# Patient Record
Sex: MALE | Race: WHITE | NOT HISPANIC OR LATINO | Employment: OTHER | URBAN - METROPOLITAN AREA
[De-identification: names, ages, dates, MRNs, and addresses within clinical notes are randomized per-mention and may not be internally consistent; named-entity substitution may affect disease eponyms.]

---

## 2017-01-02 ENCOUNTER — HOME CARE VISIT (OUTPATIENT)
Dept: HOME HEALTH SERVICES | Facility: HOME HEALTHCARE | Age: 57
End: 2017-01-02
Payer: MEDICAID

## 2017-01-02 VITALS — TEMPERATURE: 98.2 F | HEART RATE: 76 BPM | RESPIRATION RATE: 18 BRPM

## 2017-01-02 PROCEDURE — G0299 HHS/HOSPICE OF RN EA 15 MIN: HCPCS

## 2017-01-02 SDOH — ECONOMIC STABILITY: HOUSING INSECURITY: UNSAFE APPLIANCES: 0

## 2017-01-02 SDOH — ECONOMIC STABILITY: HOUSING INSECURITY: UNSAFE COOKING RANGE AREA: 0

## 2017-01-02 ASSESSMENT — ENCOUNTER SYMPTOMS
NAUSEA: DENIES
VOMITING: DENIES

## 2017-01-03 ENCOUNTER — HOME CARE VISIT (OUTPATIENT)
Dept: HOME HEALTH SERVICES | Facility: HOME HEALTHCARE | Age: 57
End: 2017-01-03
Payer: MEDICAID

## 2017-01-03 VITALS — TEMPERATURE: 98.5 F | DIASTOLIC BLOOD PRESSURE: 80 MMHG | SYSTOLIC BLOOD PRESSURE: 130 MMHG | HEART RATE: 70 BPM

## 2017-01-03 PROCEDURE — G0157 HHC PT ASSISTANT EA 15: HCPCS

## 2017-01-04 ENCOUNTER — HOME CARE VISIT (OUTPATIENT)
Dept: HOME HEALTH SERVICES | Facility: HOME HEALTHCARE | Age: 57
End: 2017-01-04
Payer: MEDICAID

## 2017-01-04 PROCEDURE — G0299 HHS/HOSPICE OF RN EA 15 MIN: HCPCS

## 2017-01-06 ENCOUNTER — HOME CARE VISIT (OUTPATIENT)
Dept: HOME HEALTH SERVICES | Facility: HOME HEALTHCARE | Age: 57
End: 2017-01-06
Payer: MEDICAID

## 2017-01-06 PROCEDURE — G0299 HHS/HOSPICE OF RN EA 15 MIN: HCPCS

## 2017-01-07 VITALS
DIASTOLIC BLOOD PRESSURE: 90 MMHG | RESPIRATION RATE: 18 BRPM | SYSTOLIC BLOOD PRESSURE: 160 MMHG | TEMPERATURE: 98.6 F | HEART RATE: 74 BPM

## 2017-01-07 ASSESSMENT — ENCOUNTER SYMPTOMS
VOMITING: DENIES
NAUSEA: DENIES

## 2017-01-08 ENCOUNTER — HOME CARE VISIT (OUTPATIENT)
Dept: HOME HEALTH SERVICES | Facility: HOME HEALTHCARE | Age: 57
End: 2017-01-08
Payer: MEDICAID

## 2017-01-08 VITALS
SYSTOLIC BLOOD PRESSURE: 140 MMHG | DIASTOLIC BLOOD PRESSURE: 90 MMHG | RESPIRATION RATE: 16 BRPM | HEART RATE: 89 BPM | TEMPERATURE: 98.6 F

## 2017-01-08 ASSESSMENT — ENCOUNTER SYMPTOMS
NAUSEA: DENIES
VOMITING: DENIES

## 2017-01-09 ENCOUNTER — HOME CARE VISIT (OUTPATIENT)
Dept: HOME HEALTH SERVICES | Facility: HOME HEALTHCARE | Age: 57
End: 2017-01-09
Payer: MEDICAID

## 2017-01-09 VITALS
RESPIRATION RATE: 14 BRPM | HEART RATE: 76 BPM | DIASTOLIC BLOOD PRESSURE: 82 MMHG | TEMPERATURE: 99.5 F | SYSTOLIC BLOOD PRESSURE: 130 MMHG

## 2017-01-09 PROCEDURE — G0299 HHS/HOSPICE OF RN EA 15 MIN: HCPCS

## 2017-01-09 PROCEDURE — A6214 FOAM DRG > 48 SQ IN W/BORDER: HCPCS

## 2017-01-09 PROCEDURE — A4385 OST SKN BARRIER SLD EXT WEAR: HCPCS

## 2017-01-09 PROCEDURE — A6253 ABSORPT DRG > 48 SQ IN W/O B: HCPCS

## 2017-01-09 SDOH — ECONOMIC STABILITY: HOUSING INSECURITY: UNSAFE COOKING RANGE AREA: 0

## 2017-01-09 SDOH — ECONOMIC STABILITY: HOUSING INSECURITY: UNSAFE APPLIANCES: 0

## 2017-01-09 SDOH — ECONOMIC STABILITY: HOUSING INSECURITY: HOME SAFETY: QUADRAPLEGIA

## 2017-01-09 ASSESSMENT — ENCOUNTER SYMPTOMS
NAUSEA: DENIES
VOMITING: DENIES

## 2017-01-10 ENCOUNTER — HOME CARE VISIT (OUTPATIENT)
Dept: HOME HEALTH SERVICES | Facility: HOME HEALTHCARE | Age: 57
End: 2017-01-10
Payer: MEDICAID

## 2017-01-10 ENCOUNTER — HOME CARE VISIT (OUTPATIENT)
Dept: HOME HEALTH SERVICES | Facility: HOME HEALTHCARE | Age: 57
End: 2017-01-10

## 2017-01-10 SDOH — ECONOMIC STABILITY: HOUSING INSECURITY: UNSAFE APPLIANCES: 0

## 2017-01-10 SDOH — ECONOMIC STABILITY: HOUSING INSECURITY: UNSAFE COOKING RANGE AREA: 0

## 2017-01-11 ENCOUNTER — HOME CARE VISIT (OUTPATIENT)
Dept: HOME HEALTH SERVICES | Facility: HOME HEALTHCARE | Age: 57
End: 2017-01-11
Payer: MEDICAID

## 2017-01-11 PROCEDURE — G0299 HHS/HOSPICE OF RN EA 15 MIN: HCPCS

## 2017-01-12 ENCOUNTER — HOME CARE VISIT (OUTPATIENT)
Dept: HOME HEALTH SERVICES | Facility: HOME HEALTHCARE | Age: 57
End: 2017-01-12
Payer: MEDICAID

## 2017-01-12 PROCEDURE — G0299 HHS/HOSPICE OF RN EA 15 MIN: HCPCS

## 2017-01-13 ENCOUNTER — HOME CARE VISIT (OUTPATIENT)
Dept: HOME HEALTH SERVICES | Facility: HOME HEALTHCARE | Age: 57
End: 2017-01-13
Payer: MEDICAID

## 2017-01-13 PROCEDURE — G0299 HHS/HOSPICE OF RN EA 15 MIN: HCPCS

## 2017-01-14 VITALS — HEART RATE: 95 BPM | TEMPERATURE: 98.8 F | RESPIRATION RATE: 18 BRPM

## 2017-01-14 ASSESSMENT — ENCOUNTER SYMPTOMS
VOMITING: DENIES
NAUSEA: DENIES

## 2017-01-15 VITALS — HEART RATE: 96 BPM | RESPIRATION RATE: 20 BRPM | TEMPERATURE: 98.2 F

## 2017-01-15 VITALS — TEMPERATURE: 98.6 F | RESPIRATION RATE: 18 BRPM | HEART RATE: 96 BPM

## 2017-01-15 ASSESSMENT — ENCOUNTER SYMPTOMS
DEPRESSED MOOD: 1
VOMITING: DENIES
NAUSEA: DENIES
VOMITING: DENIES
NAUSEA: DENIES

## 2017-01-16 ENCOUNTER — HOME CARE VISIT (OUTPATIENT)
Dept: HOME HEALTH SERVICES | Facility: HOME HEALTHCARE | Age: 57
End: 2017-01-16
Payer: MEDICAID

## 2017-01-16 PROCEDURE — G0299 HHS/HOSPICE OF RN EA 15 MIN: HCPCS

## 2017-01-17 VITALS — RESPIRATION RATE: 16 BRPM | TEMPERATURE: 98.6 F | HEART RATE: 96 BPM

## 2017-01-17 ASSESSMENT — ENCOUNTER SYMPTOMS
VOMITING: DENIES
NAUSEA: DENIES

## 2017-01-18 ENCOUNTER — HOME CARE VISIT (OUTPATIENT)
Dept: HOME HEALTH SERVICES | Facility: HOME HEALTHCARE | Age: 57
End: 2017-01-18
Payer: MEDICAID

## 2017-01-18 PROCEDURE — G0299 HHS/HOSPICE OF RN EA 15 MIN: HCPCS

## 2017-01-20 ENCOUNTER — HOME CARE VISIT (OUTPATIENT)
Dept: HOME HEALTH SERVICES | Facility: HOME HEALTHCARE | Age: 57
End: 2017-01-20
Payer: MEDICAID

## 2017-01-20 PROCEDURE — G0299 HHS/HOSPICE OF RN EA 15 MIN: HCPCS

## 2017-01-23 ENCOUNTER — HOME CARE VISIT (OUTPATIENT)
Dept: HOME HEALTH SERVICES | Facility: HOME HEALTHCARE | Age: 57
End: 2017-01-23
Payer: MEDICAID

## 2017-01-23 PROCEDURE — G0299 HHS/HOSPICE OF RN EA 15 MIN: HCPCS

## 2017-01-24 VITALS — TEMPERATURE: 98.6 F | RESPIRATION RATE: 16 BRPM | HEART RATE: 96 BPM

## 2017-01-24 ASSESSMENT — ENCOUNTER SYMPTOMS
NAUSEA: DENIES
VOMITING: DENIES

## 2017-01-25 ENCOUNTER — HOME CARE VISIT (OUTPATIENT)
Dept: HOME HEALTH SERVICES | Facility: HOME HEALTHCARE | Age: 57
End: 2017-01-25
Payer: MEDICAID

## 2017-01-25 VITALS
DIASTOLIC BLOOD PRESSURE: 70 MMHG | TEMPERATURE: 98.6 F | HEART RATE: 96 BPM | RESPIRATION RATE: 17 BRPM | SYSTOLIC BLOOD PRESSURE: 110 MMHG

## 2017-01-25 VITALS — RESPIRATION RATE: 20 BRPM | TEMPERATURE: 97.7 F | HEART RATE: 93 BPM

## 2017-01-25 ASSESSMENT — ENCOUNTER SYMPTOMS
VOMITING: DENIES
NAUSEA: DENIES
VOMITING: DENIES
NAUSEA: DENIES

## 2017-01-27 ENCOUNTER — HOME CARE VISIT (OUTPATIENT)
Dept: HOME HEALTH SERVICES | Facility: HOME HEALTHCARE | Age: 57
End: 2017-01-27
Payer: MEDICAID

## 2017-01-27 VITALS
HEART RATE: 100 BPM | SYSTOLIC BLOOD PRESSURE: 100 MMHG | TEMPERATURE: 98.2 F | RESPIRATION RATE: 18 BRPM | DIASTOLIC BLOOD PRESSURE: 60 MMHG

## 2017-01-27 PROCEDURE — A4385 OST SKN BARRIER SLD EXT WEAR: HCPCS

## 2017-01-27 PROCEDURE — G0299 HHS/HOSPICE OF RN EA 15 MIN: HCPCS

## 2017-01-27 SDOH — ECONOMIC STABILITY: HOUSING INSECURITY: UNSAFE APPLIANCES: 0

## 2017-01-27 SDOH — ECONOMIC STABILITY: HOUSING INSECURITY: UNSAFE COOKING RANGE AREA: 0

## 2017-01-27 ASSESSMENT — ENCOUNTER SYMPTOMS
NAUSEA: DENIES
VOMITING: DENIES
MENTAL STATUS CHANGE: 0

## 2017-01-27 ASSESSMENT — ACTIVITIES OF DAILY LIVING (ADL): TRANSPORTATION COMMENTS: QUADRIPLEGIA

## 2017-01-30 ENCOUNTER — HOME CARE VISIT (OUTPATIENT)
Dept: HOME HEALTH SERVICES | Facility: HOME HEALTHCARE | Age: 57
End: 2017-01-30
Payer: MEDICAID

## 2017-01-30 VITALS
RESPIRATION RATE: 18 BRPM | HEART RATE: 72 BPM | TEMPERATURE: 98.9 F | DIASTOLIC BLOOD PRESSURE: 80 MMHG | SYSTOLIC BLOOD PRESSURE: 120 MMHG

## 2017-01-30 PROCEDURE — A6214 FOAM DRG > 48 SQ IN W/BORDER: HCPCS

## 2017-01-30 PROCEDURE — G0299 HHS/HOSPICE OF RN EA 15 MIN: HCPCS

## 2017-01-30 SDOH — ECONOMIC STABILITY: HOUSING INSECURITY: UNSAFE COOKING RANGE AREA: 0

## 2017-01-30 SDOH — ECONOMIC STABILITY: HOUSING INSECURITY: UNSAFE APPLIANCES: 0

## 2017-01-30 ASSESSMENT — ENCOUNTER SYMPTOMS
NAUSEA: DENIES
VOMITING: DENIES

## 2017-02-02 ENCOUNTER — HOME CARE VISIT (OUTPATIENT)
Dept: HOME HEALTH SERVICES | Facility: HOME HEALTHCARE | Age: 57
End: 2017-02-02
Payer: MEDICAID

## 2017-02-02 VITALS
HEART RATE: 88 BPM | RESPIRATION RATE: 18 BRPM | SYSTOLIC BLOOD PRESSURE: 100 MMHG | DIASTOLIC BLOOD PRESSURE: 70 MMHG | TEMPERATURE: 98.5 F

## 2017-02-02 PROCEDURE — G0299 HHS/HOSPICE OF RN EA 15 MIN: HCPCS

## 2017-02-02 PROCEDURE — A6441 PAD BAND W>=3" <5"/YD: HCPCS

## 2017-02-02 PROCEDURE — A6214 FOAM DRG > 48 SQ IN W/BORDER: HCPCS

## 2017-02-02 PROCEDURE — A6216 NON-STERILE GAUZE<=16 SQ IN: HCPCS

## 2017-02-02 SDOH — ECONOMIC STABILITY: HOUSING INSECURITY: UNSAFE COOKING RANGE AREA: 0

## 2017-02-02 SDOH — ECONOMIC STABILITY: HOUSING INSECURITY: UNSAFE APPLIANCES: 0

## 2017-02-02 ASSESSMENT — ENCOUNTER SYMPTOMS
VOMITING: DENIES
NAUSEA: DENIES

## 2017-02-03 ENCOUNTER — HOME CARE VISIT (OUTPATIENT)
Dept: HOME HEALTH SERVICES | Facility: HOME HEALTHCARE | Age: 57
End: 2017-02-03
Payer: MEDICAID

## 2017-02-07 ENCOUNTER — HOME CARE VISIT (OUTPATIENT)
Dept: HOME HEALTH SERVICES | Facility: HOME HEALTHCARE | Age: 57
End: 2017-02-07
Payer: MEDICAID

## 2017-02-08 ENCOUNTER — HOME CARE VISIT (OUTPATIENT)
Dept: HOME HEALTH SERVICES | Facility: HOME HEALTHCARE | Age: 57
End: 2017-02-08
Payer: MEDICAID

## 2017-02-09 ENCOUNTER — HOME CARE VISIT (OUTPATIENT)
Dept: HOME HEALTH SERVICES | Facility: HOME HEALTHCARE | Age: 57
End: 2017-02-09
Payer: MEDICAID

## 2017-02-09 VITALS
RESPIRATION RATE: 18 BRPM | SYSTOLIC BLOOD PRESSURE: 140 MMHG | DIASTOLIC BLOOD PRESSURE: 80 MMHG | TEMPERATURE: 208.8 F | HEART RATE: 98 BPM

## 2017-02-09 PROCEDURE — A6214 FOAM DRG > 48 SQ IN W/BORDER: HCPCS

## 2017-02-09 PROCEDURE — G0299 HHS/HOSPICE OF RN EA 15 MIN: HCPCS

## 2017-02-09 SDOH — ECONOMIC STABILITY: HOUSING INSECURITY: UNSAFE APPLIANCES: 0

## 2017-02-09 SDOH — ECONOMIC STABILITY: HOUSING INSECURITY: UNSAFE COOKING RANGE AREA: 0

## 2017-02-09 ASSESSMENT — ENCOUNTER SYMPTOMS
NAUSEA: DENIES
VOMITING: DENIES

## 2017-02-14 ENCOUNTER — HOME CARE VISIT (OUTPATIENT)
Dept: HOME HEALTH SERVICES | Facility: HOME HEALTHCARE | Age: 57
End: 2017-02-14
Payer: MEDICAID

## 2017-02-14 VITALS
RESPIRATION RATE: 16 BRPM | TEMPERATURE: 98.4 F | HEART RATE: 80 BPM | SYSTOLIC BLOOD PRESSURE: 130 MMHG | DIASTOLIC BLOOD PRESSURE: 80 MMHG

## 2017-02-14 PROCEDURE — A6214 FOAM DRG > 48 SQ IN W/BORDER: HCPCS

## 2017-02-14 PROCEDURE — G0299 HHS/HOSPICE OF RN EA 15 MIN: HCPCS

## 2017-02-14 SDOH — ECONOMIC STABILITY: HOUSING INSECURITY: UNSAFE COOKING RANGE AREA: 0

## 2017-02-14 SDOH — ECONOMIC STABILITY: HOUSING INSECURITY: UNSAFE APPLIANCES: 0

## 2017-02-14 SDOH — ECONOMIC STABILITY: HOUSING INSECURITY: HOME SAFETY: QUADRIPLEGIA. LIVES WITH FAMILY. CAREGIVER IN AM SIX DAYS A WEEK.

## 2017-02-14 ASSESSMENT — ENCOUNTER SYMPTOMS
NAUSEA: DENIES
VOMITING: DENIES

## 2017-02-15 ENCOUNTER — HOME CARE VISIT (OUTPATIENT)
Dept: HOME HEALTH SERVICES | Facility: HOME HEALTHCARE | Age: 57
End: 2017-02-15
Payer: MEDICAID

## 2017-02-16 ENCOUNTER — HOME CARE VISIT (OUTPATIENT)
Dept: HOME HEALTH SERVICES | Facility: HOME HEALTHCARE | Age: 57
End: 2017-02-16
Payer: MEDICAID

## 2017-02-16 ENCOUNTER — HOME CARE VISIT (OUTPATIENT)
Dept: HOME HEALTH SERVICES | Facility: HOME HEALTHCARE | Age: 57
End: 2017-02-16

## 2017-02-16 PROCEDURE — A6441 PAD BAND W>=3" <5"/YD: HCPCS

## 2017-02-16 PROCEDURE — A4385 OST SKN BARRIER SLD EXT WEAR: HCPCS

## 2017-02-16 PROCEDURE — A6250 SKIN SEAL PROTECT MOISTURIZR: HCPCS

## 2017-02-16 PROCEDURE — G0162 HHC RN E&M PLAN SVS, 15 MIN: HCPCS

## 2017-02-16 PROCEDURE — A4554 DISPOSABLE UNDERPADS: HCPCS

## 2017-02-16 PROCEDURE — A6214 FOAM DRG > 48 SQ IN W/BORDER: HCPCS

## 2017-02-17 VITALS
DIASTOLIC BLOOD PRESSURE: 80 MMHG | TEMPERATURE: 98.1 F | SYSTOLIC BLOOD PRESSURE: 110 MMHG | RESPIRATION RATE: 16 BRPM | HEART RATE: 72 BPM

## 2017-02-17 SDOH — ECONOMIC STABILITY: HOUSING INSECURITY: HOME SAFETY: LIVES WITH BROTHER AND FAMILY.

## 2017-02-17 SDOH — ECONOMIC STABILITY: HOUSING INSECURITY: UNSAFE APPLIANCES: 0

## 2017-02-17 SDOH — ECONOMIC STABILITY: HOUSING INSECURITY: UNSAFE COOKING RANGE AREA: 0

## 2017-02-17 ASSESSMENT — ACTIVITIES OF DAILY LIVING (ADL)
HOME_HEALTH_OASIS: 01
OASIS_M1830: 06

## 2017-03-06 ENCOUNTER — RESOLUTE PROFESSIONAL BILLING HOSPITAL PROF FEE (OUTPATIENT)
Dept: HOSPITALIST | Facility: MEDICAL CENTER | Age: 57
End: 2017-03-06
Payer: MEDICAID

## 2017-03-06 ENCOUNTER — HOSPITAL ENCOUNTER (INPATIENT)
Facility: MEDICAL CENTER | Age: 57
LOS: 11 days | DRG: 871 | End: 2017-03-17
Attending: EMERGENCY MEDICINE | Admitting: INTERNAL MEDICINE
Payer: MEDICAID

## 2017-03-06 ENCOUNTER — APPOINTMENT (OUTPATIENT)
Dept: RADIOLOGY | Facility: MEDICAL CENTER | Age: 57
DRG: 871 | End: 2017-03-06
Attending: EMERGENCY MEDICINE
Payer: MEDICAID

## 2017-03-06 DIAGNOSIS — N10 ACUTE PYELONEPHRITIS: ICD-10-CM

## 2017-03-06 DIAGNOSIS — A41.9 SEPSIS, DUE TO UNSPECIFIED ORGANISM: ICD-10-CM

## 2017-03-06 PROBLEM — R07.9 CHEST PAIN: Status: ACTIVE | Noted: 2017-03-06

## 2017-03-06 PROBLEM — J18.9 HCAP (HEALTHCARE-ASSOCIATED PNEUMONIA): Status: ACTIVE | Noted: 2017-03-06

## 2017-03-06 LAB
ALBUMIN SERPL BCP-MCNC: 4.2 G/DL (ref 3.2–4.9)
ALBUMIN/GLOB SERPL: 0.8 G/DL
ALP SERPL-CCNC: 87 U/L (ref 30–99)
ALT SERPL-CCNC: 15 U/L (ref 2–50)
ANION GAP SERPL CALC-SCNC: 18 MMOL/L (ref 0–11.9)
APPEARANCE UR: ABNORMAL
APTT PPP: 32.3 SEC (ref 24.7–36)
APTT PPP: 33.2 SEC (ref 24.7–36)
AST SERPL-CCNC: 17 U/L (ref 12–45)
BACTERIA #/AREA URNS HPF: ABNORMAL /HPF
BASE EXCESS BLDV CALC-SCNC: -5 MMOL/L
BASOPHILS # BLD AUTO: 0.4 % (ref 0–1.8)
BASOPHILS # BLD: 0.06 K/UL (ref 0–0.12)
BILIRUB SERPL-MCNC: 0.5 MG/DL (ref 0.1–1.5)
BILIRUB UR QL STRIP.AUTO: ABNORMAL
BNP SERPL-MCNC: 35 PG/ML (ref 0–100)
BODY TEMPERATURE: ABNORMAL CENTIGRADE
BUN SERPL-MCNC: 31 MG/DL (ref 8–22)
CALCIUM SERPL-MCNC: 10.3 MG/DL (ref 8.5–10.5)
CHLORIDE SERPL-SCNC: 101 MMOL/L (ref 96–112)
CO2 SERPL-SCNC: 18 MMOL/L (ref 20–33)
COLOR UR: ABNORMAL
CREAT SERPL-MCNC: 0.92 MG/DL (ref 0.5–1.4)
EKG IMPRESSION: NORMAL
EKG IMPRESSION: NORMAL
EOSINOPHIL # BLD AUTO: 0 K/UL (ref 0–0.51)
EOSINOPHIL NFR BLD: 0 % (ref 0–6.9)
ERYTHROCYTE [DISTWIDTH] IN BLOOD BY AUTOMATED COUNT: 51.7 FL (ref 35.9–50)
GFR SERPL CREATININE-BSD FRML MDRD: >60 ML/MIN/1.73 M 2
GLOBULIN SER CALC-MCNC: 5 G/DL (ref 1.9–3.5)
GLUCOSE SERPL-MCNC: 136 MG/DL (ref 65–99)
GLUCOSE UR STRIP.AUTO-MCNC: NEGATIVE MG/DL
HCO3 BLDV-SCNC: 19 MMOL/L (ref 24–28)
HCT VFR BLD AUTO: 47.2 % (ref 42–52)
HGB BLD-MCNC: 14.6 G/DL (ref 14–18)
IMM GRANULOCYTES # BLD AUTO: 0.06 K/UL (ref 0–0.11)
IMM GRANULOCYTES NFR BLD AUTO: 0.4 % (ref 0–0.9)
INR PPP: 1.03 (ref 0.87–1.13)
INR PPP: 1.04 (ref 0.87–1.13)
KETONES UR STRIP.AUTO-MCNC: ABNORMAL MG/DL
LACTATE BLD-SCNC: 1.4 MMOL/L (ref 0.5–2)
LACTATE BLD-SCNC: 1.6 MMOL/L (ref 0.5–2)
LACTATE BLD-SCNC: 2.2 MMOL/L (ref 0.5–2)
LEUKOCYTE ESTERASE UR QL STRIP.AUTO: ABNORMAL
LYMPHOCYTES # BLD AUTO: 0.67 K/UL (ref 1–4.8)
LYMPHOCYTES NFR BLD: 4.2 % (ref 22–41)
MCH RBC QN AUTO: 23.2 PG (ref 27–33)
MCHC RBC AUTO-ENTMCNC: 30.9 G/DL (ref 33.7–35.3)
MCV RBC AUTO: 75.2 FL (ref 81.4–97.8)
MICRO URNS: ABNORMAL
MONOCYTES # BLD AUTO: 0.74 K/UL (ref 0–0.85)
MONOCYTES NFR BLD AUTO: 4.7 % (ref 0–13.4)
NEUTROPHILS # BLD AUTO: 14.3 K/UL (ref 1.82–7.42)
NEUTROPHILS NFR BLD: 90.3 % (ref 44–72)
NITRITE UR QL STRIP.AUTO: NEGATIVE
NRBC # BLD AUTO: 0 K/UL
NRBC BLD AUTO-RTO: 0 /100 WBC
PCO2 BLDV: 29.5 MMHG (ref 41–51)
PH BLDV: 7.42 [PH] (ref 7.31–7.45)
PH UR STRIP.AUTO: 8 [PH]
PLATELET # BLD AUTO: 552 K/UL (ref 164–446)
PMV BLD AUTO: 8.9 FL (ref 9–12.9)
PO2 BLDV: 54.1 MMHG (ref 25–40)
POTASSIUM SERPL-SCNC: 4.1 MMOL/L (ref 3.6–5.5)
PROT SERPL-MCNC: 9.2 G/DL (ref 6–8.2)
PROT UR QL STRIP: 600 MG/DL
PROTHROMBIN TIME: 13.8 SEC (ref 12–14.6)
PROTHROMBIN TIME: 13.9 SEC (ref 12–14.6)
RBC # BLD AUTO: 6.28 M/UL (ref 4.7–6.1)
RBC # URNS HPF: ABNORMAL /HPF
RBC UR QL AUTO: ABNORMAL
SAO2 % BLDV: 88.2 %
SODIUM SERPL-SCNC: 137 MMOL/L (ref 135–145)
SP GR UR STRIP.AUTO: 1.01
TRI-PHOS CRY #/AREA URNS HPF: ABNORMAL /HPF
TROPONIN I SERPL-MCNC: <0.01 NG/ML (ref 0–0.04)
WBC # BLD AUTO: 15.8 K/UL (ref 4.8–10.8)
WBC #/AREA URNS HPF: ABNORMAL /HPF

## 2017-03-06 PROCEDURE — 94760 N-INVAS EAR/PLS OXIMETRY 1: CPT

## 2017-03-06 PROCEDURE — 84484 ASSAY OF TROPONIN QUANT: CPT

## 2017-03-06 PROCEDURE — A9270 NON-COVERED ITEM OR SERVICE: HCPCS | Performed by: INTERNAL MEDICINE

## 2017-03-06 PROCEDURE — 93010 ELECTROCARDIOGRAM REPORT: CPT | Mod: 77 | Performed by: INTERNAL MEDICINE

## 2017-03-06 PROCEDURE — 700105 HCHG RX REV CODE 258: Performed by: EMERGENCY MEDICINE

## 2017-03-06 PROCEDURE — 700111 HCHG RX REV CODE 636 W/ 250 OVERRIDE (IP): Performed by: EMERGENCY MEDICINE

## 2017-03-06 PROCEDURE — 80053 COMPREHEN METABOLIC PANEL: CPT

## 2017-03-06 PROCEDURE — 83605 ASSAY OF LACTIC ACID: CPT

## 2017-03-06 PROCEDURE — 96361 HYDRATE IV INFUSION ADD-ON: CPT

## 2017-03-06 PROCEDURE — 93005 ELECTROCARDIOGRAM TRACING: CPT | Performed by: EMERGENCY MEDICINE

## 2017-03-06 PROCEDURE — 96365 THER/PROPH/DIAG IV INF INIT: CPT

## 2017-03-06 PROCEDURE — 94640 AIRWAY INHALATION TREATMENT: CPT

## 2017-03-06 PROCEDURE — 87040 BLOOD CULTURE FOR BACTERIA: CPT

## 2017-03-06 PROCEDURE — 700105 HCHG RX REV CODE 258: Performed by: INTERNAL MEDICINE

## 2017-03-06 PROCEDURE — 770020 HCHG ROOM/CARE - TELE (206)

## 2017-03-06 PROCEDURE — 700111 HCHG RX REV CODE 636 W/ 250 OVERRIDE (IP): Performed by: INTERNAL MEDICINE

## 2017-03-06 PROCEDURE — 94667 MNPJ CHEST WALL 1ST: CPT

## 2017-03-06 PROCEDURE — 87086 URINE CULTURE/COLONY COUNT: CPT

## 2017-03-06 PROCEDURE — 85025 COMPLETE CBC W/AUTO DIFF WBC: CPT

## 2017-03-06 PROCEDURE — 94668 MNPJ CHEST WALL SBSQ: CPT

## 2017-03-06 PROCEDURE — 99223 1ST HOSP IP/OBS HIGH 75: CPT | Performed by: INTERNAL MEDICINE

## 2017-03-06 PROCEDURE — 71010 DX-CHEST-PORTABLE (1 VIEW): CPT

## 2017-03-06 PROCEDURE — 85730 THROMBOPLASTIN TIME PARTIAL: CPT

## 2017-03-06 PROCEDURE — 81001 URINALYSIS AUTO W/SCOPE: CPT

## 2017-03-06 PROCEDURE — 302146: Performed by: INTERNAL MEDICINE

## 2017-03-06 PROCEDURE — 94669 MECHANICAL CHEST WALL OSCILL: CPT

## 2017-03-06 PROCEDURE — 85610 PROTHROMBIN TIME: CPT

## 2017-03-06 PROCEDURE — 36415 COLL VENOUS BLD VENIPUNCTURE: CPT

## 2017-03-06 PROCEDURE — 93010 ELECTROCARDIOGRAM REPORT: CPT | Performed by: INTERNAL MEDICINE

## 2017-03-06 PROCEDURE — 93005 ELECTROCARDIOGRAM TRACING: CPT | Performed by: INTERNAL MEDICINE

## 2017-03-06 PROCEDURE — 96375 TX/PRO/DX INJ NEW DRUG ADDON: CPT

## 2017-03-06 PROCEDURE — 96367 TX/PROPH/DG ADDL SEQ IV INF: CPT

## 2017-03-06 PROCEDURE — 99285 EMERGENCY DEPT VISIT HI MDM: CPT

## 2017-03-06 PROCEDURE — 700102 HCHG RX REV CODE 250 W/ 637 OVERRIDE(OP): Performed by: INTERNAL MEDICINE

## 2017-03-06 PROCEDURE — 700101 HCHG RX REV CODE 250: Performed by: INTERNAL MEDICINE

## 2017-03-06 PROCEDURE — 82803 BLOOD GASES ANY COMBINATION: CPT

## 2017-03-06 PROCEDURE — 700111 HCHG RX REV CODE 636 W/ 250 OVERRIDE (IP)

## 2017-03-06 PROCEDURE — 700105 HCHG RX REV CODE 258

## 2017-03-06 PROCEDURE — 83880 ASSAY OF NATRIURETIC PEPTIDE: CPT

## 2017-03-06 RX ORDER — SODIUM CHLORIDE 9 MG/ML
30 INJECTION, SOLUTION INTRAVENOUS ONCE
Status: COMPLETED | OUTPATIENT
Start: 2017-03-06 | End: 2017-03-06

## 2017-03-06 RX ORDER — LORAZEPAM 0.5 MG/1
0.5 TABLET ORAL EVERY 4 HOURS PRN
Status: DISCONTINUED | OUTPATIENT
Start: 2017-03-06 | End: 2017-03-06

## 2017-03-06 RX ORDER — LORAZEPAM 0.5 MG/1
0.5 TABLET ORAL ONCE
Status: COMPLETED | OUTPATIENT
Start: 2017-03-06 | End: 2017-03-06

## 2017-03-06 RX ORDER — BENZONATATE 100 MG/1
100 CAPSULE ORAL 3 TIMES DAILY PRN
Status: DISCONTINUED | OUTPATIENT
Start: 2017-03-06 | End: 2017-03-17 | Stop reason: HOSPADM

## 2017-03-06 RX ORDER — ONDANSETRON 2 MG/ML
4 INJECTION INTRAMUSCULAR; INTRAVENOUS EVERY 4 HOURS PRN
Status: DISCONTINUED | OUTPATIENT
Start: 2017-03-06 | End: 2017-03-17 | Stop reason: HOSPADM

## 2017-03-06 RX ORDER — AMOXICILLIN 250 MG
2 CAPSULE ORAL 2 TIMES DAILY
Status: DISCONTINUED | OUTPATIENT
Start: 2017-03-06 | End: 2017-03-17 | Stop reason: HOSPADM

## 2017-03-06 RX ORDER — SODIUM CHLORIDE 9 MG/ML
INJECTION, SOLUTION INTRAVENOUS CONTINUOUS
Status: DISPENSED | OUTPATIENT
Start: 2017-03-06 | End: 2017-03-07

## 2017-03-06 RX ORDER — ASCORBIC ACID 500 MG
500 TABLET ORAL 2 TIMES DAILY
Status: DISCONTINUED | OUTPATIENT
Start: 2017-03-06 | End: 2017-03-06

## 2017-03-06 RX ORDER — MIDODRINE HYDROCHLORIDE 5 MG/1
10 TABLET ORAL
Status: DISCONTINUED | OUTPATIENT
Start: 2017-03-06 | End: 2017-03-17 | Stop reason: HOSPADM

## 2017-03-06 RX ORDER — SODIUM CHLORIDE 9 MG/ML
500 INJECTION, SOLUTION INTRAVENOUS
Status: DISCONTINUED | OUTPATIENT
Start: 2017-03-06 | End: 2017-03-13

## 2017-03-06 RX ORDER — ONDANSETRON 4 MG/1
4 TABLET, ORALLY DISINTEGRATING ORAL EVERY 4 HOURS PRN
Status: DISCONTINUED | OUTPATIENT
Start: 2017-03-06 | End: 2017-03-17 | Stop reason: HOSPADM

## 2017-03-06 RX ORDER — TRAMADOL HYDROCHLORIDE 50 MG/1
50 TABLET ORAL EVERY 4 HOURS PRN
Status: DISCONTINUED | OUTPATIENT
Start: 2017-03-06 | End: 2017-03-17 | Stop reason: HOSPADM

## 2017-03-06 RX ORDER — PROMETHAZINE HYDROCHLORIDE 25 MG/1
12.5-25 SUPPOSITORY RECTAL EVERY 4 HOURS PRN
Status: DISCONTINUED | OUTPATIENT
Start: 2017-03-06 | End: 2017-03-17 | Stop reason: HOSPADM

## 2017-03-06 RX ORDER — BISACODYL 10 MG
10 SUPPOSITORY, RECTAL RECTAL
Status: DISCONTINUED | OUTPATIENT
Start: 2017-03-06 | End: 2017-03-17 | Stop reason: HOSPADM

## 2017-03-06 RX ORDER — PROMETHAZINE HYDROCHLORIDE 25 MG/1
12.5-25 TABLET ORAL EVERY 4 HOURS PRN
Status: DISCONTINUED | OUTPATIENT
Start: 2017-03-06 | End: 2017-03-17 | Stop reason: HOSPADM

## 2017-03-06 RX ORDER — IPRATROPIUM BROMIDE AND ALBUTEROL SULFATE 2.5; .5 MG/3ML; MG/3ML
3 SOLUTION RESPIRATORY (INHALATION)
Status: DISCONTINUED | OUTPATIENT
Start: 2017-03-06 | End: 2017-03-07

## 2017-03-06 RX ORDER — GABAPENTIN 300 MG/1
900 CAPSULE ORAL 3 TIMES DAILY
Status: DISCONTINUED | OUTPATIENT
Start: 2017-03-06 | End: 2017-03-17 | Stop reason: HOSPADM

## 2017-03-06 RX ORDER — SODIUM CHLORIDE 9 MG/ML
30 INJECTION, SOLUTION INTRAVENOUS
Status: DISCONTINUED | OUTPATIENT
Start: 2017-03-06 | End: 2017-03-13

## 2017-03-06 RX ORDER — POLYETHYLENE GLYCOL 3350 17 G/17G
1 POWDER, FOR SOLUTION ORAL
Status: DISCONTINUED | OUTPATIENT
Start: 2017-03-06 | End: 2017-03-17 | Stop reason: HOSPADM

## 2017-03-06 RX ORDER — HEPARIN SODIUM 5000 [USP'U]/ML
5000 INJECTION, SOLUTION INTRAVENOUS; SUBCUTANEOUS EVERY 8 HOURS
Status: DISCONTINUED | OUTPATIENT
Start: 2017-03-06 | End: 2017-03-17 | Stop reason: HOSPADM

## 2017-03-06 RX ORDER — LORAZEPAM 1 MG/1
0.5 TABLET ORAL EVERY 6 HOURS PRN
Status: DISCONTINUED | OUTPATIENT
Start: 2017-03-06 | End: 2017-03-17 | Stop reason: HOSPADM

## 2017-03-06 RX ADMIN — TRAMADOL HYDROCHLORIDE 50 MG: 50 TABLET, COATED ORAL at 20:21

## 2017-03-06 RX ADMIN — SODIUM CHLORIDE 1959 ML: 9 INJECTION, SOLUTION INTRAVENOUS at 10:32

## 2017-03-06 RX ADMIN — GABAPENTIN 900 MG: 300 CAPSULE ORAL at 18:19

## 2017-03-06 RX ADMIN — HEPARIN SODIUM 5000 UNITS: 5000 INJECTION, SOLUTION INTRAVENOUS; SUBCUTANEOUS at 14:53

## 2017-03-06 RX ADMIN — MEROPENEM 500 MG: 500 INJECTION, POWDER, FOR SOLUTION INTRAVENOUS at 18:20

## 2017-03-06 RX ADMIN — BENZONATATE 100 MG: 100 CAPSULE ORAL at 18:20

## 2017-03-06 RX ADMIN — STANDARDIZED SENNA CONCENTRATE AND DOCUSATE SODIUM 2 TABLET: 8.6; 5 TABLET, FILM COATED ORAL at 20:21

## 2017-03-06 RX ADMIN — LORAZEPAM 0.5 MG: 0.5 TABLET ORAL at 18:20

## 2017-03-06 RX ADMIN — ONDANSETRON 4 MG: 2 INJECTION, SOLUTION INTRAMUSCULAR; INTRAVENOUS at 12:45

## 2017-03-06 RX ADMIN — IPRATROPIUM BROMIDE AND ALBUTEROL SULFATE 3 ML: .5; 3 SOLUTION RESPIRATORY (INHALATION) at 16:41

## 2017-03-06 RX ADMIN — HEPARIN SODIUM 5000 UNITS: 5000 INJECTION, SOLUTION INTRAVENOUS; SUBCUTANEOUS at 20:22

## 2017-03-06 RX ADMIN — IPRATROPIUM BROMIDE AND ALBUTEROL SULFATE 3 ML: .5; 3 SOLUTION RESPIRATORY (INHALATION) at 19:44

## 2017-03-06 RX ADMIN — SODIUM CHLORIDE: 9 INJECTION, SOLUTION INTRAVENOUS at 13:45

## 2017-03-06 RX ADMIN — MEROPENEM 500 MG: 500 INJECTION, POWDER, FOR SOLUTION INTRAVENOUS at 11:15

## 2017-03-06 RX ADMIN — GABAPENTIN 900 MG: 300 CAPSULE ORAL at 20:21

## 2017-03-06 RX ADMIN — PROCHLORPERAZINE EDISYLATE 10 MG: 5 INJECTION INTRAMUSCULAR; INTRAVENOUS at 14:53

## 2017-03-06 RX ADMIN — LORAZEPAM 0.5 MG: 0.5 TABLET ORAL at 20:21

## 2017-03-06 RX ADMIN — VANCOMYCIN HYDROCHLORIDE 1600 MG: 10 INJECTION, POWDER, LYOPHILIZED, FOR SOLUTION INTRAVENOUS at 13:42

## 2017-03-06 ASSESSMENT — COPD QUESTIONNAIRES
COPD SCREENING SCORE: 1
DO YOU EVER COUGH UP ANY MUCUS OR PHLEGM?: NO/ONLY WITH OCCASIONAL COLDS OR INFECTIONS
HAVE YOU SMOKED AT LEAST 100 CIGARETTES IN YOUR ENTIRE LIFE: NO/DON'T KNOW
DURING THE PAST 4 WEEKS HOW MUCH DID YOU FEEL SHORT OF BREATH: NONE/LITTLE OF THE TIME

## 2017-03-06 ASSESSMENT — PAIN SCALES - GENERAL
PAINLEVEL_OUTOF10: 0

## 2017-03-06 ASSESSMENT — LIFESTYLE VARIABLES
EVER_SMOKED: NEVER
DO YOU DRINK ALCOHOL: NO

## 2017-03-06 NOTE — FLOWSHEET NOTE
03/06/17 1405   Events/Summary/Plan   Events/Summary/Plan PEP   Non-Invasive Resp Device Site Inspection Completed Intact   Interdisciplinary Plan of Care-Goals (Indications)   Obstructive Ventilatory Defect or Pulmonary Disease without Obvious Obstruction History / Diagnosis   Hyperinflation Protocol Indications Restrictive Lung Disorder / Consolidation   Education   Education Yes - Pt. / Family has been Instructed in use of Respiratory Equipment;Yes - Pt. / Family has been Instructed in use of Respiratory Medications and Adverse Reactions   PEP/CPT Group   PEP/CPT/Airway Clearance Therapy Yes   #PEP/CPT (Manual) Initial Initial   #CPT (Mechanical) Yes   PEP/CPT Method Positive Airway Pressure Device   CPT Settings Yes   Pressure 10   Date Disposable Equipment Last Changed 03/06/17   Date Disposable Equipment Next Change Due (Q 30 Days) 04/05/17   Chest Exam   Respiration 13   Pulse (!) 114   Heart Rate (Monitored) (!) 112   Oxygen   Pulse Oximetry 98 %   O2 (LPM) 0   O2 (FiO2) 21   O2 Daily Delivery Respiratory  Room Air with O2 Available

## 2017-03-06 NOTE — IP AVS SNAPSHOT
3/17/2017          Stevie Phoenix  [address] [place] [address]    Dear Stevie:    [facility] wants to ensure your discharge home is safe and you or your loved ones have had all your questions answered regarding your care after you leave the hospital.    You may receive a telephone call within two days of your discharge.  This call is to make certain you understand your discharge instructions as well as ensure we provided you with the best care possible during your stay with us.     The call will only last approximately 3-5 minutes and will be done by a nurse.    Once again, we want to ensure your discharge home is safe and that you have a clear understanding of any next steps in your care.  If you have any questions or concerns, please do not hesitate to contact us, we are here for you.  Thank you for choosing [facility] for your healthcare needs.    Sincerely,    Willie Cox    [facility]

## 2017-03-06 NOTE — PROGRESS NOTES
"Pharmacy Kinetics 56 y.o. male on vancomycin day # 1 3/6/2017    Vancomycin New Start  1600 mg loading dose ordered     Indication for Treatment:   Empiric for HCAP    Pertinent history per medical record:   Admitted on 3/6/2017 for complaints of generalized weakness and NV since yesterday.  Pt reports productive cough and chest congestion for some time prior to presenting to the ED.  He has a PMH significant for quadriplegia, urostomy, and recurrent UTIs and wound infections.  For this, he has previously received many courses of IV abx and is a patient of [facility].  Pt found to have hypotension and leukocytosis upon workup (although pt with autonomic dysfunction and thus low BP at baseline, on midodrine for this). Empiric abx have been initiated.     Imaging studies:   CXR 3/6 (ED): No acute cardiac or pulmonary abnormalities are identified.    Other antibiotics:   Merrem 500 mg iv q6h     Allergies:   Zosyn - not a true allergy, rxn NV     List concerns for renal function:   Quadriplegia/debility/low muscle mass, elevated BUN and SCr in comparison to baseline, chronic hypotension, lactic acidosis/hypermetabolic    Pertinent cultures to date:   2016 urine cx - EC, ESBL K.pneumo, Pseudomonas, E.faecalis  10/21/2016 urine cx - VRE (per [facility]ZMAN note, incidental finding, only 10-50K CFU/mL)    Many, many more wound and urine cxs positive     Recent Labs      17   1020   WBC  15.8*   NEUTSPOLYS  90.30*     Recent Labs      17   1020   BUN  31*   CREATININE  0.92   ALBUMIN  4.2     Blood pressure 66/44, pulse 107, temperature 37.5 °C (99.5 °F), resp. rate 17, height 1.905 m (6' 3\"), weight 65.318 kg (144 lb), SpO2 92 %. Temp (24hrs), Av.1 °C (98.7 °F), Min:36.6 °C (97.9 °F), Max:37.5 °C (99.5 °F)      A/P   1. Vancomycin dose change: initiate 1200 mg IV q12h ()   2. Next vancomycin level: 3/8 @ 1400 (not yet ordered)   3. Goal trough: 16-20  4. Comments: Patient with complicated " PMH presents to the ED with generalized symptoms.  He has previously been seen by [facility], would recommend consulting this group as they are familiar with this patient.  I have ordered an MRSA by PCR nares swab in order to help guide antimicrobial agent selection.  Per chart review, pt has achieved steady state trough values near goal range with this dosing regimen.  Recommend obtaining a trough level prior to the fifth total dose, once steady state has been achieved.  Would suggest trending renal indices in the interim as a surrogate of renal function.  Pharmacy will continue to monitor and adjust or de-escalate as appropriate.      Diane Rodrigez, LaylaD

## 2017-03-06 NOTE — IP AVS SNAPSHOT
" Home Care Instructions                                                                                                                  Name:Stevie Phoenix  Medical Record Number:[id number]  CSN: [id number]    YOB: 1960   Age: 56 y.o.  Sex: male  HT:1.905 m (6' 3\") WT: 73.2 kg (161 lb 6 oz)          Admit Date: 3/6/2017     Discharge Date:   Today's Date: 3/17/2017  Attending Doctor:  Shari Sanchez M.D.                  Allergies:  Zosyn            Discharge Instructions       Discharge Instructions    Discharged to home by car with relative. Discharged via wheelchair, hospital escort: Yes.  Special equipment needed: Not Applicable    Be sure to schedule a follow-up appointment with your primary care doctor or any specialists as instructed.     Discharge Plan:   Diet Plan: Discussed  Activity Level: Discussed  Confirmed Follow up Appointment: Patient to Call and Schedule Appointment  Confirmed Symptoms Management: Discussed  Medication Reconciliation Updated: Yes  Influenza Vaccine Indication: Not indicated: Previously immunized this influenza season and > 8 years of age    I understand that a diet low in cholesterol, fat, and sodium is recommended for good health. Unless I have been given specific instructions below for another diet, I accept this instruction as my diet prescription.   Other diet: Regular    Special Instructions:None    · Is patient discharged on Warfarin / Coumadin?   No     · Is patient Post Blood Transfusion?  No    Depression / Suicide Risk    As you are discharged from this [facility] facility, it is important to learn how to keep safe from harming yourself.    Recognize the warning signs:  · Abrupt changes in personality, positive or negative- including increase in energy   · Giving away possessions  · Change in eating patterns- significant weight changes-  positive or negative  · Change in sleeping patterns- unable to sleep or sleeping all the time   · Unwillingness or " inability to communicate  · Depression  · Unusual sadness, discouragement and loneliness  · Talk of wanting to die  · Neglect of personal appearance   · Rebelliousness- reckless behavior  · Withdrawal from people/activities they love  · Confusion- inability to concentrate     If you or a loved one observes any of these behaviors or has concerns about self-harm, here's what you can do:  · Talk about it- your feelings and reasons for harming yourself  · Remove any means that you might use to hurt yourself (examples: pills, rope, extension cords, firearm)  · Get professional help from the community (Mental Health, Substance Abuse, psychological counseling)  · Do not be alone:Call your Safe Contact- someone whom you trust who will be there for you.  · Call your local CRISIS HOTLINE 601-9061 or 173-369-4833  · Call your local Children's Mobile Crisis Response Team [place] (900) 066-0480 or www.snagajob.com  · Call the toll free National Suicide Prevention Hotlines   · National Suicide Prevention Lifeline 866-947-JNFV (0455)  · "[employer]," Line Network 800-SUICIDE (259-8204)    Chest Pain, Nonspecific  It is often hard to give a specific diagnosis for the cause of chest pain. There is always a chance that your pain could be related to something serious, like a heart attack or a blood clot in the lungs. You need to follow up with your caregiver for further evaluation. More lab tests or other studies such as X-rays, electrocardiography, stress testing, or cardiac imaging may be needed to find the cause of your pain.  Most of the time, nonspecific chest pain improves within 2 to 3 days with rest and mild pain medicine. For the next few days, avoid physical exertion or activities that bring on pain. Do not smoke. Avoid drinking alcohol. Call your caregiver for routine follow-up as advised.   SEEK IMMEDIATE MEDICAL CARE IF:  · You develop increased chest pain or pain that radiates to the arm, neck, jaw, back, or  abdomen.   · You develop shortness of breath, increased coughing, or you start coughing up blood.   · You have severe back or abdominal pain, nausea, or vomiting.   · You develop severe weakness, fainting, fever, or chills.   Document Released: 12/18/2006 Document Revised: 03/11/2013 Document Reviewed: 06/06/2008  ExitCare® Patient Information ©2013 [employer].    Chest Pain Observation  It is often hard to give a specific diagnosis for the cause of chest pain. Among other possibilities your symptoms might be caused by inadequate oxygen delivery to your heart (angina). Angina that is not treated or evaluated can lead to a heart attack (myocardial infarction) or death.  Blood tests, electrocardiograms, and X-rays may have been done to help determine a possible cause of your chest pain. After evaluation and observation, your health care provider has determined that it is unlikely your pain was caused by an unstable condition that requires hospitalization. However, a full evaluation of your pain may need to be completed, with additional diagnostic testing as directed. It is very important to keep your follow-up appointments. Not keeping your follow-up appointments could result in permanent heart damage, disability, or death. If there is any problem keeping your follow-up appointments, you must call your health care provider.  HOME CARE INSTRUCTIONS   Due to the slight chance that your pain could be angina, it is important to follow your health care provider's treatment plan and also maintain a healthy lifestyle:  · Maintain or work toward achieving a healthy weight.  · Stay physically active and exercise regularly.  · Decrease your salt intake.  · Eat a balanced, healthy diet. Talk to a dietitian to learn about heart-healthy foods.  · Increase your fiber intake by including whole grains, vegetables, fruits, and nuts in your diet.  · Avoid situations that cause stress, anger, or depression.  · Take medicines as  advised by your health care provider. Report any side effects to your health care provider. Do not stop medicines or adjust the dosages on your own.  · Quit smoking. Do not use nicotine patches or gum until you check with your health care provider.  · Keep your blood pressure, blood sugar, and cholesterol levels within normal limits.  · Limit alcohol intake to no more than 1 drink per day for women who are not pregnant and 2 drinks per day for men.  · Do not abuse drugs.  SEEK IMMEDIATE MEDICAL CARE IF:  You have severe chest pain or pressure which may include symptoms such as:  · You feel pain or pressure in your arms, neck, jaw, or back.  · You have severe back or abdominal pain, feel sick to your stomach (nauseous), or throw up (vomit).  · You are sweating profusely.  · You are having a fast or irregular heartbeat.  · You feel short of breath while at rest.  · You notice increasing shortness of breath during rest, sleep, or with activity.  · You have chest pain that does not get better after rest or after taking your usual medicine.  · You wake from sleep with chest pain.  · You are unable to sleep because you cannot breathe.  · You develop a frequent cough or you are coughing up blood.  · You feel dizzy, faint, or experience extreme fatigue.  · You develop severe weakness, dizziness, fainting, or chills.  Any of these symptoms may represent a serious problem that is an emergency. Do not wait to see if the symptoms will go away. Call your local emergency services (911 in the [place] Do not drive yourself to the hospital.  MAKE SURE YOU:  · Understand these instructions.  · Will watch your condition.  · Will get help right away if you are not doing well or get worse.     This information is not intended to replace advice given to you by your health care provider. Make sure you discuss any questions you have with your health care provider.     Document Released: 01/20/2012 Document Revised: 12/23/2014 Document  Reviewed: 06/19/2014  [employer] Interactive Patient Education ©2016 [employer]    Pneumonia, Adult  Pneumonia is an infection of the lungs.   CAUSES  Pneumonia may be caused by bacteria or a virus. Usually, the infection is caused by breathing in droplets from an infected person's cough or sneeze.   SYMPTOMS   Symptoms of pneumonia include:  · Cough.  · Fever.  · Chest pain.  · Rapid breathing.  · Shortness of breath.  · Shaking chills.  · Mucus production.  DIAGNOSIS   If you have the common symptoms of pneumonia, often your health care provider will confirm the diagnosis with a chest X-ray. The X-ray will show an abnormality in the lung if you have pneumonia. Other tests may be done on your blood, urine, or mucus (sputum) to find the specific cause of your pneumonia. A blood gas test or pulse oximetry test may be needed to check how well your lungs are working.  TREATMENT   Your treatment will depend on whether your pneumonia is caused by bacteria or a virus.   · Bacterial pneumonia is treated with antibiotic medicine.  · Pneumonia that is caused by the influenza virus may be treated with an antiviral medicine.  · Pneumonia that is caused by a virus other than influenza will not respond to antibiotic medicine. This type of pneumonia will have to run its course.   HOME CARE INSTRUCTIONS   · Cough suppressants may be used if you are losing too much rest from coughing at night. However, you should try to avoid taking cough suppresants. This is because coughing helps to remove mucus from your lungs.  · Sleep in a semi-upright position at night. Try sleeping in a reclining chair, or place a few pillows under your head.  · Try using a cold steam vaporizer or humidifier in your home or bedroom. This may help loosen your mucus.  · If you were prescribed an antibiotic medicine, finish all of it even if you start to feel better.  · If you were prescribed an expectorant, take it as directed by your health care provider.  This medicine loosens the mucus so you can cough it up.  · Take medicines only as directed by your health care provider.  · Do not smoke. If you are a smoker and continue to smoke, your cough may last several weeks after your pneumonia has cleared.  · Get rest when you feel tired, or as needed.  PREVENTION  A pneumococcal shot (vaccine) is available to prevent a common bacterial cause of pneumonia. This is usually suggested for:  · People over 65 years old.  · People on chemotherapy.  · People with chronic lung problems, such as bronchitis or emphysema.  · People with immune system problems.  If you are over 65 years old or have a high risk condition, you may receive the pneumococcal vaccine if you have not received it before. In some countries, a routine influenza vaccine is also recommended. This vaccine can help prevent some cases of pneumonia. You may be offered the influenza vaccine as part of your care.  If you are a smoker, it is time to quit in order to prevent pneumonia in the future. You may receive instructions on how to stop smoking. Your health care provider can provide medicines and counseling to help you quit.  SEEK MEDICAL CARE IF:  · You have a fever.  · You cannot control your cough with suppressants at night, and you keep losing sleep.  SEEK IMMEDIATE MEDICAL CARE IF:   · You have worsening shortness of breath.  · You have increased chest pain.  · Your sickness becomes worse, especially if you are an older adult or have a weakened immune system.  · You cough up blood.  · You have pain that is getting worse or is not controlled with medicines.  · Your symptoms are getting worse rather than better.     This information is not intended to replace advice given to you by your health care provider. Make sure you discuss any questions you have with your health care provider.     Document Released: 12/18/2006 Document Revised: 01/08/2016 Document Reviewed: 04/13/2016  [employer] Interactive Patient  Education ©2016 [employer]    Antibiotic Medication  Antibiotics are among the most frequently prescribed medicines. Antibiotics cure illness by assisting our body to injure or kill the bacteria that cause infection. While antibiotics are useful to treat a wide variety of infections they are useless against viruses. Antibiotics cannot cure colds, flu, or other viral infections.   There are many types of antibiotics available. Your caregiver will decide which antibiotic will be useful for an illness. Never take or give someone else's antibiotics or left over medicine.  Your caregiver may also take into account:  · Allergies.  · The cost of the medicine.  · Dosing schedules.  · Taste.  · Common side effects when choosing an antibiotic for an infection.  Ask your caregiver if you have questions about why a certain medicine was chosen.  HOME CARE INSTRUCTIONS  Read all instructions and labels on medicine bottles carefully. Some antibiotics should be taken on an empty stomach while others should be taken with food. Taking antibiotics incorrectly may reduce how well they work. Some antibiotics need to be kept in the refrigerator. Others should be kept at room temperature. Ask your caregiver or pharmacist if you do not understand how to give the medicine.  Be sure to give the amount of medicine your caregiver has prescribed. Even if you feel better and your symptoms improve, bacteria may still remain alive in the body. Taking all of the medicine will prevent:  · The infection from returning and becoming harder to treat.  · Complications from partially treated infections.  If there is any medicine left over after you have taken the medicine as your caregiver has instructed, throw the medicine away.  Be sure to tell your caregiver if you:  · Are allergic to any medicines.  · Are pregnant or intend to become pregnant while using this medicine.  · Are breastfeeding.  · Are taking any other prescription, non-prescription  medicine, or herbal remedies.  · Have any other medical conditions or problems you have not already discussed.  If you are taking birth control pills, they may not work while you are on antibiotics. To avoid unwanted pregnancy:  · Continue taking your birth control pills as usual.  · Use a second form of birth control (such as condoms) while you are taking antibiotic medicine.  · When you finish taking the antibiotic medicine, continue using the second form of birth control until you are finished with your current 1 month cycle of birth control pills.  Try not to miss any doses of medicine. If you miss a dose, take it as soon as possible. However, if it is almost time for the next dose and the dosing schedule is:  · 2 doses a day, take the missed dose and the next dose 5 to 6 hours apart.  · 3 or more doses a day, take the missed dose and the next dose 2 to 4 hours apart, then go back to the normal schedule.  · If you are unable to make up a missed dose, take the next scheduled dose on time and complete the missed dose at the end of the prescribed time for your medicine.  SIDE EFFECTS TO TAKING ANTIBIOTICS  Common side effects to antibiotic use include:  · Soft stools or diarrhea.  · Mild stomach upset.  · Sun sensitivity.  SEEK MEDICAL CARE IF:   · If you get worse or do not improve within a few days of starting the medicine.  · Vomiting develops.  · Diaper rash or rash on the genitals appears.  · Vaginal itching occurs.  · White patches appear on the tongue or in the mouth.  · Severe watery diarrhea and abdominal cramps occur.  · Signs of an allergy develop (hives, unknown itchy rash appears). STOP TAKING THE ANTIBIOTIC.  SEEK IMMEDIATE MEDICAL CARE IF:   · Urine turns dark or blood colored.  · Skin turns yellow.  · Easy bruising or bleeding occurs.  · Joint pain or muscle aches occur.  · Fever returns.  · Severe headache occurs.  · Signs of an allergy develop (trouble breathing, wheezing, swelling of the lips,  face or tongue, fainting, or blisters on the skin or in the mouth). STOP TAKING THE ANTIBIOTIC.     This information is not intended to replace advice given to you by your health care provider. Make sure you discuss any questions you have with your health care provider.     Document Released: 08/30/2005 Document Revised: 01/08/2016 Document Reviewed: 09/09/2010  [employer] Interactive Patient Education ©2016 [employer].      Follow-up Information     1. Follow up with Gail Perez N.P.. Schedule an appointment as soon as possible for a visit in 1 week.    Specialty:  Family Medicine    Why:  Follow up appointment    Contact information    [address]  [place] [address]  [phone]          2. Follow up with Marbin Arriola M.D.. Schedule an appointment as soon as possible for a visit in 2 weeks.    Specialty:  Plastic Surgery    Why:  Follow up appointment    Contact information    [address] [place] [address]  [phone]           Discharge Medication Instructions:    Below are the medications your physician expects you to take upon discharge:    Review all your home medications and newly ordered medications with your doctor and/or pharmacist. Follow medication instructions as directed by your doctor and/or pharmacist.    Please keep your medication list with you and share with your physician.               Medication List      START taking these medications        Instructions    cefdinir 300 MG Caps   Last time this was given:  300 mg on 3/17/2017  9:14 AM   Commonly known as:  OMNICEF    Take 1 Cap by mouth every 12 hours for 3 days.   Dose:  300 mg       metronidazole 500 MG Tabs   Last time this was given:  500 mg on 3/17/2017  5:12 AM   Commonly known as:  FLAGYL    Take 1 Tab by mouth every 8 hours for 3 days.   Dose:  500 mg         CONTINUE taking these medications        Instructions    ascorbic acid 500 MG Tabs   Last time this was given:  500 mg on 3/17/2017  9:14 AM   Commonly  known as:  ascorbic acid    Take 500 mg by mouth 2 Times a Day.   Dose:  500 mg       aspirin EC 81 MG Tbec   Last time this was given:  81 mg on 3/17/2017  9:14 AM   Commonly known as:  ECOTRIN    Take 81 mg by mouth every day.   Dose:  81 mg       ferrous sulfate 325 (65 FE) MG tablet    Take 1 Tab by mouth every morning with breakfast.   Dose:  325 mg       gabapentin 300 MG Caps   Last time this was given:  900 mg on 3/17/2017  5:13 AM   Commonly known as:  NEURONTIN    Take 900 mg by mouth 3 times a day.   Dose:  900 mg       midodrine 10 MG tablet   Last time this was given:  10 mg on 3/17/2017  9:15 AM   Commonly known as:  PROAMATINE    Take 10 mg by mouth 3 times a day, with meals.   Dose:  10 mg       Probiotic Caps    Take 1 Cap by mouth every morning.   Dose:  1 Cap       tramadol 50 MG Tabs   Last time this was given:  50 mg on 3/9/2017  5:35 AM   Commonly known as:  ULTRAM    Take 1 Tab by mouth every four hours as needed for Moderate Pain.   Dose:  50 mg               Instructions           Diet / Nutrition:    Follow any diet instructions given to you by your doctor or the dietician, including how much salt (sodium) you are allowed each day.    If you are overweight, talk to your doctor about a weight reduction plan.    Activity:    Remain physically active following your doctor's instructions about exercise and activity.    Rest often.     Any time you become even a little tired or short of breath, SIT DOWN and rest.    Worsening Symptoms:    Report any of the following signs and symptoms to the doctor's office immediately:    *Pain of jaw, arm, or neck  *Chest pain not relieved by medication                               *Dizziness or loss of consciousness  *Difficulty breathing even when at rest   *More tired than usual                                       *Bleeding drainage or swelling of surgical site  *Swelling of feet, ankles, legs or stomach                 *Fever (>100ºF)  *Pink or blood  tinged sputum  *Weight gain (3lbs/day or 5lbs /week)           *Shock from internal defibrillator (if applicable)  *Palpitations or irregular heartbeats                *Cool and/or numb extremities    Stroke Awareness    Common Risk Factors for Stroke include:    Age  Atrial Fibrillation  Carotid Artery Stenosis  Diabetes Mellitus  Excessive alcohol consumption  High blood pressure  Overweight   Physical inactivity  Smoking    Warning signs and symptoms of a stroke include:    *Sudden numbness or weakness of the face, arm or leg (especially on one side of the body).  *Sudden confusion, trouble speaking or understanding.  *Sudden trouble seeing in one or both eyes.  *Sudden trouble walking, dizziness, loss of balance or coordination.Sudden severe headache with no known cause.    It is very important to get treatment quickly when a stroke occurs. If you experience any of the above warning signs, call 911 immediately.                   Disclaimer         Quit Smoking / Tobacco Use:    I understand the use of any tobacco products increases my chance of suffering from future heart disease or stroke and could cause other illnesses which may shorten my life. Quitting the use of tobacco products is the single most important thing I can do to improve my health. For further information on smoking / tobacco cessation call a Toll Free Quit Line at 1-302.214.8478 (*[facility] [place] [facility]) or 1-912.191.7195 (American Lung Association) or you can access the web based program at www.lungusa.org.    [place] Tobacco Users Help Line:  (522) 325-5168       Toll Free: 1-861.287.4027  Quit Tobacco Program [facility] (791)729-9230    Crisis Hotline:    [place] Crisis Hotline:  3-869-SKJCPHS or 1-215.599.7237    [place] Crisis Hotline:    1-141.442.4959 or 657-095-6529    Discharge Survey:   Thank you for choosing [facility]. We hope we did everything we could to make your hospital stay a pleasant one. You may  be receiving a phone survey and we would appreciate your time and participation in answering the questions. Your input is very valuable to us in our efforts to improve our service to our patients and their families.        My signature on this form indicates that:    1. I have reviewed and understand the above information.  2. My questions regarding this information have been answered to my satisfaction.  3. I have formulated a plan with my discharge nurse to obtain my prescribed medications for home.                  Disclaimer         __________________________________                     __________       ________                       Patient Signature                                                 Date                    Time
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
## 2017-03-06 NOTE — IP AVS SNAPSHOT
eMarketer Access Code: [email or web address]  Expires: 4/16/2017 12:07 PM    Your email address is not on file at [facility].  Email Addresses are required for you to sign up for eMarketer, please contact [phone] to verify your personal information and to provide your email address prior to attempting to register for eMarketer.    Stevie Phoenix  [address] [place] [address]    eMarketer  A secure, online tool to manage your health information     [facility]’s eMarketer® is a secure, online tool that connects you to your personalized health information from the privacy of your home -- day or night - making it very easy for you to manage your healthcare. Once the activation process is completed, you can even access your medical information using the eMarketer sin, which is available for free in the Apple Sin store or Google Play store.     To learn more about eMarketer, visit [email or web address]    There are two levels of access available (as shown below):   My Chart Features  [facility] Primary Care Doctor [facility]  Specialists [facility]  Urgent  Care Non-[facility] Primary Care Doctor   Email your healthcare team securely and privately 24/7 X X X    Manage appointments: schedule your next appointment; view details of past/upcoming appointments X      Request prescription refills. X      View recent personal medical records, including lab and immunizations X X X X   View health record, including health history, allergies, medications X X X X   Read reports about your outpatient visits, procedures, consult and ER notes X X X X   See your discharge summary, which is a recap of your hospital and/or ER visit that includes your diagnosis, lab results, and care plan X X  X     How to register for eMarketer:  Once your e-mail address has been verified, follow the following steps to sign up for Kodk[employer].     1. Go to  [email or web address]  2. Click on the Sign Up Now box, which takes you to the New Member Sign Up page. You  will need to provide the following information:  a. Enter your [employer] Access Code exactly as it appears at the top of this page. (You will not need to use this code after you’ve completed the sign-up process. If you do not sign up before the expiration date, you must request a new code.)   b. Enter your date of birth.   c. Enter your home email address.   d. Click Submit, and follow the next screen’s instructions.  3. Create a [employer] ID. This will be your Turned On Digital login ID and cannot be changed, so think of one that is secure and easy to remember.  4. Create a [employer] password. You can change your password at any time.  5. Enter your Password Reset Question and Answer. This can be used at a later time if you forget your password.   6. Enter your e-mail address. This allows you to receive e-mail notifications when new information is available in Turn[employer].  7. Click Sign Up. You can now view your health information.    For assistance activating your Turned [employer] Digital account, call [phone]

## 2017-03-06 NOTE — PROGRESS NOTES
RN called for report, unable to reach MYRIAM Mclaughlin, left message extension #[phone] to call Kathy MIGUEL for any questions. Pt transferred to [id number]

## 2017-03-06 NOTE — IP AVS SNAPSHOT
" <p align=\"LEFT\"><IMG SRC=\"//[email or web address]\" alt=\"Image\" WIDTH=\"50%\" HEIGHT=\"200\" BORDER=\"\"></p>                   Name:Stevie Phoenix  Medical Record Number:[id number]  CSN: [id number]    YOB: 1960   Age: 56 y.o.  Sex: male  HT:1.905 m (6' 3\") WT: 73.2 kg (161 lb 6 oz)          Admit Date: 3/6/2017     Discharge Date:   Today's Date: 3/17/2017  Attending Doctor:  Shari Sanchez M.D.                  Allergies:  Zosyn          Follow-up Information     1. Follow up with Gail Perez N.P.. Schedule an appointment as soon as possible for a visit in 1 week.    Specialty:  Family Medicine    Why:  Follow up appointment    Contact information    [address]  [place] [id number] [phone]          2. Follow up with Marbin Arriola M.D.. Schedule an appointment as soon as possible for a visit in 2 weeks.    Specialty:  Plastic Surgery    Why:  Follow up appointment    Contact information    [address]  [place] [address] [phone]           Medication List      Take these Medications        Instructions    ascorbic acid 500 MG Tabs   Commonly known as:  ascorbic acid    Take 500 mg by mouth 2 Times a Day.   Dose:  500 mg       aspirin EC 81 MG Tbec   Commonly known as:  ECOTRIN    Take 81 mg by mouth every day.   Dose:  81 mg       cefdinir 300 MG Caps   Commonly known as:  OMNICEF    Take 1 Cap by mouth every 12 hours for 3 days.   Dose:  300 mg       ferrous sulfate 325 (65 FE) MG tablet    Take 1 Tab by mouth every morning with breakfast.   Dose:  325 mg       gabapentin 300 MG Caps   Commonly known as:  NEURONTIN    Take 900 mg by mouth 3 times a day.   Dose:  900 mg       metronidazole 500 MG Tabs   Commonly known as:  FLAGYL    Take 1 Tab by mouth every 8 hours for 3 days.   Dose:  500 mg       midodrine 10 MG tablet   Commonly known as:  PROAMATINE    Take 10 mg by mouth 3 times a day, with meals.   Dose:  10 mg       Probiotic Caps    Take 1 Cap by mouth every " morning.   Dose:  1 Cap       tramadol 50 MG Tabs   Commonly known as:  ULTRAM    Take 1 Tab by mouth every four hours as needed for Moderate Pain.   Dose:  50 mg

## 2017-03-06 NOTE — ED NOTES
"Med rec updated and complete  Allergies reviewed  Pt states \"No antibiotics in the last 30 days\".       "

## 2017-03-06 NOTE — ED NOTES
"Pt BIB EMS from home. Pt reports N/V and generalized weakness since yesterday. Denies blood in emesis . Chest congestion and productive cough \"for a while\". Pt received .5mg Versed and 4mg Zofran en route with some relief.  Upon arrival BP was 66/44, charge RN aware.   "

## 2017-03-07 ENCOUNTER — APPOINTMENT (OUTPATIENT)
Dept: RADIOLOGY | Facility: MEDICAL CENTER | Age: 57
DRG: 871 | End: 2017-03-07
Attending: HOSPITALIST
Payer: MEDICAID

## 2017-03-07 PROBLEM — K56.609 SBO (SMALL BOWEL OBSTRUCTION) (HCC): Status: ACTIVE | Noted: 2017-03-07

## 2017-03-07 LAB
EKG IMPRESSION: NORMAL
MRSA DNA SPEC QL NAA+PROBE: NORMAL
SIGNIFICANT IND 70042: NORMAL
SITE SITE: NORMAL
SOURCE SOURCE: NORMAL

## 2017-03-07 PROCEDURE — A9270 NON-COVERED ITEM OR SERVICE: HCPCS | Performed by: INTERNAL MEDICINE

## 2017-03-07 PROCEDURE — 700105 HCHG RX REV CODE 258: Performed by: INTERNAL MEDICINE

## 2017-03-07 PROCEDURE — 94640 AIRWAY INHALATION TREATMENT: CPT

## 2017-03-07 PROCEDURE — 700105 HCHG RX REV CODE 258: Performed by: HOSPITALIST

## 2017-03-07 PROCEDURE — 700102 HCHG RX REV CODE 250 W/ 637 OVERRIDE(OP): Performed by: INTERNAL MEDICINE

## 2017-03-07 PROCEDURE — 99291 CRITICAL CARE FIRST HOUR: CPT | Performed by: HOSPITALIST

## 2017-03-07 PROCEDURE — 87641 MR-STAPH DNA AMP PROBE: CPT

## 2017-03-07 PROCEDURE — 700111 HCHG RX REV CODE 636 W/ 250 OVERRIDE (IP)

## 2017-03-07 PROCEDURE — 700105 HCHG RX REV CODE 258

## 2017-03-07 PROCEDURE — 94760 N-INVAS EAR/PLS OXIMETRY 1: CPT

## 2017-03-07 PROCEDURE — 700111 HCHG RX REV CODE 636 W/ 250 OVERRIDE (IP): Performed by: HOSPITALIST

## 2017-03-07 PROCEDURE — 700111 HCHG RX REV CODE 636 W/ 250 OVERRIDE (IP): Performed by: INTERNAL MEDICINE

## 2017-03-07 PROCEDURE — 770020 HCHG ROOM/CARE - TELE (206)

## 2017-03-07 PROCEDURE — 700101 HCHG RX REV CODE 250: Performed by: INTERNAL MEDICINE

## 2017-03-07 PROCEDURE — 74020 DX-ABDOMEN-2 VIEWS: CPT

## 2017-03-07 PROCEDURE — E0191 PROTECTOR HEEL OR ELBOW: HCPCS | Performed by: HOSPITALIST

## 2017-03-07 RX ORDER — LORAZEPAM 2 MG/ML
0.5 INJECTION INTRAMUSCULAR EVERY 6 HOURS PRN
Status: DISCONTINUED | OUTPATIENT
Start: 2017-03-07 | End: 2017-03-17 | Stop reason: HOSPADM

## 2017-03-07 RX ORDER — SODIUM CHLORIDE 9 MG/ML
INJECTION, SOLUTION INTRAVENOUS CONTINUOUS
Status: DISCONTINUED | OUTPATIENT
Start: 2017-03-07 | End: 2017-03-11

## 2017-03-07 RX ORDER — IPRATROPIUM BROMIDE AND ALBUTEROL SULFATE 2.5; .5 MG/3ML; MG/3ML
3 SOLUTION RESPIRATORY (INHALATION)
Status: DISCONTINUED | OUTPATIENT
Start: 2017-03-07 | End: 2017-03-11

## 2017-03-07 RX ADMIN — MEROPENEM 500 MG: 500 INJECTION, POWDER, FOR SOLUTION INTRAVENOUS at 12:23

## 2017-03-07 RX ADMIN — MEROPENEM 500 MG: 500 INJECTION, POWDER, FOR SOLUTION INTRAVENOUS at 17:39

## 2017-03-07 RX ADMIN — MEROPENEM 500 MG: 500 INJECTION, POWDER, FOR SOLUTION INTRAVENOUS at 06:03

## 2017-03-07 RX ADMIN — MIDODRINE HYDROCHLORIDE 10 MG: 5 TABLET ORAL at 07:50

## 2017-03-07 RX ADMIN — ONDANSETRON 4 MG: 2 INJECTION, SOLUTION INTRAMUSCULAR; INTRAVENOUS at 01:44

## 2017-03-07 RX ADMIN — ONDANSETRON 4 MG: 2 INJECTION, SOLUTION INTRAMUSCULAR; INTRAVENOUS at 01:45

## 2017-03-07 RX ADMIN — MEROPENEM 500 MG: 500 INJECTION, POWDER, FOR SOLUTION INTRAVENOUS at 23:08

## 2017-03-07 RX ADMIN — MIDODRINE HYDROCHLORIDE 10 MG: 5 TABLET ORAL at 17:39

## 2017-03-07 RX ADMIN — LORAZEPAM 0.5 MG: 2 INJECTION INTRAMUSCULAR; INTRAVENOUS at 15:27

## 2017-03-07 RX ADMIN — SODIUM CHLORIDE: 9 INJECTION, SOLUTION INTRAVENOUS at 18:00

## 2017-03-07 RX ADMIN — MIDODRINE HYDROCHLORIDE 10 MG: 5 TABLET ORAL at 12:23

## 2017-03-07 RX ADMIN — ASPIRIN 81 MG: 81 TABLET ORAL at 07:51

## 2017-03-07 RX ADMIN — VANCOMYCIN HYDROCHLORIDE 1200 MG: 10 INJECTION, POWDER, LYOPHILIZED, FOR SOLUTION INTRAVENOUS at 01:50

## 2017-03-07 RX ADMIN — VANCOMYCIN HYDROCHLORIDE 1200 MG: 10 INJECTION, POWDER, LYOPHILIZED, FOR SOLUTION INTRAVENOUS at 15:32

## 2017-03-07 RX ADMIN — PROCHLORPERAZINE EDISYLATE 5 MG: 5 INJECTION INTRAMUSCULAR; INTRAVENOUS at 01:55

## 2017-03-07 RX ADMIN — PROCHLORPERAZINE EDISYLATE 5 MG: 5 INJECTION INTRAMUSCULAR; INTRAVENOUS at 07:51

## 2017-03-07 RX ADMIN — HEPARIN SODIUM 5000 UNITS: 5000 INJECTION, SOLUTION INTRAVENOUS; SUBCUTANEOUS at 00:00

## 2017-03-07 RX ADMIN — STANDARDIZED SENNA CONCENTRATE AND DOCUSATE SODIUM 2 TABLET: 8.6; 5 TABLET, FILM COATED ORAL at 07:55

## 2017-03-07 RX ADMIN — HEPARIN SODIUM 5000 UNITS: 5000 INJECTION, SOLUTION INTRAVENOUS; SUBCUTANEOUS at 15:32

## 2017-03-07 RX ADMIN — ONDANSETRON 4 MG: 2 INJECTION, SOLUTION INTRAMUSCULAR; INTRAVENOUS at 12:12

## 2017-03-07 RX ADMIN — LORAZEPAM 0.5 MG: 0.5 TABLET ORAL at 05:49

## 2017-03-07 RX ADMIN — LORAZEPAM 0.5 MG: 0.5 TABLET ORAL at 12:36

## 2017-03-07 RX ADMIN — IPRATROPIUM BROMIDE AND ALBUTEROL SULFATE 3 ML: .5; 3 SOLUTION RESPIRATORY (INHALATION) at 08:18

## 2017-03-07 RX ADMIN — GABAPENTIN 900 MG: 300 CAPSULE ORAL at 22:10

## 2017-03-07 RX ADMIN — BENZONATATE 100 MG: 100 CAPSULE ORAL at 07:55

## 2017-03-07 RX ADMIN — TRAMADOL HYDROCHLORIDE 50 MG: 50 TABLET, COATED ORAL at 05:53

## 2017-03-07 RX ADMIN — GABAPENTIN 900 MG: 300 CAPSULE ORAL at 15:32

## 2017-03-07 RX ADMIN — MEROPENEM 500 MG: 500 INJECTION, POWDER, FOR SOLUTION INTRAVENOUS at 01:49

## 2017-03-07 RX ADMIN — GABAPENTIN 900 MG: 300 CAPSULE ORAL at 05:48

## 2017-03-07 RX ADMIN — IPRATROPIUM BROMIDE AND ALBUTEROL SULFATE 3 ML: .5; 3 SOLUTION RESPIRATORY (INHALATION) at 04:39

## 2017-03-07 RX ADMIN — SODIUM CHLORIDE: 9 INJECTION, SOLUTION INTRAVENOUS at 05:54

## 2017-03-07 RX ADMIN — HEPARIN SODIUM 5000 UNITS: 5000 INJECTION, SOLUTION INTRAVENOUS; SUBCUTANEOUS at 22:11

## 2017-03-07 ASSESSMENT — ENCOUNTER SYMPTOMS
NAUSEA: 1
DIARRHEA: 0
CONSTIPATION: 0
NERVOUS/ANXIOUS: 1
FEVER: 0
VOMITING: 1
SHORTNESS OF BREATH: 0
CHILLS: 0
ABDOMINAL PAIN: 1
COUGH: 0
WHEEZING: 0

## 2017-03-07 ASSESSMENT — COPD QUESTIONNAIRES
DURING THE PAST 4 WEEKS HOW MUCH DID YOU FEEL SHORT OF BREATH: NONE/LITTLE OF THE TIME
HAVE YOU SMOKED AT LEAST 100 CIGARETTES IN YOUR ENTIRE LIFE: NO/DON'T KNOW
DO YOU EVER COUGH UP ANY MUCUS OR PHLEGM?: NO/ONLY WITH OCCASIONAL COLDS OR INFECTIONS
COPD SCREENING SCORE: 2

## 2017-03-07 ASSESSMENT — PAIN SCALES - GENERAL
PAINLEVEL_OUTOF10: 0

## 2017-03-07 ASSESSMENT — LIFESTYLE VARIABLES
EVER_SMOKED: NEVER
DO YOU DRINK ALCOHOL: NO
EVER_SMOKED: NEVER
ALCOHOL_USE: NO

## 2017-03-07 NOTE — CARE PLAN
Problem: Communication  Goal: The ability to communicate needs accurately and effectively will improve  Intervention: Educate patient and significant other/support system about the plan of care, procedures, treatments, medications and allow for questions  Pt updated on plan of care by writer

## 2017-03-07 NOTE — PROGRESS NOTES
Writer received report at bedside.  Pt. In bed, no apparent distress noted,, call light within reach and bed in lowest position and  locks on.   Pt. A&Ox4,VSS, denies pain; however,encouraged to call for anything.   Pt. being monitored on telemetry and currently has no S/S of cardiopulmonary distress at this time; however, monitor check called 's ativan once again.

## 2017-03-07 NOTE — FLOWSHEET NOTE
03/06/17 1946   Education   Education Yes - Pt. / Family has been Instructed in use of Respiratory Medications and Adverse Reactions   RT Assessment of Delivered Medications   Evaluation of Medication Delivery Daily Yes-- Pt /Family has been Instructed in use of Respiratory Medications and Adverse Reactions   SVN Group   #SVN Performed Yes   Given By: Mouthpiece   PEP/CPT Group   PEP/CPT/Airway Clearance Therapy Yes   #PEP/CPT (Manual) Subsequent Subsequent   PEP/CPT Method Positive Airway Pressure Device   Pressure 15   Incentive Spirometry Group   Breathing Exercises Yes   Incentive Spirometer Volume 2250 mL   Chest Exam   Respiration 18   Pulse (!) 130   Breath Sounds   RUL Breath Sounds Coarse Crackles   EDDIE Breath Sounds Coarse Crackles   Secretions   How Sputum Obtained (quad cough)   Oximetry   #Pulse Oximetry (Single Determination) Yes   Oxygen   Pulse Oximetry 95 %   O2 (LPM) 3   O2 Daily Delivery Respiratory  Silicone Nasal Cannula

## 2017-03-07 NOTE — PROGRESS NOTES
Patient arrived on floor via gurney with transport. Patient having emesis approx 300 ml tan brown fluid, medicated per MR. Assessment complete; placed on telemetry monitor; VS stable. Oriented to room, fall precautions in place.

## 2017-03-07 NOTE — WOUND TEAM
Wound team in to see patient known to service. Currently ( today ) patient is becoming nauseated and vomiting every time he is moving or moved and he reports he will not be turned over today. I ordered bilateral heel float boots and revised the ASHOK bed order to STAT. I covered bilateral heels with adhesive foam. Patient does understand the importance of dressing the ischial and sacral pressure ulcers ( which were most recently dressed wet to dry daily ). Chart notes that a Home health wound conference on 2/20 recommended plastic surgery for flap closure 2/2 slow/no epiphilial filling in occuring.

## 2017-03-07 NOTE — CARE PLAN
Problem: Infection  Goal: Will remain free from infection  Intervention: Implement standard precautions and perform hand washing before and after patient contact  Pt and family aware to wash hands when entering and leaving his room.

## 2017-03-07 NOTE — PROGRESS NOTES
Hospital Medicine Progress Note, Adult, Complex               Author: Tom Keating Date & Time created: 3/7/2017  2:28 PM     Interval History:  CC: SBO, sepsis, ischium ulcers   3/7: AXR shows likely SBO. NPO. NGT to suction. Discussed w/ pt. Also discussed ischium ulcers w/ plastics Dr. JUDSON Arriola.     Review of Systems:  Review of Systems   Constitutional: Negative for fever and chills.   Respiratory: Negative for cough, shortness of breath and wheezing.    Cardiovascular: Negative for chest pain.   Gastrointestinal: Positive for nausea, vomiting and abdominal pain. Negative for diarrhea and constipation.   Psychiatric/Behavioral: The patient is nervous/anxious.        Physical Exam:  Physical Exam   Constitutional: He appears well-developed.   Thin/frail   HENT:   Head: Normocephalic.   Cardiovascular: Normal rate.    Pulmonary/Chest: No respiratory distress. He has no wheezes.   Abdominal: He exhibits no distension. There is tenderness. There is no rebound and no guarding.   Neurological: He is alert.   Skin:   bilat ichium ulcers w/o surrounding erythema        Labs:        Invalid input(s): [id number]  Recent Labs      17   1020   TROPONINI  <0.01   BNPBTYPENAT  35     Recent Labs      17   1020   SODIUM  137   POTASSIUM  4.1   CHLORIDE  101   CO2  18*   BUN  31*   CREATININE  0.92   CALCIUM  10.3     Recent Labs      17   1020   ALTSGPT  15   ASTSGOT  17   ALKPHOSPHAT  87   TBILIRUBIN  0.5   GLUCOSE  136*     Recent Labs      17   1020  17   1900   RBC  6.28*   --    HEMOGLOBIN  14.6   --    HEMATOCRIT  47.2   --    PLATELETCT  552*   --    PROTHROMBTM  13.8  13.9   APTT  33.2  32.3   INR  1.03  1.04     Recent Labs      17   1020   WBC  15.8*   NEUTSPOLYS  90.30*   LYMPHOCYTES  4.20*   MONOCYTES  4.70   EOSINOPHILS  0.00   BASOPHILS  0.40   ASTSGOT  17   ALTSGPT  15   ALKPHOSPHAT  87   TBILIRUBIN  0.5           Hemodynamics:  Temp (24hrs), Av.9 °C (98.4 °F),  Min:36.6 °C (97.9 °F), Max:37.3 °C (99.1 °F)  Temperature: 36.6 °C (97.9 °F)  Pulse  Av.8  Min: 101  Max: 160Heart Rate (Monitored): (!) 118  Blood Pressure: (!) 93/59 mmHg     Respiratory:    Respiration: (!) 24, Pulse Oximetry: 91 %, O2 Daily Delivery Respiratory : Silicone Nasal Cannula     Given By:: Mouthpiece, PEP/CPT Method: Positive Airway Pressure Device, Work Of Breathing / Effort: Mild  RUL Breath Sounds: Diminished, RML Breath Sounds: Diminished, RLL Breath Sounds: Diminished, EDDIE Breath Sounds: Diminished, LLL Breath Sounds: Diminished  Fluids:    Intake/Output Summary (Last 24 hours) at 17 1428  Last data filed at 17 0800   Gross per 24 hour   Intake    240 ml   Output   1100 ml   Net   -860 ml     Weight: 73.3 kg (161 lb 9.6 oz)  GI/Nutrition:  Orders Placed This Encounter   Procedures   • Diet NPO     Standing Status: Standing      Number of Occurrences: 1      Standing Expiration Date:      Order Specific Question:  Restrict to:     Answer:  Sips with Medications [3]     Medical Decision Making, by Problem:  Active Hospital Problems    Diagnosis   • *Sepsis (CMS-HCC) [A41.9]  - covering for UTI vs HCAP   - vanc and meropenem  - also treating SBO   • Decubitus ulcer, stage 3 ([facility]) [L89.94]  - consulted plastics Marbin Arriola    • 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  - wound care     Patient is critically ill.   The patient continues to have:  sepsis, SBO  The vital organ system that is affected is the: circulation  If untreated there is a high chance of deterioration into: shock  And eventually death.   The critical care that I am providing today is: exam, medications, NGT  The critical that has been undertaken is medically complex.   There has been no overlap in critical care time.   Critical Care Time not including procedures: 36 mins    Labs reviewed and Medications reviewed  Medrano catheter: No Medrano      DVT Prophylaxis: Heparin

## 2017-03-07 NOTE — PROGRESS NOTES
"Assumed care of pt. Bedside report completed. AAO x 3, pt. Anxious, requesting a volunteer at bedside to \"sit and hold his hand\" for comfort. Mild work of breathing Pt. actively vomiting brown liquid, medicated (see MAR). Medrano draining dark brown fluid. Bed alarms arm on, safety precautions in place, personal belongings and call light within reach  "

## 2017-03-07 NOTE — H&P
CHIEF COMPLAINT:  Weakness, cough, vomiting.    HISTORY OF PRESENT ILLNESS:  The patient is a 56-year-old male with very   complicated medical history including ___ quadriplegia with severe sacral   decubitus ulcers, multiple urinary tract infections including ESBL,   Klebsiella, VRE enterococcus.  Patient states he comes in as he has not been   feeling well for the last several days with nausea, vomiting, generalized   weakness since yesterday.  He has also had nonbloody emesis.  He says he has   had increasing cough as well and has not been taking anything for his cough.    Denies any sick contacts.  He says his ostomy has no change in output recently   and has decreased appetite.  No recent antibiotics.  Apparently, he was taken   off wound care a week and a half ago and the plan was to see Dr. Arriola this   week to possibly get a full flap procedure for his lower extremity decubitus   ulcers.  He is no longer on a wound VAC.  He denies any other new neurological   changes.  He says he cannot feel anything from about the neck down.    REVIEW OF SYSTEMS:  All other systems reviewed are negative.    In the ED, he was given sepsis protocol, meropenem was given.    PAST MEDICAL HISTORY:  1.  Quadriplegia, ___.  2.  Recurrent urinary tract infections with history of ESBL, Klebsiella, VRE   enterococcus.  3.  Hypotension.  4.  History of autonomic dysreflexia.  5.  Neurogenic bladder.  6.  History of paroxysmal atrial fibrillation.  7.  History of Clostridium difficile infection.  8.  History of DVT.  9.  History of sacral area decubitus ulcer and bilateral heel ulcers.    PAST SURGICAL HISTORY:  1.  Gastrostomy.  2.  Cervical spine surgery.  3.  IVC filter placement.  4.  Cholecystectomy  5.  Cystoscopy, ureteroscopy and laser lithotripsy.  6.  Ileostomy.    FAMILY HISTORY:  None.    SOCIAL HISTORY:  No alcohol, tobacco, or drugs.    ALLERGIES:  ZOSYN.    MEDICATIONS PRIOR TO ADMISSION:  1.  Ascorbic acid 500 mg  b.i.d.  2.  Aspirin 81 mg every day.  3.  Ferrous sulfate 325 mg every morning with breakfast.  4.  Gabapentin 900 mg t.i.d.  5.  Midodrine 10 mg tablets t.i.d. with meals.  6.  Probiotic capsule daily.  7.  Tramadol 50 mg tablets every 4 hours as needed for moderate pain.    PHYSICAL EXAMINATION:  VITAL SIGNS:  Temperature is 37.2, heart rate 130-160, respiratory rate is 18,   oxygen saturation 93% on 3 L nasal cannula.  Blood pressure 109/74.  GENERAL:  Patient is actively vomiting in front of me, but he is A and O x4,   appears ill though.  HEENT:  Very dry mucous membranes.  Flat JVP.  Pupils equal, round and   reactive to light.  Extraocular muscles intact.  CARDIOVASCULAR:  Sinus tachycardic.  No murmurs, rubs, gallops.  Hard to   assess due to the patient's fast heart rate.  LUNGS:  He has coarse breath sounds throughout.  Mild use of accessory   muscles.  ABDOMEN:  Scaphoid, nontender.  He does have an ostomy bag in the right lower   quadrant with some normal appearing stool in it.  EXTREMITIES:  Very cachectic, paralyzed, mild ulcers on the heels.  I could   not assess his buttocks, he would not move it for me.  NEUROLOGICAL:  He has no sensation from about the upper chest distal.  MUSCULOSKELETAL:  Could not move his legs at all and sort of moves his arms,   but has no gross use of his dexterity or fingers.  SKIN:  No obvious rashes, lesions, or excoriations.  PSYCHOLOGICAL:  Appears slightly depressed.  A and O x4.    LABORATORY DATA AND IMAGING:  CBC remarkable for white blood cell count 15.8,   H and H 14.6 and 47.2, MCV is 75.2, platelet count 552.  CMP, sodium 137,   potassium 4.1, bicarbonate 18, anion gap 18.0, blood glucose 136, BUN and   creatinine of 31 and 0.92, AST and ALT 17 and 15.  Lactic acid 1.21, 1.4, 1.6.    Troponin less than 0.01.  BNP 35.  PT/INR 30.9 and 1.04, PTT 32.3.  UA, 5-10   white blood cells, moderate bilirubin, moderate occult blood, negative   nitrite, large leukocyte  esterase, moderate triple phosphate crystals.  VBG,   7.42, 30 and 54.    IMAGING:  Portable chest x-ray:  No acute cardiac or pulmonary abnormalities   are identified.    EKG personally reviewed by me, sinus tachycardia, rate of 101, left anterior   fascicular block, mild J-point elevation in V2, V3, otherwise no evidence of   acute ST segment changes.    ASSESSMENT:  1.  Sepsis.  2.  Possible healthcare-associated pneumonia versus chronic indwelling Medrano   catheter, recurrent urinary tract infections.  3.                     quadriplegia.  4.  Autonomic dysreflexia.  5.  Ileal conduit in place.  6.  History of sacral decubitus ulcers.  7.  Iron deficiency anemia.    PLAN:  Patient will be admitted to the telemetry unit.  At this time, he   appears septic.  His UA actually is  compared to priors, though we will   cover both for pneumonia and UTI as he has very coarse breath sounds.  He is   vomiting in front of me though no acute abdominal issues  on exam, therefore   we will defer CT abdomen and pelvis.  I am going to treat for possible HCAP,   also complicated UTI with history of multidrug resistant organisms given his                  quadriplegia and multiple hospitalizations.  I will do IV   vancomycin, IV meropenem, sepsis protocol, trend his lactic acid.  We will do   PEP therapy, RT protocol, DuoNeb inhalers, also speech therapy.  I will also   order wound care.  Of note, patient was supposed to get possible flap in the   near future and is off wound VAC.  I would like to have him reassessed in the   morning for sacral decubitus ulcers as he would not move for me in the ER.  I   have also ordered wound care as well, that could be another additional source   of infection.  I would recommend maybe getting ID consult in the next 1 or 2   days for help with the antibiotic choices.  We will follow up on cultures and   adjust antibiotics appropriately.    CODE STATUS:  Full code.    DIET:  NPO.    DVT,  heparin 5000 units every 8 hours.       ____________________________________     MD VIN BLANKENSHIP / ERICK    DD:  03/06/2017 20:25:24  DT:  03/06/2017 21:20:12    D#:  [id number]  Job#:  [id number]

## 2017-03-07 NOTE — THERAPY
Hold eval currently per nurs. Pt with nausea and pending abd xray with concerns for abd issues.Thanks, David

## 2017-03-07 NOTE — PROGRESS NOTES
"Pharmacy Kinetics 56 y.o. male on vancomycin day # 2  3/7/2017    Currently on Vancomycin    3/7/17 @ 1342: 1200mg iv x1 LD    Followed by: 1200mg iv q12h (02:00, 14:00)    Indication for Treatment: rule out MRSA PNA    Pertinent history per medical record:    Admitted on 3/6/2017 for NV and generalized weakness x1 day. Pt also reported productive cough and chest congestion   Pertinent PMH: quadriplegia, urostomy, complicated UTIs (MDR organisms), and wound infections (MRSA in Feb)   Pt is followed by [facility] historically     Other antibiotics:    Meropenem 500mg iv q6h (ordered through 3/8)    Allergies: Zosyn     List concerns for renal function:   BUN: Scr > 20:1   Quadriplegia   Chronic hypotension   Hypermetabolic state on admit    Pertinent cultures to date:    3/6/17 Peripheral blood cultures: 2/ NGTD   3/6/17 MRSA by PCR: in process - per micro sample should be processed this afternoon    Recent Labs      17   1020   WBC  15.8*   NEUTSPOLYS  90.30*     Recent Labs      17   1020   BUN  31*   CREATININE  0.92   ALBUMIN  4.2     Last data filed at 17 1821   Gross per 24 hour   Intake    240 ml   Output    400 ml   Net   -160 ml      Blood pressure 111/84, pulse 109, temperature 36.7 °C (98 °F), resp. rate 24, height 1.905 m (6' 3\"), weight 73.3 kg (161 lb 9.6 oz), SpO2 96 %. Temp (24hrs), Av.9 °C (98.5 °F), Min:36.7 °C (98 °F), Max:37.3 °C (99.1 °F)      A/P   1. Vancomycin dose change: not indicated  2. Next vancomycin level: 3/8/17 @ 01:30  3. Goal trough: 16-20 mcg/mL  4. Comments: Several concerns for the risk of accumulation/renal insult  a. Dose initiated based on patient's prior dosing history   b. BUN/Scr appear to be trending up. In light of this, will order a Vt before the 4th dose rather than the 5th dose as suggested by my colleague  c. Recommend rapid de-escalation of therapy if/when indicated    Marilia Tirado, PharmD, BCPS        "

## 2017-03-08 LAB
ALBUMIN SERPL BCP-MCNC: 3.5 G/DL (ref 3.2–4.9)
ALBUMIN/GLOB SERPL: 0.9 G/DL
ALP SERPL-CCNC: 70 U/L (ref 30–99)
ALT SERPL-CCNC: 8 U/L (ref 2–50)
ANION GAP SERPL CALC-SCNC: 13 MMOL/L (ref 0–11.9)
AST SERPL-CCNC: 13 U/L (ref 12–45)
BACTERIA UR CULT: NORMAL
BASOPHILS # BLD AUTO: 1.1 % (ref 0–1.8)
BASOPHILS # BLD: 0.12 K/UL (ref 0–0.12)
BILIRUB SERPL-MCNC: 0.6 MG/DL (ref 0.1–1.5)
BUN SERPL-MCNC: 42 MG/DL (ref 8–22)
CALCIUM SERPL-MCNC: 9.7 MG/DL (ref 8.5–10.5)
CHLORIDE SERPL-SCNC: 102 MMOL/L (ref 96–112)
CO2 SERPL-SCNC: 23 MMOL/L (ref 20–33)
CREAT SERPL-MCNC: 0.65 MG/DL (ref 0.5–1.4)
EOSINOPHIL # BLD AUTO: 0.02 K/UL (ref 0–0.51)
EOSINOPHIL NFR BLD: 0.2 % (ref 0–6.9)
ERYTHROCYTE [DISTWIDTH] IN BLOOD BY AUTOMATED COUNT: 50.8 FL (ref 35.9–50)
GFR SERPL CREATININE-BSD FRML MDRD: >60 ML/MIN/1.73 M 2
GLOBULIN SER CALC-MCNC: 3.9 G/DL (ref 1.9–3.5)
GLUCOSE SERPL-MCNC: 113 MG/DL (ref 65–99)
HCT VFR BLD AUTO: 39.4 % (ref 42–52)
HGB BLD-MCNC: 12.1 G/DL (ref 14–18)
IMM GRANULOCYTES # BLD AUTO: 0.04 K/UL (ref 0–0.11)
IMM GRANULOCYTES NFR BLD AUTO: 0.4 % (ref 0–0.9)
LYMPHOCYTES # BLD AUTO: 0.99 K/UL (ref 1–4.8)
LYMPHOCYTES NFR BLD: 9.3 % (ref 22–41)
MCH RBC QN AUTO: 23.2 PG (ref 27–33)
MCHC RBC AUTO-ENTMCNC: 30.7 G/DL (ref 33.7–35.3)
MCV RBC AUTO: 75.6 FL (ref 81.4–97.8)
MONOCYTES # BLD AUTO: 1.18 K/UL (ref 0–0.85)
MONOCYTES NFR BLD AUTO: 11.1 % (ref 0–13.4)
NEUTROPHILS # BLD AUTO: 8.3 K/UL (ref 1.82–7.42)
NEUTROPHILS NFR BLD: 77.9 % (ref 44–72)
NRBC # BLD AUTO: 0 K/UL
NRBC BLD AUTO-RTO: 0 /100 WBC
PLATELET # BLD AUTO: 467 K/UL (ref 164–446)
PMV BLD AUTO: 9.4 FL (ref 9–12.9)
POTASSIUM SERPL-SCNC: 3.5 MMOL/L (ref 3.6–5.5)
PROT SERPL-MCNC: 7.4 G/DL (ref 6–8.2)
RBC # BLD AUTO: 5.21 M/UL (ref 4.7–6.1)
SIGNIFICANT IND 70042: NORMAL
SITE SITE: NORMAL
SODIUM SERPL-SCNC: 138 MMOL/L (ref 135–145)
SOURCE SOURCE: NORMAL
VANCOMYCIN TROUGH SERPL-MCNC: 22.4 UG/ML (ref 10–20)
WBC # BLD AUTO: 10.7 K/UL (ref 4.8–10.8)

## 2017-03-08 PROCEDURE — 700102 HCHG RX REV CODE 250 W/ 637 OVERRIDE(OP): Performed by: HOSPITALIST

## 2017-03-08 PROCEDURE — 700105 HCHG RX REV CODE 258: Performed by: HOSPITALIST

## 2017-03-08 PROCEDURE — 700102 HCHG RX REV CODE 250 W/ 637 OVERRIDE(OP): Performed by: INTERNAL MEDICINE

## 2017-03-08 PROCEDURE — 700111 HCHG RX REV CODE 636 W/ 250 OVERRIDE (IP)

## 2017-03-08 PROCEDURE — 700105 HCHG RX REV CODE 258

## 2017-03-08 PROCEDURE — 85025 COMPLETE CBC W/AUTO DIFF WBC: CPT

## 2017-03-08 PROCEDURE — A9270 NON-COVERED ITEM OR SERVICE: HCPCS | Performed by: HOSPITALIST

## 2017-03-08 PROCEDURE — 94668 MNPJ CHEST WALL SBSQ: CPT

## 2017-03-08 PROCEDURE — 94760 N-INVAS EAR/PLS OXIMETRY 1: CPT

## 2017-03-08 PROCEDURE — 700111 HCHG RX REV CODE 636 W/ 250 OVERRIDE (IP): Performed by: HOSPITALIST

## 2017-03-08 PROCEDURE — 80202 ASSAY OF VANCOMYCIN: CPT

## 2017-03-08 PROCEDURE — 36415 COLL VENOUS BLD VENIPUNCTURE: CPT

## 2017-03-08 PROCEDURE — 80053 COMPREHEN METABOLIC PANEL: CPT

## 2017-03-08 PROCEDURE — A9270 NON-COVERED ITEM OR SERVICE: HCPCS | Performed by: INTERNAL MEDICINE

## 2017-03-08 PROCEDURE — 770020 HCHG ROOM/CARE - TELE (206)

## 2017-03-08 PROCEDURE — 700111 HCHG RX REV CODE 636 W/ 250 OVERRIDE (IP): Performed by: INTERNAL MEDICINE

## 2017-03-08 PROCEDURE — 99233 SBSQ HOSP IP/OBS HIGH 50: CPT | Performed by: HOSPITALIST

## 2017-03-08 RX ORDER — ENEMA 19; 7 G/133ML; G/133ML
1 ENEMA RECTAL PRN
Status: DISCONTINUED | OUTPATIENT
Start: 2017-03-08 | End: 2017-03-17 | Stop reason: HOSPADM

## 2017-03-08 RX ORDER — GABAPENTIN 300 MG/1
600 CAPSULE ORAL ONCE
Status: COMPLETED | OUTPATIENT
Start: 2017-03-08 | End: 2017-03-08

## 2017-03-08 RX ADMIN — GABAPENTIN 300 MG: 300 CAPSULE ORAL at 15:06

## 2017-03-08 RX ADMIN — VANCOMYCIN HYDROCHLORIDE 1200 MG: 10 INJECTION, POWDER, LYOPHILIZED, FOR SOLUTION INTRAVENOUS at 02:12

## 2017-03-08 RX ADMIN — SODIUM CHLORIDE: 9 INJECTION, SOLUTION INTRAVENOUS at 19:53

## 2017-03-08 RX ADMIN — HEPARIN SODIUM 5000 UNITS: 5000 INJECTION, SOLUTION INTRAVENOUS; SUBCUTANEOUS at 15:06

## 2017-03-08 RX ADMIN — GABAPENTIN 900 MG: 300 CAPSULE ORAL at 06:18

## 2017-03-08 RX ADMIN — MEROPENEM 500 MG: 500 INJECTION, POWDER, FOR SOLUTION INTRAVENOUS at 15:07

## 2017-03-08 RX ADMIN — HEPARIN SODIUM 5000 UNITS: 5000 INJECTION, SOLUTION INTRAVENOUS; SUBCUTANEOUS at 20:38

## 2017-03-08 RX ADMIN — HEPARIN SODIUM 5000 UNITS: 5000 INJECTION, SOLUTION INTRAVENOUS; SUBCUTANEOUS at 06:18

## 2017-03-08 RX ADMIN — LORAZEPAM 0.5 MG: 2 INJECTION INTRAMUSCULAR; INTRAVENOUS at 11:39

## 2017-03-08 RX ADMIN — MIDODRINE HYDROCHLORIDE 10 MG: 5 TABLET ORAL at 08:35

## 2017-03-08 RX ADMIN — MEROPENEM 500 MG: 500 INJECTION, POWDER, FOR SOLUTION INTRAVENOUS at 07:14

## 2017-03-08 RX ADMIN — MIDODRINE HYDROCHLORIDE 10 MG: 5 TABLET ORAL at 18:07

## 2017-03-08 RX ADMIN — MIDODRINE HYDROCHLORIDE 10 MG: 5 TABLET ORAL at 15:07

## 2017-03-08 RX ADMIN — ASPIRIN 81 MG: 81 TABLET ORAL at 08:34

## 2017-03-08 RX ADMIN — GABAPENTIN 900 MG: 300 CAPSULE ORAL at 14:00

## 2017-03-08 RX ADMIN — TRAMADOL HYDROCHLORIDE 50 MG: 50 TABLET, COATED ORAL at 02:12

## 2017-03-08 RX ADMIN — LORAZEPAM 0.5 MG: 0.5 TABLET ORAL at 02:12

## 2017-03-08 RX ADMIN — GABAPENTIN 900 MG: 300 CAPSULE ORAL at 20:42

## 2017-03-08 RX ADMIN — STANDARDIZED SENNA CONCENTRATE AND DOCUSATE SODIUM 2 TABLET: 8.6; 5 TABLET, FILM COATED ORAL at 20:38

## 2017-03-08 ASSESSMENT — PAIN SCALES - GENERAL
PAINLEVEL_OUTOF10: 0

## 2017-03-08 ASSESSMENT — ENCOUNTER SYMPTOMS
VOMITING: 0
CONSTIPATION: 1
WHEEZING: 0
ABDOMINAL PAIN: 1
CHILLS: 0
DIARRHEA: 0
COUGH: 0
SHORTNESS OF BREATH: 0
FEVER: 0
NAUSEA: 0
NERVOUS/ANXIOUS: 1

## 2017-03-08 NOTE — WOUND TEAM
"[facility]  Inpatient Services  Initial Wound and Skin Care Evaluation    Admission Date:  03/06/2017.   HPI, PMH, SH: Reviewed  Unit where seen by Wound Team: T7    WOUND CONSULT RELATED TO: Heels and decubitus ulcers.    SUBJECTIVE:  \"Home health discharged me because they said the wounds need a skin flap.\"    Self Report / Pain Level: 5/10.    OBJECTIVE: Fatigued, ill appearing, cooperative.     WOUND TYPE, LOCATION, CHARACTERISTICS (Pressure ulcers: location, stage, POA or date identified)    Location and type of wound: Bilateral ischium: Stage 4 healing pressure injuries, POA.        Periwound:     Friable, dry.   Drainage:     None.  Tissue Type and %:    100% red granulated to hypergranulated tissue.   Wound Edges:    Attached.  Odor:      None.  Exposed structure(s):  None.  S&S of Infection:   None.      Measurements:   03/08/2017.      Right  Left  Length:     4.3 cm  1.3 cm  Width:      1.3 cm  2.3 cm  Depth:    1.2 cm  1.2 cm    Location and type of wound: Right posterior heel: Unstagable pressure ulcer. POA.        Periwound:     Pink, discolored.  Drainage:     None.  Tissue Type and %:    100% black/brown eschar.  Wound Edges:    Attached.  Odor:      None.  Exposed structure(s):  None.  S&S of Infection:   None.      Measurements:   03/08/2017  Length:     4.9 cm  Width:      1.6 cm  Depth:    0 cm    Location and type of wound: Sacrum: Friable, partially denuded tissue.        Periwound:     Red, discolored.  Drainage:     Scant, sanguinous.  Tissue Type and %:    100% red/purple partially denuded tissue.  Wound Edges:    Attached.  Odor:      None.  Exposed structure(s):  None.  S&S of Infection:   None.      Measurements:   03/08/2017.  Length:     10 cm  Width:      5 cm  Depth:    0 cm      INTERVENTIONS BY WOUND TEAM: With assistance from MYRIAM Mclaughlin and MYRIAM Moncada, patient turned for assessment. Dressings that had been on ischial wounds were saturated with stool, removed. Stool " cleaned off of patient. Wounds irrigated with NS, hydrofera blue foam placed over wound beds, then covered with adhesive silicone foam. Reinforced edges with hypafix tape. Had to change them both after digital stimulation performed to induce BM. Bilateral heels assessed. Right heel has an unstagable, covered with adhesive silicone foam. Left heel is pink, looks like newly healed tissue, covered with adhesive silicone foam for protection. Heel float boots applied bilaterally. Low air loss bed already ordered. Skin care and dressing maintenance order placed.   Nursing to change dressings.       Interdisciplinary consultation: Patient, Arnoldo, RN, Coral RN.    EVALUATION: Wound bed are hypergranulated, hydrofera blue will help slow granulation, manage drainage, and provides antimicrobial treatment.     Factors affecting wound healing: Quadriplegia.  Goals: Wounds will decrease in size by 1% per week.     NURSING PLAN OF CARE ORDERS (X):    Dressing changes: See Dressing Maintenance orders: X  Skin care: See Skin Care orders: X  Rectal tube care: See Rectal Tube Care orders:   Other orders:    RSKIN: CURRENT (X) ORDERED (O)  Q shift Sebas:  X  Q shift pressure point assessments:  X  Pressure redistribution mattress   X     ASHOK    O  Bariatric ASHKO      Bariatric foam        Heel float boots   X    Heels floated on pillows      Barrier wipes    X  Barrier Cream      Barrier paste      Sacral silicone dressing  X    Silicone O2 tubing   X   Anchorfast      Trach with Optifoam split foam       Waffle cushion      Rectal tube or BMS      Antifungal tx    Turn q 2 hours   X  Up to chair     Ambulate   PT/OT     Dietician      PO     TF   TPN     PVN    NPO  X # days   Other       WOUND TEAM PLAN OF CARE (X):   NPWT change 3 x week:        Dressing changes by wound team:       Follow up as needed: X      Other (explain):    Anticipated discharge plans (X):  TBD  SNF:           Home Care:           Outpatient Wound Center:             Self Care:            Other:

## 2017-03-08 NOTE — DIETARY
Nutrition Services: Low BMI  56 year old male with admit dx of chest pain, sepsis, HCAP  Past Pertinent Med Hx: quadriplegia, recurrent UTI, hypotension, autonomic dysreflexia, neurogenic bladder, paroxysmal atrial fibrillation, clostridium difficile infection, DVT, sacral area decubitus ulcer and bilateral heel ulcers    Pt states his appetite was good PTA, however it wasn't good for the last couple of days. Pt denies wt loss and unsure of UBW.    Ht: 190.5 cm    Wt 3/7: 68.1 kg via bed scale  BMI: 18.77  Diet: NPO x 2 day  Pertinent Labs 3/6: Glucose 136, BUN 31  Pertinent Meds: neurontin, merrem, proamatine, pericolace  Fluids: NS infusion @ 125 ml/hr  Skin: Pressure Ulcer stage 4 POA ischium right  GI: Last BM PTA    PLAN/RECOMMEND -   1) Nutrition rep to see patient daily for meal and snack preferences, when diet starts  2) Start PO diet when medically feasible.   3) Weekly weights to monitor fluid and nutrition status  4) Obtain supplement order per RD when diet starts  5) Vitamin therapy to aide in wound healing: Multivitamin with minerals daily and 500 mg Vitamin C BID for 14 days    RD following

## 2017-03-08 NOTE — PROGRESS NOTES
UROLOGY CONSULT (dictated) [id number]  Requested by:  Dr Keating  Reason:  Possible UTI    Recommendations:  No evidence at this time of UTI. Medicine will handle the small bowel obstruction.   No urology intervention needed at this time. Plan of care discussed with Dr Martinez.    Thank you for the consult    HAIR Valle

## 2017-03-08 NOTE — WOUND TEAM
MYRIAM Mclaughlin will call this RN when it is time to move patient over to the low air loss bed so that assessment of patient's wounds can be done at the same time.

## 2017-03-08 NOTE — DOCUMENTATION QUERY
"DOCUMENTATION QUERY    PROVIDERS: Please select “Cosign w/ note”to reply to query.    To better represent the severity of illness of your patient, please review the following information and exercise your independent professional judgment in responding to this query.     Quadriplegia, Iron Deficiency Anemia due to inadequate dietary intake & Multiple Decubitus Ulcers are documented in the History and Physical and Progress Notes. Patient is NPO and has orders for GI/Dietary Consults in Place. Patient states \"his ostomy has no change in output recently and has had a decreased appetite. Patients BMI 18 and has an abnormal HDL reading of 22. Patient was described as being critically ill, thin & frail.      Based upon the clinical findings, risk factors, and treatment, can a diagnosis be provided to support this finding?    • Severe Protein Calorie Malnutrition   • Moderate Protein Calorie Malnutrition  • Cachexia   • Other Diagnosis (Please Specify)  • Unable to Determine            The medical record reflects the following:   Clinical Findings  BMI 18, Decreased Appetite, Quadriplegia with skin breakdown in multiple areas, Has ileostomy in place, Documented as Thin/Frail   Treatment  Dietary/ GI Consult, NPO at this time   Risk Factors  Quadriplegia, decreased appetite, History of Malnutrition    Location within medical record  H&P & Progress Notes      Thank you for your time.    Sincerely,   Coni Burnette RN  Clinical Documentation Improvement Specialist   [phone]  [email or web address]        "

## 2017-03-08 NOTE — PROGRESS NOTES
Writer received report at bedside.  Pt. In bed, no apparent distress noted, call light within reach and bed in lowest position and  locks on.   Pt. A&Ox4,claims he's tired,VSS, denies pain; however,encouraged to call for anything.   Pt. NG to wall suction maintained as ordered.

## 2017-03-08 NOTE — CARE PLAN
Problem: Knowledge Deficit  Goal: Knowledge of disease process/condition, treatment plan, diagnostic tests, and medications will improve  Intervention: Explain information regarding disease process/condition, treatment plan, diagnostic tests, and medications and document in education  Pt and family updated on plan of care.

## 2017-03-08 NOTE — PROGRESS NOTES
Bedside report completed. Pt resting in bed, A&Ox4.  Assessment complete.  Discussed POC.  Call light within reach, fall risk assessed and precautions in place.

## 2017-03-08 NOTE — DISCHARGE PLANNING
Care Transition Team Assessment    Information Source  Orientation : Oriented x 4  Information Given By: Patient  Informant's Name: Stevie  Who is responsible for making decisions for patient? : Patient    Readmission Evaluation  Is this a readmission?: No    Elopement Risk  Legal Hold: No  Ambulatory or Self Mobile in Wheelchair: No-Not an Elopement Risk    Interdisciplinary Discharge Planning  Does Admitting Nurse Feel This Could be a Complex Discharge?: No  Primary Care Physician: Gail Perez  Lives with - Patient's Self Care Capacity: Unable To Determine At This Time  Patient or legal guardian wants to designate a caregiver (see row info): No  Support Systems: Children, Family Member(s)  Housing / Facility: [address]  Do You Take your Prescribed Medications Regularly: Yes  Able to Return to Previous ADL's: No  Mobility Issues: Yes  Prior Services: Skilled Home Health Services  Patient Expects to be Discharged to:: Home  Assistance Needed: Yes  Durable Medical Equipment: Not Applicable    Discharge Preparedness  What is your plan after discharge?: Home with help  What are your discharge supports?: Child, Sibling  Prior Functional Level: Needs Assist with Activities of Daily Living  Difficulity with ADLs: Toileting, Dressing  Difficulity with IADLs: Cooking, Driving, Laundry    Functional Assesment  Prior Functional Level: Needs Assist with Activities of Daily Living    Finances  Financial Barriers to Discharge: No  Prescription Coverage: Yes    Vision / Hearing Impairment  Vision Impairment : No  Hearing Impairment : No    Values / Beliefs / Concerns  Values / Beliefs Concerns : No    Advance Directive  Advance Directive?: None  Advance Directive offered?:  (pt would like one)    Domestic Abuse  Have you ever been the victim of abuse or violence?: No  Physical Abuse or Sexual Abuse: No  Verbal Abuse or Emotional Abuse: No  Possible Abuse Reported to:: Not Applicable    Psychological Assessment  History of  Substance Abuse: None    Discharge Risks or Barriers  Discharge risks or barriers?: Complex medical needs  Patient risk factors: Complex medical needs    Anticipated Discharge Information  Anticipated discharge disposition: Home  Discharge Address: [facility] [address], [place] [address]  Discharge Contact Phone Number: [phone]

## 2017-03-09 LAB
ALBUMIN SERPL BCP-MCNC: 3.3 G/DL (ref 3.2–4.9)
ALBUMIN/GLOB SERPL: 0.9 G/DL
ALP SERPL-CCNC: 67 U/L (ref 30–99)
ALT SERPL-CCNC: 5 U/L (ref 2–50)
ANION GAP SERPL CALC-SCNC: 15 MMOL/L (ref 0–11.9)
ANISOCYTOSIS BLD QL SMEAR: ABNORMAL
AST SERPL-CCNC: 12 U/L (ref 12–45)
BASOPHILS # BLD AUTO: 1.7 % (ref 0–1.8)
BASOPHILS # BLD: 0.19 K/UL (ref 0–0.12)
BILIRUB SERPL-MCNC: 0.4 MG/DL (ref 0.1–1.5)
BUN SERPL-MCNC: 37 MG/DL (ref 8–22)
BURR CELLS BLD QL SMEAR: NORMAL
CALCIUM SERPL-MCNC: 9.4 MG/DL (ref 8.5–10.5)
CHLORIDE SERPL-SCNC: 105 MMOL/L (ref 96–112)
CO2 SERPL-SCNC: 21 MMOL/L (ref 20–33)
CREAT SERPL-MCNC: 0.44 MG/DL (ref 0.5–1.4)
EOSINOPHIL # BLD AUTO: 0.1 K/UL (ref 0–0.51)
EOSINOPHIL NFR BLD: 0.9 % (ref 0–6.9)
ERYTHROCYTE [DISTWIDTH] IN BLOOD BY AUTOMATED COUNT: 53.2 FL (ref 35.9–50)
GFR SERPL CREATININE-BSD FRML MDRD: >60 ML/MIN/1.73 M 2
GLOBULIN SER CALC-MCNC: 3.7 G/DL (ref 1.9–3.5)
GLUCOSE SERPL-MCNC: 88 MG/DL (ref 65–99)
HCT VFR BLD AUTO: 38.3 % (ref 42–52)
HGB BLD-MCNC: 11.4 G/DL (ref 14–18)
LYMPHOCYTES # BLD AUTO: 1.14 K/UL (ref 1–4.8)
LYMPHOCYTES NFR BLD: 10.4 % (ref 22–41)
MANUAL DIFF BLD: NORMAL
MCH RBC QN AUTO: 23.5 PG (ref 27–33)
MCHC RBC AUTO-ENTMCNC: 29.8 G/DL (ref 33.7–35.3)
MCV RBC AUTO: 78.8 FL (ref 81.4–97.8)
MICROCYTES BLD QL SMEAR: ABNORMAL
MONOCYTES # BLD AUTO: 0.57 K/UL (ref 0–0.85)
MONOCYTES NFR BLD AUTO: 5.2 % (ref 0–13.4)
MORPHOLOGY BLD-IMP: NORMAL
NEUTROPHILS # BLD AUTO: 9 K/UL (ref 1.82–7.42)
NEUTROPHILS NFR BLD: 81.8 % (ref 44–72)
NRBC # BLD AUTO: 0 K/UL
NRBC BLD AUTO-RTO: 0 /100 WBC
PLATELET # BLD AUTO: 437 K/UL (ref 164–446)
PLATELET BLD QL SMEAR: NORMAL
PMV BLD AUTO: 9.8 FL (ref 9–12.9)
POIKILOCYTOSIS BLD QL SMEAR: NORMAL
POTASSIUM SERPL-SCNC: 3.4 MMOL/L (ref 3.6–5.5)
PROT SERPL-MCNC: 7 G/DL (ref 6–8.2)
RBC # BLD AUTO: 4.86 M/UL (ref 4.7–6.1)
RBC BLD AUTO: PRESENT
SCHISTOCYTES BLD QL SMEAR: NORMAL
SODIUM SERPL-SCNC: 141 MMOL/L (ref 135–145)
WBC # BLD AUTO: 11 K/UL (ref 4.8–10.8)

## 2017-03-09 PROCEDURE — 700111 HCHG RX REV CODE 636 W/ 250 OVERRIDE (IP): Performed by: HOSPITALIST

## 2017-03-09 PROCEDURE — 80053 COMPREHEN METABOLIC PANEL: CPT

## 2017-03-09 PROCEDURE — G8997 SWALLOW GOAL STATUS: HCPCS | Mod: CI

## 2017-03-09 PROCEDURE — 94669 MECHANICAL CHEST WALL OSCILL: CPT

## 2017-03-09 PROCEDURE — 99233 SBSQ HOSP IP/OBS HIGH 50: CPT | Performed by: HOSPITALIST

## 2017-03-09 PROCEDURE — A6209 FOAM DRSG <=16 SQ IN W/O BDR: HCPCS | Performed by: HOSPITALIST

## 2017-03-09 PROCEDURE — 85007 BL SMEAR W/DIFF WBC COUNT: CPT

## 2017-03-09 PROCEDURE — 36415 COLL VENOUS BLD VENIPUNCTURE: CPT

## 2017-03-09 PROCEDURE — 700111 HCHG RX REV CODE 636 W/ 250 OVERRIDE (IP): Performed by: INTERNAL MEDICINE

## 2017-03-09 PROCEDURE — G8996 SWALLOW CURRENT STATUS: HCPCS | Mod: CK

## 2017-03-09 PROCEDURE — A9270 NON-COVERED ITEM OR SERVICE: HCPCS | Performed by: HOSPITALIST

## 2017-03-09 PROCEDURE — 94667 MNPJ CHEST WALL 1ST: CPT

## 2017-03-09 PROCEDURE — 700101 HCHG RX REV CODE 250: Performed by: HOSPITALIST

## 2017-03-09 PROCEDURE — 700105 HCHG RX REV CODE 258: Performed by: HOSPITALIST

## 2017-03-09 PROCEDURE — 700102 HCHG RX REV CODE 250 W/ 637 OVERRIDE(OP): Performed by: HOSPITALIST

## 2017-03-09 PROCEDURE — 85027 COMPLETE CBC AUTOMATED: CPT

## 2017-03-09 PROCEDURE — 92610 EVALUATE SWALLOWING FUNCTION: CPT

## 2017-03-09 PROCEDURE — 700102 HCHG RX REV CODE 250 W/ 637 OVERRIDE(OP): Performed by: INTERNAL MEDICINE

## 2017-03-09 PROCEDURE — A9270 NON-COVERED ITEM OR SERVICE: HCPCS | Performed by: INTERNAL MEDICINE

## 2017-03-09 PROCEDURE — 94760 N-INVAS EAR/PLS OXIMETRY 1: CPT

## 2017-03-09 PROCEDURE — 770020 HCHG ROOM/CARE - TELE (206)

## 2017-03-09 RX ORDER — ASCORBIC ACID 500 MG
500 TABLET ORAL DAILY
Status: DISCONTINUED | OUTPATIENT
Start: 2017-03-09 | End: 2017-03-17 | Stop reason: HOSPADM

## 2017-03-09 RX ORDER — POTASSIUM CHLORIDE 7.45 MG/ML
10 INJECTION INTRAVENOUS
Status: COMPLETED | OUTPATIENT
Start: 2017-03-09 | End: 2017-03-09

## 2017-03-09 RX ORDER — ENEMA 19; 7 G/133ML; G/133ML
1 ENEMA RECTAL ONCE
Status: COMPLETED | OUTPATIENT
Start: 2017-03-09 | End: 2017-03-09

## 2017-03-09 RX ADMIN — THERA TABS 1 TABLET: TAB at 12:29

## 2017-03-09 RX ADMIN — MIDODRINE HYDROCHLORIDE 10 MG: 5 TABLET ORAL at 12:29

## 2017-03-09 RX ADMIN — MIDODRINE HYDROCHLORIDE 10 MG: 5 TABLET ORAL at 08:16

## 2017-03-09 RX ADMIN — LORAZEPAM 0.5 MG: 0.5 TABLET ORAL at 05:35

## 2017-03-09 RX ADMIN — POTASSIUM CHLORIDE 10 MEQ: 7.46 INJECTION, SOLUTION INTRAVENOUS at 10:38

## 2017-03-09 RX ADMIN — HEPARIN SODIUM 5000 UNITS: 5000 INJECTION, SOLUTION INTRAVENOUS; SUBCUTANEOUS at 15:12

## 2017-03-09 RX ADMIN — POTASSIUM CHLORIDE 10 MEQ: 7.46 INJECTION, SOLUTION INTRAVENOUS at 09:17

## 2017-03-09 RX ADMIN — SODIUM CHLORIDE: 9 INJECTION, SOLUTION INTRAVENOUS at 05:53

## 2017-03-09 RX ADMIN — POTASSIUM CHLORIDE 10 MEQ: 7.46 INJECTION, SOLUTION INTRAVENOUS at 09:16

## 2017-03-09 RX ADMIN — STANDARDIZED SENNA CONCENTRATE AND DOCUSATE SODIUM 2 TABLET: 8.6; 5 TABLET, FILM COATED ORAL at 21:02

## 2017-03-09 RX ADMIN — ASPIRIN 81 MG: 81 TABLET ORAL at 08:16

## 2017-03-09 RX ADMIN — SODIUM CHLORIDE: 9 INJECTION, SOLUTION INTRAVENOUS at 15:13

## 2017-03-09 RX ADMIN — HEPARIN SODIUM 5000 UNITS: 5000 INJECTION, SOLUTION INTRAVENOUS; SUBCUTANEOUS at 05:35

## 2017-03-09 RX ADMIN — MIDODRINE HYDROCHLORIDE 10 MG: 5 TABLET ORAL at 17:52

## 2017-03-09 RX ADMIN — POTASSIUM CHLORIDE 10 MEQ: 7.46 INJECTION, SOLUTION INTRAVENOUS at 12:29

## 2017-03-09 RX ADMIN — GABAPENTIN 900 MG: 300 CAPSULE ORAL at 15:12

## 2017-03-09 RX ADMIN — HEPARIN SODIUM 5000 UNITS: 5000 INJECTION, SOLUTION INTRAVENOUS; SUBCUTANEOUS at 21:02

## 2017-03-09 RX ADMIN — PROCHLORPERAZINE EDISYLATE 10 MG: 5 INJECTION INTRAMUSCULAR; INTRAVENOUS at 23:35

## 2017-03-09 RX ADMIN — OXYCODONE HYDROCHLORIDE AND ACETAMINOPHEN 500 MG: 500 TABLET ORAL at 12:29

## 2017-03-09 RX ADMIN — GABAPENTIN 900 MG: 300 CAPSULE ORAL at 06:00

## 2017-03-09 RX ADMIN — TRAMADOL HYDROCHLORIDE 50 MG: 50 TABLET, COATED ORAL at 05:35

## 2017-03-09 RX ADMIN — SODIUM CHLORIDE: 9 INJECTION, SOLUTION INTRAVENOUS at 23:35

## 2017-03-09 RX ADMIN — ONDANSETRON 4 MG: 2 INJECTION, SOLUTION INTRAMUSCULAR; INTRAVENOUS at 20:11

## 2017-03-09 RX ADMIN — GABAPENTIN 900 MG: 300 CAPSULE ORAL at 21:02

## 2017-03-09 RX ADMIN — STANDARDIZED SENNA CONCENTRATE AND DOCUSATE SODIUM 2 TABLET: 8.6; 5 TABLET, FILM COATED ORAL at 08:16

## 2017-03-09 RX ADMIN — SODIUM PHOSPHATE 133 ML: 7; 19 ENEMA RECTAL at 15:12

## 2017-03-09 ASSESSMENT — PAIN SCALES - GENERAL
PAINLEVEL_OUTOF10: 1
PAINLEVEL_OUTOF10: 0
PAINLEVEL_OUTOF10: 4

## 2017-03-09 ASSESSMENT — ENCOUNTER SYMPTOMS
DIARRHEA: 0
FEVER: 0
NERVOUS/ANXIOUS: 1
SHORTNESS OF BREATH: 0
CHILLS: 0
NAUSEA: 0
CONSTIPATION: 1
ABDOMINAL PAIN: 0
COUGH: 0
VOMITING: 0

## 2017-03-09 NOTE — PROGRESS NOTES
Hospital Medicine Progress Note, Adult, Complex               Author: Tom Keating Date & Time created: 3/8/2017  5:33 PM     Interval History:  CC: SBO, sepsis, ischium ulcers   3/7: AXR shows likely SBO. NPO. NGT to suction. Discussed w/ pt. Also discussed ischium ulcers w/ plastics Dr. JUDSON Arriola.   3/8: Consulted niraj Martinez. THAD meropenem. DC vanc. MRSA negative. Disimpacted rectum, had medium BM. Spoke w/ daughter.     Review of Systems:  Review of Systems   Constitutional: Negative for fever and chills.   Respiratory: Negative for cough, shortness of breath and wheezing.    Cardiovascular: Negative for chest pain.   Gastrointestinal: Positive for abdominal pain and constipation. Negative for nausea, vomiting and diarrhea.   Psychiatric/Behavioral: The patient is nervous/anxious.      Physical Exam:  Physical Exam   Constitutional: He appears well-developed.   Thin/frail   HENT:   Head: Normocephalic.   Eyes: Conjunctivae are normal.   Cardiovascular: Normal rate.    Pulmonary/Chest: No respiratory distress.   Abdominal: He exhibits no distension. There is no tenderness. There is no rebound and no guarding.   Neurological: He is alert.   Skin:   bilat ichium ulcers w/o surrounding erythema      Labs:        Invalid input(s): [id number]  Recent Labs      03/06/17   1020   TROPONINI  <0.01   BNPBTYPENAT  35     Recent Labs      03/06/17   1020  03/08/17   0935   SODIUM  137  138   POTASSIUM  4.1  3.5*   CHLORIDE  101  102   CO2  18*  23   BUN  31*  42*   CREATININE  0.92  0.65   CALCIUM  10.3  9.7     Recent Labs      03/06/17   1020  03/08/17   0935   ALTSGPT  15  8   ASTSGOT  17  13   ALKPHOSPHAT  87  70   TBILIRUBIN  0.5  0.6   GLUCOSE  136*  113*     Recent Labs      03/06/17   1020  03/06/17   1900  03/08/17   0935   RBC  6.28*   --   5.21   HEMOGLOBIN  14.6   --   12.1*   HEMATOCRIT  47.2   --   39.4*   PLATELETCT  552*   --   467*   PROTHROMBTM  13.8  13.9   --    APTT  33.2  32.3   --    INR  1.03  1.04    --      Recent Labs      17   1020  17   0935   WBC  15.8*  10.7   NEUTSPOLYS  90.30*  77.90*   LYMPHOCYTES  4.20*  9.30*   MONOCYTES  4.70  11.10   EOSINOPHILS  0.00  0.20   BASOPHILS  0.40  1.10   ASTSGOT  17  13   ALTSGPT  15  8   ALKPHOSPHAT  87  70   TBILIRUBIN  0.5  0.6           Hemodynamics:  Temp (24hrs), Av.6 °C (97.9 °F), Min:36.3 °C (97.3 °F), Max:37 °C (98.6 °F)  Temperature: 36.6 °C (97.9 °F)  Pulse  Av  Min: 53  Max: 160  Blood Pressure: 141/88 mmHg     Respiratory:    Respiration: 17, Pulse Oximetry: 94 %, O2 Daily Delivery Respiratory : Silicone Nasal Cannula     PEP/CPT Method: Positive Airway Pressure Device, Work Of Breathing / Effort: Mild  RUL Breath Sounds: Diminished, RML Breath Sounds: Diminished, RLL Breath Sounds: Diminished, EDDIE Breath Sounds: Diminished, LLL Breath Sounds: Diminished  Fluids:    Intake/Output Summary (Last 24 hours) at 17 1733  Last data filed at 17 0500   Gross per 24 hour   Intake      0 ml   Output   1650 ml   Net  -1650 ml     Weight: 68.1 kg (150 lb 2.1 oz)  GI/Nutrition:  Orders Placed This Encounter   Procedures   • Diet NPO     Standing Status: Standing      Number of Occurrences: 1      Standing Expiration Date:      Order Specific Question:  Restrict to:     Answer:  Sips with Medications [3]      Comments:  small amount of fluids     Medical Decision Making, by Problem:  Active Hospital Problems    Diagnosis   • *Sepsis (CMS-HCC) [A41.9]  - covering for UTI vs HCAP   - dc vanc and meropenem  - also treating SBO   • Decubitus ulcer, stage 3 ([facility]) [L89.94]  - consulted plastics Marbin Arriola    • 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  - wound care     Labs reviewed and Medications reviewed  Medrano catheter: No Medrano      DVT Prophylaxis: Heparin

## 2017-03-09 NOTE — CARE PLAN
Problem: Bowel/Gastric:  Goal: Normal bowel function is maintained or improved  Intervention: Educate patient and significant other/support system about signs and symptoms of constipation and interventions to implement  Pt. educated on NPO status and plan of care

## 2017-03-09 NOTE — PROGRESS NOTES
Bedside report completed. Pt resting in bed, A&Ox4.  Assessment complete.  No reports of pain.  Discussed POC.  Call light within reach, fall risk assessed and precautions in place.

## 2017-03-09 NOTE — CARE PLAN
Problem: Oxygenation:  Goal: Maintain adequate oxygenation dependent on patient condition  Outcome: PROGRESSING AS EXPECTED  96-94% on 1.5 LPM NC    Problem: Hyperinflation:  Goal: Prevent or improve atelectasis  Outcome: PROGRESSING AS EXPECTED  Intervention: Instruct incentive spirometry usage  60% of pred is 1980. Best IS value today was 2200.  Intervention: Perform hyperinflation therapy as indicated by assessment  Pep QID

## 2017-03-09 NOTE — CONSULTS
DATE OF SERVICE:  03/08/2017    REQUESTING PHYSICIAN:  Tom Keating MD    CONSULTING UROLOGIST:  Tiago Martinez MD/VERENICE Ochoa    REASON FOR CONSULTATION:  Possible urinary tract infection.    HISTORY OF PRESENT ILLNESS:  This is a 56-year-old male who is well known to   our office, has a very complicated medical history including being a   quadriplegic with multiple urinary tract infections including ESBL Klebsiella,   VRE, and Enterococcus.  He presented to the emergency room complaining of   several days of nausea, vomiting, and weakness.  He states that the only   change with his ostomy is less output, but with the vomiting, he has not been   able to keep much down for fluids.  Denies any chest pain, shortness of   breath.  No fever or chills.    REVIEW OF SYSTEMS:  All pertinent positives addressed under HPI.    PAST MEDICAL HISTORY:  Quadriplegic, recurrent urinary tract infections,   hypertension, autonomic dysreflexia, neurogenic bladder, atrial fibrillation,   Clostridium difficile infection, history of DVT, history of sacral area   decubitus.    PAST SURGICAL HISTORY:  Gastrostomy, cervical spine surgery, IVC filter   placement, cholecystectomy, cystectomy, and ileostomy.    FAMILY HISTORY:  Noncontributory.    SOCIAL HISTORY:  Does not smoke, drink, or use any illicit drugs.    ALLERGIES:  ZOSYN.    MEDICATIONS:  Vitamin C, aspirin, iron, gabapentin, probiotic, tramadol,   midodrine.    PHYSICAL EXAMINATION:  VITAL SIGNS:  Temperature is 97.3, pulse 83, respirations 19, blood pressure   93/70.  GENERAL:  He is awake, alert and oriented.  In no acute distress.  HEART:  He is a little tachycardic.  LUNGS:  Breath sounds are coarse throughout.  ABDOMEN:  He does have his ostomy bag in the right lower quadrant.    LABORATORY DATA:  WBC is 10.7, hemoglobin 12.1, hematocrit 39.4.  Sodium 139,   potassium 3.5, BUN 42, creatinine 0.65.  Urine culture is negative.    IMAGING:  Chest x-ray shows  small-bowel obstruction.    ASSESSMENT AND PLAN:  At this time, does not appear to be any urinary tract   infection that would require our input.  Medicine will handle his small-bowel   obstruction.    Thank you for the consult.  Should anything change, please contact urology;   otherwise, we will see him for his normal visit with Dr. Martinez.       ____________________________________     VERENICE MATHIS / ERICK    DD:  03/08/2017 12:56:25  DT:  03/08/2017 16:45:43    D#:  [id number]  Job#:  [id number]    cc: Paula Lynn MD

## 2017-03-09 NOTE — CARE PLAN
Problem: Safety  Goal: Will remain free from injury  Intervention: Provide assistance with mobility  Pt. positioned in bed with 2 assist

## 2017-03-09 NOTE — THERAPY
"Speech Language Therapy Clinical Swallow Evaluation completed.  Functional Status: The patient was seen for clinical swallow evaluation this date. The patient was awake, alert and oriented. The patient followed oral motor directives with no gross deficits appreciated aside from weak cough and weak laryngeal elevation on palpation. The patient is currently only cleared by MD for clear liquids 2/2 possible small bowel obstruction. The patient consumed portions of his thin liquid tray with no overt s/s of aspriation. Of note, patient with wet sounding cough prior to and during PO intake however did not appear consistent with swallow function. The patient was instructed on swallow strategies given largea bore NG remains in place. Patient verbalized understanding but requested to rest. SLP following and will complete PO trials of solids when medically appropriate. Thank you.     Recommendations - Diet: Clear liquids Thin Liquid                          Strategies: Monitor during meals and Head of Bed at 90 Degrees                          Medication Administration:Whole with Liquid Wash   Plan of Care: Will benefit from Speech Therapy 3 times per week    See \"Rehab Therapy-Acute\" Patient Summary Report for complete documentation.   "

## 2017-03-10 ENCOUNTER — APPOINTMENT (OUTPATIENT)
Dept: RADIOLOGY | Facility: MEDICAL CENTER | Age: 57
DRG: 871 | End: 2017-03-10
Attending: INTERNAL MEDICINE
Payer: MEDICAID

## 2017-03-10 LAB
ALBUMIN SERPL BCP-MCNC: 3 G/DL (ref 3.2–4.9)
ALBUMIN/GLOB SERPL: 0.9 G/DL
ALP SERPL-CCNC: 72 U/L (ref 30–99)
ALT SERPL-CCNC: 7 U/L (ref 2–50)
ANION GAP SERPL CALC-SCNC: 8 MMOL/L (ref 0–11.9)
AST SERPL-CCNC: 13 U/L (ref 12–45)
BASOPHILS # BLD AUTO: 0.3 % (ref 0–1.8)
BASOPHILS # BLD: 0.04 K/UL (ref 0–0.12)
BILIRUB SERPL-MCNC: 0.3 MG/DL (ref 0.1–1.5)
BUN SERPL-MCNC: 20 MG/DL (ref 8–22)
CALCIUM SERPL-MCNC: 8.8 MG/DL (ref 8.5–10.5)
CHLORIDE SERPL-SCNC: 110 MMOL/L (ref 96–112)
CO2 SERPL-SCNC: 22 MMOL/L (ref 20–33)
CREAT SERPL-MCNC: 0.41 MG/DL (ref 0.5–1.4)
EOSINOPHIL # BLD AUTO: 0.26 K/UL (ref 0–0.51)
EOSINOPHIL NFR BLD: 2.1 % (ref 0–6.9)
ERYTHROCYTE [DISTWIDTH] IN BLOOD BY AUTOMATED COUNT: 52.5 FL (ref 35.9–50)
GFR SERPL CREATININE-BSD FRML MDRD: >60 ML/MIN/1.73 M 2
GLOBULIN SER CALC-MCNC: 3.3 G/DL (ref 1.9–3.5)
GLUCOSE SERPL-MCNC: 103 MG/DL (ref 65–99)
HCT VFR BLD AUTO: 34.6 % (ref 42–52)
HGB BLD-MCNC: 9.9 G/DL (ref 14–18)
IMM GRANULOCYTES # BLD AUTO: 0.1 K/UL (ref 0–0.11)
IMM GRANULOCYTES NFR BLD AUTO: 0.8 % (ref 0–0.9)
LYMPHOCYTES # BLD AUTO: 0.88 K/UL (ref 1–4.8)
LYMPHOCYTES NFR BLD: 7 % (ref 22–41)
MCH RBC QN AUTO: 22.4 PG (ref 27–33)
MCHC RBC AUTO-ENTMCNC: 28.6 G/DL (ref 33.7–35.3)
MCV RBC AUTO: 78.3 FL (ref 81.4–97.8)
MONOCYTES # BLD AUTO: 1.27 K/UL (ref 0–0.85)
MONOCYTES NFR BLD AUTO: 10.1 % (ref 0–13.4)
NEUTROPHILS # BLD AUTO: 10 K/UL (ref 1.82–7.42)
NEUTROPHILS NFR BLD: 79.7 % (ref 44–72)
NRBC # BLD AUTO: 0 K/UL
NRBC BLD AUTO-RTO: 0 /100 WBC
PLATELET # BLD AUTO: 467 K/UL (ref 164–446)
PMV BLD AUTO: 10.1 FL (ref 9–12.9)
POTASSIUM SERPL-SCNC: 3.5 MMOL/L (ref 3.6–5.5)
PROT SERPL-MCNC: 6.3 G/DL (ref 6–8.2)
RBC # BLD AUTO: 4.42 M/UL (ref 4.7–6.1)
SODIUM SERPL-SCNC: 140 MMOL/L (ref 135–145)
WBC # BLD AUTO: 12.6 K/UL (ref 4.8–10.8)

## 2017-03-10 PROCEDURE — 94640 AIRWAY INHALATION TREATMENT: CPT

## 2017-03-10 PROCEDURE — A9270 NON-COVERED ITEM OR SERVICE: HCPCS | Performed by: HOSPITALIST

## 2017-03-10 PROCEDURE — 94669 MECHANICAL CHEST WALL OSCILL: CPT

## 2017-03-10 PROCEDURE — 99233 SBSQ HOSP IP/OBS HIGH 50: CPT | Performed by: INTERNAL MEDICINE

## 2017-03-10 PROCEDURE — 94667 MNPJ CHEST WALL 1ST: CPT

## 2017-03-10 PROCEDURE — 700111 HCHG RX REV CODE 636 W/ 250 OVERRIDE (IP): Performed by: INTERNAL MEDICINE

## 2017-03-10 PROCEDURE — 700102 HCHG RX REV CODE 250 W/ 637 OVERRIDE(OP): Performed by: INTERNAL MEDICINE

## 2017-03-10 PROCEDURE — 80053 COMPREHEN METABOLIC PANEL: CPT

## 2017-03-10 PROCEDURE — 31720 CLEARANCE OF AIRWAYS: CPT

## 2017-03-10 PROCEDURE — 302111 WAFER OST 2.25IN N IMG RD 2 PC (BARRIER): Performed by: INTERNAL MEDICINE

## 2017-03-10 PROCEDURE — 700105 HCHG RX REV CODE 258: Performed by: INTERNAL MEDICINE

## 2017-03-10 PROCEDURE — 85025 COMPLETE CBC W/AUTO DIFF WBC: CPT

## 2017-03-10 PROCEDURE — A6209 FOAM DRSG <=16 SQ IN W/O BDR: HCPCS | Performed by: INTERNAL MEDICINE

## 2017-03-10 PROCEDURE — 770020 HCHG ROOM/CARE - TELE (206)

## 2017-03-10 PROCEDURE — 36415 COLL VENOUS BLD VENIPUNCTURE: CPT

## 2017-03-10 PROCEDURE — 700105 HCHG RX REV CODE 258: Performed by: HOSPITALIST

## 2017-03-10 PROCEDURE — 700102 HCHG RX REV CODE 250 W/ 637 OVERRIDE(OP): Performed by: HOSPITALIST

## 2017-03-10 PROCEDURE — A9270 NON-COVERED ITEM OR SERVICE: HCPCS | Performed by: INTERNAL MEDICINE

## 2017-03-10 PROCEDURE — 74000 DX-ABDOMEN-1 VIEW: CPT

## 2017-03-10 PROCEDURE — 302104 KIT,UROSTOMY 2-PIECE 2 1/4": Performed by: INTERNAL MEDICINE

## 2017-03-10 PROCEDURE — 94760 N-INVAS EAR/PLS OXIMETRY 1: CPT

## 2017-03-10 RX ORDER — POTASSIUM CHLORIDE 20 MEQ/1
40 TABLET, EXTENDED RELEASE ORAL ONCE
Status: COMPLETED | OUTPATIENT
Start: 2017-03-10 | End: 2017-03-10

## 2017-03-10 RX ADMIN — MIDODRINE HYDROCHLORIDE 10 MG: 5 TABLET ORAL at 11:58

## 2017-03-10 RX ADMIN — HEPARIN SODIUM 5000 UNITS: 5000 INJECTION, SOLUTION INTRAVENOUS; SUBCUTANEOUS at 11:58

## 2017-03-10 RX ADMIN — GABAPENTIN 900 MG: 300 CAPSULE ORAL at 05:29

## 2017-03-10 RX ADMIN — OXYCODONE HYDROCHLORIDE AND ACETAMINOPHEN 500 MG: 500 TABLET ORAL at 07:58

## 2017-03-10 RX ADMIN — GABAPENTIN 900 MG: 300 CAPSULE ORAL at 20:21

## 2017-03-10 RX ADMIN — POTASSIUM CHLORIDE 40 MEQ: 1500 TABLET, EXTENDED RELEASE ORAL at 09:00

## 2017-03-10 RX ADMIN — GABAPENTIN 900 MG: 300 CAPSULE ORAL at 11:58

## 2017-03-10 RX ADMIN — SODIUM CHLORIDE: 9 INJECTION, SOLUTION INTRAVENOUS at 17:11

## 2017-03-10 RX ADMIN — ASPIRIN 81 MG: 81 TABLET ORAL at 07:58

## 2017-03-10 RX ADMIN — STANDARDIZED SENNA CONCENTRATE AND DOCUSATE SODIUM 2 TABLET: 8.6; 5 TABLET, FILM COATED ORAL at 07:58

## 2017-03-10 RX ADMIN — MIDODRINE HYDROCHLORIDE 10 MG: 5 TABLET ORAL at 07:58

## 2017-03-10 RX ADMIN — HEPARIN SODIUM 5000 UNITS: 5000 INJECTION, SOLUTION INTRAVENOUS; SUBCUTANEOUS at 20:22

## 2017-03-10 RX ADMIN — THERA TABS 1 TABLET: TAB at 07:58

## 2017-03-10 RX ADMIN — MIDODRINE HYDROCHLORIDE 10 MG: 5 TABLET ORAL at 17:07

## 2017-03-10 RX ADMIN — SODIUM CHLORIDE: 9 INJECTION, SOLUTION INTRAVENOUS at 07:57

## 2017-03-10 RX ADMIN — HEPARIN SODIUM 5000 UNITS: 5000 INJECTION, SOLUTION INTRAVENOUS; SUBCUTANEOUS at 05:29

## 2017-03-10 ASSESSMENT — PATIENT HEALTH QUESTIONNAIRE - PHQ9
SUM OF ALL RESPONSES TO PHQ QUESTIONS 1-9: 0
SUM OF ALL RESPONSES TO PHQ9 QUESTIONS 1 AND 2: 0
2. FEELING DOWN, DEPRESSED, IRRITABLE, OR HOPELESS: NOT AT ALL
1. LITTLE INTEREST OR PLEASURE IN DOING THINGS: NOT AT ALL

## 2017-03-10 ASSESSMENT — ENCOUNTER SYMPTOMS
VOMITING: 0
PALPITATIONS: 0
ABDOMINAL PAIN: 0
FALLS: 0
COUGH: 0
DEPRESSION: 1
FEVER: 0
MYALGIAS: 1
CHILLS: 0
WEAKNESS: 1
NAUSEA: 0
DIARRHEA: 1
SHORTNESS OF BREATH: 0

## 2017-03-10 ASSESSMENT — PAIN SCALES - GENERAL
PAINLEVEL_OUTOF10: 0

## 2017-03-10 ASSESSMENT — LIFESTYLE VARIABLES: DO YOU DRINK ALCOHOL: NO

## 2017-03-10 NOTE — PROGRESS NOTES
Patient very agitated and irritable. Did have one small emesis brown in color. Medicated with zofran. Request all medications to be given at this time so we can leave him alone. Complains that we are bugging him causing him to be more sick. Refuses to turn. Will medicate patient and try again at later time.

## 2017-03-10 NOTE — PROGRESS NOTES
PLASTICS    Patient known to me with bilateral ischial pressure ulcers, recently admitted with bowel obstruction.  Currently still has NG tube in place.  Patient unwilling to let me assess wounds, but I reviewed the recent photos - wound appears clean and granulating.  Will address further wound culture once bowel obstruction resolves and patient has a chance to improve nutritional status.

## 2017-03-10 NOTE — PROGRESS NOTES
Report received, pt care assumed, tele box on. VSS, pt assessment complete. Pt aaox 4, no signs of distress noted at this time. POC discussed with pt and verbalizes no questions. Pt c/o of 4/10 pain in arms, PRN pain med declined administration. Pt denies any additional needs at this time. Bed in lowest position, bed alarm on, pt educated on fall risk and verbalized understanding, call light within reach, will continue to monitor.

## 2017-03-10 NOTE — PROGRESS NOTES
Hospital Medicine Progress Note, Adult, Complex               Author: Tom Keating Date & Time created: 3/9/2017  6:32 PM     Interval History:  CC: SBO, sepsis, ischium ulcers   3/7: AXR shows likely SBO. NPO. NGT to suction. Discussed w/ pt. Also discussed ischium ulcers w/ plastics Dr. JUDSON Arriola.   3/8: Consulted niraj Martinez. THAD meropenem. DC vanc. MRSA negative. Disimpacted rectum, had medium BM. Spoke w/ daughter.   3/9: Added liquid MVI and vitamin C. Clamped NGT and trialing clears. Disimpacted again. HypoK: repleting. Updated daughter Giselle Phoenix (number below)     Review of Systems:  Review of Systems   Constitutional: Negative for fever and chills.   Respiratory: Negative for cough and shortness of breath.    Cardiovascular: Negative for chest pain.   Gastrointestinal: Positive for constipation. Negative for nausea, vomiting, abdominal pain and diarrhea.   Psychiatric/Behavioral: The patient is nervous/anxious.      Physical Exam:  Physical Exam   Constitutional: He appears well-developed.   Thin/frail   HENT:   Head: Normocephalic.   Eyes: Conjunctivae are normal.   Cardiovascular: Normal rate.    Pulmonary/Chest: No respiratory distress. He has no wheezes.   Abdominal: He exhibits no distension. There is no tenderness. There is no rebound.   Neurological: He is alert.   Skin:   bilat ichium ulcers w/o surrounding erythema      Labs:        Invalid input(s): [id number]      Recent Labs      03/08/17   0935  03/09/17   0231   SODIUM  138  141   POTASSIUM  3.5*  3.4*   CHLORIDE  102  105   CO2  23  21   BUN  42*  37*   CREATININE  0.65  0.44*   CALCIUM  9.7  9.4     Recent Labs      03/08/17   0935  03/09/17   0231   ALTSGPT  8  5   ASTSGOT  13  12   ALKPHOSPHAT  70  67   TBILIRUBIN  0.6  0.4   GLUCOSE  113*  88     Recent Labs      03/06/17   1900  03/08/17   0935  03/09/17   0231   RBC   --   5.21  4.86   HEMOGLOBIN   --   12.1*  11.4*   HEMATOCRIT   --   39.4*  38.3*   PLATELETCT   --   467*  437    PROTHROMBTM  13.9   --    --    APTT  32.3   --    --    INR  1.04   --    --      Recent Labs      17   0935  17   0231   WBC  10.7  11.0*   NEUTSPOLYS  77.90*  81.80*   LYMPHOCYTES  9.30*  10.40*   MONOCYTES  11.10  5.20   EOSINOPHILS  0.20  0.90   BASOPHILS  1.10  1.70   ASTSGOT  13  12   ALTSGPT  8  5   ALKPHOSPHAT  70  67   TBILIRUBIN  0.6  0.4           Hemodynamics:  Temp (24hrs), Av.5 °C (97.7 °F), Min:36.1 °C (97 °F), Max:37.5 °C (99.5 °F)  Temperature: 37.5 °C (99.5 °F)  Pulse  Av.3  Min: 11  Max: 160  Blood Pressure: 108/72 mmHg     Respiratory:    Respiration: 18, Pulse Oximetry: 97 %, O2 Daily Delivery Respiratory : Silicone Nasal Cannula     PEP/CPT Method: Flutter Valve, Given By:: Mouthseal, Work Of Breathing / Effort: Mild  RUL Breath Sounds: Diminished, RML Breath Sounds: Diminished, RLL Breath Sounds: Diminished, EDDIE Breath Sounds: Diminished, LLL Breath Sounds: Diminished  Fluids:    Intake/Output Summary (Last 24 hours) at 17 1832  Last data filed at 17 0700   Gross per 24 hour   Intake      0 ml   Output    800 ml   Net   -800 ml     Weight: 70.3 kg (154 lb 15.7 oz)  GI/Nutrition:  Orders Placed This Encounter   Procedures   • DIET ORDER     Standing Status: Standing      Number of Occurrences: 1      Standing Expiration Date:      Order Specific Question:  Diet:     Answer:  Clear Liquids - No Red Foods [12]     Medical Decision Making, by Problem:  Active Hospital Problems    Diagnosis   • *Sepsis (CMS-[facility]) [A41.9]  - covering for UTI vs HCAP   - dc vanc and meropenem  - also treating SBO   • Decubitus ulcer, stage 3 ([facility]) [L89.94]  - consulted plastics Marbin Arriola    • 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]  - chronic   • Anxiety [F41.9]  - ativan PRN   • SBO (small bowel obstruction) (CMS-[facility]) [K56.69]  - NGT now clamped  - no hx of abd surg other than ileal conduit w/ uro Michelle   - disimpacted   - trialing clear liquids   • Chest pain [R07.9]   • HCAP  (healthcare-associated pneumonia) [J18.9]  - covering empirically    • Iron deficiency anemia secondary to inadequate dietary iron intake [D50.8]   • Chronic ulcer of left heel (CMS-HCC) [L97.429]  - wound care   • Chronic ulcer of right heel (CMS-HCC) [L97.419]  - wound care   • Decubitus ulcer of left ischium, stage 3 (CMS-HCC) [L89.323].  - wound care     Dispo: Daughter Giselle Phoenix, cell [phone]    Labs reviewed and Medications reviewed  Medrano catheter: No Medrano      DVT Prophylaxis: Heparin

## 2017-03-10 NOTE — PROGRESS NOTES
Informed patient of importance of turning and wound care which he agrees to do. Patient found to have a small bm which had saturated the dressing to his coccyx. Dressings changed. Medicated for nausea.

## 2017-03-10 NOTE — PROGRESS NOTES
Pt A&Ox4, awake in bed. IV Site intact, IVF infusing without difficulty. SR/A Flutter on monitor. Denies pain or SOB. Will continue to monitor.

## 2017-03-10 NOTE — CARE PLAN
Problem: Skin Integrity  Goal: Risk for impaired skin integrity will decrease  Outcome: PROGRESSING AS EXPECTED

## 2017-03-10 NOTE — CARE PLAN
Problem: Oxygenation:  Goal: Maintain adequate oxygenation dependent on patient condition  Outcome: PROGRESSING AS EXPECTED  Intervention: Manage oxygen therapy by monitoring pulse oximetry and/or ABG values  Pt 97% on 3.5LPM NC       Problem: Bronchopulmonary Hygiene:  Goal: Increase mobilization of retained secretions  Outcome: PROGRESSING AS EXPECTED  Intervention: Perform bronchopulmonary therapy as indicated by assessment  IPV Q4 with cough assist

## 2017-03-10 NOTE — DIETARY
Nutrition Services: Update  Pt has been on CLD x 1 day (NPO/CLD x4 days). Pt states he hasn't eaten anything yet and is going to try breakfast in a bit. Pt willing to try boost breeze for additional kcal and protein. Per rounds MD okay with supplements. Added TID with meals. Per chart pt PO 0% for breakfast. Wt on 3/9 - 72.9 kg via bed scale, wt fluctuation noted.     Pertinent Labs: K 3.5, Glucose 103, Creatinine 0.41  Pertinent Meds: ascorbic acid, neurontin, proamatine, MVI, pericolace  Fluids: NS infusion @ 125 ml/hr  Skin: Per wound team note on 3/8 - Bilateral ischium: Stage 4 healing pressure injuries, POA, Right posterior heel: Unstagable pressure ulcer. POA, and Sacrum: Friable, partially denuded tissue.  GI: Last BM 3/9    PLAN/RECOMMEND -   1) Nutrition rep to see patient daily for meal and snack preferences, when diet advances past clears.   2) Advance diet past clears when medically feasible. If unable to advance consider starting nutrition support  3) Weekly weights to monitor fluid and nutrition status  4) Please document PO intake for each meal as percentage of meals consumed    RD following

## 2017-03-10 NOTE — PROGRESS NOTES
Hospital Medicine Progress Note, Adult, Complex               Author: Allyssa Becerril  Date & Time created: 3/10/2017  1:13 PM     CC: Constipation, Vomiting    Interval History:  56M hx of paraplegia, c/b autonomic dysreflexia, severe chronic sacral decubitus and heel ulcers, neurogenic bowel (s/p ostomy) and neurogenic bladder (recurrent UTIs w MDROs), paroxysmal afib and hx of DVT s/p IVC filter (not on anticaog), who was admitted for N/V and weakness found to have small bowel obstruction    3/7: SBO. NPO. NGT to suction.  Discussed ischium ulcers w/ plastics Dr. JUDSON Arriola, will see when SBO resolves  3/8: Consulted uro Michelle. THAD meropenem. DC vanc. MRSA negative. Disimpacted rectum, had medium BM.  3/9: Clamped NGT and trial of clears. Disimpacted again. HypoK: repleting. Updated daughter Giselle   3/10: Frequent liquid stools, vomiting resolved.  Repeat KUB ordered.  Requiring 2-3L    Review of Systems:  Review of Systems   Constitutional: Positive for malaise/fatigue. Negative for fever and chills.   Respiratory: Negative for cough and shortness of breath.    Cardiovascular: Negative for chest pain and palpitations.   Gastrointestinal: Positive for diarrhea (Loose stool now). Negative for nausea, vomiting and abdominal pain.   Genitourinary: Negative for dysuria and urgency.   Musculoskeletal: Positive for myalgias. Negative for falls.   Neurological: Positive for weakness.   Psychiatric/Behavioral: Positive for depression.     Physical Exam:  Physical Exam   Constitutional: He appears well-developed.   Thin/frail   HENT:   Head: Normocephalic.   Cortrak in place, NC in place   Eyes: Conjunctivae are normal.   Neck: No tracheal deviation present. No thyromegaly present.   Cardiovascular: Normal rate.    Pulmonary/Chest: No respiratory distress. He has no wheezes.   Abdominal: He exhibits no distension. There is no tenderness. There is no rebound.   Musculoskeletal: He exhibits no edema or tenderness.    Neurological: He is alert. A cranial nerve deficit is present. He exhibits abnormal muscle tone. Coordination abnormal.   Skin:   bilat ichium ulcers w/o surrounding erythema      Labs:        Invalid input(s): [id number]      Recent Labs      03/08/17   0935  03/09/17   0231  03/10/17   032   SODIUM  138  141  140   POTASSIUM  3.5*  3.4*  3.5*   CHLORIDE  102  105  110   CO2  23  21  22   BUN  42*  37*  20   CREATININE  0.65  0.44*  0.41*   CALCIUM  9.7  9.4  8.8     Recent Labs      03/08/17   0935  03/09/17   0231  03/10/17   032   ALTSGPT  8  5  7   ASTSGOT  13  12  13   ALKPHOSPHAT  70  67  72   TBILIRUBIN  0.6  0.4  0.3   GLUCOSE  113*  88  103*     Recent Labs      03/08/17   0935  03/09/17   0231  03/10/17   0329   RBC  5.21  4.86  4.42*   HEMOGLOBIN  12.1*  11.4*  9.9*   HEMATOCRIT  39.4*  38.3*  34.6*   PLATELETCT  467*  437  467*     Recent Labs      03/08/17   0935  03/09/17   0231  03/10/17   032   WBC  10.7  11.0*  12.6*   NEUTSPOLYS  77.90*  81.80*  79.70*   LYMPHOCYTES  9.30*  10.40*  7.00*   MONOCYTES  11.10  5.20  10.10   EOSINOPHILS  0.20  0.90  2.10   BASOPHILS  1.10  1.70  0.30   ASTSGOT  13  12  13   ALTSGPT  8  5  7   ALKPHOSPHAT  70  67  72   TBILIRUBIN  0.6  0.4  0.3           Hemodynamics:  Temp (24hrs), Av.2 °C (98.9 °F), Min:36.8 °C (98.2 °F), Max:37.6 °C (99.6 °F)  Temperature: 37.6 °C (99.6 °F)  Pulse  Av.4  Min: 11  Max: 160  Blood Pressure: (!) 94/59 mmHg     Respiratory:    Respiration: 18, Pulse Oximetry: 96 %, O2 Daily Delivery Respiratory : Silicone Nasal Cannula     Given By:: Mouthpiece, Given By:: Mouthseal, Work Of Breathing / Effort: Mild  RUL Breath Sounds: Diminished, RML Breath Sounds: Diminished, RLL Breath Sounds: Diminished, EDDIE Breath Sounds: Diminished, LLL Breath Sounds: Diminished  Fluids:    Intake/Output Summary (Last 24 hours) at 03/10/17 1313  Last data filed at 03/10/17 0900   Gross per 24 hour   Intake   2960 ml   Output   1350 ml   Net    1610 ml     Weight: 72.9 kg (160 lb 11.5 oz)  GI/Nutrition:  Orders Placed This Encounter   Procedures   • DIET ORDER     Standing Status: Standing      Number of Occurrences: 1      Standing Expiration Date:      Order Specific Question:  Diet:     Answer:  Clear Liquids - No Red Foods [12]     Medical Decision Making, by Problem:  Active Hospital Problems    Diagnosis   • *Sepsis (CMS-HCC) [A41.9] vs SIRS.  Was initially started on abx to cover for UTI vs HCAP.  Urology consulted and discontinued abx, as no e/o UTI. Still with WBC  - dc vanc and meropenem  - suspect related to SIRS rather than sepsis  - No infectious source identified     • Decubitus ulcer, stage 3 ([facility]) [L89.94]  - consulted plastics Marbin Arriola, he will see pt when SBO resolves and nutrition improves  - Re-consult in AM     • 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]  - Midodrine 10 TID  - Gabapentin 900 TID     • Anxiety [F41.9]  - ativan PRN     • SBO (small bowel obstruction) (CMS-HCC) [K56.69]- partially due to severe impaction, required digital disimpaction and now with liquid stool  - Decrease IVF  - Trial of clears  - NGT now clamped, DC in AM if tolerating clears  - Repeat KUB today  - hx of abd surg other than ileal conduit w/ uro Michelle      • Iron deficiency anemia secondary to inadequate dietary iron intake [D50.8]- Hgb is stable  -CBC in AM  -Slight drop overnight     • Chronic ulcer of left heel (CMS-[facility]) [L97.429]  - wound care     • Chronic ulcer of right heel (CMS-[facility]) [L97.419]  - wound care     • Hypotension - BP low end of tolerable     • Hypokalemia - replace PO   -Repeat in AM   • Decubitus ulcer of left ischium, stage 3 (CMS-[facility]) [L89.323].  - wound care  - Dr. Arriola to reconsult when SBO improves     Dispo: Daughter Giselle Phoenix, cell [phone]    F/U KUB, re-consult Plastics, WBC in AM, Wean O2 (2-3L)    Labs reviewed and Medications reviewed  Medrano catheter: No Medrano      DVT Prophylaxis: Heparin

## 2017-03-11 ENCOUNTER — APPOINTMENT (OUTPATIENT)
Dept: RADIOLOGY | Facility: MEDICAL CENTER | Age: 57
DRG: 871 | End: 2017-03-11
Attending: INTERNAL MEDICINE
Payer: MEDICAID

## 2017-03-11 ENCOUNTER — APPOINTMENT (OUTPATIENT)
Dept: RADIOLOGY | Facility: MEDICAL CENTER | Age: 57
DRG: 871 | End: 2017-03-11
Attending: HOSPITALIST
Payer: MEDICAID

## 2017-03-11 LAB
ANION GAP SERPL CALC-SCNC: 10 MMOL/L (ref 0–11.9)
BACTERIA BLD CULT: NORMAL
BACTERIA BLD CULT: NORMAL
BASOPHILS # BLD AUTO: 0.5 % (ref 0–1.8)
BASOPHILS # BLD: 0.06 K/UL (ref 0–0.12)
BUN SERPL-MCNC: 10 MG/DL (ref 8–22)
CALCIUM SERPL-MCNC: 8.9 MG/DL (ref 8.5–10.5)
CHLORIDE SERPL-SCNC: 110 MMOL/L (ref 96–112)
CO2 SERPL-SCNC: 20 MMOL/L (ref 20–33)
CREAT SERPL-MCNC: 0.3 MG/DL (ref 0.5–1.4)
EOSINOPHIL # BLD AUTO: 0.23 K/UL (ref 0–0.51)
EOSINOPHIL NFR BLD: 1.8 % (ref 0–6.9)
ERYTHROCYTE [DISTWIDTH] IN BLOOD BY AUTOMATED COUNT: 51.4 FL (ref 35.9–50)
GFR SERPL CREATININE-BSD FRML MDRD: >60 ML/MIN/1.73 M 2
GLUCOSE SERPL-MCNC: 106 MG/DL (ref 65–99)
HCT VFR BLD AUTO: 31.6 % (ref 42–52)
HGB BLD-MCNC: 9.7 G/DL (ref 14–18)
IMM GRANULOCYTES # BLD AUTO: 0.12 K/UL (ref 0–0.11)
IMM GRANULOCYTES NFR BLD AUTO: 0.9 % (ref 0–0.9)
LYMPHOCYTES # BLD AUTO: 1.65 K/UL (ref 1–4.8)
LYMPHOCYTES NFR BLD: 12.8 % (ref 22–41)
MAGNESIUM SERPL-MCNC: 1.6 MG/DL (ref 1.5–2.5)
MCH RBC QN AUTO: 23.6 PG (ref 27–33)
MCHC RBC AUTO-ENTMCNC: 30.7 G/DL (ref 33.7–35.3)
MCV RBC AUTO: 76.9 FL (ref 81.4–97.8)
MONOCYTES # BLD AUTO: 1.25 K/UL (ref 0–0.85)
MONOCYTES NFR BLD AUTO: 9.7 % (ref 0–13.4)
NEUTROPHILS # BLD AUTO: 9.55 K/UL (ref 1.82–7.42)
NEUTROPHILS NFR BLD: 74.3 % (ref 44–72)
NRBC # BLD AUTO: 0 K/UL
NRBC BLD AUTO-RTO: 0 /100 WBC
PLATELET # BLD AUTO: 459 K/UL (ref 164–446)
PMV BLD AUTO: 9.7 FL (ref 9–12.9)
POTASSIUM SERPL-SCNC: 2.9 MMOL/L (ref 3.6–5.5)
RBC # BLD AUTO: 4.11 M/UL (ref 4.7–6.1)
SIGNIFICANT IND 70042: NORMAL
SIGNIFICANT IND 70042: NORMAL
SITE SITE: NORMAL
SITE SITE: NORMAL
SODIUM SERPL-SCNC: 140 MMOL/L (ref 135–145)
SOURCE SOURCE: NORMAL
SOURCE SOURCE: NORMAL
WBC # BLD AUTO: 12.9 K/UL (ref 4.8–10.8)

## 2017-03-11 PROCEDURE — 700102 HCHG RX REV CODE 250 W/ 637 OVERRIDE(OP): Performed by: INTERNAL MEDICINE

## 2017-03-11 PROCEDURE — 700101 HCHG RX REV CODE 250: Performed by: HOSPITALIST

## 2017-03-11 PROCEDURE — A9270 NON-COVERED ITEM OR SERVICE: HCPCS | Performed by: INTERNAL MEDICINE

## 2017-03-11 PROCEDURE — 36415 COLL VENOUS BLD VENIPUNCTURE: CPT

## 2017-03-11 PROCEDURE — 94640 AIRWAY INHALATION TREATMENT: CPT

## 2017-03-11 PROCEDURE — 80048 BASIC METABOLIC PNL TOTAL CA: CPT

## 2017-03-11 PROCEDURE — 700102 HCHG RX REV CODE 250 W/ 637 OVERRIDE(OP): Performed by: HOSPITALIST

## 2017-03-11 PROCEDURE — A9270 NON-COVERED ITEM OR SERVICE: HCPCS | Performed by: HOSPITALIST

## 2017-03-11 PROCEDURE — 700111 HCHG RX REV CODE 636 W/ 250 OVERRIDE (IP): Performed by: INTERNAL MEDICINE

## 2017-03-11 PROCEDURE — E0191 PROTECTOR HEEL OR ELBOW: HCPCS | Performed by: INTERNAL MEDICINE

## 2017-03-11 PROCEDURE — 700101 HCHG RX REV CODE 250: Performed by: INTERNAL MEDICINE

## 2017-03-11 PROCEDURE — 83735 ASSAY OF MAGNESIUM: CPT

## 2017-03-11 PROCEDURE — 85025 COMPLETE CBC W/AUTO DIFF WBC: CPT

## 2017-03-11 PROCEDURE — 71010 DX-CHEST-LIMITED (1 VIEW): CPT

## 2017-03-11 PROCEDURE — 74000 DX-ABDOMEN-1 VIEW: CPT

## 2017-03-11 PROCEDURE — 99232 SBSQ HOSP IP/OBS MODERATE 35: CPT | Performed by: INTERNAL MEDICINE

## 2017-03-11 PROCEDURE — 94760 N-INVAS EAR/PLS OXIMETRY 1: CPT

## 2017-03-11 PROCEDURE — 302043 TAPE, HYPAFIX: Performed by: INTERNAL MEDICINE

## 2017-03-11 PROCEDURE — 770020 HCHG ROOM/CARE - TELE (206)

## 2017-03-11 RX ORDER — SODIUM CHLORIDE 9 MG/ML
1000 INJECTION, SOLUTION INTRAVENOUS ONCE
Status: ACTIVE | OUTPATIENT
Start: 2017-03-11 | End: 2017-03-12

## 2017-03-11 RX ORDER — POTASSIUM CHLORIDE 1.5 G/1.58G
40 POWDER, FOR SOLUTION ORAL 2 TIMES DAILY
Status: DISCONTINUED | OUTPATIENT
Start: 2017-03-11 | End: 2017-03-13

## 2017-03-11 RX ORDER — IPRATROPIUM BROMIDE AND ALBUTEROL SULFATE 2.5; .5 MG/3ML; MG/3ML
3 SOLUTION RESPIRATORY (INHALATION)
Status: DISCONTINUED | OUTPATIENT
Start: 2017-03-11 | End: 2017-03-17 | Stop reason: HOSPADM

## 2017-03-11 RX ORDER — SODIUM CHLORIDE AND POTASSIUM CHLORIDE 300; 900 MG/100ML; MG/100ML
INJECTION, SOLUTION INTRAVENOUS CONTINUOUS
Status: DISCONTINUED | OUTPATIENT
Start: 2017-03-11 | End: 2017-03-17 | Stop reason: HOSPADM

## 2017-03-11 RX ORDER — LEVOFLOXACIN 5 MG/ML
750 INJECTION, SOLUTION INTRAVENOUS EVERY 24 HOURS
Status: DISCONTINUED | OUTPATIENT
Start: 2017-03-11 | End: 2017-03-13

## 2017-03-11 RX ADMIN — MIDODRINE HYDROCHLORIDE 10 MG: 5 TABLET ORAL at 17:01

## 2017-03-11 RX ADMIN — MIDODRINE HYDROCHLORIDE 10 MG: 5 TABLET ORAL at 11:56

## 2017-03-11 RX ADMIN — HEPARIN SODIUM 5000 UNITS: 5000 INJECTION, SOLUTION INTRAVENOUS; SUBCUTANEOUS at 22:42

## 2017-03-11 RX ADMIN — STANDARDIZED SENNA CONCENTRATE AND DOCUSATE SODIUM 2 TABLET: 8.6; 5 TABLET, FILM COATED ORAL at 08:24

## 2017-03-11 RX ADMIN — POTASSIUM CHLORIDE AND SODIUM CHLORIDE: 900; 300 INJECTION, SOLUTION INTRAVENOUS at 08:25

## 2017-03-11 RX ADMIN — MIDODRINE HYDROCHLORIDE 10 MG: 5 TABLET ORAL at 08:29

## 2017-03-11 RX ADMIN — OXYCODONE HYDROCHLORIDE AND ACETAMINOPHEN 500 MG: 500 TABLET ORAL at 08:25

## 2017-03-11 RX ADMIN — GABAPENTIN 900 MG: 300 CAPSULE ORAL at 14:38

## 2017-03-11 RX ADMIN — ASPIRIN 81 MG: 81 TABLET ORAL at 08:25

## 2017-03-11 RX ADMIN — HEPARIN SODIUM 5000 UNITS: 5000 INJECTION, SOLUTION INTRAVENOUS; SUBCUTANEOUS at 06:16

## 2017-03-11 RX ADMIN — POTASSIUM CHLORIDE 40 MEQ: 1.5 POWDER, FOR SOLUTION ORAL at 08:25

## 2017-03-11 RX ADMIN — THERA TABS 1 TABLET: TAB at 08:25

## 2017-03-11 RX ADMIN — GABAPENTIN 900 MG: 300 CAPSULE ORAL at 06:17

## 2017-03-11 RX ADMIN — LEVOFLOXACIN 750 MG: 750 INJECTION, SOLUTION INTRAVENOUS at 15:05

## 2017-03-11 RX ADMIN — HEPARIN SODIUM 5000 UNITS: 5000 INJECTION, SOLUTION INTRAVENOUS; SUBCUTANEOUS at 14:38

## 2017-03-11 RX ADMIN — IPRATROPIUM BROMIDE AND ALBUTEROL SULFATE 3 ML: .5; 3 SOLUTION RESPIRATORY (INHALATION) at 15:31

## 2017-03-11 ASSESSMENT — ENCOUNTER SYMPTOMS
SHORTNESS OF BREATH: 0
FEVER: 0
VOMITING: 0
FALLS: 0
NAUSEA: 0
WEAKNESS: 1
ABDOMINAL PAIN: 0
PALPITATIONS: 0
CONSTIPATION: 0
COUGH: 1
CHILLS: 0

## 2017-03-11 ASSESSMENT — PATIENT HEALTH QUESTIONNAIRE - PHQ9
2. FEELING DOWN, DEPRESSED, IRRITABLE, OR HOPELESS: NOT AT ALL
SUM OF ALL RESPONSES TO PHQ QUESTIONS 1-9: 0
1. LITTLE INTEREST OR PLEASURE IN DOING THINGS: NOT AT ALL
SUM OF ALL RESPONSES TO PHQ9 QUESTIONS 1 AND 2: 0

## 2017-03-11 ASSESSMENT — PAIN SCALES - GENERAL
PAINLEVEL_OUTOF10: 0
PAINLEVEL_OUTOF10: 0

## 2017-03-11 ASSESSMENT — LIFESTYLE VARIABLES: EVER_SMOKED: NEVER

## 2017-03-11 NOTE — PROGRESS NOTES
"Hospital Medicine Progress Note, Adult, Complex               Author: Allyssa Becerril  Date & Time created: 3/11/2017  11:07 AM     CC: Constipation, Vomiting    Interval History:  56M hx of paraplegia, c/b autonomic dysreflexia, severe chronic sacral decubitus and heel ulcers, neurogenic bowel (s/p ostomy) and neurogenic bladder (recurrent UTIs w MDROs), paroxysmal afib and hx of DVT s/p IVC filter (not on anticaog), who was admitted for N/V and weakness found to have small bowel obstruction    3/7: SBO. NPO. NGT to suction.  Discussed ischium ulcers w/ plastics Dr. JUDSON Arriola, will see when SBO resolves  3/8: Consulted uro Michelle. THAD meropenem. DC vanc. MRSA negative. Disimpacted rectum, had medium BM.  3/9: Clamped NGT and trial of clears. Disimpacted again. HypoK: repleting. Updated daughter Giselle   3/10: Frequent liquid stools, vomiting resolved.  Repeat KUB showing improved SBO.  Requiring 2-3L  3/11: Cortrak removed, tolerating clears.  Advanced to full liquid, still having BMs    Review of Systems:  Review of Systems   Constitutional: Positive for malaise/fatigue. Negative for fever and chills.        \"The first day I feel worth a damn\"   Respiratory: Positive for cough. Negative for shortness of breath.    Cardiovascular: Negative for chest pain and palpitations.   Gastrointestinal: Negative for nausea, vomiting, abdominal pain and constipation.   Genitourinary: Negative for dysuria and urgency.   Musculoskeletal: Negative for falls.   Neurological: Positive for weakness.     Physical Exam:  Physical Exam   Constitutional: He appears well-developed.   Thin/frail   HENT:   Head: Normocephalic.   Eyes: Conjunctivae are normal.   Neck: No tracheal deviation present. No thyromegaly present.   Cardiovascular: Normal rate.    Pulmonary/Chest: No respiratory distress. He has no wheezes.   Bronchial breath sounds, weak cough   Abdominal: He exhibits no distension. There is no tenderness. There is no rebound. "   Musculoskeletal: He exhibits no edema or tenderness.   Neurological: He is alert. He exhibits abnormal muscle tone. Coordination abnormal.   Skin:   bilat ichium ulcers w/o surrounding erythema      Labs:        Invalid input(s): [id number]      Recent Labs      03/09/17   0231  03/10/17   0329  03/11/17   0302  03/11/17   0917   SODIUM  141  140  140   --    POTASSIUM  3.4*  3.5*  2.9*   --    CHLORIDE  105  110  110   --    CO2  21  22  20   --    BUN  37*  20  10   --    CREATININE  0.44*  0.41*  0.30*   --    MAGNESIUM   --    --    --   1.6   CALCIUM  9.4  8.8  8.9   --      Recent Labs      03/09/17   0231  03/10/17   0329  03/11/17   0302   ALTSGPT  5  7   --    ASTSGOT  12  13   --    ALKPHOSPHAT  67  72   --    TBILIRUBIN  0.4  0.3   --    GLUCOSE  88  103*  106*     Recent Labs      03/09/17   0231  03/10/17   0329  03/11/17   0302   RBC  4.86  4.42*  4.11*   HEMOGLOBIN  11.4*  9.9*  9.7*   HEMATOCRIT  38.3*  34.6*  31.6*   PLATELETCT  437  467*  459*     Recent Labs      03/09/17   0231  03/10/17   0329  03/11/17   030   WBC  11.0*  12.6*  12.9*   NEUTSPOLYS  81.80*  79.70*  74.30*   LYMPHOCYTES  10.40*  7.00*  12.80*   MONOCYTES  5.20  10.10  9.70   EOSINOPHILS  0.90  2.10  1.80   BASOPHILS  1.70  0.30  0.50   ASTSGOT  12  13   --    ALTSGPT  5  7   --    ALKPHOSPHAT  67  72   --    TBILIRUBIN  0.4  0.3   --            Hemodynamics:  Temp (24hrs), Av.5 °C (99.5 °F), Min:37.1 °C (98.7 °F), Max:38.6 °C (101.4 °F)  Temperature: 37.4 °C (99.3 °F)  Pulse  Av  Min: 11  Max: 160  Blood Pressure: (!) 97/66 mmHg     Respiratory:    Respiration: 18, Pulse Oximetry: 95 %, O2 Daily Delivery Respiratory : Room Air with O2 Available     PEP/CPT Method: Cough Assist, Given By:: Mouthseal, Work Of Breathing / Effort: Mild  RUL Breath Sounds: Diminished, RML Breath Sounds: Diminished, RLL Breath Sounds: Diminished, EDDIE Breath Sounds: Diminished, LLL Breath Sounds: Diminished  Fluids:    Intake/Output  Summary (Last 24 hours) at 03/11/17 1107  Last data filed at 03/11/17 0400   Gross per 24 hour   Intake      0 ml   Output   1080 ml   Net  -1080 ml     Weight: 71.8 kg (158 lb 4.6 oz)  GI/Nutrition:  Orders Placed This Encounter   Procedures   • DIET ORDER     Standing Status: Standing      Number of Occurrences: 1      Standing Expiration Date:      Order Specific Question:  Diet:     Answer:  Full Liquid [11]     Medical Decision Making, by Problem:  Active Hospital Problems    Diagnosis   • *Sepsis (CMS-[facility]) [A41.9] vs SIRS.  Was initially started on abx to cover for UTI vs HCAP.  Urology consulted and discontinued abx, as no e/o UTI. Still with WBC  - dc vanc and meropenem  - suspect related to SIRS rather than sepsis  - No infectious source identified  - Repeat CXR for weak cough on exam today     • Decubitus ulcer, stage 3 ([facility]) [L89.94]  - consulted plastics Marbin Arriola, he will see pt when SBO resolves and nutrition improves  - Re-consult on Monday     • 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]- BP varies  - Midodrine 10 TID  - Gabapentin 900 TID     • Anxiety [F41.9]  - ativan PRN     • SBO (small bowel obstruction) (CMS-[facility]) [K56.69]- partially due to severe impaction, required digital disimpaction and now with liquid stool.  NGT removed, tolerating clears  - Decrease IVF  - Trial of full liquid diet  - hx of abd surg other than ileal conduit w/ uro Michelle   - Repeat KUB on 3/10, showed improvement     • Iron deficiency anemia secondary to inadequate dietary iron intake [D50.8]- Hgb is stable  -CBC in AM  -Slight drop overnight     • Chronic ulcer of left heel (CMS-[facility]) [L97.429]  - wound care     • Chronic ulcer of right heel (CMS-[facility]) [L97.419]  - wound care     • Hypotension - BP low end of tolerable     • Hypokalemia - replace PO and in IVF  -Repeat in AM  -Check mag     • Decubitus ulcer of left ischium, stage 3 (CMS-[facility]) [L89.323].  - wound care  - Dr. Arriola to reconsult when SBO improves     Dispo:  Daughter Giselle Phoenix, cell [phone]    F/U CXR, re-consult Plastics, WBC in AM, Wean O2 (2-3L)- consider abx.  Encourage IS    Labs reviewed and Medications reviewed  Medrano catheter: No Medrano      DVT Prophylaxis: Heparin

## 2017-03-11 NOTE — PROGRESS NOTES
Pt A&Ox4, awake in bed. IV site intact, IVF infusing without difficulty. Cortrack in place, clamped. Denies pain or SOB at this time. Will continue to monitor.

## 2017-03-11 NOTE — CARE PLAN
Problem: Safety  Goal: Will remain free from injury  Intervention: Provide assistance with mobility  Turn & reposition q2hrs

## 2017-03-11 NOTE — CARE PLAN
Problem: Respiratory:  Goal: Respiratory status will improve  Intervention: Educate and encourage incentive spirometry usage  Encourage IS hrly

## 2017-03-11 NOTE — CARE PLAN
Problem: Safety  Goal: Will remain free from injury  Intervention: Provide assistance with mobility  Turn & reposition pt q2hrs

## 2017-03-11 NOTE — CARE PLAN
Problem: Nutritional:  Goal: Achieve adequate nutritional intake  Patient will start PO diet and will consume >50% of meals   Outcome: PROGRESSING AS EXPECTED  Diet advanced to full liquids.   Boost Plus supplements in place TID.    RD following for adequate intake and additional needs.

## 2017-03-11 NOTE — FLOWSHEET NOTE
03/11/17 1535   Events/Summary/Plan   Events/Summary/Plan IPV refused, PRN SVN given   Non-Invasive Resp Device Site Inspection Completed Intact   Interdisciplinary Plan of Care-Goals (Indications)   Obstructive Ventilatory Defect or Pulmonary Disease without Obvious Obstruction History / Diagnosis   Interdisciplinary Plan of Care-Outcomes    Bronchodilator Outcome Patient at Stable Baseline   Education   Education Yes - Pt. / Family has been Instructed in use of Respiratory Equipment;Yes - Pt. / Family has been Instructed in use of Respiratory Medications and Adverse Reactions   Therapy Not Performed   Type of Therapy Not Performed IPV   Reason Therapy Not Performed Refused   RT Assessment of Delivered Medications   Evaluation of Medication Delivery Daily Yes-- Pt /Family has been Instructed in use of Respiratory Medications and Adverse Reactions   SVN Group   #SVN Performed Yes   Given By: Mask   Date SVN Last Changed 03/06/17   Date SVN Next Change Due (Q 7 Days) 03/13/17   Incentive Spirometry Group   Incentive Spirometry Instruction Yes   Breathing Exercises Yes   Incentive Spirometer Volume 2000 mL   Incentive Spirometer Date Last Changed 03/06/17   Incentive Spirometer Next Change Date (Q 30 Days) 04/05/17   Respiratory WDL   Respiratory (WDL) X   Chest Exam   Work Of Breathing / Effort Mild   Respiration 18   Pulse 97   Breath Sounds   Pre/Post Intervention Pre Intervention Assessment   RUL Breath Sounds Diminished   RML Breath Sounds Diminished   RLL Breath Sounds Diminished   EDDIE Breath Sounds Diminished   LLL Breath Sounds Diminished   Secretions   Cough Weak;Productive;Quad   How Sputum Obtained Spontaneous;Cough on Request   Sputum Amount Small;Moderate   Sputum Color Grey   Sputum Consistency Thin;Frothy   Suction Frequency Suctioned Once or Twice Per Encounter   Oximetry   #Pulse Oximetry (Single Determination) Yes   Oxygen   Pulse Oximetry 93 %   O2 (LPM) 0   O2 (FiO2) 21   O2 Daily Delivery  Respiratory  Room Air with O2 Available

## 2017-03-11 NOTE — FLOWSHEET NOTE
03/11/17 1000   Events/Summary/Plan   Events/Summary/Plan IPV refused, pt states he feels he does not need the IPV tx anymore and is able to have a productive cough without assistance.  At [facility] pt was able to cough up a good amount of thin frothy grey sectrions    Therapy Not Performed   Type of Therapy Not Performed IPV   Reason Therapy Not Performed Refused   Chest Exam   Work Of Breathing / Effort Mild   Respiration 18   Pulse 91   Breath Sounds   Pre/Post Intervention Pre Intervention Assessment   RUL Breath Sounds Diminished   RML Breath Sounds Diminished   RLL Breath Sounds Diminished   EDDIE Breath Sounds Diminished   LLL Breath Sounds Diminished   Secretions   Cough Productive   How Sputum Obtained Spontaneous;Cough on Request   Sputum Amount Small;Moderate   Sputum Color Grey   Sputum Consistency Thin;Frothy   Oximetry   #Pulse Oximetry (Single Determination) Yes   Oxygen   Pulse Oximetry 95 %   O2 (LPM) 0   O2 (FiO2) 21   O2 Daily Delivery Respiratory  Room Air with O2 Available

## 2017-03-11 NOTE — CARE PLAN
Problem: Knowledge Deficit  Goal: Knowledge of disease process/condition, treatment plan, diagnostic tests, and medications will improve  Intervention: Assess knowledge level of disease process/condition, treatment plan, diagnostic tests, and medications  Assess understanding of abd xray results & treatment plan that follows

## 2017-03-12 ENCOUNTER — APPOINTMENT (OUTPATIENT)
Dept: RADIOLOGY | Facility: MEDICAL CENTER | Age: 57
DRG: 871 | End: 2017-03-12
Attending: INTERNAL MEDICINE
Payer: MEDICAID

## 2017-03-12 LAB
ANION GAP SERPL CALC-SCNC: 9 MMOL/L (ref 0–11.9)
ANISOCYTOSIS BLD QL SMEAR: ABNORMAL
BASOPHILS # BLD AUTO: 1.8 % (ref 0–1.8)
BASOPHILS # BLD: 0.24 K/UL (ref 0–0.12)
BUN SERPL-MCNC: 10 MG/DL (ref 8–22)
CALCIUM SERPL-MCNC: 9 MG/DL (ref 8.5–10.5)
CHLORIDE SERPL-SCNC: 108 MMOL/L (ref 96–112)
CO2 SERPL-SCNC: 22 MMOL/L (ref 20–33)
CREAT SERPL-MCNC: 0.33 MG/DL (ref 0.5–1.4)
EOSINOPHIL # BLD AUTO: 0 K/UL (ref 0–0.51)
EOSINOPHIL NFR BLD: 0 % (ref 0–6.9)
ERYTHROCYTE [DISTWIDTH] IN BLOOD BY AUTOMATED COUNT: 50.4 FL (ref 35.9–50)
GFR SERPL CREATININE-BSD FRML MDRD: >60 ML/MIN/1.73 M 2
GLUCOSE SERPL-MCNC: 99 MG/DL (ref 65–99)
HCT VFR BLD AUTO: 32.1 % (ref 42–52)
HGB BLD-MCNC: 9.7 G/DL (ref 14–18)
HYPOCHROMIA BLD QL SMEAR: ABNORMAL
LACTATE BLD-SCNC: 0.9 MMOL/L (ref 0.5–2)
LYMPHOCYTES # BLD AUTO: 1.38 K/UL (ref 1–4.8)
LYMPHOCYTES NFR BLD: 10.4 % (ref 22–41)
MANUAL DIFF BLD: NORMAL
MCH RBC QN AUTO: 23.1 PG (ref 27–33)
MCHC RBC AUTO-ENTMCNC: 30.2 G/DL (ref 33.7–35.3)
MCV RBC AUTO: 76.4 FL (ref 81.4–97.8)
MICROCYTES BLD QL SMEAR: ABNORMAL
MONOCYTES # BLD AUTO: 1.04 K/UL (ref 0–0.85)
MONOCYTES NFR BLD AUTO: 7.8 % (ref 0–13.4)
MORPHOLOGY BLD-IMP: NORMAL
MYELOCYTES NFR BLD MANUAL: 0.9 %
NEUTROPHILS # BLD AUTO: 10.52 K/UL (ref 1.82–7.42)
NEUTROPHILS NFR BLD: 76.5 % (ref 44–72)
NEUTS BAND NFR BLD MANUAL: 2.6 % (ref 0–10)
NRBC # BLD AUTO: 0 K/UL
NRBC BLD AUTO-RTO: 0 /100 WBC
PLATELET # BLD AUTO: 463 K/UL (ref 164–446)
PLATELET BLD QL SMEAR: NORMAL
PMV BLD AUTO: 10.2 FL (ref 9–12.9)
POTASSIUM SERPL-SCNC: 3.3 MMOL/L (ref 3.6–5.5)
RBC # BLD AUTO: 4.2 M/UL (ref 4.7–6.1)
RBC BLD AUTO: PRESENT
SODIUM SERPL-SCNC: 139 MMOL/L (ref 135–145)
WBC # BLD AUTO: 13.3 K/UL (ref 4.8–10.8)

## 2017-03-12 PROCEDURE — 94669 MECHANICAL CHEST WALL OSCILL: CPT

## 2017-03-12 PROCEDURE — 83605 ASSAY OF LACTIC ACID: CPT

## 2017-03-12 PROCEDURE — 80048 BASIC METABOLIC PNL TOTAL CA: CPT

## 2017-03-12 PROCEDURE — 85027 COMPLETE CBC AUTOMATED: CPT

## 2017-03-12 PROCEDURE — 74000 DX-ABDOMEN-1 VIEW: CPT

## 2017-03-12 PROCEDURE — 700101 HCHG RX REV CODE 250: Performed by: INTERNAL MEDICINE

## 2017-03-12 PROCEDURE — 770020 HCHG ROOM/CARE - TELE (206)

## 2017-03-12 PROCEDURE — 700102 HCHG RX REV CODE 250 W/ 637 OVERRIDE(OP): Performed by: INTERNAL MEDICINE

## 2017-03-12 PROCEDURE — A9270 NON-COVERED ITEM OR SERVICE: HCPCS | Performed by: INTERNAL MEDICINE

## 2017-03-12 PROCEDURE — 99233 SBSQ HOSP IP/OBS HIGH 50: CPT | Performed by: INTERNAL MEDICINE

## 2017-03-12 PROCEDURE — 85007 BL SMEAR W/DIFF WBC COUNT: CPT

## 2017-03-12 PROCEDURE — 36415 COLL VENOUS BLD VENIPUNCTURE: CPT

## 2017-03-12 PROCEDURE — 700111 HCHG RX REV CODE 636 W/ 250 OVERRIDE (IP): Performed by: INTERNAL MEDICINE

## 2017-03-12 PROCEDURE — 94668 MNPJ CHEST WALL SBSQ: CPT

## 2017-03-12 PROCEDURE — 94760 N-INVAS EAR/PLS OXIMETRY 1: CPT

## 2017-03-12 PROCEDURE — A6209 FOAM DRSG <=16 SQ IN W/O BDR: HCPCS | Performed by: INTERNAL MEDICINE

## 2017-03-12 RX ADMIN — MIDODRINE HYDROCHLORIDE 10 MG: 5 TABLET ORAL at 13:16

## 2017-03-12 RX ADMIN — METRONIDAZOLE 500 MG: 500 INJECTION, SOLUTION INTRAVENOUS at 13:16

## 2017-03-12 RX ADMIN — MIDODRINE HYDROCHLORIDE 10 MG: 5 TABLET ORAL at 09:54

## 2017-03-12 RX ADMIN — POTASSIUM CHLORIDE AND SODIUM CHLORIDE: 900; 300 INJECTION, SOLUTION INTRAVENOUS at 08:19

## 2017-03-12 RX ADMIN — METRONIDAZOLE 500 MG: 500 INJECTION, SOLUTION INTRAVENOUS at 09:55

## 2017-03-12 RX ADMIN — METRONIDAZOLE 500 MG: 500 INJECTION, SOLUTION INTRAVENOUS at 21:27

## 2017-03-12 RX ADMIN — HEPARIN SODIUM 5000 UNITS: 5000 INJECTION, SOLUTION INTRAVENOUS; SUBCUTANEOUS at 13:16

## 2017-03-12 RX ADMIN — HEPARIN SODIUM 5000 UNITS: 5000 INJECTION, SOLUTION INTRAVENOUS; SUBCUTANEOUS at 21:27

## 2017-03-12 RX ADMIN — LEVOFLOXACIN 750 MG: 750 INJECTION, SOLUTION INTRAVENOUS at 08:19

## 2017-03-12 RX ADMIN — GABAPENTIN 900 MG: 300 CAPSULE ORAL at 21:27

## 2017-03-12 RX ADMIN — MIDODRINE HYDROCHLORIDE 10 MG: 5 TABLET ORAL at 17:48

## 2017-03-12 RX ADMIN — GABAPENTIN 900 MG: 300 CAPSULE ORAL at 13:16

## 2017-03-12 RX ADMIN — HEPARIN SODIUM 5000 UNITS: 5000 INJECTION, SOLUTION INTRAVENOUS; SUBCUTANEOUS at 06:16

## 2017-03-12 ASSESSMENT — ENCOUNTER SYMPTOMS
SHORTNESS OF BREATH: 0
NAUSEA: 1
COUGH: 1
WEAKNESS: 1
ABDOMINAL PAIN: 0
FALLS: 0
VOMITING: 1
PALPITATIONS: 0
CONSTIPATION: 0
FEVER: 0
CHILLS: 0

## 2017-03-12 ASSESSMENT — COPD QUESTIONNAIRES
HAVE YOU SMOKED AT LEAST 100 CIGARETTES IN YOUR ENTIRE LIFE: NO/DON'T KNOW
COPD SCREENING SCORE: 1
DURING THE PAST 4 WEEKS HOW MUCH DID YOU FEEL SHORT OF BREATH: NONE/LITTLE OF THE TIME
DO YOU EVER COUGH UP ANY MUCUS OR PHLEGM?: NO/ONLY WITH OCCASIONAL COLDS OR INFECTIONS

## 2017-03-12 ASSESSMENT — PAIN SCALES - GENERAL
PAINLEVEL_OUTOF10: 0
PAINLEVEL_OUTOF10: 0

## 2017-03-12 ASSESSMENT — LIFESTYLE VARIABLES: EVER_SMOKED: NEVER

## 2017-03-12 NOTE — PROGRESS NOTES
Received report and assumed care of patient. Patient is alert and oriented x4. Patient is in apparent distress and with abdomen discomfort and has a hardened abdomen with faint bowel sounds to LUQ but non in all other quadrants. Patient has an abdominal xray pending for SBO. Patient turned to relieve discomfort and has liquid almost like bile that is coming out. Patient has some relief from it. Patients wounds changed but continue to get soiled from BM. Patient was updated on the plan of care for the day. Call light within reach, bed in low position, 2 side rails up. All fall precautions in place. Will continue to monitor.

## 2017-03-12 NOTE — PROGRESS NOTES
Patient repositioned and changed, not having loose watery bile stool he was having before just slightly now with NGtube in place. A total of 300 out at this time with low suction. Patient's BP high 90's has stabilized here, decided not to give bolus but will leave in place in case of any hypotension. Sacrum dressings changed again per order.

## 2017-03-12 NOTE — CARE PLAN
"Problem: Bowel/Gastric:  Goal: Normal bowel function is maintained or improved  Intervention: Educate patient and significant other/support system about diet, fluid intake, medications and activity to promote bowel function  NG tube replaced last night, patient NPO at this time, patient reports \"feeling better.\"          "

## 2017-03-12 NOTE — CARE PLAN
Problem: Infection  Goal: Will remain free from infection  Intervention: Assess signs and symptoms of infection  Lactic acid at 0.9 this morning.

## 2017-03-12 NOTE — PROGRESS NOTES
Report received from MYRIAM Jha at bedside.  Patient sleeping in bed with eyes closed, respirations even and unlabored. Call light within reach.

## 2017-03-12 NOTE — DISCHARGE PLANNING
Medical Social Work    This  received a message from pt's  Hanny Welch [phone].  Per Hanny pt is on the Win Waiver and also states that pt's Medicaid application is due soon.  Per Hanny she would like social work to call her with an update.  Hanny states that pt has a sister who is involved in his care and Hanny is hoping that pt's sister has turned in his Medicaid application, as it is due soon.  This  attempted to reach Hanny however she is not in the office on Sundays.  This  to leave report for tomorrows unit SW.

## 2017-03-12 NOTE — PROGRESS NOTES
Patient continues to feel discomfort in abdomen but feels slightly less distended than at change of shift. Pressures continues to be 80's. Dr. Chu notified orders to replace NGTube to intermittent low suction and give 1L bolus. Will continue to monitor.

## 2017-03-12 NOTE — PROGRESS NOTES
"Hospital Medicine Progress Note, Adult, Complex               Author: Allyssa Becerril  Date & Time created: 3/12/2017  1:16 PM     CC: Constipation, Vomiting    Interval History:  56M hx of paraplegia, c/b autonomic dysreflexia, severe chronic sacral decubitus and heel ulcers, neurogenic bowel (s/p ostomy) and neurogenic bladder (recurrent UTIs w MDROs), paroxysmal afib and hx of DVT s/p IVC filter (not on anticaog), who was admitted for N/V and weakness found to have small bowel obstruction    3/7: SBO. NPO. NGT to suction.  Discussed ischium ulcers w/ plastics Dr. JUDSON Arriola, will see when SBO resolves  3/8: Consulted uro Michelle. THAD meropenem. DC vanc. MRSA negative. Disimpacted rectum, had medium BM.  3/9: Clamped NGT and trial of clears. Disimpacted again. HypoK: repleting. Updated daughter Giselle   3/10: Frequent liquid stools, vomiting resolved.  Repeat KUB showing improved SBO.  Requiring 2-3L  3/11: Cortrak removed, tolerating clears.  Advanced to full liquid, still having BMs  3/12: N/V returned, NGT replaced.  No abdominal pain, last BM was last night    Review of Systems:  Review of Systems   Constitutional: Positive for malaise/fatigue. Negative for fever and chills.        \"The first day I feel worth a damn\"   Respiratory: Positive for cough. Negative for shortness of breath.    Cardiovascular: Negative for chest pain and palpitations.   Gastrointestinal: Positive for nausea and vomiting. Negative for abdominal pain and constipation.   Genitourinary: Negative for dysuria and urgency.   Musculoskeletal: Negative for falls.   Neurological: Positive for weakness.     Physical Exam:  Physical Exam   Constitutional: He appears well-developed. He appears listless.   Thin/frail   HENT:   Head: Normocephalic.   Eyes: Conjunctivae are normal.   Neck: No tracheal deviation present. No thyromegaly present.   Cardiovascular: Normal rate.    Pulmonary/Chest: No respiratory distress. He has no wheezes.   Bronchial " breath sounds, weak cough   Abdominal: He exhibits no distension. There is no tenderness. There is no rebound.   NGT back in place, bilious output   Musculoskeletal: He exhibits no edema or tenderness.   Neurological: He appears listless. He exhibits abnormal muscle tone. Coordination abnormal.   Skin:   bilat ichium ulcers w/o surrounding erythema      Labs:        Invalid input(s): [id number]      Recent Labs      03/10/17   0329  03/11/17   0302  03/11/17   0917  03/12/17   0407   SODIUM  140  140   --   139   POTASSIUM  3.5*  2.9*   --   3.3*   CHLORIDE  110  110   --   108   CO2  22  20   --   22   BUN  20  10   --   10   CREATININE  0.41*  0.30*   --   0.33*   MAGNESIUM   --    --   1.6   --    CALCIUM  8.8  8.9   --   9.0     Recent Labs      03/10/17   0329  03/11/17   0302  03/12/17   0407   ALTSGPT  7   --    --    ASTSGOT  13   --    --    ALKPHOSPHAT  72   --    --    TBILIRUBIN  0.3   --    --    GLUCOSE  103*  106*  99     Recent Labs      03/10/17   0329  03/11/17   0302  03/12/17   040   RBC  4.42*  4.11*  4.20*   HEMOGLOBIN  9.9*  9.7*  9.7*   HEMATOCRIT  34.6*  31.6*  32.1*   PLATELETCT  467*  459*  463*     Recent Labs      03/10/17   0329  03/11/17   0302  03/12/17   040   WBC  12.6*  12.9*  13.3*   NEUTSPOLYS  79.70*  74.30*  76.50*   LYMPHOCYTES  7.00*  12.80*  10.40*   MONOCYTES  10.10  9.70  7.80   EOSINOPHILS  2.10  1.80  0.00   BASOPHILS  0.30  0.50  1.80   ASTSGOT  13   --    --    ALTSGPT  7   --    --    ALKPHOSPHAT  72   --    --    TBILIRUBIN  0.3   --    --            Hemodynamics:  Temp (24hrs), Av.8 °C (98.3 °F), Min:36.3 °C (97.4 °F), Max:37.5 °C (99.5 °F)  Temperature: 36.5 °C (97.7 °F)  Pulse  Av.4  Min: 11  Max: 160  Blood Pressure: 148/95 mmHg     Respiratory:    Respiration: 16, Pulse Oximetry: 96 %, O2 Daily Delivery Respiratory : Silicone Nasal Cannula     Given By:: Mask, Work Of Breathing / Effort: Mild  RUL Breath Sounds: Clear, RML Breath Sounds: Clear,  RLL Breath Sounds: Diminished, EDDIE Breath Sounds: Clear, LLL Breath Sounds: Diminished  Fluids:    Intake/Output Summary (Last 24 hours) at 03/12/17 1316  Last data filed at 03/12/17 0800   Gross per 24 hour   Intake    600 ml   Output   1250 ml   Net   -650 ml     Weight: 71.6 kg (157 lb 13.6 oz)  GI/Nutrition:  Orders Placed This Encounter   Procedures   • DIET NPO     Standing Status: Standing      Number of Occurrences: 1      Standing Expiration Date:      Order Specific Question:  Restrict to:     Answer:  Sips with Medications [3]      Comments:  ice chips ok      Medical Decision Making, by Problem:  Active Hospital Problems    Diagnosis   • *Pneumonia - on presentation had sepsis ([facility]) [A41.9] vs SIRS, which resolved and he later developed PNA, likely from poor cough reflex/NGT in place/vomiting..  Was initially started on abx to cover for UTI vs HCAP.  Urology consulted and discontinued abx, as no e/o UTI. Still with WBC  - Levaquin (b-lactam allergy)  - Repeat lactate: (result, normal)  - Repeat CXR 3/11 showed PNA     • Decubitus ulcer, stage 3 ([facility]) [L89.94]  - consulted plastics Marbin Arriola, he will see pt when SBO resolves and nutrition improves  - Re-consult later in week     • 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]- BP varies  - Midodrine 10 TID  - Gabapentin 900 TID     • Anxiety [F41.9]  - ativan PRN     • SBO (small bowel obstruction) (CMS-[facility]) [K56.69]- partially due to severe impaction, required digital disimpaction and now with liquid stool.  NGT removed, and emesis returned w full liquids  - Decrease IVF  - NPO w sips  - hx of abd surg other than ileal conduit w/ uro Michelle   - Repeat KUB on 3/10, showed improvement  - Repeat KUB today     • Iron deficiency anemia secondary to inadequate dietary iron intake [D50.8]- Hgb is stable  -CBC in AM  -Slight drop overnight     • Chronic ulcer of left heel (CMS-[facility]) [L97.429]  - wound care     • Chronic ulcer of right heel (CMS-[facility]) [L97.419]  - wound  care     • Hypotension - BP low end of tolerable     • Hypokalemia - replace PO and in IVF  -Repeat in AM  -Check mag     • Decubitus ulcer of left ischium, stage 3 ([facility]) [L89.323].  - wound care  - Dr. Arriola to reconsult when SBO improves     Dispo: Daughter Giselle Phoenix, cell [phone]    F/U KUB, re-consult Plastics, Wean O2 (2-3L). Encourage IS    Labs reviewed and Medications reviewed  Medrano catheter: No Medrano      DVT Prophylaxis: Heparin

## 2017-03-13 LAB
ANION GAP SERPL CALC-SCNC: 10 MMOL/L (ref 0–11.9)
ANISOCYTOSIS BLD QL SMEAR: ABNORMAL
BASOPHILS # BLD AUTO: 0 % (ref 0–1.8)
BASOPHILS # BLD: 0 K/UL (ref 0–0.12)
BUN SERPL-MCNC: 9 MG/DL (ref 8–22)
CALCIUM SERPL-MCNC: 8.5 MG/DL (ref 8.5–10.5)
CHLORIDE SERPL-SCNC: 108 MMOL/L (ref 96–112)
CO2 SERPL-SCNC: 24 MMOL/L (ref 20–33)
CREAT SERPL-MCNC: 0.29 MG/DL (ref 0.5–1.4)
EOSINOPHIL # BLD AUTO: 0.5 K/UL (ref 0–0.51)
EOSINOPHIL NFR BLD: 4.4 % (ref 0–6.9)
ERYTHROCYTE [DISTWIDTH] IN BLOOD BY AUTOMATED COUNT: 51.5 FL (ref 35.9–50)
GFR SERPL CREATININE-BSD FRML MDRD: >60 ML/MIN/1.73 M 2
GLUCOSE SERPL-MCNC: 92 MG/DL (ref 65–99)
HCT VFR BLD AUTO: 32.9 % (ref 42–52)
HGB BLD-MCNC: 10 G/DL (ref 14–18)
LYMPHOCYTES # BLD AUTO: 1.97 K/UL (ref 1–4.8)
LYMPHOCYTES NFR BLD: 17.4 % (ref 22–41)
MANUAL DIFF BLD: NORMAL
MCH RBC QN AUTO: 23.5 PG (ref 27–33)
MCHC RBC AUTO-ENTMCNC: 30.4 G/DL (ref 33.7–35.3)
MCV RBC AUTO: 77.2 FL (ref 81.4–97.8)
MICROCYTES BLD QL SMEAR: ABNORMAL
MONOCYTES # BLD AUTO: 0.49 K/UL (ref 0–0.85)
MONOCYTES NFR BLD AUTO: 4.3 % (ref 0–13.4)
MORPHOLOGY BLD-IMP: NORMAL
NEUTROPHILS # BLD AUTO: 8.35 K/UL (ref 1.82–7.42)
NEUTROPHILS NFR BLD: 73.9 % (ref 44–72)
NRBC # BLD AUTO: 0 K/UL
NRBC BLD AUTO-RTO: 0 /100 WBC
PLATELET # BLD AUTO: 465 K/UL (ref 164–446)
PLATELET BLD QL SMEAR: NORMAL
PMV BLD AUTO: 9.4 FL (ref 9–12.9)
POTASSIUM SERPL-SCNC: 3.3 MMOL/L (ref 3.6–5.5)
RBC # BLD AUTO: 4.26 M/UL (ref 4.7–6.1)
RBC BLD AUTO: PRESENT
SODIUM SERPL-SCNC: 142 MMOL/L (ref 135–145)
WBC # BLD AUTO: 11.3 K/UL (ref 4.8–10.8)

## 2017-03-13 PROCEDURE — 80048 BASIC METABOLIC PNL TOTAL CA: CPT

## 2017-03-13 PROCEDURE — A9270 NON-COVERED ITEM OR SERVICE: HCPCS | Performed by: HOSPITALIST

## 2017-03-13 PROCEDURE — 99233 SBSQ HOSP IP/OBS HIGH 50: CPT | Performed by: INTERNAL MEDICINE

## 2017-03-13 PROCEDURE — 700102 HCHG RX REV CODE 250 W/ 637 OVERRIDE(OP): Performed by: INTERNAL MEDICINE

## 2017-03-13 PROCEDURE — A9270 NON-COVERED ITEM OR SERVICE: HCPCS | Performed by: INTERNAL MEDICINE

## 2017-03-13 PROCEDURE — 700111 HCHG RX REV CODE 636 W/ 250 OVERRIDE (IP): Performed by: INTERNAL MEDICINE

## 2017-03-13 PROCEDURE — 700102 HCHG RX REV CODE 250 W/ 637 OVERRIDE(OP): Performed by: HOSPITALIST

## 2017-03-13 PROCEDURE — 700101 HCHG RX REV CODE 250: Performed by: INTERNAL MEDICINE

## 2017-03-13 PROCEDURE — 94760 N-INVAS EAR/PLS OXIMETRY 1: CPT

## 2017-03-13 PROCEDURE — 85027 COMPLETE CBC AUTOMATED: CPT

## 2017-03-13 PROCEDURE — 36415 COLL VENOUS BLD VENIPUNCTURE: CPT

## 2017-03-13 PROCEDURE — 770020 HCHG ROOM/CARE - TELE (206)

## 2017-03-13 PROCEDURE — 85007 BL SMEAR W/DIFF WBC COUNT: CPT

## 2017-03-13 RX ORDER — GUAIFENESIN 600 MG/1
600 TABLET, EXTENDED RELEASE ORAL EVERY 12 HOURS
Status: DISCONTINUED | OUTPATIENT
Start: 2017-03-13 | End: 2017-03-17 | Stop reason: HOSPADM

## 2017-03-13 RX ORDER — METRONIDAZOLE 500 MG/1
500 TABLET ORAL EVERY 8 HOURS
Status: DISCONTINUED | OUTPATIENT
Start: 2017-03-13 | End: 2017-03-16

## 2017-03-13 RX ADMIN — GABAPENTIN 900 MG: 300 CAPSULE ORAL at 14:41

## 2017-03-13 RX ADMIN — MIDODRINE HYDROCHLORIDE 10 MG: 5 TABLET ORAL at 17:46

## 2017-03-13 RX ADMIN — OXYCODONE HYDROCHLORIDE AND ACETAMINOPHEN 500 MG: 500 TABLET ORAL at 08:54

## 2017-03-13 RX ADMIN — HEPARIN SODIUM 5000 UNITS: 5000 INJECTION, SOLUTION INTRAVENOUS; SUBCUTANEOUS at 05:56

## 2017-03-13 RX ADMIN — POTASSIUM CHLORIDE AND SODIUM CHLORIDE: 900; 300 INJECTION, SOLUTION INTRAVENOUS at 19:30

## 2017-03-13 RX ADMIN — THERA TABS 1 TABLET: TAB at 08:54

## 2017-03-13 RX ADMIN — METRONIDAZOLE 500 MG: 500 TABLET ORAL at 21:21

## 2017-03-13 RX ADMIN — METRONIDAZOLE 500 MG: 500 INJECTION, SOLUTION INTRAVENOUS at 05:56

## 2017-03-13 RX ADMIN — MIDODRINE HYDROCHLORIDE 10 MG: 5 TABLET ORAL at 11:49

## 2017-03-13 RX ADMIN — ASPIRIN 81 MG: 81 TABLET ORAL at 08:54

## 2017-03-13 RX ADMIN — HEPARIN SODIUM 5000 UNITS: 5000 INJECTION, SOLUTION INTRAVENOUS; SUBCUTANEOUS at 21:20

## 2017-03-13 RX ADMIN — MIDODRINE HYDROCHLORIDE 10 MG: 5 TABLET ORAL at 08:54

## 2017-03-13 RX ADMIN — GUAIFENESIN 600 MG: 600 TABLET, EXTENDED RELEASE ORAL at 21:21

## 2017-03-13 RX ADMIN — LEVOFLOXACIN 750 MG: 750 INJECTION, SOLUTION INTRAVENOUS at 08:54

## 2017-03-13 RX ADMIN — GABAPENTIN 900 MG: 300 CAPSULE ORAL at 05:56

## 2017-03-13 RX ADMIN — BENZONATATE 100 MG: 100 CAPSULE ORAL at 17:46

## 2017-03-13 RX ADMIN — METRONIDAZOLE 500 MG: 500 INJECTION, SOLUTION INTRAVENOUS at 14:42

## 2017-03-13 RX ADMIN — HEPARIN SODIUM 5000 UNITS: 5000 INJECTION, SOLUTION INTRAVENOUS; SUBCUTANEOUS at 14:41

## 2017-03-13 RX ADMIN — GABAPENTIN 900 MG: 300 CAPSULE ORAL at 21:21

## 2017-03-13 RX ADMIN — STANDARDIZED SENNA CONCENTRATE AND DOCUSATE SODIUM 2 TABLET: 8.6; 5 TABLET, FILM COATED ORAL at 08:54

## 2017-03-13 ASSESSMENT — ENCOUNTER SYMPTOMS
WEAKNESS: 1
VOMITING: 1
FEVER: 0
CHILLS: 0
PALPITATIONS: 0
NAUSEA: 1
ABDOMINAL PAIN: 0
CONSTIPATION: 0
SHORTNESS OF BREATH: 0
COUGH: 1
FALLS: 0

## 2017-03-13 ASSESSMENT — PAIN SCALES - GENERAL
PAINLEVEL_OUTOF10: 0
PAINLEVEL_OUTOF10: 0

## 2017-03-13 NOTE — PROGRESS NOTES
NGT clamped at this time per Dr. Becerril.  Will start clear liquid diet at 1330 if patient tolerates clamped NGT.

## 2017-03-13 NOTE — PROGRESS NOTES
Hospital Medicine Progress Note, Adult, Complex               Author: Allyssa Becerril  Date & Time created: 3/13/2017  3:04 PM     CC: Constipation, Vomiting    Interval History:  56M hx of paraplegia, c/b autonomic dysreflexia, severe chronic sacral decubitus and heel ulcers, neurogenic bowel (s/p ostomy) and neurogenic bladder (recurrent UTIs w MDROs), paroxysmal afib and hx of DVT s/p IVC filter (not on anticaog), who was admitted for N/V and weakness found to have small bowel obstruction    3/7: SBO. NPO. NGT to suction.  Discussed ischium ulcers w/ plastics Dr. JUDSON Arriola, will see when SBO resolves  3/8: Consulted uro Michelle. DC meropenem. DC vanc. MRSA negative. Disimpacted rectum, had medium BM.  3/9: Clamped NGT and trial of clears. Disimpacted again. HypoK: repleting. Updated daughter Giselle   3/10: Frequent liquid stools, vomiting resolved.  Repeat KUB showing improved SBO.  Requiring 2-3L  3/11: Cortrak removed, tolerating clears.  Advanced to full liquid, still having BMs  3/12: N/V returned, NGT replaced.  No abdominal pain, last BM was last night  3/13: N/V improved ~150 cc from NGT, clamped.  Will advance if tolerates    Review of Systems:  Review of Systems   Constitutional: Positive for malaise/fatigue. Negative for fever and chills.   Respiratory: Positive for cough. Negative for shortness of breath.    Cardiovascular: Negative for chest pain and palpitations.   Gastrointestinal: Positive for nausea and vomiting. Negative for abdominal pain and constipation.   Genitourinary: Negative for dysuria and urgency.   Musculoskeletal: Negative for falls.   Neurological: Positive for weakness.     Physical Exam:  Physical Exam   Constitutional: He appears well-developed. He appears listless.   Thin/frail   HENT:   Head: Normocephalic.   Eyes: Conjunctivae are normal.   Neck: No tracheal deviation present. No thyromegaly present.   Cardiovascular: Normal rate.    Pulmonary/Chest: No respiratory distress. He  has no wheezes.   Bronchial breath sounds, weak cough   Abdominal: He exhibits no distension. There is no tenderness. There is no rebound.   NGT back in place, bilious output   Musculoskeletal: He exhibits no edema or tenderness.   Neurological: He appears listless. He exhibits abnormal muscle tone. Coordination abnormal.   Skin:   bilat ichium ulcers w/o surrounding erythema      Labs:        Invalid input(s): [id number]      Recent Labs      17   0917  17   0313   SODIUM  140   --   139  142   POTASSIUM  2.9*   --   3.3*  3.3*   CHLORIDE  110   --   108  108   CO2  20   --   22  24   BUN  10   --   10  9   CREATININE  0.30*   --   0.33*  0.29*   MAGNESIUM   --   1.6   --    --    CALCIUM  8.9   --   9.0  8.5     Recent Labs      17   GLUCOSE  106*  99  92     Recent Labs      17   RBC  4.11*  4.20*  4.26*   HEMOGLOBIN  9.7*  9.7*  10.0*   HEMATOCRIT  31.6*  32.1*  32.9*   PLATELETCT  459*  463*  465*     Recent Labs      17   WBC  12.9*  13.3*  11.3*   NEUTSPOLYS  74.30*  76.50*  73.90*   LYMPHOCYTES  12.80*  10.40*  17.40*   MONOCYTES  9.70  7.80  4.30   EOSINOPHILS  1.80  0.00  4.40   BASOPHILS  0.50  1.80  0.00           Hemodynamics:  Temp (24hrs), Av.6 °C (97.9 °F), Min:36.3 °C (97.4 °F), Max:36.9 °C (98.4 °F)  Temperature: 36.6 °C (97.8 °F)  Pulse  Av.5  Min: 11  Max: 160  Blood Pressure: 134/92 mmHg     Respiratory:    Respiration: 18, Pulse Oximetry: 95 %, O2 Daily Delivery Respiratory : Silicone Nasal Cannula     PEP/CPT Method: Cough Assist, Work Of Breathing / Effort: Mild  RUL Breath Sounds: Diminished, RML Breath Sounds: Diminished, RLL Breath Sounds: Diminished, EDDIE Breath Sounds: Diminished, LLL Breath Sounds: Diminished  Fluids:    Intake/Output Summary (Last 24 hours) at 17 1504  Last data filed  at 03/13/17 1433   Gross per 24 hour   Intake    808 ml   Output   2050 ml   Net  -1242 ml     Weight: 69.6 kg (153 lb 7 oz)  GI/Nutrition:  Orders Placed This Encounter   Procedures   • DIET ORDER     Standing Status: Standing      Number of Occurrences: 1      Standing Expiration Date:      Order Specific Question:  Diet:     Answer:  Clear Liquid [10]     Medical Decision Making, by Problem:  Active Hospital Problems    Diagnosis   • *Pneumonia - on presentation had sepsis (CMS-[facility]) [A41.9] vs SIRS, which resolved and he later developed PNA, likely from poor cough reflex/NGT in place/vomiting..  Was initially started on abx to cover for UTI vs HCAP.  Urology consulted and discontinued abx, as no e/o UTI. Still with WBC  - Change to Ceftriaxone/Flagyl (long QT)  - Lactate ok  - Repeat CXR 3/11 showed PNA  - Add mucinex  - Add quad cough assist     • Decubitus ulcer, stage 3 ([facility]) [L89.94]  - consulted plastics Marbin Arriola, he will see pt when SBO resolves and nutrition improves  - Re-consult later in week     • 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]- BP varies  - Midodrine 10 TID  - Gabapentin 900 TID     • Anxiety [F41.9]  - ativan PRN     • SBO (small bowel obstruction) (CMS-HCC) [K56.69]- partially due to severe impaction, required digital disimpaction and now with liquid stool.  NGT removed, and emesis returned w full liquids  - Decrease IVF  - NPO w sips  - hx of abd surg other than ileal conduit w/ uro Michelle   - Serial KUBs have been improving  - Clamp NGT  - Trial of clears if tolerated     • Iron deficiency anemia secondary to inadequate dietary iron intake [D50.8]- Hgb is stable  -CBC in AM  -Slight drop overnight     • Chronic ulcer of left heel (CMS-[facility]) [L97.429]  - wound care     • Chronic ulcer of right heel (CMS-[facility]) [L97.419]  - wound care     • Hypotension - BP low end of tolerable     • Hypokalemia - replace in IVF  -Repeat in AM  -Check mag     • Decubitus ulcer of left ischium, stage 3 (CMS-[facility])  [L97.323].  - wound care  - Dr. Arriola to reconsult when SBO improves     Dispo: Daughter Giselle [place], cell [phone]    Re-consult Plastics when tolerating diet, Wean O2 (2-3L). Quad cough    Labs reviewed and Medications reviewed  Medrano catheter: No Medrano      DVT Prophylaxis: Heparin

## 2017-03-13 NOTE — CARE PLAN
Problem: Bowel/Gastric:  Goal: Normal bowel function is maintained or improved  Intervention: Educate patient and significant other/support system about diet, fluid intake, medications and activity to promote bowel function  NGT clamped for 2 hours prior to starting trial of clear liquids.  Started with ice chips and slowly introducing clears.  Patient tolerating well so far this shift.

## 2017-03-13 NOTE — CARE PLAN
Problem: Safety  Goal: Will remain free from injury  Intervention: Provide assistance with mobility  Patient instructed to use call light when needing assistance

## 2017-03-13 NOTE — PROGRESS NOTES
Delores ARNDT made aware of potassium level from today and yesterday. Also made her aware of continuous  IVF with K+, and of pt refusing potassium PO supplements. No new orders rec'd.

## 2017-03-14 ENCOUNTER — APPOINTMENT (OUTPATIENT)
Dept: RADIOLOGY | Facility: MEDICAL CENTER | Age: 57
DRG: 871 | End: 2017-03-14
Attending: HOSPITALIST
Payer: MEDICAID

## 2017-03-14 PROBLEM — J18.9 HCAP (HEALTHCARE-ASSOCIATED PNEUMONIA): Status: ACTIVE | Noted: 2017-03-14

## 2017-03-14 LAB
ANION GAP SERPL CALC-SCNC: 8 MMOL/L (ref 0–11.9)
ANISOCYTOSIS BLD QL SMEAR: ABNORMAL
BASOPHILS # BLD AUTO: 0.9 % (ref 0–1.8)
BASOPHILS # BLD: 0.08 K/UL (ref 0–0.12)
BUN SERPL-MCNC: 7 MG/DL (ref 8–22)
CALCIUM SERPL-MCNC: 8.5 MG/DL (ref 8.5–10.5)
CHLORIDE SERPL-SCNC: 109 MMOL/L (ref 96–112)
CO2 SERPL-SCNC: 25 MMOL/L (ref 20–33)
CREAT SERPL-MCNC: 0.28 MG/DL (ref 0.5–1.4)
EOSINOPHIL # BLD AUTO: 0.4 K/UL (ref 0–0.51)
EOSINOPHIL NFR BLD: 4.3 % (ref 0–6.9)
ERYTHROCYTE [DISTWIDTH] IN BLOOD BY AUTOMATED COUNT: 52 FL (ref 35.9–50)
GFR SERPL CREATININE-BSD FRML MDRD: >60 ML/MIN/1.73 M 2
GLUCOSE SERPL-MCNC: 94 MG/DL (ref 65–99)
HCT VFR BLD AUTO: 32.4 % (ref 42–52)
HGB BLD-MCNC: 10 G/DL (ref 14–18)
LYMPHOCYTES # BLD AUTO: 2.02 K/UL (ref 1–4.8)
LYMPHOCYTES NFR BLD: 21.7 % (ref 22–41)
MANUAL DIFF BLD: NORMAL
MCH RBC QN AUTO: 23.7 PG (ref 27–33)
MCHC RBC AUTO-ENTMCNC: 30.9 G/DL (ref 33.7–35.3)
MCV RBC AUTO: 76.8 FL (ref 81.4–97.8)
MICROCYTES BLD QL SMEAR: ABNORMAL
MONOCYTES # BLD AUTO: 0.81 K/UL (ref 0–0.85)
MONOCYTES NFR BLD AUTO: 8.7 % (ref 0–13.4)
MORPHOLOGY BLD-IMP: NORMAL
NEUTROPHILS # BLD AUTO: 5.99 K/UL (ref 1.82–7.42)
NEUTROPHILS NFR BLD: 64.4 % (ref 44–72)
NRBC # BLD AUTO: 0 K/UL
NRBC BLD AUTO-RTO: 0 /100 WBC
PLATELET # BLD AUTO: 540 K/UL (ref 164–446)
PLATELET BLD QL SMEAR: NORMAL
PMV BLD AUTO: 9.4 FL (ref 9–12.9)
POTASSIUM SERPL-SCNC: 3.2 MMOL/L (ref 3.6–5.5)
RBC # BLD AUTO: 4.22 M/UL (ref 4.7–6.1)
RBC BLD AUTO: PRESENT
SODIUM SERPL-SCNC: 142 MMOL/L (ref 135–145)
WBC # BLD AUTO: 9.3 K/UL (ref 4.8–10.8)

## 2017-03-14 PROCEDURE — 700111 HCHG RX REV CODE 636 W/ 250 OVERRIDE (IP): Performed by: INTERNAL MEDICINE

## 2017-03-14 PROCEDURE — 700101 HCHG RX REV CODE 250: Performed by: INTERNAL MEDICINE

## 2017-03-14 PROCEDURE — 700102 HCHG RX REV CODE 250 W/ 637 OVERRIDE(OP): Performed by: HOSPITALIST

## 2017-03-14 PROCEDURE — A9270 NON-COVERED ITEM OR SERVICE: HCPCS | Performed by: HOSPITALIST

## 2017-03-14 PROCEDURE — G8988 SELF CARE GOAL STATUS: HCPCS | Mod: CI

## 2017-03-14 PROCEDURE — 97165 OT EVAL LOW COMPLEX 30 MIN: CPT

## 2017-03-14 PROCEDURE — 94668 MNPJ CHEST WALL SBSQ: CPT

## 2017-03-14 PROCEDURE — 700102 HCHG RX REV CODE 250 W/ 637 OVERRIDE(OP): Performed by: INTERNAL MEDICINE

## 2017-03-14 PROCEDURE — 302146: Performed by: HOSPITALIST

## 2017-03-14 PROCEDURE — 770020 HCHG ROOM/CARE - TELE (206)

## 2017-03-14 PROCEDURE — A9270 NON-COVERED ITEM OR SERVICE: HCPCS | Performed by: INTERNAL MEDICINE

## 2017-03-14 PROCEDURE — 770006 HCHG ROOM/CARE - MED/SURG/GYN SEMI*

## 2017-03-14 PROCEDURE — 36415 COLL VENOUS BLD VENIPUNCTURE: CPT

## 2017-03-14 PROCEDURE — 85027 COMPLETE CBC AUTOMATED: CPT

## 2017-03-14 PROCEDURE — 700111 HCHG RX REV CODE 636 W/ 250 OVERRIDE (IP): Performed by: HOSPITALIST

## 2017-03-14 PROCEDURE — G8987 SELF CARE CURRENT STATUS: HCPCS | Mod: CI

## 2017-03-14 PROCEDURE — 85007 BL SMEAR W/DIFF WBC COUNT: CPT

## 2017-03-14 PROCEDURE — 700105 HCHG RX REV CODE 258: Performed by: INTERNAL MEDICINE

## 2017-03-14 PROCEDURE — 71010 DX-CHEST-PORTABLE (1 VIEW): CPT

## 2017-03-14 PROCEDURE — 94760 N-INVAS EAR/PLS OXIMETRY 1: CPT

## 2017-03-14 PROCEDURE — 80048 BASIC METABOLIC PNL TOTAL CA: CPT

## 2017-03-14 PROCEDURE — 99233 SBSQ HOSP IP/OBS HIGH 50: CPT | Performed by: HOSPITALIST

## 2017-03-14 PROCEDURE — G8989 SELF CARE D/C STATUS: HCPCS | Mod: CI

## 2017-03-14 RX ORDER — POTASSIUM CHLORIDE 7.45 MG/ML
10 INJECTION INTRAVENOUS
Status: DISPENSED | OUTPATIENT
Start: 2017-03-14 | End: 2017-03-14

## 2017-03-14 RX ADMIN — MIDODRINE HYDROCHLORIDE 10 MG: 5 TABLET ORAL at 13:50

## 2017-03-14 RX ADMIN — THERA TABS 1 TABLET: TAB at 08:48

## 2017-03-14 RX ADMIN — MIDODRINE HYDROCHLORIDE 10 MG: 5 TABLET ORAL at 08:51

## 2017-03-14 RX ADMIN — HEPARIN SODIUM 5000 UNITS: 5000 INJECTION, SOLUTION INTRAVENOUS; SUBCUTANEOUS at 06:23

## 2017-03-14 RX ADMIN — GUAIFENESIN 600 MG: 600 TABLET, EXTENDED RELEASE ORAL at 08:48

## 2017-03-14 RX ADMIN — OXYCODONE HYDROCHLORIDE AND ACETAMINOPHEN 500 MG: 500 TABLET ORAL at 08:47

## 2017-03-14 RX ADMIN — MIDODRINE HYDROCHLORIDE 10 MG: 5 TABLET ORAL at 17:32

## 2017-03-14 RX ADMIN — GABAPENTIN 900 MG: 300 CAPSULE ORAL at 13:50

## 2017-03-14 RX ADMIN — METRONIDAZOLE 500 MG: 500 TABLET ORAL at 06:23

## 2017-03-14 RX ADMIN — CEFTRIAXONE SODIUM 1 G: 1 INJECTION, POWDER, FOR SOLUTION INTRAMUSCULAR; INTRAVENOUS at 08:50

## 2017-03-14 RX ADMIN — POTASSIUM CHLORIDE 10 MEQ: 7.46 INJECTION, SOLUTION INTRAVENOUS at 19:17

## 2017-03-14 RX ADMIN — POTASSIUM CHLORIDE AND SODIUM CHLORIDE: 900; 300 INJECTION, SOLUTION INTRAVENOUS at 17:32

## 2017-03-14 RX ADMIN — METRONIDAZOLE 500 MG: 500 TABLET ORAL at 20:32

## 2017-03-14 RX ADMIN — METRONIDAZOLE 500 MG: 500 TABLET ORAL at 13:50

## 2017-03-14 RX ADMIN — HEPARIN SODIUM 5000 UNITS: 5000 INJECTION, SOLUTION INTRAVENOUS; SUBCUTANEOUS at 13:50

## 2017-03-14 RX ADMIN — POTASSIUM CHLORIDE 10 MEQ: 7.46 INJECTION, SOLUTION INTRAVENOUS at 20:36

## 2017-03-14 RX ADMIN — GABAPENTIN 900 MG: 300 CAPSULE ORAL at 20:32

## 2017-03-14 RX ADMIN — POTASSIUM CHLORIDE 10 MEQ: 7.46 INJECTION, SOLUTION INTRAVENOUS at 17:32

## 2017-03-14 RX ADMIN — GABAPENTIN 900 MG: 300 CAPSULE ORAL at 06:23

## 2017-03-14 RX ADMIN — ASPIRIN 81 MG: 81 TABLET ORAL at 08:47

## 2017-03-14 RX ADMIN — HEPARIN SODIUM 5000 UNITS: 5000 INJECTION, SOLUTION INTRAVENOUS; SUBCUTANEOUS at 20:32

## 2017-03-14 RX ADMIN — GUAIFENESIN 600 MG: 600 TABLET, EXTENDED RELEASE ORAL at 20:32

## 2017-03-14 RX ADMIN — MAGNESIUM SULFATE IN DEXTROSE 1 G: 10 INJECTION, SOLUTION INTRAVENOUS at 16:00

## 2017-03-14 ASSESSMENT — ENCOUNTER SYMPTOMS
VOMITING: 0
MYALGIAS: 0
WHEEZING: 0
FOCAL WEAKNESS: 0
COUGH: 1
CHILLS: 0
TINGLING: 0
DIZZINESS: 0
DIARRHEA: 0
SHORTNESS OF BREATH: 1
DEPRESSION: 0
ABDOMINAL PAIN: 0
SORE THROAT: 0
PALPITATIONS: 0
WEAKNESS: 1
HEADACHES: 0
NAUSEA: 0
FEVER: 0
PHOTOPHOBIA: 0

## 2017-03-14 ASSESSMENT — PATIENT HEALTH QUESTIONNAIRE - PHQ9
1. LITTLE INTEREST OR PLEASURE IN DOING THINGS: NOT AT ALL
SUM OF ALL RESPONSES TO PHQ9 QUESTIONS 1 AND 2: 0
2. FEELING DOWN, DEPRESSED, IRRITABLE, OR HOPELESS: NOT AT ALL
SUM OF ALL RESPONSES TO PHQ QUESTIONS 1-9: 0

## 2017-03-14 ASSESSMENT — PAIN SCALES - GENERAL
PAINLEVEL_OUTOF10: 0

## 2017-03-14 ASSESSMENT — ACTIVITIES OF DAILY LIVING (ADL): TOILETING: INDEPENDENT

## 2017-03-14 NOTE — THERAPY
"Occupational Therapy Evaluation completed.   Functional Status:  At functional baseline  Plan of Care: Patient with no further skilled OT needs in the acute care setting at this time  Discharge Recommendations:  Equipment: No Equipment Needed and Will Continue to Assess for Equipment Needs.    Patient seen for OT eval 2/2 requesting an eating utensil modification. Issued appropriate items, trialed positioning, and patient verbalize approval for new eating utensil. PLOF, patient reports Mod I and wc level. Upon eval, per nurse, and per patient, patient demos baseline functional level and cognition intact. Patient has no further skilled OT needs at this time. Eval only.  DC to home when medically stable.     See \"Rehab Therapy-Acute\" Patient Summary Report for complete documentation.    "

## 2017-03-14 NOTE — PROGRESS NOTES
Hospital Medicine Progress Note, Adult, Complex               Author: Shari Sanchez Date & Time created: 3/14/2017  8:13 AM     CC: 56M with multiple medical issues including paraplegia with autonomic dysreflexia, severe chronic sacral decubitus, neurogenic bowel (s/p ostomy) and neurogenic bladder (recurrent UTIs w MDRs), paroxysmal afib and hx of DVT s/p IVC filter (not on anticaog), who presented with constipation, nausea and vomiting and admitted for small bowel obstruction and hospitalization complicated by development of PNA.    Interval History:  Patient reports flatus and bowel movement this morning, NGT has been clamped since yesterday and patient tolerating clears.  Reports worsening SOB.    Review of Systems:  Review of Systems   Constitutional: Positive for malaise/fatigue. Negative for fever and chills.   HENT: Negative for congestion and sore throat.    Eyes: Negative for photophobia.   Respiratory: Positive for cough and shortness of breath. Negative for wheezing.    Cardiovascular: Negative for chest pain and palpitations.   Gastrointestinal: Negative for nausea, vomiting, abdominal pain and diarrhea.   Genitourinary: Negative for dysuria.   Musculoskeletal: Negative for myalgias.   Skin: Negative.    Neurological: Positive for weakness. Negative for dizziness, tingling, focal weakness and headaches.   Psychiatric/Behavioral: Negative for depression and suicidal ideas.     Physical Exam:  Physical Exam   Constitutional: He is oriented to person, place, and time. No distress.   HENT:   Head: Normocephalic and atraumatic.   Right Ear: External ear normal.   Left Ear: External ear normal.   Eyes: EOM are normal. Right eye exhibits no discharge. Left eye exhibits no discharge.   Neck: Neck supple. No JVD present.   Cardiovascular: Normal rate, regular rhythm and normal heart sounds.    Pulmonary/Chest: Effort normal. No respiratory distress. He has rales. He exhibits no tenderness.   Diminished at bases    Abdominal: Soft. Bowel sounds are normal. He exhibits no distension. There is no tenderness.   Musculoskeletal: He exhibits no edema.   Neurological: He is alert and oriented to person, place, and time. No cranial nerve deficit. He exhibits abnormal muscle tone. Coordination abnormal.   Skin: Skin is dry. He is not diaphoretic. No erythema.   Psychiatric: His mood appears anxious. He exhibits a depressed mood.   Nursing note and vitals reviewed.    Labs:        Invalid input(s): [id number]      Recent Labs      17   SODIUM   --   139  142  142   POTASSIUM   --   3.3*  3.3*  3.2*   CHLORIDE   --   108  108  109   CO2   --   22  24  25   BUN   --   10  9  7*   CREATININE   --   0.33*  0.29*  0.28*   MAGNESIUM  1.6   --    --    --    CALCIUM   --   9.0  8.5  8.5     Recent Labs      17   GLUCOSE  99  92  94     Recent Labs      17   RBC  4.20*  4.26*  4.22*   HEMOGLOBIN  9.7*  10.0*  10.0*   HEMATOCRIT  32.1*  32.9*  32.4*   PLATELETCT  463*  465*  540*     Recent Labs      17   WBC  13.3*  11.3*  9.3   NEUTSPOLYS  76.50*  73.90*  64.40   LYMPHOCYTES  10.40*  17.40*  21.70*   MONOCYTES  7.80  4.30  8.70   EOSINOPHILS  0.00  4.40  4.30   BASOPHILS  1.80  0.00  0.90           Hemodynamics:  Temp (24hrs), Av.6 °C (97.8 °F), Min:36.4 °C (97.5 °F), Max:36.8 °C (98.2 °F)  Temperature: 36.6 °C (97.8 °F)  Pulse  Av.8  Min: 11  Max: 160  Blood Pressure: 126/83 mmHg     Respiratory:    Respiration: 16, Pulse Oximetry: 97 %, O2 Daily Delivery Respiratory : Silicone Nasal Cannula     Work Of Breathing / Effort: Mild  RUL Breath Sounds: Diminished, RML Breath Sounds: Diminished, RLL Breath Sounds: Diminished, EDDIE Breath Sounds: Diminished, LLL Breath Sounds: Diminished  Fluids:    Intake/Output Summary (Last 24  hours) at 03/14/17 0813  Last data filed at 03/14/17 0600   Gross per 24 hour   Intake    556 ml   Output   1925 ml   Net  -1369 ml     Weight: 72.9 kg (160 lb 11.5 oz)  GI/Nutrition:  Orders Placed This Encounter   Procedures   • DIET ORDER     Standing Status: Standing      Number of Occurrences: 1      Standing Expiration Date:      Order Specific Question:  Diet:     Answer:  Clear Liquid [10]     Medical Decision Making, by Problem:  Active Hospital Problems    Diagnosis   • SBO (small bowel obstruction) (CMS-HCC) [K56.69]  - Partially due to severe impaction s/p required digital disimpaction  - +Flatus and BM, good bowel sounds  - Advance diet to full liquids for lunch and check for residuals, if none, will DC NGT     • Pneumonia  - Not present at admission, likely from poor cough reflex/NGT in place/vomiting  - Repeat CXR this AM, patient reports worsening SOB  - Continue Ceftriaxone/Flagyl to cover for aspiration  - RT protocol to assist with aggressive pulmonary toilet in a quad     • Decubitus ulcer, stage 3 ([facility]) [L89.94] and bilateral heel ulcers  - Plastics Marbin Arriola previously consulted, he will see pt when SBO resolves and nutrition improves  - Re-consult later in week  - Wound care     • 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] with Hypotension  - Midodrine 10 TID  - Gabapentin 900 TID     • Anxiety [F41.9]  - Ativan PRN     • Hypokalemia   - Persists, mildly worse  - Add K rider, Mag 1.6 but will give 1g IV mag  - Monitor     More than 35 minutes was spent in pt exam, interview, and more than 60 % of this in discussion of plan, diagnoses, prognosis and disposition with patient and/or family, nurse and case management coordinator.      Labs reviewed and Medications reviewed  Medrano catheter: No Medrano      DVT Prophylaxis: Heparin

## 2017-03-14 NOTE — PROGRESS NOTES
Report received at bedside, assumed care of patient. Patient A&O x 4, no signs of distress noted. All LDAs assessed. Discussed POC with patient and all questions answered at this time. Denies pain at this time. Bed alarm on. Bed in lowest position, upper side rails up. Non-skid socks in place. Call light within reach. Personal possessions in reach. Will continue to monitor pt status.

## 2017-03-14 NOTE — DIETARY
Nutrition Services: Following for PO adequacy and stage 4 pressure ulcer. Appropriate vitamin therapy in place. Supplement order in place; patient receiving Boost TID. The patient has been NPO or on clears since admit due to SBO; upgraded to full liquid diet today. Recommendation: Advance diet as tolerated per MD. RD will continue to monitor for PO adequacy.

## 2017-03-14 NOTE — CARE PLAN
Problem: Nutritional:  Goal: Achieve adequate nutritional intake  Patient will start PO diet and will consume >50% of meals   Outcome: PROGRESSING AS EXPECTED

## 2017-03-15 LAB
ANION GAP SERPL CALC-SCNC: 8 MMOL/L (ref 0–11.9)
ANISOCYTOSIS BLD QL SMEAR: ABNORMAL
BASOPHILS # BLD AUTO: 0 % (ref 0–1.8)
BASOPHILS # BLD: 0 K/UL (ref 0–0.12)
BUN SERPL-MCNC: 5 MG/DL (ref 8–22)
CALCIUM SERPL-MCNC: 8.1 MG/DL (ref 8.5–10.5)
CHLORIDE SERPL-SCNC: 110 MMOL/L (ref 96–112)
CO2 SERPL-SCNC: 25 MMOL/L (ref 20–33)
CREAT SERPL-MCNC: 0.27 MG/DL (ref 0.5–1.4)
EOSINOPHIL # BLD AUTO: 0.74 K/UL (ref 0–0.51)
EOSINOPHIL NFR BLD: 9.6 % (ref 0–6.9)
ERYTHROCYTE [DISTWIDTH] IN BLOOD BY AUTOMATED COUNT: 53.8 FL (ref 35.9–50)
GFR SERPL CREATININE-BSD FRML MDRD: >60 ML/MIN/1.73 M 2
GLUCOSE SERPL-MCNC: 87 MG/DL (ref 65–99)
HCT VFR BLD AUTO: 32.6 % (ref 42–52)
HGB BLD-MCNC: 9.7 G/DL (ref 14–18)
LYMPHOCYTES # BLD AUTO: 3.41 K/UL (ref 1–4.8)
LYMPHOCYTES NFR BLD: 44.3 % (ref 22–41)
MANUAL DIFF BLD: NORMAL
MCH RBC QN AUTO: 23.4 PG (ref 27–33)
MCHC RBC AUTO-ENTMCNC: 29.8 G/DL (ref 33.7–35.3)
MCV RBC AUTO: 78.6 FL (ref 81.4–97.8)
MICROCYTES BLD QL SMEAR: ABNORMAL
MONOCYTES # BLD AUTO: 0.07 K/UL (ref 0–0.85)
MONOCYTES NFR BLD AUTO: 0.9 % (ref 0–13.4)
MORPHOLOGY BLD-IMP: NORMAL
NEUTROPHILS # BLD AUTO: 3.48 K/UL (ref 1.82–7.42)
NEUTROPHILS NFR BLD: 45.2 % (ref 44–72)
NRBC # BLD AUTO: 0 K/UL
NRBC BLD AUTO-RTO: 0 /100 WBC
PLATELET # BLD AUTO: 525 K/UL (ref 164–446)
PLATELET BLD QL SMEAR: NORMAL
PMV BLD AUTO: 9.3 FL (ref 9–12.9)
POTASSIUM SERPL-SCNC: 3.7 MMOL/L (ref 3.6–5.5)
RBC # BLD AUTO: 4.15 M/UL (ref 4.7–6.1)
RBC BLD AUTO: PRESENT
SODIUM SERPL-SCNC: 143 MMOL/L (ref 135–145)
WBC # BLD AUTO: 7.7 K/UL (ref 4.8–10.8)

## 2017-03-15 PROCEDURE — 99233 SBSQ HOSP IP/OBS HIGH 50: CPT | Performed by: HOSPITALIST

## 2017-03-15 PROCEDURE — 94669 MECHANICAL CHEST WALL OSCILL: CPT

## 2017-03-15 PROCEDURE — 700101 HCHG RX REV CODE 250: Performed by: INTERNAL MEDICINE

## 2017-03-15 PROCEDURE — 700105 HCHG RX REV CODE 258: Performed by: INTERNAL MEDICINE

## 2017-03-15 PROCEDURE — 31720 CLEARANCE OF AIRWAYS: CPT

## 2017-03-15 PROCEDURE — 700102 HCHG RX REV CODE 250 W/ 637 OVERRIDE(OP): Performed by: INTERNAL MEDICINE

## 2017-03-15 PROCEDURE — 700102 HCHG RX REV CODE 250 W/ 637 OVERRIDE(OP): Performed by: HOSPITALIST

## 2017-03-15 PROCEDURE — A9270 NON-COVERED ITEM OR SERVICE: HCPCS | Performed by: HOSPITALIST

## 2017-03-15 PROCEDURE — 770006 HCHG ROOM/CARE - MED/SURG/GYN SEMI*

## 2017-03-15 PROCEDURE — 94668 MNPJ CHEST WALL SBSQ: CPT

## 2017-03-15 PROCEDURE — 700111 HCHG RX REV CODE 636 W/ 250 OVERRIDE (IP): Performed by: INTERNAL MEDICINE

## 2017-03-15 PROCEDURE — 80048 BASIC METABOLIC PNL TOTAL CA: CPT

## 2017-03-15 PROCEDURE — A9270 NON-COVERED ITEM OR SERVICE: HCPCS | Performed by: INTERNAL MEDICINE

## 2017-03-15 PROCEDURE — 94760 N-INVAS EAR/PLS OXIMETRY 1: CPT

## 2017-03-15 PROCEDURE — 85007 BL SMEAR W/DIFF WBC COUNT: CPT

## 2017-03-15 PROCEDURE — 36415 COLL VENOUS BLD VENIPUNCTURE: CPT

## 2017-03-15 PROCEDURE — 85027 COMPLETE CBC AUTOMATED: CPT

## 2017-03-15 RX ADMIN — MIDODRINE HYDROCHLORIDE 10 MG: 5 TABLET ORAL at 17:40

## 2017-03-15 RX ADMIN — OXYCODONE HYDROCHLORIDE AND ACETAMINOPHEN 500 MG: 500 TABLET ORAL at 08:22

## 2017-03-15 RX ADMIN — METRONIDAZOLE 500 MG: 500 TABLET ORAL at 06:30

## 2017-03-15 RX ADMIN — MIDODRINE HYDROCHLORIDE 10 MG: 5 TABLET ORAL at 14:20

## 2017-03-15 RX ADMIN — METRONIDAZOLE 500 MG: 500 TABLET ORAL at 14:20

## 2017-03-15 RX ADMIN — CEFTRIAXONE SODIUM 1 G: 1 INJECTION, POWDER, FOR SOLUTION INTRAMUSCULAR; INTRAVENOUS at 08:23

## 2017-03-15 RX ADMIN — HEPARIN SODIUM 5000 UNITS: 5000 INJECTION, SOLUTION INTRAVENOUS; SUBCUTANEOUS at 06:30

## 2017-03-15 RX ADMIN — GABAPENTIN 900 MG: 300 CAPSULE ORAL at 22:07

## 2017-03-15 RX ADMIN — POTASSIUM CHLORIDE AND SODIUM CHLORIDE: 900; 300 INJECTION, SOLUTION INTRAVENOUS at 22:07

## 2017-03-15 RX ADMIN — HEPARIN SODIUM 5000 UNITS: 5000 INJECTION, SOLUTION INTRAVENOUS; SUBCUTANEOUS at 22:06

## 2017-03-15 RX ADMIN — GUAIFENESIN 600 MG: 600 TABLET, EXTENDED RELEASE ORAL at 22:07

## 2017-03-15 RX ADMIN — THERA TABS 1 TABLET: TAB at 08:23

## 2017-03-15 RX ADMIN — GABAPENTIN 900 MG: 300 CAPSULE ORAL at 14:20

## 2017-03-15 RX ADMIN — GUAIFENESIN 600 MG: 600 TABLET, EXTENDED RELEASE ORAL at 09:00

## 2017-03-15 RX ADMIN — METRONIDAZOLE 500 MG: 500 TABLET ORAL at 22:07

## 2017-03-15 RX ADMIN — ASPIRIN 81 MG: 81 TABLET ORAL at 08:22

## 2017-03-15 RX ADMIN — MIDODRINE HYDROCHLORIDE 10 MG: 5 TABLET ORAL at 08:22

## 2017-03-15 RX ADMIN — HEPARIN SODIUM 5000 UNITS: 5000 INJECTION, SOLUTION INTRAVENOUS; SUBCUTANEOUS at 14:21

## 2017-03-15 RX ADMIN — GABAPENTIN 900 MG: 300 CAPSULE ORAL at 06:30

## 2017-03-15 ASSESSMENT — LIFESTYLE VARIABLES: DO YOU DRINK ALCOHOL: NO

## 2017-03-15 ASSESSMENT — PAIN SCALES - GENERAL
PAINLEVEL_OUTOF10: 0

## 2017-03-15 ASSESSMENT — ENCOUNTER SYMPTOMS
WHEEZING: 0
TINGLING: 0
COUGH: 1
SORE THROAT: 0
DIZZINESS: 0
CHILLS: 0
FOCAL WEAKNESS: 0
VOMITING: 0
DEPRESSION: 0
PHOTOPHOBIA: 0
FEVER: 0
WEAKNESS: 1
HEADACHES: 0
DIARRHEA: 0
ABDOMINAL PAIN: 0
PALPITATIONS: 0
NAUSEA: 0
SHORTNESS OF BREATH: 1
MYALGIAS: 0

## 2017-03-15 NOTE — PROGRESS NOTES
Skin team  Concerned with blanching redness behind left ear, patient has glasses and silicone oxygen tubing. Foam pads applied to both ears. Primary RN notified.

## 2017-03-15 NOTE — CARE PLAN
Problem: Communication  Goal: The ability to communicate needs accurately and effectively will improve  Outcome: PROGRESSING AS EXPECTED  Patient verbalized understanding of need to communicate needs/concerns to nurse/staff.

## 2017-03-15 NOTE — CARE PLAN
Problem: Safety  Goal: Will remain free from injury  Outcome: PROGRESSING AS EXPECTED  Patient verbalized understanding of important in using call-light to call for assistance with mobility, bed at lowest position at all times, HOB greater than 30 degrees when eating, turning q 2 hours.

## 2017-03-15 NOTE — PROGRESS NOTES
Moved to low air loss mattress. ngt removed, after checking residual. 15cc residual after eating lunch. repositioned for comfort and dressings changed.

## 2017-03-15 NOTE — CARE PLAN
Problem: Bowel/Gastric:  Goal: Normal bowel function is maintained or improved  Intervention: Collaborate with Interdisciplinary Team for optimal positioning for bowel evacuation  Will collaborate with interdisciplinary team if bowel movement slows or shows s/s of another bowel obstruction.       Problem: Skin Integrity  Goal: Risk for impaired skin integrity will decrease  Intervention: Implement precautions to protect skin integrity in collaboration with the interdisciplinary team  Follow wound orders for dressing changes and monitor pt skin for additional breakdown due to incontinence of stool.

## 2017-03-15 NOTE — PROGRESS NOTES
Hospital Medicine Progress Note, Adult, Complex               Author: Shari Sanchez Date & Time created: 3/15/2017  8:15 AM     CC: 56M with multiple medical issues including paraplegia with autonomic dysreflexia, severe chronic sacral decubitus, neurogenic bowel (s/p ostomy) and neurogenic bladder (recurrent UTIs w MDRs), paroxysmal afib and hx of DVT s/p IVC filter (not on anticaog), who presented with constipation, nausea and vomiting and admitted for small bowel obstruction and hospitalization complicated by development of PNA.    Interval History:  3/14 - Patient reports flatus and bowel movement this morning, NGT has been clamped since yesterday and patient tolerating clears.  Reports worsening SOB.  3/15 - NGT successfully DCd, tolerating diet    Review of Systems:  Review of Systems   Constitutional: Positive for malaise/fatigue. Negative for fever and chills.   HENT: Negative for congestion and sore throat.    Eyes: Negative for photophobia.   Respiratory: Positive for cough and shortness of breath. Negative for wheezing.    Cardiovascular: Negative for chest pain and palpitations.   Gastrointestinal: Negative for nausea, vomiting, abdominal pain and diarrhea.   Genitourinary: Negative for dysuria.   Musculoskeletal: Negative for myalgias.   Skin: Negative.    Neurological: Positive for weakness. Negative for dizziness, tingling, focal weakness and headaches.   Psychiatric/Behavioral: Negative for depression and suicidal ideas.     Physical Exam:  Physical Exam   Constitutional: He is oriented to person, place, and time. No distress.   HENT:   Head: Normocephalic and atraumatic.   Right Ear: External ear normal.   Left Ear: External ear normal.   Eyes: EOM are normal. Right eye exhibits no discharge. Left eye exhibits no discharge.   Neck: Neck supple. No JVD present.   Cardiovascular: Normal rate, regular rhythm and normal heart sounds.    Pulmonary/Chest: Effort normal. No respiratory distress. He has  rales. He exhibits no tenderness.   Diminished at bases   Abdominal: Soft. Bowel sounds are normal. He exhibits no distension. There is no tenderness.   Musculoskeletal: He exhibits no edema.   Neurological: He is alert and oriented to person, place, and time. No cranial nerve deficit. He exhibits abnormal muscle tone. Coordination abnormal.   Skin: Skin is dry. He is not diaphoretic. No erythema.   Psychiatric: His mood appears anxious. He exhibits a depressed mood.   Nursing note and vitals reviewed.    Labs:        Invalid input(s): [id number]      Recent Labs      03/13/17   0313  03/14/17   0227  03/15/17   0351   SODIUM  142  142  143   POTASSIUM  3.3*  3.2*  3.7   CHLORIDE  108  109  110   CO2  24  25  25   BUN  9  7*  5*   CREATININE  0.29*  0.28*  0.27*   CALCIUM  8.5  8.5  8.1*     Recent Labs      03/13/17   0313  03/14/17   0227  03/15/17   0351   GLUCOSE  92  94  87     Recent Labs      03/13/17   0313  03/14/17   0227  03/15/17   0351   RBC  4.26*  4.22*  4.15*   HEMOGLOBIN  10.0*  10.0*  9.7*   HEMATOCRIT  32.9*  32.4*  32.6*   PLATELETCT  465*  540*  525*     Recent Labs      03/13/17   0313  03/14/17   0227  03/15/17   0351   WBC  11.3*  9.3  7.7   NEUTSPOLYS  73.90*  64.40  45.20   LYMPHOCYTES  17.40*  21.70*  44.30*   MONOCYTES  4.30  8.70  0.90   EOSINOPHILS  4.40  4.30  9.60*   BASOPHILS  0.00  0.90  0.00           Hemodynamics:  Temp (24hrs), Av.6 °C (97.8 °F), Min:36.3 °C (97.4 °F), Max:36.8 °C (98.3 °F)  Temperature: 36.8 °C (98.3 °F)  Pulse  Av.6  Min: 11  Max: 160  Blood Pressure: 125/84 mmHg     Respiratory:    Respiration: 16, Pulse Oximetry: 99 %, O2 Daily Delivery Respiratory : Silicone Nasal Cannula     PEP/CPT Method: Positive Airway Pressure Device, Work Of Breathing / Effort: Mild  RUL Breath Sounds: Diminished, RML Breath Sounds: Diminished, RLL Breath Sounds: Diminished, EDDIE Breath Sounds: Diminished, LLL Breath Sounds: Diminished  Fluids:    Intake/Output Summary  (Last 24 hours) at 03/15/17 0815  Last data filed at 03/15/17 0600   Gross per 24 hour   Intake   1440 ml   Output   1600 ml   Net   -160 ml     Weight: 72.8 kg (160 lb 7.9 oz)  GI/Nutrition:  Orders Placed This Encounter   Procedures   • DIET ORDER     Standing Status: Standing      Number of Occurrences: 1      Standing Expiration Date:      Order Specific Question:  Diet:     Answer:  Regular [1]     Medical Decision Making, by Problem:  Active Hospital Problems    Diagnosis   • SBO (small bowel obstruction) (CMS-HCC) [K56.69]  - Partially due to severe impaction s/p required digital disimpaction  - +Flatus and BM, good bowel sounds  - Advance diet to full liquids for lunch and check for residuals, if none, will DC NGT     • Pneumonia  - Not present at admission, likely from poor cough reflex/NGT in place/vomiting  - Repeat CXR 3/14 shows worsening patchy bilateral lower lung opacity suggesting multifocal pneumonia however patient remains afebrile with VSS and no leukocytosis  - Continue Ceftriaxone/Flagyl to cover for aspiration  - RT protocol to assist with aggressive pulmonary toilet in a quad     • Decubitus ulcer, stage 3 ([facility]) [L89.94] and bilateral heel ulcers  - Plastics Marbin Arriola previously consulted, he will see pt when SBO resolves and nutrition improves  - NGT DCd yesterday and diet advanced to regular today  - Wound care     • 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] with Hypotension  - Midodrine 10 TID  - Gabapentin 900 TID     • Anxiety [F41.9]  - Ativan PRN     • Hypokalemia   - Corrected     More than 35 minutes was spent in pt exam, interview, and more than 60 % of this in discussion of plan, diagnoses, prognosis and disposition with patient and/or family, nurse and case management coordinator.      Labs reviewed and Medications reviewed  Medrano catheter: No Medrano      DVT Prophylaxis: Heparin

## 2017-03-15 NOTE — CARE PLAN
Problem: Knowledge Deficit  Goal: Knowledge of disease process/condition, treatment plan, diagnostic tests, and medications will improve  Outcome: PROGRESSING AS EXPECTED  Plan of care discussed and questions answered.

## 2017-03-16 LAB
ANION GAP SERPL CALC-SCNC: 6 MMOL/L (ref 0–11.9)
ANISOCYTOSIS BLD QL SMEAR: ABNORMAL
BASOPHILS # BLD AUTO: 0 % (ref 0–1.8)
BASOPHILS # BLD: 0 K/UL (ref 0–0.12)
BUN SERPL-MCNC: 6 MG/DL (ref 8–22)
CALCIUM SERPL-MCNC: 8.3 MG/DL (ref 8.5–10.5)
CHLORIDE SERPL-SCNC: 108 MMOL/L (ref 96–112)
CO2 SERPL-SCNC: 24 MMOL/L (ref 20–33)
CREAT SERPL-MCNC: 0.36 MG/DL (ref 0.5–1.4)
EOSINOPHIL # BLD AUTO: 0.32 K/UL (ref 0–0.51)
EOSINOPHIL NFR BLD: 3.5 % (ref 0–6.9)
ERYTHROCYTE [DISTWIDTH] IN BLOOD BY AUTOMATED COUNT: 52.8 FL (ref 35.9–50)
GFR SERPL CREATININE-BSD FRML MDRD: >60 ML/MIN/1.73 M 2
GLUCOSE SERPL-MCNC: 89 MG/DL (ref 65–99)
HCT VFR BLD AUTO: 34.7 % (ref 42–52)
HGB BLD-MCNC: 10.6 G/DL (ref 14–18)
LYMPHOCYTES # BLD AUTO: 3.37 K/UL (ref 1–4.8)
LYMPHOCYTES NFR BLD: 37.4 % (ref 22–41)
MANUAL DIFF BLD: NORMAL
MCH RBC QN AUTO: 23.7 PG (ref 27–33)
MCHC RBC AUTO-ENTMCNC: 30.5 G/DL (ref 33.7–35.3)
MCV RBC AUTO: 77.5 FL (ref 81.4–97.8)
MICROCYTES BLD QL SMEAR: ABNORMAL
MONOCYTES # BLD AUTO: 0.4 K/UL (ref 0–0.85)
MONOCYTES NFR BLD AUTO: 4.4 % (ref 0–13.4)
MORPHOLOGY BLD-IMP: NORMAL
MYELOCYTES NFR BLD MANUAL: 1.7 %
NEUTROPHILS # BLD AUTO: 4.77 K/UL (ref 1.82–7.42)
NEUTROPHILS NFR BLD: 53 % (ref 44–72)
NRBC # BLD AUTO: 0 K/UL
NRBC BLD AUTO-RTO: 0 /100 WBC
PLATELET # BLD AUTO: 472 K/UL (ref 164–446)
PLATELET BLD QL SMEAR: NORMAL
PMV BLD AUTO: 9.9 FL (ref 9–12.9)
POTASSIUM SERPL-SCNC: 3.9 MMOL/L (ref 3.6–5.5)
RBC # BLD AUTO: 4.48 M/UL (ref 4.7–6.1)
RBC BLD AUTO: PRESENT
SODIUM SERPL-SCNC: 138 MMOL/L (ref 135–145)
WBC # BLD AUTO: 9 K/UL (ref 4.8–10.8)

## 2017-03-16 PROCEDURE — 36415 COLL VENOUS BLD VENIPUNCTURE: CPT

## 2017-03-16 PROCEDURE — 770006 HCHG ROOM/CARE - MED/SURG/GYN SEMI*

## 2017-03-16 PROCEDURE — 80048 BASIC METABOLIC PNL TOTAL CA: CPT

## 2017-03-16 PROCEDURE — 700111 HCHG RX REV CODE 636 W/ 250 OVERRIDE (IP): Performed by: NURSE PRACTITIONER

## 2017-03-16 PROCEDURE — 700102 HCHG RX REV CODE 250 W/ 637 OVERRIDE(OP): Performed by: INTERNAL MEDICINE

## 2017-03-16 PROCEDURE — 85007 BL SMEAR W/DIFF WBC COUNT: CPT

## 2017-03-16 PROCEDURE — 85027 COMPLETE CBC AUTOMATED: CPT

## 2017-03-16 PROCEDURE — 700102 HCHG RX REV CODE 250 W/ 637 OVERRIDE(OP): Performed by: HOSPITALIST

## 2017-03-16 PROCEDURE — 700111 HCHG RX REV CODE 636 W/ 250 OVERRIDE (IP): Performed by: INTERNAL MEDICINE

## 2017-03-16 PROCEDURE — 99233 SBSQ HOSP IP/OBS HIGH 50: CPT | Performed by: HOSPITALIST

## 2017-03-16 PROCEDURE — A9270 NON-COVERED ITEM OR SERVICE: HCPCS | Performed by: INTERNAL MEDICINE

## 2017-03-16 PROCEDURE — A9270 NON-COVERED ITEM OR SERVICE: HCPCS | Performed by: HOSPITALIST

## 2017-03-16 PROCEDURE — A6209 FOAM DRSG <=16 SQ IN W/O BDR: HCPCS | Performed by: HOSPITALIST

## 2017-03-16 PROCEDURE — 700101 HCHG RX REV CODE 250: Performed by: INTERNAL MEDICINE

## 2017-03-16 PROCEDURE — 700105 HCHG RX REV CODE 258: Performed by: INTERNAL MEDICINE

## 2017-03-16 RX ORDER — SODIUM CHLORIDE 9 MG/ML
500 INJECTION, SOLUTION INTRAVENOUS ONCE
Status: COMPLETED | OUTPATIENT
Start: 2017-03-16 | End: 2017-03-16

## 2017-03-16 RX ORDER — CEFDINIR 300 MG/1
300 CAPSULE ORAL EVERY 12 HOURS
Status: DISCONTINUED | OUTPATIENT
Start: 2017-03-16 | End: 2017-03-17 | Stop reason: HOSPADM

## 2017-03-16 RX ORDER — METRONIDAZOLE 500 MG/1
500 TABLET ORAL EVERY 8 HOURS
Status: DISCONTINUED | OUTPATIENT
Start: 2017-03-16 | End: 2017-03-17 | Stop reason: HOSPADM

## 2017-03-16 RX ADMIN — OXYCODONE HYDROCHLORIDE AND ACETAMINOPHEN 500 MG: 500 TABLET ORAL at 08:43

## 2017-03-16 RX ADMIN — HEPARIN SODIUM 5000 UNITS: 5000 INJECTION, SOLUTION INTRAVENOUS; SUBCUTANEOUS at 13:55

## 2017-03-16 RX ADMIN — METRONIDAZOLE 500 MG: 500 TABLET ORAL at 13:55

## 2017-03-16 RX ADMIN — GABAPENTIN 900 MG: 300 CAPSULE ORAL at 13:55

## 2017-03-16 RX ADMIN — MIDODRINE HYDROCHLORIDE 10 MG: 5 TABLET ORAL at 08:43

## 2017-03-16 RX ADMIN — CEFDINIR 300 MG: 300 CAPSULE ORAL at 21:11

## 2017-03-16 RX ADMIN — GUAIFENESIN 600 MG: 600 TABLET, EXTENDED RELEASE ORAL at 21:10

## 2017-03-16 RX ADMIN — METRONIDAZOLE 500 MG: 500 TABLET ORAL at 21:11

## 2017-03-16 RX ADMIN — CEFTRIAXONE SODIUM 1 G: 1 INJECTION, POWDER, FOR SOLUTION INTRAMUSCULAR; INTRAVENOUS at 09:00

## 2017-03-16 RX ADMIN — CEFDINIR 300 MG: 300 CAPSULE ORAL at 15:04

## 2017-03-16 RX ADMIN — METRONIDAZOLE 500 MG: 500 TABLET ORAL at 06:13

## 2017-03-16 RX ADMIN — THERA TABS 1 TABLET: TAB at 08:43

## 2017-03-16 RX ADMIN — GABAPENTIN 900 MG: 300 CAPSULE ORAL at 06:13

## 2017-03-16 RX ADMIN — HEPARIN SODIUM 5000 UNITS: 5000 INJECTION, SOLUTION INTRAVENOUS; SUBCUTANEOUS at 06:13

## 2017-03-16 RX ADMIN — HEPARIN SODIUM 5000 UNITS: 5000 INJECTION, SOLUTION INTRAVENOUS; SUBCUTANEOUS at 21:11

## 2017-03-16 RX ADMIN — POTASSIUM CHLORIDE AND SODIUM CHLORIDE: 900; 300 INJECTION, SOLUTION INTRAVENOUS at 22:06

## 2017-03-16 RX ADMIN — MIDODRINE HYDROCHLORIDE 10 MG: 5 TABLET ORAL at 17:30

## 2017-03-16 RX ADMIN — GABAPENTIN 900 MG: 300 CAPSULE ORAL at 21:10

## 2017-03-16 RX ADMIN — ASPIRIN 81 MG: 81 TABLET ORAL at 08:43

## 2017-03-16 RX ADMIN — GUAIFENESIN 600 MG: 600 TABLET, EXTENDED RELEASE ORAL at 08:43

## 2017-03-16 RX ADMIN — SODIUM CHLORIDE 500 ML: 9 INJECTION, SOLUTION INTRAVENOUS at 04:13

## 2017-03-16 RX ADMIN — MIDODRINE HYDROCHLORIDE 10 MG: 5 TABLET ORAL at 11:59

## 2017-03-16 RX ADMIN — SODIUM CHLORIDE 500 ML: 9 INJECTION, SOLUTION INTRAVENOUS at 06:27

## 2017-03-16 ASSESSMENT — ENCOUNTER SYMPTOMS
WEAKNESS: 1
ABDOMINAL PAIN: 0
SORE THROAT: 0
VOMITING: 0
MYALGIAS: 0
DIARRHEA: 0
PALPITATIONS: 0
FOCAL WEAKNESS: 0
NAUSEA: 0
PHOTOPHOBIA: 0
CHILLS: 0
FEVER: 0
HEADACHES: 0
TINGLING: 0
COUGH: 1
DEPRESSION: 0
DIZZINESS: 0
SHORTNESS OF BREATH: 1
WHEEZING: 0

## 2017-03-16 ASSESSMENT — PAIN SCALES - GENERAL
PAINLEVEL_OUTOF10: 0

## 2017-03-16 NOTE — PROGRESS NOTES
Recheck patient BP during med pass, BP 69/60 on vitals machine, checked manually per hospitalist request, 76/60, ordered another 500 mL bolus of NS.  Will recheck.

## 2017-03-16 NOTE — PROGRESS NOTES
Patient resting in bed. Pain assessed and patient is not experiencing any pain. Patient is not in distress. Will continue to conduct hourly rounds. Bed alarm is on. Call light is within reach. Safety precautions were discussed and Patient calls for assistance.

## 2017-03-16 NOTE — CARE PLAN
Problem: Nutritional:  Goal: Achieve adequate nutritional intake  Patient will start PO diet and will consume >50% of meals   Outcome: PROGRESSING AS EXPECTED  Diet advanced to regular, po intake varies but is mostly >50%     Please record percentage of all meals and supplements consumed to help monitor po adequacy

## 2017-03-16 NOTE — PROGRESS NOTES
Handoff report received. Assume patient care. Patient sleeping in bed. Patient not in distress, AAOX 4. Bed alarm is on. Safety precautions in place. Call light and personal belongings within reach. Educated to call for assistance if needed.

## 2017-03-16 NOTE — PROGRESS NOTES
Patient is sleeping at this time.  No signs of distress.  Safety precautions in place, will continue to monitor with hourly rounding.

## 2017-03-16 NOTE — PROGRESS NOTES
Bedside report completed.  Assumed pt care. Patient in bed, resting, in no apparent distress.  Safety precautions in place. Call light & personal belongings within reach.  Patient is on room air, IV is running NS with 40 mEq of KCl at 50 mL/hour.  Plan of care discussed.  No reports of pain at this time.  Will continue to monitor with hourly rounding in place.

## 2017-03-16 NOTE — CARE PLAN
Problem: Communication  Goal: The ability to communicate needs accurately and effectively will improve  Outcome: PROGRESSING AS EXPECTED  Intervention: [place] patient and significant other/support system to call light to alert staff of needs  Patient educated and understands the use of the call button for any needs to be met.      Problem: Venous Thromboembolism (VTW)/Deep Vein Thrombosis (DVT) Prevention:  Goal: Patient will participate in Venous Thrombosis (VTE)/Deep Vein Thrombosis (DVT)Prevention Measures  Outcome: PROGRESSING AS EXPECTED  Patient is currently on heparin for DVT prophylaxis.

## 2017-03-16 NOTE — PROGRESS NOTES
Hospital Medicine Progress Note, Adult, Complex               Author: Shari Sanchez Date & Time created: 3/16/2017  8:21 AM     CC: 56M with multiple medical issues including paraplegia with autonomic dysreflexia, severe chronic sacral decubitus, neurogenic bowel (s/p ostomy) and neurogenic bladder (recurrent UTIs w MDRs), paroxysmal afib and hx of DVT s/p IVC filter (not on anticaog), who presented with constipation, nausea and vomiting and admitted for small bowel obstruction and hospitalization complicated by development of PNA.    Interval History:  3/14 - Patient reports flatus and bowel movement this morning, NGT has been clamped since yesterday and patient tolerating clears.  Reports worsening SOB.  3/15 - NGT successfully DCd, tolerating diet  3/16 - Continues to tolerate diet and is having BMs, clinically appears better, reports better breathing    Review of Systems:  Review of Systems   Constitutional: Positive for malaise/fatigue. Negative for fever and chills.   HENT: Negative for congestion and sore throat.    Eyes: Negative for photophobia.   Respiratory: Positive for cough and shortness of breath. Negative for wheezing.    Cardiovascular: Negative for chest pain and palpitations.   Gastrointestinal: Negative for nausea, vomiting, abdominal pain and diarrhea.   Genitourinary: Negative for dysuria.   Musculoskeletal: Negative for myalgias.   Skin: Negative.    Neurological: Positive for weakness. Negative for dizziness, tingling, focal weakness and headaches.   Psychiatric/Behavioral: Negative for depression and suicidal ideas.     Physical Exam:  Physical Exam   Constitutional: He is oriented to person, place, and time. No distress.   HENT:   Head: Normocephalic and atraumatic.   Right Ear: External ear normal.   Left Ear: External ear normal.   Eyes: EOM are normal. Right eye exhibits no discharge. Left eye exhibits no discharge.   Neck: Neck supple. No JVD present.   Cardiovascular: Normal rate,  regular rhythm and normal heart sounds.    Pulmonary/Chest: Effort normal. No respiratory distress. He has rales. He exhibits no tenderness.   Diminished at bases   Abdominal: Soft. Bowel sounds are normal. He exhibits no distension. There is no tenderness.   Musculoskeletal: He exhibits no edema.   Neurological: He is alert and oriented to person, place, and time. No cranial nerve deficit. He exhibits abnormal muscle tone. Coordination abnormal.   Skin: Skin is dry. He is not diaphoretic. No erythema.   Psychiatric: His mood appears anxious. He exhibits a depressed mood.   Nursing note and vitals reviewed.    Labs:        Invalid input(s): [id number]      Recent Labs      03/14/17   0227  03/15/17   0351  03/16/17   0315   SODIUM  142  143  138   POTASSIUM  3.2*  3.7  3.9   CHLORIDE  109  110  108   CO2  25  25  24   BUN  7*  5*  6*   CREATININE  0.28*  0.27*  0.36*   CALCIUM  8.5  8.1*  8.3*     Recent Labs      03/14/17   0227  03/15/17   0351  03/16/17   0315   GLUCOSE  94  87  89     Recent Labs      03/14/17   0227  03/15/17   0351  03/16/17   0315   RBC  4.22*  4.15*  4.48*   HEMOGLOBIN  10.0*  9.7*  10.6*   HEMATOCRIT  32.4*  32.6*  34.7*   PLATELETCT  540*  525*  472*     Recent Labs      03/14/17   0227  03/15/17   0351  03/16/17   0315   WBC  9.3  7.7  9.0   NEUTSPOLYS  64.40  45.20  53.00   LYMPHOCYTES  21.70*  44.30*  37.40   MONOCYTES  8.70  0.90  4.40   EOSINOPHILS  4.30  9.60*  3.50   BASOPHILS  0.90  0.00  0.00           Hemodynamics:  Temp (24hrs), Av.7 °C (98 °F), Min:36.4 °C (97.6 °F), Max:36.8 °C (98.2 °F)  Temperature: 36.4 °C (97.6 °F)  Pulse  Av.6  Min: 11  Max: 160  Blood Pressure: (!) 76/60 mmHg     Respiratory:    Respiration: 18, Pulse Oximetry: 95 %, O2 Daily Delivery Respiratory : Silicone Nasal Cannula     PEP/CPT Method: Positive Airway Pressure Device, Work Of Breathing / Effort: Mild  RUL Breath Sounds: Diminished, RML Breath Sounds: Diminished, RLL Breath Sounds:  Diminished, EDDIE Breath Sounds: Diminished, LLL Breath Sounds: Diminished  Fluids:    Intake/Output Summary (Last 24 hours) at 03/16/17 0821  Last data filed at 03/16/17 0600   Gross per 24 hour   Intake   1340 ml   Output   2800 ml   Net  -1460 ml     Weight: 75.3 kg (166 lb 0.1 oz)  GI/Nutrition:  Orders Placed This Encounter   Procedures   • DIET ORDER     Standing Status: Standing      Number of Occurrences: 1      Standing Expiration Date:      Order Specific Question:  Diet:     Answer:  Regular [1]      Comments:  only CHOCOLATE boost, no other flavors please!      Medical Decision Making, by Problem:  Active Hospital Problems    Diagnosis   • SBO (small bowel obstruction) (CMS-HCC) [K56.69]  - Resolved  - Partially due to severe impaction s/p digital disimpaction  - +Flatus and BM, good bowel sounds  - NGT DCd  - Tolerating diet and having BMs     • Pneumonia  - Improving  - CORRECTION TO THIS AND ALL PREVIOUS NOTES: present at admission, likely from poor cough reflex/NGT in place/vomiting  - Repeat CXR 3/14 shows worsening patchy bilateral lower lung opacity suggesting multifocal pneumonia however patient remains afebrile with VSS and no leukocytosis  - Continue Ceftriaxone/Flagyl to cover for aspiration but will switch to PO  - RT protocol to assist with aggressive pulmonary toilet in a quad     • Decubitus ulcer, stage 3 (HCC) [L89.94] and bilateral heel ulcers  - Discussed with Dr. Arriola of plastic surgery, would like a few more weeks of oral diet to improve nutritional status and he will arrange for outpatient FU, no surgery planned at this time     • 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] with Hypotension  - Midodrine 10 TID  - Gabapentin 900 TID     • Anxiety [F41.9]  - Ativan PRN     • Hypokalemia   - Corrected     More than 35 minutes was spent in pt exam, interview, and more than 60 % of this in discussion of plan, diagnoses, prognosis and disposition with patient and/or family, nurse and case management  coordinator.      Labs reviewed and Medications reviewed  Medrano catheter: No Medrano      DVT Prophylaxis: Heparin

## 2017-03-16 NOTE — PROGRESS NOTES
Patient blood pressure was 75/54, paged hospitalist.  Per hospitalist, start 500mL bolus of NS.  Will recheck patient after bolus.

## 2017-03-16 NOTE — CARE PLAN
Problem: Communication  Goal: The ability to communicate needs accurately and effectively will improve  Intervention: [place] patient and significant other/support system to call light to alert staff of needs  Patient educated on the importance of communicating his feeding needs and personal needs.       Problem: Safety  Goal: Will remain free from falls  Intervention: Implement fall precautions  Patient educated on safety precautions, the utilization of the call light, and bed alarm. Patient verbalized understanding.

## 2017-03-16 NOTE — DISCHARGE PLANNING
"The patient will discharge home tomorrow, 3/17.  He is concerned because his electric wheelchair at home has batteries that can no longer be charged and he needs new ones.  He was about to have them changed but had to be admitted to the hospital.  He would like the new batteries to be replaced before he arrives home.  I called [facility] ([phone]) to see if they can go to his home today to replace the batteries.  I spoke with Lindsay and she said that they can only replace the batteries once the patient is actually home from the hospital.  I asked if they can plan to replace them tomorrow.  Lindsay said that she will \"put the patient on Zig[facility]'s schedule\" for Friday but he won't go to the patient's house until the patient calls him from home after he gets home.  I told the patient this.  He said that the problem is that he lives with his brother and his brother works until about 5:00 PM and can't  the patient until after that, at which [facility].  I offered to send him home by ambulance but he said that even if he is home and calls [facility] to come, he won't be able to answer the door because of the non-working batteries in his electric wheelchair.  He also said there are dogs, which he apparently can't deal with by himself.  So the patient said he will talk to his brother when he stops by the hospital after work today.  Maybe his brother could either take the day off work or leave for long enough to take the patient home and wait for the new batteries to arrive.  The patient requested that the  who has him on Friday would go to his room first thing in the morning so that he can communicate the plan early.  I told him that I will leave a message for the .  "

## 2017-03-17 VITALS
OXYGEN SATURATION: 94 % | WEIGHT: 161.38 LBS | BODY MASS INDEX: 20.07 KG/M2 | HEART RATE: 76 BPM | SYSTOLIC BLOOD PRESSURE: 119 MMHG | TEMPERATURE: 98.3 F | DIASTOLIC BLOOD PRESSURE: 80 MMHG | HEIGHT: 75 IN | RESPIRATION RATE: 16 BRPM

## 2017-03-17 LAB
ANION GAP SERPL CALC-SCNC: 6 MMOL/L (ref 0–11.9)
BASOPHILS # BLD AUTO: 0.5 % (ref 0–1.8)
BASOPHILS # BLD: 0.04 K/UL (ref 0–0.12)
BUN SERPL-MCNC: 8 MG/DL (ref 8–22)
CALCIUM SERPL-MCNC: 8.6 MG/DL (ref 8.5–10.5)
CHLORIDE SERPL-SCNC: 108 MMOL/L (ref 96–112)
CO2 SERPL-SCNC: 24 MMOL/L (ref 20–33)
CREAT SERPL-MCNC: 0.31 MG/DL (ref 0.5–1.4)
EOSINOPHIL # BLD AUTO: 0.46 K/UL (ref 0–0.51)
EOSINOPHIL NFR BLD: 5.5 % (ref 0–6.9)
ERYTHROCYTE [DISTWIDTH] IN BLOOD BY AUTOMATED COUNT: 51.9 FL (ref 35.9–50)
GFR SERPL CREATININE-BSD FRML MDRD: >60 ML/MIN/1.73 M 2
GLUCOSE SERPL-MCNC: 98 MG/DL (ref 65–99)
HCT VFR BLD AUTO: 34.1 % (ref 42–52)
HGB BLD-MCNC: 10.2 G/DL (ref 14–18)
IMM GRANULOCYTES # BLD AUTO: 0.19 K/UL (ref 0–0.11)
IMM GRANULOCYTES NFR BLD AUTO: 2.3 % (ref 0–0.9)
LYMPHOCYTES # BLD AUTO: 2.97 K/UL (ref 1–4.8)
LYMPHOCYTES NFR BLD: 35.8 % (ref 22–41)
MCH RBC QN AUTO: 23.1 PG (ref 27–33)
MCHC RBC AUTO-ENTMCNC: 29.9 G/DL (ref 33.7–35.3)
MCV RBC AUTO: 77.3 FL (ref 81.4–97.8)
MONOCYTES # BLD AUTO: 0.62 K/UL (ref 0–0.85)
MONOCYTES NFR BLD AUTO: 7.5 % (ref 0–13.4)
NEUTROPHILS # BLD AUTO: 4.02 K/UL (ref 1.82–7.42)
NEUTROPHILS NFR BLD: 48.4 % (ref 44–72)
NRBC # BLD AUTO: 0 K/UL
NRBC BLD AUTO-RTO: 0 /100 WBC
PLATELET # BLD AUTO: 582 K/UL (ref 164–446)
PMV BLD AUTO: 9.1 FL (ref 9–12.9)
POTASSIUM SERPL-SCNC: 4 MMOL/L (ref 3.6–5.5)
RBC # BLD AUTO: 4.41 M/UL (ref 4.7–6.1)
SODIUM SERPL-SCNC: 138 MMOL/L (ref 135–145)
WBC # BLD AUTO: 8.3 K/UL (ref 4.8–10.8)

## 2017-03-17 PROCEDURE — A9270 NON-COVERED ITEM OR SERVICE: HCPCS | Performed by: INTERNAL MEDICINE

## 2017-03-17 PROCEDURE — 700102 HCHG RX REV CODE 250 W/ 637 OVERRIDE(OP): Performed by: HOSPITALIST

## 2017-03-17 PROCEDURE — G8997 SWALLOW GOAL STATUS: HCPCS | Mod: CH

## 2017-03-17 PROCEDURE — 700102 HCHG RX REV CODE 250 W/ 637 OVERRIDE(OP): Performed by: INTERNAL MEDICINE

## 2017-03-17 PROCEDURE — 85025 COMPLETE CBC W/AUTO DIFF WBC: CPT

## 2017-03-17 PROCEDURE — A9270 NON-COVERED ITEM OR SERVICE: HCPCS | Performed by: HOSPITALIST

## 2017-03-17 PROCEDURE — G8998 SWALLOW D/C STATUS: HCPCS | Mod: CH

## 2017-03-17 PROCEDURE — 700111 HCHG RX REV CODE 636 W/ 250 OVERRIDE (IP): Performed by: INTERNAL MEDICINE

## 2017-03-17 PROCEDURE — 36415 COLL VENOUS BLD VENIPUNCTURE: CPT

## 2017-03-17 PROCEDURE — 80048 BASIC METABOLIC PNL TOTAL CA: CPT

## 2017-03-17 PROCEDURE — 99239 HOSP IP/OBS DSCHRG MGMT >30: CPT | Performed by: HOSPITALIST

## 2017-03-17 PROCEDURE — 92526 ORAL FUNCTION THERAPY: CPT

## 2017-03-17 RX ORDER — CEFDINIR 300 MG/1
300 CAPSULE ORAL EVERY 12 HOURS
Qty: 6 CAP | Refills: 0 | Status: SHIPPED | OUTPATIENT
Start: 2017-03-17 | End: 2017-03-20

## 2017-03-17 RX ORDER — METRONIDAZOLE 500 MG/1
500 TABLET ORAL EVERY 8 HOURS
Qty: 9 TAB | Refills: 0 | Status: SHIPPED | OUTPATIENT
Start: 2017-03-17 | End: 2017-03-20

## 2017-03-17 RX ORDER — METRONIDAZOLE 500 MG/1
500 TABLET ORAL EVERY 8 HOURS
Qty: 9 TAB | Refills: 0 | Status: SHIPPED | OUTPATIENT
Start: 2017-03-17 | End: 2017-03-17

## 2017-03-17 RX ORDER — CEFDINIR 300 MG/1
300 CAPSULE ORAL EVERY 12 HOURS
Qty: 6 CAP | Refills: 0 | Status: SHIPPED | OUTPATIENT
Start: 2017-03-17 | End: 2017-03-17

## 2017-03-17 RX ADMIN — ASPIRIN 81 MG: 81 TABLET ORAL at 09:14

## 2017-03-17 RX ADMIN — OXYCODONE HYDROCHLORIDE AND ACETAMINOPHEN 500 MG: 500 TABLET ORAL at 09:14

## 2017-03-17 RX ADMIN — MIDODRINE HYDROCHLORIDE 10 MG: 5 TABLET ORAL at 09:15

## 2017-03-17 RX ADMIN — METRONIDAZOLE 500 MG: 500 TABLET ORAL at 05:12

## 2017-03-17 RX ADMIN — GABAPENTIN 900 MG: 300 CAPSULE ORAL at 05:13

## 2017-03-17 RX ADMIN — METRONIDAZOLE 500 MG: 500 TABLET ORAL at 14:43

## 2017-03-17 RX ADMIN — THERA TABS 1 TABLET: TAB at 09:15

## 2017-03-17 RX ADMIN — HEPARIN SODIUM 5000 UNITS: 5000 INJECTION, SOLUTION INTRAVENOUS; SUBCUTANEOUS at 05:14

## 2017-03-17 RX ADMIN — GUAIFENESIN 600 MG: 600 TABLET, EXTENDED RELEASE ORAL at 09:15

## 2017-03-17 RX ADMIN — CEFDINIR 300 MG: 300 CAPSULE ORAL at 09:14

## 2017-03-17 RX ADMIN — GABAPENTIN 900 MG: 300 CAPSULE ORAL at 14:43

## 2017-03-17 ASSESSMENT — LIFESTYLE VARIABLES: EVER_SMOKED: NEVER

## 2017-03-17 NOTE — DISCHARGE PLANNING
Medical Social Work     MD d/c order has been placed. Met w/ pt at bedside and he will call his brother to come pick him up. Provided pt with number to [employer] [phone]) to call when he gets home. Arleen to deliver battery to electric wheelchair once pt is home. Nothing further.

## 2017-03-17 NOTE — CARE PLAN
Problem: Safety  Goal: Will remain free from injury  Outcome: PROGRESSING AS EXPECTED  Fall precautions in place. Bed wheels locked, bed is in the lowest position. Call light in reach. Bed alarm on and in place. Non-skid socks provided. Patient provides successful verbalization of fall and safety precautions and demonstration of use of call bell. Upper side rails are up. Hourly rounding in progress.         Problem: Knowledge Deficit  Goal: Knowledge of disease process/condition, treatment plan, diagnostic tests, and medications will improve  Outcome: PROGRESSING AS EXPECTED  POC discussed with the patient. All questions and concerns addressed and answered. Patient is involved in POC and verbalizes the understanding of the disease process, medications, tests and treatments. Questions on POC encouraged throughout care.

## 2017-03-17 NOTE — PROGRESS NOTES
Patient up to floor around 0030. Patient AxO x4, VSS, denies pain at this time, and appears calm and comfortable. Heel boots in place. Call light in reach, all needs met at this time.

## 2017-03-17 NOTE — DISCHARGE INSTRUCTIONS
Discharge Instructions    Discharged to home by car with relative. Discharged via wheelchair, hospital escort: Yes.  Special equipment needed: Not Applicable    Be sure to schedule a follow-up appointment with your primary care doctor or any specialists as instructed.     Discharge Plan:   Diet Plan: Discussed  Activity Level: Discussed  Confirmed Follow up Appointment: Patient to Call and Schedule Appointment  Confirmed Symptoms Management: Discussed  Medication Reconciliation Updated: Yes  Influenza Vaccine Indication: Not indicated: Previously immunized this influenza season and > 8 years of age    I understand that a diet low in cholesterol, fat, and sodium is recommended for good health. Unless I have been given specific instructions below for another diet, I accept this instruction as my diet prescription.   Other diet: Regular    Special Instructions:None    · Is patient discharged on Warfarin / Coumadin?   No     · Is patient Post Blood Transfusion?  No    Depression / Suicide Risk    As you are discharged from this [facility] facility, it is important to learn how to keep safe from harming yourself.    Recognize the warning signs:  · Abrupt changes in personality, positive or negative- including increase in energy   · Giving away possessions  · Change in eating patterns- significant weight changes-  positive or negative  · Change in sleeping patterns- unable to sleep or sleeping all the time   · Unwillingness or inability to communicate  · Depression  · Unusual sadness, discouragement and loneliness  · Talk of wanting to die  · Neglect of personal appearance   · Rebelliousness- reckless behavior  · Withdrawal from people/activities they love  · Confusion- inability to concentrate     If you or a loved one observes any of these behaviors or has concerns about self-harm, here's what you can do:  · Talk about it- your feelings and reasons for harming yourself  · Remove any means that you might use to hurt  yourself (examples: pills, rope, extension cords, firearm)  · Get professional help from the community (Mental Health, Substance Abuse, psychological counseling)  · Do not be alone:Call your Safe Contact- someone whom you trust who will be there for you.  · Call your local CRISIS HOTLINE 716-5027 or 686-281-2692  · Call your local Children's Mobile Crisis Response Team [place] (383) 251-0139 or www.Bridgefy  · Call the toll free National Suicide Prevention Hotlines   · National Suicide Prevention Lifeline 103-880-WKLQ (9472)  · [employer] Hope Line Network 800-SUICIDE (836-6008)    Chest Pain, Nonspecific  It is often hard to give a specific diagnosis for the cause of chest pain. There is always a chance that your pain could be related to something serious, like a heart attack or a blood clot in the lungs. You need to follow up with your caregiver for further evaluation. More lab tests or other studies such as X-rays, electrocardiography, stress testing, or cardiac imaging may be needed to find the cause of your pain.  Most of the time, nonspecific chest pain improves within 2 to 3 days with rest and mild pain medicine. For the next few days, avoid physical exertion or activities that bring on pain. Do not smoke. Avoid drinking alcohol. Call your caregiver for routine follow-up as advised.   SEEK IMMEDIATE MEDICAL CARE IF:  · You develop increased chest pain or pain that radiates to the arm, neck, jaw, back, or abdomen.   · You develop shortness of breath, increased coughing, or you start coughing up blood.   · You have severe back or abdominal pain, nausea, or vomiting.   · You develop severe weakness, fainting, fever, or chills.   Document Released: 12/18/2006 Document Revised: 03/11/2013 Document Reviewed: 06/06/2008  ExitCare® Patient Information ©2013 [employer].    Chest Pain Observation  It is often hard to give a specific diagnosis for the cause of chest pain. Among other possibilities your  symptoms might be caused by inadequate oxygen delivery to your heart (angina). Angina that is not treated or evaluated can lead to a heart attack (myocardial infarction) or death.  Blood tests, electrocardiograms, and X-rays may have been done to help determine a possible cause of your chest pain. After evaluation and observation, your health care provider has determined that it is unlikely your pain was caused by an unstable condition that requires hospitalization. However, a full evaluation of your pain may need to be completed, with additional diagnostic testing as directed. It is very important to keep your follow-up appointments. Not keeping your follow-up appointments could result in permanent heart damage, disability, or death. If there is any problem keeping your follow-up appointments, you must call your health care provider.  HOME CARE INSTRUCTIONS   Due to the slight chance that your pain could be angina, it is important to follow your health care provider's treatment plan and also maintain a healthy lifestyle:  · Maintain or work toward achieving a healthy weight.  · Stay physically active and exercise regularly.  · Decrease your salt intake.  · Eat a balanced, healthy diet. Talk to a dietitian to learn about heart-healthy foods.  · Increase your fiber intake by including whole grains, vegetables, fruits, and nuts in your diet.  · Avoid situations that cause stress, anger, or depression.  · Take medicines as advised by your health care provider. Report any side effects to your health care provider. Do not stop medicines or adjust the dosages on your own.  · Quit smoking. Do not use nicotine patches or gum until you check with your health care provider.  · Keep your blood pressure, blood sugar, and cholesterol levels within normal limits.  · Limit alcohol intake to no more than 1 drink per day for women who are not pregnant and 2 drinks per day for men.  · Do not abuse drugs.  SEEK IMMEDIATE MEDICAL CARE  IF:  You have severe chest pain or pressure which may include symptoms such as:  · You feel pain or pressure in your arms, neck, jaw, or back.  · You have severe back or abdominal pain, feel sick to your stomach (nauseous), or throw up (vomit).  · You are sweating profusely.  · You are having a fast or irregular heartbeat.  · You feel short of breath while at rest.  · You notice increasing shortness of breath during rest, sleep, or with activity.  · You have chest pain that does not get better after rest or after taking your usual medicine.  · You wake from sleep with chest pain.  · You are unable to sleep because you cannot breathe.  · You develop a frequent cough or you are coughing up blood.  · You feel dizzy, faint, or experience extreme fatigue.  · You develop severe weakness, dizziness, fainting, or chills.  Any of these symptoms may represent a serious problem that is an emergency. Do not wait to see if the symptoms will go away. Call your local emergency services (911 in the [place] Do not drive yourself to the hospital.  MAKE SURE YOU:  · Understand these instructions.  · Will watch your condition.  · Will get help right away if you are not doing well or get worse.     This information is not intended to replace advice given to you by your health care provider. Make sure you discuss any questions you have with your health care provider.     Document Released: 01/20/2012 Document Revised: 12/23/2014 Document Reviewed: 06/19/2014  [employer] Interactive Patient Education ©2016 [employer]    Pneumonia, Adult  Pneumonia is an infection of the lungs.   CAUSES  Pneumonia may be caused by bacteria or a virus. Usually, the infection is caused by breathing in droplets from an infected person's cough or sneeze.   SYMPTOMS   Symptoms of pneumonia include:  · Cough.  · Fever.  · Chest pain.  · Rapid breathing.  · Shortness of breath.  · Shaking chills.  · Mucus production.  DIAGNOSIS   If you have the common symptoms  of pneumonia, often your health care provider will confirm the diagnosis with a chest X-ray. The X-ray will show an abnormality in the lung if you have pneumonia. Other tests may be done on your blood, urine, or mucus (sputum) to find the specific cause of your pneumonia. A blood gas test or pulse oximetry test may be needed to check how well your lungs are working.  TREATMENT   Your treatment will depend on whether your pneumonia is caused by bacteria or a virus.   · Bacterial pneumonia is treated with antibiotic medicine.  · Pneumonia that is caused by the influenza virus may be treated with an antiviral medicine.  · Pneumonia that is caused by a virus other than influenza will not respond to antibiotic medicine. This type of pneumonia will have to run its course.   HOME CARE INSTRUCTIONS   · Cough suppressants may be used if you are losing too much rest from coughing at night. However, you should try to avoid taking cough suppresants. This is because coughing helps to remove mucus from your lungs.  · Sleep in a semi-upright position at night. Try sleeping in a reclining chair, or place a few pillows under your head.  · Try using a cold steam vaporizer or humidifier in your home or bedroom. This may help loosen your mucus.  · If you were prescribed an antibiotic medicine, finish all of it even if you start to feel better.  · If you were prescribed an expectorant, take it as directed by your health care provider. This medicine loosens the mucus so you can cough it up.  · Take medicines only as directed by your health care provider.  · Do not smoke. If you are a smoker and continue to smoke, your cough may last several weeks after your pneumonia has cleared.  · Get rest when you feel tired, or as needed.  PREVENTION  A pneumococcal shot (vaccine) is available to prevent a common bacterial cause of pneumonia. This is usually suggested for:  · People over 65 years old.  · People on chemotherapy.  · People with chronic  lung problems, such as bronchitis or emphysema.  · People with immune system problems.  If you are over 65 years old or have a high risk condition, you may receive the pneumococcal vaccine if you have not received it before. In some countries, a routine influenza vaccine is also recommended. This vaccine can help prevent some cases of pneumonia. You may be offered the influenza vaccine as part of your care.  If you are a smoker, it is time to quit in order to prevent pneumonia in the future. You may receive instructions on how to stop smoking. Your health care provider can provide medicines and counseling to help you quit.  SEEK MEDICAL CARE IF:  · You have a fever.  · You cannot control your cough with suppressants at night, and you keep losing sleep.  SEEK IMMEDIATE MEDICAL CARE IF:   · You have worsening shortness of breath.  · You have increased chest pain.  · Your sickness becomes worse, especially if you are an older adult or have a weakened immune system.  · You cough up blood.  · You have pain that is getting worse or is not controlled with medicines.  · Your symptoms are getting worse rather than better.     This information is not intended to replace advice given to you by your health care provider. Make sure you discuss any questions you have with your health care provider.     Document Released: 12/18/2006 Document Revised: 01/08/2016 Document Reviewed: 04/13/2016  [employer] Interactive Patient Education ©2016 [employer]    Antibiotic Medication  Antibiotics are among the most frequently prescribed medicines. Antibiotics cure illness by assisting our body to injure or kill the bacteria that cause infection. While antibiotics are useful to treat a wide variety of infections they are useless against viruses. Antibiotics cannot cure colds, flu, or other viral infections.   There are many types of antibiotics available. Your caregiver will decide which antibiotic will be useful for an illness. Never take  or give someone else's antibiotics or left over medicine.  Your caregiver may also take into account:  · Allergies.  · The cost of the medicine.  · Dosing schedules.  · Taste.  · Common side effects when choosing an antibiotic for an infection.  Ask your caregiver if you have questions about why a certain medicine was chosen.  HOME CARE INSTRUCTIONS  Read all instructions and labels on medicine bottles carefully. Some antibiotics should be taken on an empty stomach while others should be taken with food. Taking antibiotics incorrectly may reduce how well they work. Some antibiotics need to be kept in the refrigerator. Others should be kept at room temperature. Ask your caregiver or pharmacist if you do not understand how to give the medicine.  Be sure to give the amount of medicine your caregiver has prescribed. Even if you feel better and your symptoms improve, bacteria may still remain alive in the body. Taking all of the medicine will prevent:  · The infection from returning and becoming harder to treat.  · Complications from partially treated infections.  If there is any medicine left over after you have taken the medicine as your caregiver has instructed, throw the medicine away.  Be sure to tell your caregiver if you:  · Are allergic to any medicines.  · Are pregnant or intend to become pregnant while using this medicine.  · Are breastfeeding.  · Are taking any other prescription, non-prescription medicine, or herbal remedies.  · Have any other medical conditions or problems you have not already discussed.  If you are taking birth control pills, they may not work while you are on antibiotics. To avoid unwanted pregnancy:  · Continue taking your birth control pills as usual.  · Use a second form of birth control (such as condoms) while you are taking antibiotic medicine.  · When you finish taking the antibiotic medicine, continue using the second form of birth control until you are finished with your current 1  month cycle of birth control pills.  Try not to miss any doses of medicine. If you miss a dose, take it as soon as possible. However, if it is almost time for the next dose and the dosing schedule is:  · 2 doses a day, take the missed dose and the next dose 5 to 6 hours apart.  · 3 or more doses a day, take the missed dose and the next dose 2 to 4 hours apart, then go back to the normal schedule.  · If you are unable to make up a missed dose, take the next scheduled dose on time and complete the missed dose at the end of the prescribed time for your medicine.  SIDE EFFECTS TO TAKING ANTIBIOTICS  Common side effects to antibiotic use include:  · Soft stools or diarrhea.  · Mild stomach upset.  · Sun sensitivity.  SEEK MEDICAL CARE IF:   · If you get worse or do not improve within a few days of starting the medicine.  · Vomiting develops.  · Diaper rash or rash on the genitals appears.  · Vaginal itching occurs.  · White patches appear on the tongue or in the mouth.  · Severe watery diarrhea and abdominal cramps occur.  · Signs of an allergy develop (hives, unknown itchy rash appears). STOP TAKING THE ANTIBIOTIC.  SEEK IMMEDIATE MEDICAL CARE IF:   · Urine turns dark or blood colored.  · Skin turns yellow.  · Easy bruising or bleeding occurs.  · Joint pain or muscle aches occur.  · Fever returns.  · Severe headache occurs.  · Signs of an allergy develop (trouble breathing, wheezing, swelling of the lips, face or tongue, fainting, or blisters on the skin or in the mouth). STOP TAKING THE ANTIBIOTIC.     This information is not intended to replace advice given to you by your health care provider. Make sure you discuss any questions you have with your health care provider.     Document Released: 08/30/2005 Document Revised: 01/08/2016 Document Reviewed: 09/09/2010  [employer] Interactive Patient Education ©2016 [employer]

## 2017-03-17 NOTE — THERAPY
"Speech Language Therapy dysphagia treatment completed.   Functional Status:  Tolerating regular diet.   Recommendations: Recommend continue regular diet. No further acute dysphagia intervention indicated at this time. Please reconsult with any further needs.      See \"Rehab Therapy-Acute\" Patient Summary Report for complete documentation.     "

## 2017-03-17 NOTE — DISCHARGE PLANNING
Medical Social Work     Received report from T7  regarding discharge plan for this pt today. Met w/ pt at bedside and he stated his brother is off today to assist in transporting him home with his wheelchair. Once MD places DC order, will call [employer] (Ph: [phone]) to let them know when pt and his brother will be home to meet them and provide pt with new batteries for his electric wheelchair. Pt plans to call his brother once the MD lets pt know that he is medically stable to return home. Will continue to assist with further d/c plan needs.

## 2017-03-17 NOTE — PROGRESS NOTES
Bedside report received from day RN. Assumed pt care. Pt a&ox4. POC discussed. Pt denies any needs at this time. Bed alarm on. Bed locked and in lowest position. Call light within reach. Will continue to assess and provide care.

## 2017-03-18 NOTE — PROGRESS NOTES
Received pt awake in bed. Alert and oriented x 3. VSS.   Lungs clear bilaterally. No SOB or cough.   Abdomen soft and distended. BS present x 4. Pt passing flatus and denies nausea.  Ileoconduit intact draining yellow, mucousy urine.  Pt denies any pain.  Heel boots maintained. Pt on low air pressure mattress.   IV infusing well.  Morning medication administered as per MAR. No voiced concerns at this time. Call bell in reach.

## 2017-03-18 NOTE — PROGRESS NOTES
Discharge papers signed and given to pt. Prescription given to pt.   Teachings done in regards to diet, activity, pneumonia information and antibiotic therapy. Pt aware of follow up appointments.   No voiced concerns or questions. Pt awaiting ride from friend.

## 2017-03-18 NOTE — DISCHARGE SUMMARY
CHIEF COMPLAINT:  Weakness, cough and vomiting.    DISCHARGE DIAGNOSES:  1.  Pneumonia, suspect aspiration, improved.  2.  Small bowel obstruction, resolved.  3.  Decubitus ulcer, chronic, will need outpatient followup with plastic   surgery in 2 weeks for skin flap surgery.  4.  Hypokalemia, corrected.  5.  Chronic autonomic dysreflexia with hypotension.  6.  Chronic quadriplegia.  7.  Chronic anxiety.    CONSULTANT:  Dr. Tiago Martinez of urology.    STUDIES:  1.  Blood culture negative since 02/06/2017.  2.  Urine culture negative since 03/06/2017.  3.  Blood culture negative since 03/06/2017.    HOSPITAL COURSE:  For complete history and physical, please see notes dictated   by Dr. Lazaro Andrade.  In short, this is a 56-year-old gentleman who carries   a very complicated medical history including chronic quadriplegia with severe   sacral decubitus ulcers, who has been on an outpatient treatment with wound   care and past history of urinary tract infections, positive for ESBL   Klebsiella and VRE, who comes in with complaints of several days of weakness,   cough and vomiting.  Upon assessment in the ER, the patient had clinical   evidence of pneumonia and sepsis.  He was started on empiric antibiotic   therapy to cover both the lungs as well as a urinary tract given his history   of recurrent UTIs.  He was seen by Dr. Tiago Martinez of urology and was deemed   not to have urinary tract infection.  We focused on treating him for pneumonia   which given his history of dysphagia and quadriplegia, did likely have an   element of aspiration.  Antibiotics were changed to cover for aspiration   pneumonia and by the date of discharge, the patient was clinically much   improved and feeling better.  He will be sent home with a prescription for   additional antibiotic therapy to complete a full course.    During the course of this hospitalization, the patient also developed a   small-bowel obstruction.  This was partially due  to severe impaction and the   patient underwent digital disimpaction.  He also needed a NG tube was placed   for decompression.  We treated him conservatively with bowel rest and his SBO   did resolve.  By the date of discharge, he had been having bowel movements and   tolerating a regular diet.    In terms of the patient's chronic issues including his quadriplegia leading to   severe sacral decubitus ulcers, I discussed this patient with Dr. Arriola   during this hospitalization.  He has recommended that the patient be   discharged home for the time being to continue increasing his nutritional   status and he will make arrangements for the patient to be followed up on an   outpatient basis in several weeks to arrange for skin flap procedure to close   the wound.  Otherwise, although the patient's other chronic medical issues   have remained stable and he was treated with his home medications.  The   patient is currently stable for discharge home.    DISPOSITION:  Home.    DIET:  Regular.    ACTIVITIES:  As tolerated.    MEDICATIONS:  1.  Omnicef 300 mg p.o. q. 12 hours x3 more days.  2.  Flagyl 500 mg p.o. q. 8 hours x3 more days.  3.  Ascorbic acid.  4.  Aspirin 81 mg p.o. daily.  5.  Iron sulfate.  6.  Gabapentin 900 mg p.o. t.i.d.  7.  Midodrine 10 mg p.o. t.i.d.  8.  Probiotics.  9.  Tramadol 50 mg p.o. q. 4 hours p.r.n. severe pain.    FOLLOWUP:  The patient is scheduled to follow up with Dr. Jung Oliveira on   April 21st, he is instructed to move up appointment if needed.  He is also   instructed to call and make an appointment with Dr. Arriola of plastic surgery   to discuss future plans for skin flap procedure.    These discharge instructions were discussed with the patient at bedside, he   has expressed understanding and agreed to comply with all discharge   instruction.    Thirty six minutes were spent in the discharge planning and management of this   patient, including more than 50% of the time spent  face-to-face in   counseling.       ____________________________________     MD HIEN ABURTO / ERICK    DD:  03/17/2017 13:15:39  DT:  03/17/2017 19:32:47    D#:  [id number]  Job#:  [id number]

## 2017-04-11 DIAGNOSIS — R82.90 ABNORMAL URINE ODOR: ICD-10-CM

## 2017-04-12 ENCOUNTER — HOSPITAL ENCOUNTER (OUTPATIENT)
Dept: LAB | Facility: MEDICAL CENTER | Age: 57
End: 2017-04-12
Attending: SURGERY
Payer: MEDICAID

## 2017-04-12 DIAGNOSIS — R82.90 ABNORMAL URINE ODOR: ICD-10-CM

## 2017-04-12 LAB
ALBUMIN SERPL BCP-MCNC: 3.7 G/DL (ref 3.2–4.9)
ALBUMIN/GLOB SERPL: 0.8 G/DL
ALP SERPL-CCNC: 70 U/L (ref 30–99)
ALT SERPL-CCNC: 10 U/L (ref 2–50)
ANION GAP SERPL CALC-SCNC: 9 MMOL/L (ref 0–11.9)
ANISOCYTOSIS BLD QL SMEAR: ABNORMAL
APPEARANCE UR: ABNORMAL
AST SERPL-CCNC: 15 U/L (ref 12–45)
BACTERIA #/AREA URNS HPF: ABNORMAL /HPF
BASOPHILS # BLD AUTO: 1 % (ref 0–1.8)
BASOPHILS # BLD: 0.13 K/UL (ref 0–0.12)
BILIRUB SERPL-MCNC: 0.2 MG/DL (ref 0.1–1.5)
BILIRUB UR QL STRIP.AUTO: NEGATIVE
BUN SERPL-MCNC: 13 MG/DL (ref 8–22)
CALCIUM SERPL-MCNC: 9.8 MG/DL (ref 8.5–10.5)
CHLORIDE SERPL-SCNC: 102 MMOL/L (ref 96–112)
CO2 SERPL-SCNC: 27 MMOL/L (ref 20–33)
COLOR UR: YELLOW
COMMENT 1642: NORMAL
CREAT SERPL-MCNC: 0.43 MG/DL (ref 0.5–1.4)
CULTURE IF INDICATED INDCX: YES UA CULTURE
EOSINOPHIL # BLD AUTO: 0.59 K/UL (ref 0–0.51)
EOSINOPHIL NFR BLD: 4.7 % (ref 0–6.9)
ERYTHROCYTE [DISTWIDTH] IN BLOOD BY AUTOMATED COUNT: 50.2 FL (ref 35.9–50)
GFR SERPL CREATININE-BSD FRML MDRD: >60 ML/MIN/1.73 M 2
GLOBULIN SER CALC-MCNC: 4.5 G/DL (ref 1.9–3.5)
GLUCOSE SERPL-MCNC: 87 MG/DL (ref 65–99)
GLUCOSE UR STRIP.AUTO-MCNC: NEGATIVE MG/DL
HCT VFR BLD AUTO: 42.7 % (ref 42–52)
HGB BLD-MCNC: 12.7 G/DL (ref 14–18)
HYALINE CASTS #/AREA URNS LPF: ABNORMAL /LPF
IMM GRANULOCYTES # BLD AUTO: 0.09 K/UL (ref 0–0.11)
IMM GRANULOCYTES NFR BLD AUTO: 0.7 % (ref 0–0.9)
KETONES UR STRIP.AUTO-MCNC: NEGATIVE MG/DL
LEUKOCYTE ESTERASE UR QL STRIP.AUTO: ABNORMAL
LYMPHOCYTES # BLD AUTO: 2.15 K/UL (ref 1–4.8)
LYMPHOCYTES NFR BLD: 17.3 % (ref 22–41)
MCH RBC QN AUTO: 24.1 PG (ref 27–33)
MCHC RBC AUTO-ENTMCNC: 29.7 G/DL (ref 33.7–35.3)
MCV RBC AUTO: 81.2 FL (ref 81.4–97.8)
MICRO URNS: ABNORMAL
MONOCYTES # BLD AUTO: 1.04 K/UL (ref 0–0.85)
MONOCYTES NFR BLD AUTO: 8.4 % (ref 0–13.4)
MORPHOLOGY BLD-IMP: NORMAL
MUCOUS THREADS #/AREA URNS HPF: ABNORMAL /HPF
NEUTROPHILS # BLD AUTO: 8.45 K/UL (ref 1.82–7.42)
NEUTROPHILS NFR BLD: 67.9 % (ref 44–72)
NITRITE UR QL STRIP.AUTO: NEGATIVE
NRBC # BLD AUTO: 0 K/UL
NRBC BLD AUTO-RTO: 0 /100 WBC
PH UR STRIP.AUTO: 6.5 [PH]
PLATELET # BLD AUTO: 832 K/UL (ref 164–446)
PLATELET BLD QL SMEAR: NORMAL
PMV BLD AUTO: 9.3 FL (ref 9–12.9)
POTASSIUM SERPL-SCNC: 4.4 MMOL/L (ref 3.6–5.5)
PREALB SERPL-MCNC: 9 MG/DL (ref 18–38)
PROT SERPL-MCNC: 8.2 G/DL (ref 6–8.2)
PROT UR QL STRIP: 100 MG/DL
RBC # BLD AUTO: 5.26 M/UL (ref 4.7–6.1)
RBC BLD AUTO: PRESENT
RBC UR QL AUTO: ABNORMAL
SODIUM SERPL-SCNC: 138 MMOL/L (ref 135–145)
SP GR UR STRIP.AUTO: 1.02
WBC # BLD AUTO: 12.5 K/UL (ref 4.8–10.8)
WBC #/AREA URNS HPF: ABNORMAL /HPF

## 2017-04-12 PROCEDURE — 87086 URINE CULTURE/COLONY COUNT: CPT

## 2017-04-12 PROCEDURE — 87077 CULTURE AEROBIC IDENTIFY: CPT | Mod: 91

## 2017-04-12 PROCEDURE — 81001 URINALYSIS AUTO W/SCOPE: CPT

## 2017-04-12 PROCEDURE — 87186 SC STD MICRODIL/AGAR DIL: CPT

## 2017-04-12 PROCEDURE — 80053 COMPREHEN METABOLIC PANEL: CPT

## 2017-04-12 PROCEDURE — 85025 COMPLETE CBC W/AUTO DIFF WBC: CPT

## 2017-04-12 PROCEDURE — 36415 COLL VENOUS BLD VENIPUNCTURE: CPT

## 2017-04-12 PROCEDURE — 84134 ASSAY OF PREALBUMIN: CPT

## 2017-04-15 LAB
BACTERIA UR CULT: NORMAL
SIGNIFICANT IND 70042: NORMAL
SOURCE SOURCE: NORMAL

## 2017-04-24 ENCOUNTER — HOSPITAL ENCOUNTER (OUTPATIENT)
Facility: MEDICAL CENTER | Age: 57
End: 2017-04-24
Attending: NURSE PRACTITIONER
Payer: MEDICAID

## 2017-04-24 LAB
AMORPH CRY #/AREA URNS HPF: PRESENT /HPF
APPEARANCE UR: ABNORMAL
BACTERIA #/AREA URNS HPF: ABNORMAL /HPF
BILIRUB UR QL STRIP.AUTO: NEGATIVE
COLOR UR: YELLOW
CULTURE IF INDICATED INDCX: YES UA CULTURE
GLUCOSE UR STRIP.AUTO-MCNC: NEGATIVE MG/DL
KETONES UR STRIP.AUTO-MCNC: NEGATIVE MG/DL
LEUKOCYTE ESTERASE UR QL STRIP.AUTO: ABNORMAL
MICRO URNS: ABNORMAL
MUCOUS THREADS #/AREA URNS HPF: ABNORMAL /HPF
NITRITE UR QL STRIP.AUTO: NEGATIVE
PH UR STRIP.AUTO: 6.5 [PH]
PROT UR QL STRIP: 100 MG/DL
RBC # URNS HPF: ABNORMAL /HPF
RBC UR QL AUTO: ABNORMAL
SP GR UR STRIP.AUTO: 1.02
WBC #/AREA URNS HPF: ABNORMAL /HPF

## 2017-04-24 PROCEDURE — 81001 URINALYSIS AUTO W/SCOPE: CPT

## 2017-04-24 PROCEDURE — 87086 URINE CULTURE/COLONY COUNT: CPT

## 2017-04-25 LAB
BACTERIA UR CULT: NORMAL
SIGNIFICANT IND 70042: NORMAL
SOURCE SOURCE: NORMAL

## 2017-07-11 ENCOUNTER — HOSPITAL ENCOUNTER (INPATIENT)
Facility: MEDICAL CENTER | Age: 57
LOS: 7 days | DRG: 539 | End: 2017-07-18
Attending: EMERGENCY MEDICINE | Admitting: INTERNAL MEDICINE
Payer: MEDICAID

## 2017-07-11 ENCOUNTER — RESOLUTE PROFESSIONAL BILLING HOSPITAL PROF FEE (OUTPATIENT)
Dept: HOSPITALIST | Facility: MEDICAL CENTER | Age: 57
End: 2017-07-11
Payer: MEDICAID

## 2017-07-11 DIAGNOSIS — F41.9 ANXIETY: ICD-10-CM

## 2017-07-11 DIAGNOSIS — Z93.6 S/P ILEAL CONDUIT (HCC): ICD-10-CM

## 2017-07-11 DIAGNOSIS — G82.20 PARAPLEGIA (HCC): ICD-10-CM

## 2017-07-11 DIAGNOSIS — L89.323 DECUBITUS ULCER OF LEFT ISCHIUM, STAGE 3 (HCC): ICD-10-CM

## 2017-07-11 DIAGNOSIS — M46.28 OSTEOMYELITIS OF SACROILIAC REGION (HCC): ICD-10-CM

## 2017-07-11 DIAGNOSIS — L89.319 DECUBITUS ULCER OF RIGHT ISCHIAL AREA, UNSPECIFIED PRESSURE ULCER STAGE: ICD-10-CM

## 2017-07-11 DIAGNOSIS — L89.304 DECUBITUS ULCER OF BUTTOCK, STAGE 4, UNSPECIFIED LATERALITY: ICD-10-CM

## 2017-07-11 DIAGNOSIS — G90.4 AUTONOMIC DYSREFLEXIA: ICD-10-CM

## 2017-07-11 LAB
ALBUMIN SERPL BCP-MCNC: 3.7 G/DL (ref 3.2–4.9)
ALBUMIN/GLOB SERPL: 0.9 G/DL
ALP SERPL-CCNC: 92 U/L (ref 30–99)
ALT SERPL-CCNC: 12 U/L (ref 2–50)
ANION GAP SERPL CALC-SCNC: 12 MMOL/L (ref 0–11.9)
APPEARANCE UR: ABNORMAL
AST SERPL-CCNC: 14 U/L (ref 12–45)
BACTERIA #/AREA URNS HPF: ABNORMAL /HPF
BASOPHILS # BLD AUTO: 0.8 % (ref 0–1.8)
BASOPHILS # BLD: 0.1 K/UL (ref 0–0.12)
BILIRUB SERPL-MCNC: 0.3 MG/DL (ref 0.1–1.5)
BILIRUB UR QL STRIP.AUTO: NEGATIVE
BUN SERPL-MCNC: 18 MG/DL (ref 8–22)
CALCIUM SERPL-MCNC: 9.7 MG/DL (ref 8.5–10.5)
CHLORIDE SERPL-SCNC: 105 MMOL/L (ref 96–112)
CO2 SERPL-SCNC: 21 MMOL/L (ref 20–33)
COLOR UR: YELLOW
CREAT SERPL-MCNC: 0.43 MG/DL (ref 0.5–1.4)
CRP SERPL HS-MCNC: 6.55 MG/DL (ref 0–0.75)
CULTURE IF INDICATED INDCX: YES UA CULTURE
EOSINOPHIL # BLD AUTO: 0.69 K/UL (ref 0–0.51)
EOSINOPHIL NFR BLD: 5.9 % (ref 0–6.9)
EPI CELLS #/AREA URNS HPF: ABNORMAL /HPF
ERYTHROCYTE [DISTWIDTH] IN BLOOD BY AUTOMATED COUNT: 47.9 FL (ref 35.9–50)
ERYTHROCYTE [SEDIMENTATION RATE] IN BLOOD BY WESTERGREN METHOD: 91 MM/HOUR (ref 0–20)
GFR SERPL CREATININE-BSD FRML MDRD: >60 ML/MIN/1.73 M 2
GLOBULIN SER CALC-MCNC: 4.2 G/DL (ref 1.9–3.5)
GLUCOSE SERPL-MCNC: 117 MG/DL (ref 65–99)
GLUCOSE UR STRIP.AUTO-MCNC: NEGATIVE MG/DL
GRAM STN SPEC: NORMAL
HCT VFR BLD AUTO: 38.9 % (ref 42–52)
HGB BLD-MCNC: 11.7 G/DL (ref 14–18)
HYALINE CASTS #/AREA URNS LPF: ABNORMAL /LPF
IMM GRANULOCYTES # BLD AUTO: 0.1 K/UL (ref 0–0.11)
IMM GRANULOCYTES NFR BLD AUTO: 0.8 % (ref 0–0.9)
KETONES UR STRIP.AUTO-MCNC: NEGATIVE MG/DL
LEUKOCYTE ESTERASE UR QL STRIP.AUTO: ABNORMAL
LYMPHOCYTES # BLD AUTO: 1.8 K/UL (ref 1–4.8)
LYMPHOCYTES NFR BLD: 15.3 % (ref 22–41)
MCH RBC QN AUTO: 22.9 PG (ref 27–33)
MCHC RBC AUTO-ENTMCNC: 30.1 G/DL (ref 33.7–35.3)
MCV RBC AUTO: 76 FL (ref 81.4–97.8)
MICRO URNS: ABNORMAL
MONOCYTES # BLD AUTO: 0.82 K/UL (ref 0–0.85)
MONOCYTES NFR BLD AUTO: 7 % (ref 0–13.4)
NEUTROPHILS # BLD AUTO: 8.26 K/UL (ref 1.82–7.42)
NEUTROPHILS NFR BLD: 70.2 % (ref 44–72)
NITRITE UR QL STRIP.AUTO: POSITIVE
NRBC # BLD AUTO: 0 K/UL
NRBC BLD AUTO-RTO: 0 /100 WBC
PH UR STRIP.AUTO: 8 [PH]
PLATELET # BLD AUTO: 781 K/UL (ref 164–446)
PMV BLD AUTO: 8.7 FL (ref 9–12.9)
POTASSIUM SERPL-SCNC: 4 MMOL/L (ref 3.6–5.5)
PROT SERPL-MCNC: 7.9 G/DL (ref 6–8.2)
PROT UR QL STRIP: NEGATIVE MG/DL
RBC # BLD AUTO: 5.12 M/UL (ref 4.7–6.1)
RBC # URNS HPF: ABNORMAL /HPF
RBC UR QL AUTO: ABNORMAL
RENAL EPI CELLS #/AREA URNS HPF: ABNORMAL /HPF
SIGNIFICANT IND 70042: NORMAL
SITE SITE: NORMAL
SODIUM SERPL-SCNC: 138 MMOL/L (ref 135–145)
SOURCE SOURCE: NORMAL
SP GR UR STRIP.AUTO: 1.01
TRI-PHOS CRY #/AREA URNS HPF: ABNORMAL /HPF
TSH SERPL DL<=0.005 MIU/L-ACNC: 2.33 UIU/ML (ref 0.3–3.7)
UROBILINOGEN UR STRIP.AUTO-MCNC: 0.2 MG/DL
WBC # BLD AUTO: 11.8 K/UL (ref 4.8–10.8)
WBC #/AREA URNS HPF: ABNORMAL /HPF

## 2017-07-11 PROCEDURE — 96365 THER/PROPH/DIAG IV INF INIT: CPT

## 2017-07-11 PROCEDURE — 770021 HCHG ROOM/CARE - ISO PRIVATE

## 2017-07-11 PROCEDURE — 87077 CULTURE AEROBIC IDENTIFY: CPT | Mod: 91

## 2017-07-11 PROCEDURE — 99223 1ST HOSP IP/OBS HIGH 75: CPT | Performed by: INTERNAL MEDICINE

## 2017-07-11 PROCEDURE — 81001 URINALYSIS AUTO W/SCOPE: CPT

## 2017-07-11 PROCEDURE — 85652 RBC SED RATE AUTOMATED: CPT

## 2017-07-11 PROCEDURE — 700105 HCHG RX REV CODE 258

## 2017-07-11 PROCEDURE — 96367 TX/PROPH/DG ADDL SEQ IV INF: CPT

## 2017-07-11 PROCEDURE — 86140 C-REACTIVE PROTEIN: CPT

## 2017-07-11 PROCEDURE — 302146: Performed by: INTERNAL MEDICINE

## 2017-07-11 PROCEDURE — 84443 ASSAY THYROID STIM HORMONE: CPT

## 2017-07-11 PROCEDURE — 96366 THER/PROPH/DIAG IV INF ADDON: CPT

## 2017-07-11 PROCEDURE — 700111 HCHG RX REV CODE 636 W/ 250 OVERRIDE (IP): Performed by: INTERNAL MEDICINE

## 2017-07-11 PROCEDURE — 85025 COMPLETE CBC W/AUTO DIFF WBC: CPT

## 2017-07-11 PROCEDURE — 36415 COLL VENOUS BLD VENIPUNCTURE: CPT

## 2017-07-11 PROCEDURE — 87186 SC STD MICRODIL/AGAR DIL: CPT

## 2017-07-11 PROCEDURE — A9270 NON-COVERED ITEM OR SERVICE: HCPCS | Performed by: INTERNAL MEDICINE

## 2017-07-11 PROCEDURE — 80053 COMPREHEN METABOLIC PANEL: CPT

## 2017-07-11 PROCEDURE — 700101 HCHG RX REV CODE 250: Performed by: INTERNAL MEDICINE

## 2017-07-11 PROCEDURE — 700102 HCHG RX REV CODE 250 W/ 637 OVERRIDE(OP): Performed by: INTERNAL MEDICINE

## 2017-07-11 PROCEDURE — 87040 BLOOD CULTURE FOR BACTERIA: CPT | Mod: 91

## 2017-07-11 PROCEDURE — 87070 CULTURE OTHR SPECIMN AEROBIC: CPT

## 2017-07-11 PROCEDURE — 700105 HCHG RX REV CODE 258: Performed by: EMERGENCY MEDICINE

## 2017-07-11 PROCEDURE — 87205 SMEAR GRAM STAIN: CPT

## 2017-07-11 PROCEDURE — 99285 EMERGENCY DEPT VISIT HI MDM: CPT

## 2017-07-11 PROCEDURE — 87086 URINE CULTURE/COLONY COUNT: CPT

## 2017-07-11 PROCEDURE — 700111 HCHG RX REV CODE 636 W/ 250 OVERRIDE (IP)

## 2017-07-11 PROCEDURE — 700111 HCHG RX REV CODE 636 W/ 250 OVERRIDE (IP): Performed by: EMERGENCY MEDICINE

## 2017-07-11 RX ORDER — GABAPENTIN 300 MG/1
900 CAPSULE ORAL 3 TIMES DAILY
Status: DISCONTINUED | OUTPATIENT
Start: 2017-07-11 | End: 2017-07-18 | Stop reason: HOSPADM

## 2017-07-11 RX ORDER — POLYETHYLENE GLYCOL 3350 17 G/17G
1 POWDER, FOR SOLUTION ORAL
Status: DISCONTINUED | OUTPATIENT
Start: 2017-07-11 | End: 2017-07-18 | Stop reason: HOSPADM

## 2017-07-11 RX ORDER — TRAMADOL HYDROCHLORIDE 50 MG/1
50 TABLET ORAL EVERY 4 HOURS PRN
Status: DISCONTINUED | OUTPATIENT
Start: 2017-07-11 | End: 2017-07-18 | Stop reason: HOSPADM

## 2017-07-11 RX ORDER — SODIUM CHLORIDE AND POTASSIUM CHLORIDE 150; 900 MG/100ML; MG/100ML
INJECTION, SOLUTION INTRAVENOUS CONTINUOUS
Status: DISCONTINUED | OUTPATIENT
Start: 2017-07-11 | End: 2017-07-15

## 2017-07-11 RX ORDER — FERROUS SULFATE 325(65) MG
325 TABLET ORAL
Status: DISCONTINUED | OUTPATIENT
Start: 2017-07-12 | End: 2017-07-18 | Stop reason: HOSPADM

## 2017-07-11 RX ORDER — ONDANSETRON 4 MG/1
4 TABLET, ORALLY DISINTEGRATING ORAL EVERY 4 HOURS PRN
Status: DISCONTINUED | OUTPATIENT
Start: 2017-07-11 | End: 2017-07-18 | Stop reason: HOSPADM

## 2017-07-11 RX ORDER — AMOXICILLIN 250 MG
2 CAPSULE ORAL 2 TIMES DAILY
Status: DISCONTINUED | OUTPATIENT
Start: 2017-07-11 | End: 2017-07-18 | Stop reason: HOSPADM

## 2017-07-11 RX ORDER — LABETALOL HYDROCHLORIDE 5 MG/ML
10 INJECTION, SOLUTION INTRAVENOUS EVERY 4 HOURS PRN
Status: DISCONTINUED | OUTPATIENT
Start: 2017-07-11 | End: 2017-07-18 | Stop reason: HOSPADM

## 2017-07-11 RX ORDER — BISACODYL 10 MG
10 SUPPOSITORY, RECTAL RECTAL
Status: DISCONTINUED | OUTPATIENT
Start: 2017-07-11 | End: 2017-07-18 | Stop reason: HOSPADM

## 2017-07-11 RX ORDER — MIDODRINE HYDROCHLORIDE 5 MG/1
10 TABLET ORAL
Status: DISCONTINUED | OUTPATIENT
Start: 2017-07-11 | End: 2017-07-18 | Stop reason: HOSPADM

## 2017-07-11 RX ORDER — ONDANSETRON 2 MG/ML
4 INJECTION INTRAMUSCULAR; INTRAVENOUS EVERY 4 HOURS PRN
Status: DISCONTINUED | OUTPATIENT
Start: 2017-07-11 | End: 2017-07-18 | Stop reason: HOSPADM

## 2017-07-11 RX ORDER — PROMETHAZINE HYDROCHLORIDE 25 MG/1
12.5-25 SUPPOSITORY RECTAL EVERY 4 HOURS PRN
Status: DISCONTINUED | OUTPATIENT
Start: 2017-07-11 | End: 2017-07-18 | Stop reason: HOSPADM

## 2017-07-11 RX ORDER — ASCORBIC ACID 500 MG
500 TABLET ORAL 2 TIMES DAILY
Status: DISCONTINUED | OUTPATIENT
Start: 2017-07-11 | End: 2017-07-18 | Stop reason: HOSPADM

## 2017-07-11 RX ORDER — PROMETHAZINE HYDROCHLORIDE 25 MG/1
12.5-25 TABLET ORAL EVERY 4 HOURS PRN
Status: DISCONTINUED | OUTPATIENT
Start: 2017-07-11 | End: 2017-07-18 | Stop reason: HOSPADM

## 2017-07-11 RX ADMIN — ENOXAPARIN SODIUM 40 MG: 100 INJECTION SUBCUTANEOUS at 18:17

## 2017-07-11 RX ADMIN — OXYCODONE HYDROCHLORIDE AND ACETAMINOPHEN 500 MG: 500 TABLET ORAL at 20:16

## 2017-07-11 RX ADMIN — MEROPENEM 500 MG: 500 INJECTION, POWDER, FOR SOLUTION INTRAVENOUS at 13:12

## 2017-07-11 RX ADMIN — POTASSIUM CHLORIDE AND SODIUM CHLORIDE: 900; 150 INJECTION, SOLUTION INTRAVENOUS at 18:50

## 2017-07-11 RX ADMIN — STANDARDIZED SENNA CONCENTRATE AND DOCUSATE SODIUM 2 TABLET: 8.6; 5 TABLET, FILM COATED ORAL at 20:16

## 2017-07-11 RX ADMIN — MIDODRINE HYDROCHLORIDE 10 MG: 5 TABLET ORAL at 18:17

## 2017-07-11 RX ADMIN — GABAPENTIN 900 MG: 300 CAPSULE ORAL at 20:15

## 2017-07-11 RX ADMIN — VANCOMYCIN HYDROCHLORIDE 1800 MG: 100 INJECTION, POWDER, LYOPHILIZED, FOR SOLUTION INTRAVENOUS at 13:55

## 2017-07-11 ASSESSMENT — ENCOUNTER SYMPTOMS
NECK PAIN: 0
DIZZINESS: 0
PSYCHIATRIC NEGATIVE: 1
GASTROINTESTINAL NEGATIVE: 1
EYES NEGATIVE: 1
MYALGIAS: 1
COUGH: 0
DEPRESSION: 0
FEVER: 0
RESPIRATORY NEGATIVE: 1
HEADACHES: 0
WEAKNESS: 1
CARDIOVASCULAR NEGATIVE: 1
BLURRED VISION: 0
HEARTBURN: 0
BRUISES/BLEEDS EASILY: 0

## 2017-07-11 ASSESSMENT — PAIN SCALES - GENERAL: PAINLEVEL_OUTOF10: 0

## 2017-07-11 ASSESSMENT — LIFESTYLE VARIABLES
ALCOHOL_USE: NO
EVER_SMOKED: NEVER

## 2017-07-11 NOTE — IP AVS SNAPSHOT
7/18/2017    Stevie Phoenix  [address] [place] [address]    Dear Stevie:    [facility] wants to ensure your discharge home is safe and you or your loved ones have had all of your questions answered regarding your care after you leave the hospital.    Below is a list of resources and contact information should you have any questions regarding your hospital stay, follow-up instructions, or active medical symptoms.    Questions or Concerns Regarding… Contact   Medical Questions Related to Your Discharge  (7 days a week, 8am-5pm) Contact a Nurse Care Coordinator   [phone]   Medical Questions Not Related to Your Discharge  (24 hours a day / 7 days a week)  Contact the Nurse Health Line   [phone]    Medications or Discharge Instructions Refer to your discharge packet   or contact your [facility] Primary Care Provider   [phone]   Follow-up Appointment(s) Schedule your appointment via [employer]   or contact Scheduling [phone]   Billing Review your statement via [employer]  or contact Billing [phone]   Medical Records Review your records via [employer]   or contact Medical Records [phone]     You may receive a telephone call within two days of discharge. This call is to make certain you understand your discharge instructions and have the opportunity to have any questions answered. You can also easily access your medical information, test results and upcoming appointments via the [employer] free online health management tool. You can learn more and sign up at [email or web address]. For assistance setting up your [employer] account, please call [phone].    Once again, we want to ensure your discharge home is safe and that you have a clear understanding of any next steps in your care. If you have any questions or concerns, please do not hesitate to contact us, we are here for you. Thank you for choosing [facility] for your healthcare needs.    Sincerely,    Your [facility] Healthcare Team
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
## 2017-07-11 NOTE — IP AVS SNAPSHOT
Home Care Instructions                                                                                                                  Name:Stevie Phoenix  Medical Record Number:[id number]  CSN: [id number]    YOB: 1960   Age: 56 y.o.  Sex: male  HT:1.829 m (6') WT: 72.576 kg (160 lb)          Admit Date: 7/11/2017     Discharge Date:   Today's Date: 7/18/2017  Attending Doctor:  Zari Hebert M.D.                  Allergies:  Zosyn            Discharge Instructions       Discharge Instructions    Discharged to home by car with relative. Discharged via wheelchair, hospital escort: Yes.  Special equipment needed: Not Applicable    Be sure to schedule a follow-up appointment with your primary care doctor or any specialists as instructed.     Discharge Plan:   Diet Plan: Discussed (Resume normal diet)  Activity Level: Discussed  Confirmed Follow up Appointment: Patient to Call and Schedule Appointment (Follow up with PCP)  Confirmed Symptoms Management: Discussed  Medication Reconciliation Updated: Yes (Augmentin 800mg every 8 hrs)  Influenza Vaccine Indication: Not indicated: Previously immunized this influenza season and > 8 years of age    I understand that a diet low in cholesterol, fat, and sodium is recommended for good health. Unless I have been given specific instructions below for another diet, I accept this instruction as my diet prescription.   Other diet: Regular    Special Instructions: Please take antibiotics as ordered    · Is patient discharged on Warfarin / Coumadin?   No     · Is patient Post Blood Transfusion?  No    Depression / Suicide Risk    As you are discharged from this [facility] facility, it is important to learn how to keep safe from harming yourself.    Recognize the warning signs:  · Abrupt changes in personality, positive or negative- including increase in energy   · Giving away possessions  · Change in eating patterns- significant weight changes-  positive or  negative  · Change in sleeping patterns- unable to sleep or sleeping all the time   · Unwillingness or inability to communicate  · Depression  · Unusual sadness, discouragement and loneliness  · Talk of wanting to die  · Neglect of personal appearance   · Rebelliousness- reckless behavior  · Withdrawal from people/activities they love  · Confusion- inability to concentrate     If you or a loved one observes any of these behaviors or has concerns about self-harm, here's what you can do:  · Talk about it- your feelings and reasons for harming yourself  · Remove any means that you might use to hurt yourself (examples: pills, rope, extension cords, firearm)  · Get professional help from the community (Mental Health, Substance Abuse, psychological counseling)  · Do not be alone:Call your Safe Contact- someone whom you trust who will be there for you.  · Call your local CRISIS HOTLINE 414-2066 or 379-357-3482  · Call your local Children's Mobile Crisis Response Team [place] (382) 367-0155 or www.Urgent Career  · Call the toll free National Suicide Prevention Hotlines   · National Suicide Prevention Lifeline 244-694-QNBY (8515)  · National Hope Line Network 800-SUICIDE (791-2576)        Follow-up Information     1. Follow up with Gail Perez N.P.. Schedule an appointment as soon as possible for a visit in 1 week.    Specialty:  Family Medicine    Why:  Post hospitalization check    Contact information    [address] [place] [address]  [phone]           Discharge Medication Instructions:    Below are the medications your physician expects you to take upon discharge:    Review all your home medications and newly ordered medications with your doctor and/or pharmacist. Follow medication instructions as directed by your doctor and/or pharmacist.    Please keep your medication list with you and share with your physician.               Medication List      ASK your doctor about these medications         Instructions    Morning Afternoon Evening Bedtime    ascorbic acid 500 MG Tabs   Last time this was given:  500 mg on 7/18/2017  8:20 AM   Commonly known as:  ascorbic acid   Next Dose Due:  7/18 pm        Take 500 mg by mouth 2 Times a Day.   Dose:  500 mg                        aspirin EC 81 MG Tbec   Last time this was given:  81 mg on 7/18/2017  8:20 AM   Commonly known as:  ECOTRIN   Next Dose Due:  7/19 am        Take 81 mg by mouth every day.   Dose:  81 mg                        ferrous sulfate 325 (65 FE) MG tablet   Last time this was given:  325 mg on 7/18/2017  8:21 AM   Next Dose Due:  7/19 am        Take 1 Tab by mouth every morning with breakfast.   Dose:  325 mg                        gabapentin 300 MG Caps   Last time this was given:  900 mg on 7/18/2017  1:03 PM   Commonly known as:  NEURONTIN   Next Dose Due:  7/18 pm        Take 900 mg by mouth 3 times a day.   Dose:  900 mg                        midodrine 10 MG tablet   Last time this was given:  10 mg on 7/18/2017  1:03 PM   Commonly known as:  PROAMATINE   Next Dose Due:  7/18 pm        Take 10 mg by mouth 3 times a day, with meals.   Dose:  10 mg                        Probiotic Caps   Next Dose Due:  7/19 am        Take 1 Cap by mouth every morning.   Dose:  1 Cap                        tramadol 50 MG Tabs   Last time this was given:  50 mg on 7/14/2017  8:22 PM   Commonly known as:  ULTRAM   Next Dose Due:  Any time as needed (pain)        Take 1 Tab by mouth every four hours as needed for Moderate Pain.   Dose:  50 mg                                Orders for after discharge     [facility] Bed    Complete by:  As directed    The patient has a medical condition which requires positioning of the body in ways not feasible with an ordinary bed. OR   The patient requires positioning of the body in ways not feasible with an ordinary bed in order to alleviate pain. OR   The patient requires the head of the bed to be elevated more than 30 degrees most  of the time due to congestive heart failure, chronic pulmonary disease, problems with aspiration. OR   The patient requires traction equipment which can only be attached to a hospital bed, and the patient requires frequent changes in body position and/or has an immediate need for a change in body position       DME Other    Complete by:  As directed        REFERRAL TO HOME HEALTH    Complete by:  As directed    Home health will create and establish a plan of care       REFERRAL TO LONG TERM ACUTE CARE    Complete by:  As directed        REFERRAL TO SKILLED NURSING FACILITY    Complete by:  As directed              Instructions           Diet / Nutrition:    Follow any diet instructions given to you by your doctor or the dietician, including how much salt (sodium) you are allowed each day.    If you are overweight, talk to your doctor about a weight reduction plan.    Activity:    Remain physically active following your doctor's instructions about exercise and activity.    Rest often.     Any time you become even a little tired or short of breath, SIT DOWN and rest.    Worsening Symptoms:    Report any of the following signs and symptoms to the doctor's office immediately:    *Pain of jaw, arm, or neck  *Chest pain not relieved by medication                               *Dizziness or loss of consciousness  *Difficulty breathing even when at rest   *More tired than usual                                       *Bleeding drainage or swelling of surgical site  *Swelling of feet, ankles, legs or stomach                 *Fever (>100ºF)  *Pink or blood tinged sputum  *Weight gain (3lbs/day or 5lbs /week)           *Shock from internal defibrillator (if applicable)  *Palpitations or irregular heartbeats                *Cool and/or numb extremities    Stroke Awareness    Common Risk Factors for Stroke include:    Age  Atrial Fibrillation  Carotid Artery Stenosis  Diabetes Mellitus  Excessive alcohol consumption  High blood  pressure  Overweight   Physical inactivity  Smoking    Warning signs and symptoms of a stroke include:    *Sudden numbness or weakness of the face, arm or leg (especially on one side of the body).  *Sudden confusion, trouble speaking or understanding.  *Sudden trouble seeing in one or both eyes.  *Sudden trouble walking, dizziness, loss of balance or coordination.Sudden severe headache with no known cause.    It is very important to get treatment quickly when a stroke occurs. If you experience any of the above warning signs, call 911 immediately.                   Disclaimer         Quit Smoking / Tobacco Use:    I understand the use of any tobacco products increases my chance of suffering from future heart disease or stroke and could cause other illnesses which may shorten my life. Quitting the use of tobacco products is the single most important thing I can do to improve my health. For further information on smoking / tobacco cessation call a Toll Free Quit Line at 1-329.152.3789 (*National [employer] [place]) or 1-274.352.5129 (American Lung Association) or you can access the web based program at www.lungIsogenica.org.    [place] Tobacco Users Help Line:  (310) 569-1819       Toll Free: 1-219.855.7774  Quit Tobacco Program [facility] (570)846-3991    Crisis Hotline:    [place] Crisis Hotline:  3-957-YONXWCQ or 1-876.225.8693    [place] Crisis Hotline:    1-795.987.3506 or 647-033-5797    Discharge Survey:   Thank you for choosing [facility]. We hope we did everything we could to make your hospital stay a pleasant one. You may be receiving a phone survey and we would appreciate your time and participation in answering the questions. Your input is very valuable to us in our efforts to improve our service to our patients and their families.        My signature on this form indicates that:    1. I have reviewed and understand the above information.  2. My questions regarding this information have  been answered to my satisfaction.  3. I have formulated a plan with my discharge nurse to obtain my prescribed medications for home.                  Disclaimer         __________________________________                     __________       ________                       Patient Signature                                                 Date                    Time

## 2017-07-11 NOTE — ED NOTES
Chief Complaint   Patient presents with   • Wound Check     bib AMB from home. pt has multiple wounds to sacrum. foul smelling drainage to all wounds. full thickness - to the bone

## 2017-07-11 NOTE — IP AVS SNAPSHOT
Kuona Access Code: [email or web address]  Expires: 8/17/2017  3:03 PM    Your email address is not on file at [email or web address].  Email Addresses are required for you to sign up for Kuona, please contact [phone] to verify your personal information and to provide your email address prior to attempting to register for Kuona.    Stevie Phoenix  [address] [place] [address]    Kuona  A secure, online tool to manage your health information     [facility]’s Kuona® is a secure, online tool that connects you to your personalized health information from the privacy of your home -- day or night - making it very easy for you to manage your healthcare. Once the activation process is completed, you can even access your medical information using the Kuona sin, which is available for free in the Apple Sin store or Google Play store.     To learn more about Kuona, visit [email or web address]    There are two levels of access available (as shown below):   My Chart Features  [facility] Primary Care Doctor [facility]  Specialists [facility]  Urgent  Care Non-[facility] Primary Care Doctor   Email your healthcare team securely and privately 24/7 X X X    Manage appointments: schedule your next appointment; view details of past/upcoming appointments X      Request prescription refills. X      View recent personal medical records, including lab and immunizations X X X X   View health record, including health history, allergies, medications X X X X   Read reports about your outpatient visits, procedures, consult and ER notes X X X X   See your discharge summary, which is a recap of your hospital and/or ER visit that includes your diagnosis, lab results, and care plan X X  X     How to register for JumpStartt:  Once your e-mail address has been verified, follow the following steps to sign up for Kuona.     1. Go to  [email or web address]  2. Click on the Sign Up Now box, which takes you to the New Member Sign Up page. You  will need to provide the following information:  a. Enter your [employer] Access Code exactly as it appears at the top of this page. (You will not need to use this code after you’ve completed the sign-up process. If you do not sign up before the expiration date, you must request a new code.)   b. Enter your date of birth.   c. Enter your home email address.   d. Click Submit, and follow the next screen’s instructions.  3. Create a Zeust ID. This will be your [employer] login ID and cannot be changed, so think of one that is secure and easy to remember.  4. Create a [employer] password. You can change your password at any time.  5. Enter your Password Reset Question and Answer. This can be used at a later time if you forget your password.   6. Enter your e-mail address. This allows you to receive e-mail notifications when new information is available in [employer].  7. Click Sign Up. You can now view your health information.    For assistance activating your [employer] account, call [phone]

## 2017-07-11 NOTE — IP AVS SNAPSHOT
" <p align=\"LEFT\"><IMG SRC=\"//[email or web address]\" alt=\"Image\" WIDTH=\"50%\" HEIGHT=\"200\" BORDER=\"\"></p>                   Name:Stevie Phoenix  Medical Record Number:[id number]  CSN: [id number]    YOB: 1960   Age: 56 y.o.  Sex: male  HT:1.829 m (6') WT: 72.576 kg (160 lb)          Admit Date: 7/11/2017     Discharge Date:   Today's Date: 7/18/2017  Attending Doctor:  Zari Hebert M.D.                  Allergies:  Zosyn          Follow-up Information     1. Follow up with Gail Perez N.P.. Schedule an appointment as soon as possible for a visit in 1 week.    Specialty:  Family Medicine    Why:  Post hospitalization check    Contact information    [facility] [facility] [address] [phone]           Medication List      Ask your Physician about these medications        Instructions    ascorbic acid 500 MG Tabs   Commonly known as:  ascorbic acid    Take 500 mg by mouth 2 Times a Day.   Dose:  500 mg       aspirin EC 81 MG Tbec   Commonly known as:  ECOTRIN    Take 81 mg by mouth every day.   Dose:  81 mg       ferrous sulfate 325 (65 FE) MG tablet    Take 1 Tab by mouth every morning with breakfast.   Dose:  325 mg       gabapentin 300 MG Caps   Commonly known as:  NEURONTIN    Take 900 mg by mouth 3 times a day.   Dose:  900 mg       midodrine 10 MG tablet   Commonly known as:  PROAMATINE    Take 10 mg by mouth 3 times a day, with meals.   Dose:  10 mg       Probiotic Caps    Take 1 Cap by mouth every morning.   Dose:  1 Cap       tramadol 50 MG Tabs   Commonly known as:  ULTRAM    Take 1 Tab by mouth every four hours as needed for Moderate Pain.   Dose:  50 mg         "

## 2017-07-12 ENCOUNTER — APPOINTMENT (OUTPATIENT)
Dept: RADIOLOGY | Facility: MEDICAL CENTER | Age: 57
DRG: 539 | End: 2017-07-12
Attending: INTERNAL MEDICINE
Payer: MEDICAID

## 2017-07-12 LAB
ANION GAP SERPL CALC-SCNC: 10 MMOL/L (ref 0–11.9)
BUN SERPL-MCNC: 19 MG/DL (ref 8–22)
CALCIUM SERPL-MCNC: 9.5 MG/DL (ref 8.5–10.5)
CHLORIDE SERPL-SCNC: 109 MMOL/L (ref 96–112)
CO2 SERPL-SCNC: 22 MMOL/L (ref 20–33)
CREAT SERPL-MCNC: 0.34 MG/DL (ref 0.5–1.4)
ERYTHROCYTE [DISTWIDTH] IN BLOOD BY AUTOMATED COUNT: 49.9 FL (ref 35.9–50)
GFR SERPL CREATININE-BSD FRML MDRD: >60 ML/MIN/1.73 M 2
GLUCOSE SERPL-MCNC: 86 MG/DL (ref 65–99)
HCT VFR BLD AUTO: 36.9 % (ref 42–52)
HGB BLD-MCNC: 10.8 G/DL (ref 14–18)
MCH RBC QN AUTO: 22.8 PG (ref 27–33)
MCHC RBC AUTO-ENTMCNC: 29.3 G/DL (ref 33.7–35.3)
MCV RBC AUTO: 78 FL (ref 81.4–97.8)
PLATELET # BLD AUTO: 612 K/UL (ref 164–446)
PMV BLD AUTO: 9.8 FL (ref 9–12.9)
POTASSIUM SERPL-SCNC: 4.3 MMOL/L (ref 3.6–5.5)
RBC # BLD AUTO: 4.73 M/UL (ref 4.7–6.1)
SODIUM SERPL-SCNC: 141 MMOL/L (ref 135–145)
WBC # BLD AUTO: 10.9 K/UL (ref 4.8–10.8)

## 2017-07-12 PROCEDURE — 72197 MRI PELVIS W/O & W/DYE: CPT

## 2017-07-12 PROCEDURE — 700111 HCHG RX REV CODE 636 W/ 250 OVERRIDE (IP): Performed by: INTERNAL MEDICINE

## 2017-07-12 PROCEDURE — A9270 NON-COVERED ITEM OR SERVICE: HCPCS | Performed by: INTERNAL MEDICINE

## 2017-07-12 PROCEDURE — 36415 COLL VENOUS BLD VENIPUNCTURE: CPT

## 2017-07-12 PROCEDURE — 99233 SBSQ HOSP IP/OBS HIGH 50: CPT | Performed by: INTERNAL MEDICINE

## 2017-07-12 PROCEDURE — 700102 HCHG RX REV CODE 250 W/ 637 OVERRIDE(OP): Performed by: INTERNAL MEDICINE

## 2017-07-12 PROCEDURE — 302111 WAFER OST 2.25IN N IMG RD 2 PC (BARRIER): Performed by: INTERNAL MEDICINE

## 2017-07-12 PROCEDURE — 700105 HCHG RX REV CODE 258: Performed by: INTERNAL MEDICINE

## 2017-07-12 PROCEDURE — 94760 N-INVAS EAR/PLS OXIMETRY 1: CPT

## 2017-07-12 PROCEDURE — 700117 HCHG RX CONTRAST REV CODE 255: Performed by: INTERNAL MEDICINE

## 2017-07-12 PROCEDURE — 302103 DOWN DRAIN ADAPTER: Performed by: INTERNAL MEDICINE

## 2017-07-12 PROCEDURE — 700101 HCHG RX REV CODE 250: Performed by: INTERNAL MEDICINE

## 2017-07-12 PROCEDURE — 770021 HCHG ROOM/CARE - ISO PRIVATE

## 2017-07-12 PROCEDURE — A9579 GAD-BASE MR CONTRAST NOS,1ML: HCPCS | Performed by: INTERNAL MEDICINE

## 2017-07-12 PROCEDURE — 85027 COMPLETE CBC AUTOMATED: CPT

## 2017-07-12 PROCEDURE — 80048 BASIC METABOLIC PNL TOTAL CA: CPT

## 2017-07-12 RX ADMIN — POTASSIUM CHLORIDE AND SODIUM CHLORIDE: 900; 150 INJECTION, SOLUTION INTRAVENOUS at 08:16

## 2017-07-12 RX ADMIN — AMPICILLIN SODIUM AND SULBACTAM SODIUM 3 G: 2; 1 INJECTION, POWDER, FOR SOLUTION INTRAMUSCULAR; INTRAVENOUS at 16:50

## 2017-07-12 RX ADMIN — GABAPENTIN 900 MG: 300 CAPSULE ORAL at 20:14

## 2017-07-12 RX ADMIN — Medication 325 MG: at 08:06

## 2017-07-12 RX ADMIN — OXYCODONE HYDROCHLORIDE AND ACETAMINOPHEN 500 MG: 500 TABLET ORAL at 20:14

## 2017-07-12 RX ADMIN — AMPICILLIN SODIUM AND SULBACTAM SODIUM 3 G: 2; 1 INJECTION, POWDER, FOR SOLUTION INTRAMUSCULAR; INTRAVENOUS at 15:12

## 2017-07-12 RX ADMIN — GABAPENTIN 900 MG: 300 CAPSULE ORAL at 12:10

## 2017-07-12 RX ADMIN — GADODIAMIDE 20 ML: 287 INJECTION INTRAVENOUS at 09:11

## 2017-07-12 RX ADMIN — ASPIRIN 81 MG: 81 TABLET ORAL at 08:07

## 2017-07-12 RX ADMIN — OXYCODONE HYDROCHLORIDE AND ACETAMINOPHEN 500 MG: 500 TABLET ORAL at 08:07

## 2017-07-12 RX ADMIN — GABAPENTIN 900 MG: 300 CAPSULE ORAL at 05:35

## 2017-07-12 RX ADMIN — MIDODRINE HYDROCHLORIDE 10 MG: 5 TABLET ORAL at 12:10

## 2017-07-12 RX ADMIN — MIDODRINE HYDROCHLORIDE 10 MG: 5 TABLET ORAL at 08:07

## 2017-07-12 RX ADMIN — ENOXAPARIN SODIUM 40 MG: 100 INJECTION SUBCUTANEOUS at 16:51

## 2017-07-12 RX ADMIN — MIDODRINE HYDROCHLORIDE 10 MG: 5 TABLET ORAL at 16:51

## 2017-07-12 ASSESSMENT — ENCOUNTER SYMPTOMS
DIARRHEA: 0
DIZZINESS: 0
WHEEZING: 0
VOMITING: 0
TREMORS: 0
CONSTIPATION: 0
PALPITATIONS: 0
HEADACHES: 0
FALLS: 0
FEVER: 0
COUGH: 0
ABDOMINAL PAIN: 0
SHORTNESS OF BREATH: 0
NAUSEA: 0
SEIZURES: 0
DEPRESSION: 0
BACK PAIN: 1
HEARTBURN: 0
PND: 0
SPUTUM PRODUCTION: 0
BLURRED VISION: 0
WEIGHT LOSS: 0
WEAKNESS: 1
NECK PAIN: 0
SPEECH CHANGE: 0
CHILLS: 0
EYE PAIN: 0

## 2017-07-12 ASSESSMENT — LIFESTYLE VARIABLES
EVER_SMOKED: NEVER
SUBSTANCE_ABUSE: 0

## 2017-07-12 ASSESSMENT — PAIN SCALES - GENERAL: PAINLEVEL_OUTOF10: 0
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
## 2017-07-12 NOTE — ED NOTES
Assist RN: Transport here to take Pt upstairs. Called up to [address] floor to let them know Pt coming up.

## 2017-07-12 NOTE — DIETARY
Nutrition note:  Pt with wounds to coccyx, bilat ischium and left heel.  Wound RN just saw pt.  BMI 21.7.  Pt is on a regular diet.    Pt states he usually eats 100% of his meals.  Agrees to add snacks to help ensure adequate protein intake for healing.  Labs and meds noted.  Pt is on vitamin C (500 mg BID) and ferrous sulfate (325 mg), but no MVi.    Set up snacks TID. May want to order a daily MVi with minerals to ensure adequate intake of trace nutrients. RD will monitor per dept policy.

## 2017-07-12 NOTE — PROGRESS NOTES
Patient back from MRI- alert and oriented, vss, denies pain. Patient now on low air loss mattress. Q2 turns. Wound care consulted and dressing changes to be ordered via wound care. IV fluids running. Patient with urostomy. stg 1 pressure ulcer noted to R heel- moon boots in place and LDA added. Await MRI results and plastics consults. Will monitor.
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
## 2017-07-12 NOTE — PROGRESS NOTES
PT care assumed.  Aaox4, flat affect and fatigued.  R and L ischial pressures ulcers, L heel pressure ulcer and coccyx ulcer noted.  New dressing in pace, cdi.  Pt refusing turns, refusing transfer to [facility] at this time, states needs to be left alone as he is very tired.  Denies pain, swallows pills without incident.  Call light within reach and q2 rounds in place.

## 2017-07-12 NOTE — PROGRESS NOTES
Patient arrived from ED into [place] [address].  2 RN skin check performed with Castillo MIGUEL.     Patient noted to have R and L ischial pressure ulcers, L heel pressure ulcer, and coccyx ulcer. Patient has RLQ urostomy, stoma pink and patent.  Pictures uploaded to Epic.  All dressings changed (wet-dry per wound RN) and urostomy collection device changed.   Low air loss bed ordered per protocol.     Patient oriented to unit.

## 2017-07-12 NOTE — CARE PLAN
Problem: Venous Thromboembolism (VTW)/Deep Vein Thrombosis (DVT) Prevention:  Goal: Patient will participate in Venous Thrombosis (VTE)/Deep Vein Thrombosis (DVT)Prevention Measures  Outcome: PROGRESSING AS EXPECTED  Patient with lovenox ordered.

## 2017-07-12 NOTE — ASSESSMENT & PLAN NOTE
- Wound culture grew strep G   - cont patient on Unasyn , doing well  - Plastic surgeon recom surgery but not at this moment  -  home vs snf vs [facility] for iv ertapenem and the wound care  - PT preferS to go home, pending set up for home iv abx and need PICC  AS PER I.D    Patient is requesting Dr. Arriola for 2nd opinion we'll contact infectious disease specialist Dr. Arriola.

## 2017-07-12 NOTE — DISCHARGE PLANNING
Care Transition Team Assessment    IHD met with pt at bedside. Pt states that he currently receives home care nursing provided by provided by [facility]. He states that he also receives meal assistance provided by [employer]. Pt is currently requesting a referral for a lateral rotation bed at home. He states that he feels as though he is not getting moved enough at home which is why he believes he has so many hospital visits.     Information Source  Orientation : Oriented x 4  Information Given By: Patient  Informant's Name: Stevie  Who is responsible for making decisions for patient? : Patient         Elopement Risk  Legal Hold: No  Ambulatory or Self Mobile in Wheelchair: No-Not an Elopement Risk    Interdisciplinary Discharge Planning  Does Admitting Nurse Feel This Could be a Complex Discharge?: Yes  Primary Care Physician: Dr. Gail ePrez  Lives with - Patient's Self Care Capacity: Attendant / Paid Care Giver  Patient or legal guardian wants to designate a caregiver (see row info): Yes  Caregiver name: Marily  Caregiver relationship to patient: Paid Caregiver- [facility]  Support Systems: Children  Housing / Facility: [address] [place] [address] House  Do You Take your Prescribed Medications Regularly: Yes  Able to Return to Previous ADL's: Other (paraplegic, bedbound)  Mobility Issues: Yes  Prior Services: Continuous (24 Hour) Care Giving Per Service  Assistance Needed: Yes  Durable Medical Equipment: Other - Specify (needs hospital bed)    Discharge Preparedness  What is your plan after discharge?: Home with help  What are your discharge supports?: Sibling, [facility] parent (home health nurse )  Prior Functional Level: Needs Assist with Activities of Daily Living, Wheelchair Dependent, Needs Assist with Medication Management  Difficulity with ADLs: Walking, Toileting, Dressing, Bathing  Difficulty with ADLs Comment: pt has home health nurse and lives with siblings for assistance  Difficulity with IADLs:  Driving    Functional Assesment  Prior Functional Level: Needs Assist with Activities of Daily Living, Wheelchair Dependent, Needs Assist with Medication Management    Finances  Financial Barriers to Discharge: No  Prescription Coverage: Yes    Vision / Hearing Impairment  Vision Impairment : Yes  Right Eye Vision: Impaired, Wears Glasses  Left Eye Vision: Impaired, Wears Glasses  Hearing Impairment : No    Values / Beliefs / Concerns  Values / Beliefs Concerns : No         Domestic Abuse  Have you ever been the victim of abuse or violence?: No  Physical Abuse or Sexual Abuse: No  Verbal Abuse or Emotional Abuse: No    Psychological Assessment  History of Substance Abuse: None  History of Psychiatric Problems: No  Non-compliant with Treatment: No  Newly Diagnosed Illness: No    Discharge Risks or Barriers  Discharge risks or barriers?: No  Patient risk factors: Multiple ED visits    Anticipated Discharge Information  Anticipated discharge disposition: Home  Discharge Address: [facility] [address]   Discharge Contact Phone Number: [phone]
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
## 2017-07-12 NOTE — ASSESSMENT & PLAN NOTE
Patient needed to be admitted to the hospital and consider long-term outpatient treatment in nursing care facility

## 2017-07-13 LAB
ANION GAP SERPL CALC-SCNC: 8 MMOL/L (ref 0–11.9)
ANISOCYTOSIS BLD QL SMEAR: ABNORMAL
BACTERIA UR CULT: NORMAL
BACTERIA WND AEROBE CULT: ABNORMAL
BACTERIA WND AEROBE CULT: ABNORMAL
BASOPHILS # BLD AUTO: 0.9 % (ref 0–1.8)
BASOPHILS # BLD: 0.08 K/UL (ref 0–0.12)
BUN SERPL-MCNC: 16 MG/DL (ref 8–22)
CALCIUM SERPL-MCNC: 9.1 MG/DL (ref 8.5–10.5)
CHLORIDE SERPL-SCNC: 108 MMOL/L (ref 96–112)
CO2 SERPL-SCNC: 22 MMOL/L (ref 20–33)
CREAT SERPL-MCNC: 0.4 MG/DL (ref 0.5–1.4)
EOSINOPHIL # BLD AUTO: 0.32 K/UL (ref 0–0.51)
EOSINOPHIL NFR BLD: 3.5 % (ref 0–6.9)
ERYTHROCYTE [DISTWIDTH] IN BLOOD BY AUTOMATED COUNT: 49.4 FL (ref 35.9–50)
GFR SERPL CREATININE-BSD FRML MDRD: >60 ML/MIN/1.73 M 2
GLUCOSE SERPL-MCNC: 86 MG/DL (ref 65–99)
GRAM STN SPEC: ABNORMAL
HCT VFR BLD AUTO: 34.9 % (ref 42–52)
HGB BLD-MCNC: 10.4 G/DL (ref 14–18)
LYMPHOCYTES # BLD AUTO: 3.04 K/UL (ref 1–4.8)
LYMPHOCYTES NFR BLD: 33 % (ref 22–41)
MANUAL DIFF BLD: NORMAL
MCH RBC QN AUTO: 22.9 PG (ref 27–33)
MCHC RBC AUTO-ENTMCNC: 29.8 G/DL (ref 33.7–35.3)
MCV RBC AUTO: 76.9 FL (ref 81.4–97.8)
MICROCYTES BLD QL SMEAR: ABNORMAL
MONOCYTES # BLD AUTO: 0.56 K/UL (ref 0–0.85)
MONOCYTES NFR BLD AUTO: 6.1 % (ref 0–13.4)
MORPHOLOGY BLD-IMP: NORMAL
NEUTROPHILS # BLD AUTO: 5.2 K/UL (ref 1.82–7.42)
NEUTROPHILS NFR BLD: 56.5 % (ref 44–72)
NRBC # BLD AUTO: 0 K/UL
NRBC BLD AUTO-RTO: 0 /100 WBC
PLATELET # BLD AUTO: 619 K/UL (ref 164–446)
PLATELET BLD QL SMEAR: NORMAL
PMV BLD AUTO: 9.2 FL (ref 9–12.9)
POTASSIUM SERPL-SCNC: 4.3 MMOL/L (ref 3.6–5.5)
RBC # BLD AUTO: 4.54 M/UL (ref 4.7–6.1)
RBC BLD AUTO: PRESENT
SIGNIFICANT IND 70042: ABNORMAL
SIGNIFICANT IND 70042: NORMAL
SITE SITE: ABNORMAL
SITE SITE: NORMAL
SODIUM SERPL-SCNC: 138 MMOL/L (ref 135–145)
SOURCE SOURCE: ABNORMAL
SOURCE SOURCE: NORMAL
WBC # BLD AUTO: 9.2 K/UL (ref 4.8–10.8)

## 2017-07-13 PROCEDURE — 700102 HCHG RX REV CODE 250 W/ 637 OVERRIDE(OP): Performed by: INTERNAL MEDICINE

## 2017-07-13 PROCEDURE — 36415 COLL VENOUS BLD VENIPUNCTURE: CPT

## 2017-07-13 PROCEDURE — 99232 SBSQ HOSP IP/OBS MODERATE 35: CPT | Performed by: INTERNAL MEDICINE

## 2017-07-13 PROCEDURE — A9270 NON-COVERED ITEM OR SERVICE: HCPCS | Performed by: INTERNAL MEDICINE

## 2017-07-13 PROCEDURE — 700105 HCHG RX REV CODE 258: Performed by: INTERNAL MEDICINE

## 2017-07-13 PROCEDURE — 85007 BL SMEAR W/DIFF WBC COUNT: CPT

## 2017-07-13 PROCEDURE — 85027 COMPLETE CBC AUTOMATED: CPT

## 2017-07-13 PROCEDURE — 770021 HCHG ROOM/CARE - ISO PRIVATE

## 2017-07-13 PROCEDURE — 80048 BASIC METABOLIC PNL TOTAL CA: CPT

## 2017-07-13 PROCEDURE — 700101 HCHG RX REV CODE 250: Performed by: INTERNAL MEDICINE

## 2017-07-13 PROCEDURE — 700111 HCHG RX REV CODE 636 W/ 250 OVERRIDE (IP): Performed by: INTERNAL MEDICINE

## 2017-07-13 RX ADMIN — GABAPENTIN 900 MG: 300 CAPSULE ORAL at 21:22

## 2017-07-13 RX ADMIN — STANDARDIZED SENNA CONCENTRATE AND DOCUSATE SODIUM 2 TABLET: 8.6; 5 TABLET, FILM COATED ORAL at 09:44

## 2017-07-13 RX ADMIN — POTASSIUM CHLORIDE AND SODIUM CHLORIDE: 900; 150 INJECTION, SOLUTION INTRAVENOUS at 00:10

## 2017-07-13 RX ADMIN — OXYCODONE HYDROCHLORIDE AND ACETAMINOPHEN 500 MG: 500 TABLET ORAL at 21:22

## 2017-07-13 RX ADMIN — MIDODRINE HYDROCHLORIDE 10 MG: 5 TABLET ORAL at 12:04

## 2017-07-13 RX ADMIN — Medication 325 MG: at 07:36

## 2017-07-13 RX ADMIN — AMPICILLIN SODIUM AND SULBACTAM SODIUM 3 G: 2; 1 INJECTION, POWDER, FOR SOLUTION INTRAMUSCULAR; INTRAVENOUS at 07:36

## 2017-07-13 RX ADMIN — POTASSIUM CHLORIDE AND SODIUM CHLORIDE: 900; 150 INJECTION, SOLUTION INTRAVENOUS at 09:47

## 2017-07-13 RX ADMIN — ASPIRIN 81 MG: 81 TABLET ORAL at 09:44

## 2017-07-13 RX ADMIN — OXYCODONE HYDROCHLORIDE AND ACETAMINOPHEN 500 MG: 500 TABLET ORAL at 09:44

## 2017-07-13 RX ADMIN — MIDODRINE HYDROCHLORIDE 10 MG: 5 TABLET ORAL at 07:36

## 2017-07-13 RX ADMIN — STANDARDIZED SENNA CONCENTRATE AND DOCUSATE SODIUM 2 TABLET: 8.6; 5 TABLET, FILM COATED ORAL at 21:22

## 2017-07-13 RX ADMIN — ENOXAPARIN SODIUM 40 MG: 100 INJECTION SUBCUTANEOUS at 18:07

## 2017-07-13 RX ADMIN — GABAPENTIN 900 MG: 300 CAPSULE ORAL at 05:12

## 2017-07-13 RX ADMIN — AMPICILLIN SODIUM AND SULBACTAM SODIUM 3 G: 2; 1 INJECTION, POWDER, FOR SOLUTION INTRAMUSCULAR; INTRAVENOUS at 18:07

## 2017-07-13 RX ADMIN — MIDODRINE HYDROCHLORIDE 10 MG: 5 TABLET ORAL at 18:06

## 2017-07-13 RX ADMIN — AMPICILLIN SODIUM AND SULBACTAM SODIUM 3 G: 2; 1 INJECTION, POWDER, FOR SOLUTION INTRAMUSCULAR; INTRAVENOUS at 00:10

## 2017-07-13 RX ADMIN — POTASSIUM CHLORIDE AND SODIUM CHLORIDE: 900; 150 INJECTION, SOLUTION INTRAVENOUS at 21:36

## 2017-07-13 RX ADMIN — AMPICILLIN SODIUM AND SULBACTAM SODIUM 3 G: 2; 1 INJECTION, POWDER, FOR SOLUTION INTRAMUSCULAR; INTRAVENOUS at 12:04

## 2017-07-13 RX ADMIN — GABAPENTIN 900 MG: 300 CAPSULE ORAL at 12:04

## 2017-07-13 ASSESSMENT — LIFESTYLE VARIABLES: SUBSTANCE_ABUSE: 0

## 2017-07-13 ASSESSMENT — ENCOUNTER SYMPTOMS
HEADACHES: 0
FEVER: 0
SPEECH CHANGE: 0
CHILLS: 0
DIZZINESS: 0
PND: 0
WEIGHT LOSS: 0
TREMORS: 0
HEARTBURN: 0
EYE PAIN: 0
COUGH: 0
PALPITATIONS: 0
WEAKNESS: 1
NECK PAIN: 0
SPUTUM PRODUCTION: 0
DIARRHEA: 0
RESPIRATORY NEGATIVE: 1
NAUSEA: 0
SEIZURES: 0
BLURRED VISION: 0
WHEEZING: 0
CONSTIPATION: 0
BACK PAIN: 1
DEPRESSION: 0
CARDIOVASCULAR NEGATIVE: 1
GASTROINTESTINAL NEGATIVE: 1
HEMOPTYSIS: 0
FALLS: 0

## 2017-07-13 ASSESSMENT — PAIN SCALES - GENERAL: PAINLEVEL_OUTOF10: 0

## 2017-07-13 NOTE — PROGRESS NOTES
Patient resting quietly in bed, no S/S of distress, AA&Ox4. Denies SOB, chest pain, dizziness. Patient has no complaints of pain at this time. Patient has float boots on. Urostomy bag in place with no complications at this time. Bed alarm on, call light within reach,  pt calls appropriately and does not get out of bed. Bed in lowest position, bed locked, RN and CNA numbers provided, no further needs at this time. Hourly rounding in place.
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
## 2017-07-13 NOTE — PROGRESS NOTES
Patient alert and oriented, vss, denies pain. Patient now on low air loss mattress. Q2 turns. IV fluids running. Patient with urostomy. stg 1 pressure ulcer noted to R heel- moon boots in place. Bed in lowest position. Call light within reach. Will monitor.

## 2017-07-13 NOTE — FACE TO FACE
Face to Face Note  -  Durable Medical Equipment    Zari Hebert M.D. - NPI: [id number]  I certify that this patient is under my care and that they had a durable medical equipment(DME)face to face encounter by myself that meets the physician DME face-to-face encounter requirements with this patient on:    Date of encounter:   Patient:                    MRN:                       YOB: 2015  Stevie Phoenix  [id number]  1960     The encounter with the patient was in whole, or in part, for the following medical condition, which is the primary reason for durable medical equipment:  Wound Care    I certify that, based on my findings, the following durable medical equipment is medically necessary:  Specialty Overlay Mattress and Other DME Equipment - air loss matress.    HOME O2 Saturation Measurements:(Values must be present for Home Oxygen orders)         ,     ,         My Clinical findings support the need for the above equipment due to:  Bedbound/non-weight bearing, Decubitus ulcers    Supporting Symptoms: none
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
## 2017-07-13 NOTE — WOUND TEAM
"[facility]  Inpatient Services  Initial Wound and Skin Care Evaluation    Admission Date:  7/11/17  HPI, PMH, SH: Reviewed  Unit where seen by Wound Team:  [id number]    WOUND CONSULT RELATED TO:  Evaluation of multiple wounds and ostomy    SUBJECTIVE:  \"I think we were just putting gauze on them until they broke open\"    Self Report / Pain Level:  Denies due to paralyzed    OBJECTIVE:  Patient well known to wound team from previous admissions.  He has the below wounds that are chronic; right heel is scarred and intact at this time.  He has a purple area on tip of left great toe that requires no care      WOUND TYPE, LOCATION, CHARACTERISTICS (Pressure ulcers: location, stage, POA or date identified)    Wound Type/Location:  Stage 3 pressure ulcer right sacrum POA    Periwound: red, two linear skin tears medially due to tape     Drainage:  Moderate tan, serous     Tissue Type and %:   Red/brown 80/20%   Wound Edges:  Open and rolled    Odor:  mild     Exposed structure(s):  Bone palpated   S&S of Infection:  drainage    Measurements:  (taken 7/12/17)    Length:  4.5cm   Width:  6.5cm   Depth:  3cm   Tracts/undermining: mild undermining proximally ~1-2 cm     Wound Type/Location: stage 3 pressure ulcer right ischium POA  Periwound: intact     Drainage:  Moderate tan serous     Tissue Type and %:   Red 100%   Wound Edges:  Attached and rolled    Odor:  none     Exposed structure(s): bone palpated    S&S of Infection:  none    Measurements: (taken 7/12/17)   Length: 5cm   Width:  4.5cm   Depth: <0.5cm   Tracts/undermining: none      Wound Type/Location:  Stage 3 pressure ulcer left ischium POA    Periwound: intact     Drainage:  Moderate tan serous     Tissue Type and %:   Red 100%   Wound Edges:  Attached and rolled    Odor:  none     Exposed structure(s):  Bone palpated   S&S of Infection:  none    Measurements:  (taken 7/12/17)    Length:  5.5cm   Width:  5.5cm   Depth:  <0.5cm   Tracts/undermining:  " none      Wound Type/Location:  Gagandeep 2 pressure ulcer posterior left heel POA    Periwound:  Flaky, peeling, scarred     Drainage:  Minimal bloody     Tissue Type and %: red 100%     Wound Edges:  attached    Odor:  none      Exposed structure(s):  none   S&S of Infection:  none   Measurements:  (taken 7/12/17)    Length: 2.5cm   Width:  1cm   Depth: <0.5cm   Tracts/undermining: none      INTERVENTIONS BY WOUND TEAM:  Turned patient to side and removed old dressings.  Cleansed and irrigated wounds with wound cleanser.  Measurements taken.  Filled wounds with silver hydrofiber and secured with adhesive foams (bilateral ischial) and mepilex sacral wound.  Note that patient starting to expel soft brown stool.  Using gloved finger provided digital stimulation and remove stool from rectal vault.  Cleansed christiano rectal skin.  See below for ostomy care.  Discussed with case management need for county  so that patient can assess resources.  Discussed with staff RN need for daily digital stimulation to maintain bowel regime.     Interdisciplinary consultation: case management, staff RN, patient, CNA    EVALUATION:    Ischial wounds clean appearing; some soft eschar sacral wound.  Silver hydrofiber to promote antimicrobial environment and foam to manage drainage.    Factors affecting wound healing:  Pressure, history of non compliance, infection  Goals: wound to decrease by 1% weekly and drainage to decrease with IVAB; Dr. Marbin Arriola to consult with patient as patient reports flap procedure to occur this fall.    NURSING PLAN OF CARE ORDERS (X):    Dressing changes: See Dressing Maintenance orders:  X  Skin care: See Skin Care orders: X  Rectal tube care: See Rectal Tube Care orders:    Other orders: X bowel care    RSKIN: CURRENT (X) ORDERED (O)  Q shift Sebas:  X  Q shift pressure point assessments:  X  Atmosair         ASHOK   X    Bariatric ASHOK       Bariatric foam         Heel float boots    X    Heels  "floated on pillows       Barrier wipes       Barrier Cream       Barrier paste    Sacral silicone dressing  X    Padded O2 tubing       Anchorfast       Trach with Optifoam split foam        Waffle cushion       Rectal tube or BMS       Antifungal tx    Turn q 2 hours  X  Up to chair      Ambulate    PT/OT      Dietician      PO   X  TF   TPN     PVN    NPO   # days    Other       WOUND TEAM PLAN OF CARE (X):    NPWT change 3 x week:         Dressing changes by wound team:       Follow up as needed:  X      Other (explain):    Anticipated discharge plans (X):  SNF:           Home Care:           Outpatient Wound Center:            Self Care:            Other:    TBD          [facility]   Inpatient Services   Established Ostomy Management/ troubleshooting  HPI: Reviewed  PMH: Reviewed   SH: Reviewed   Reason for Ostomy nurse consult:  Assist with established urostomy    Subjective:  \"I have it changed a couple of times a week\"    Objective:   Ostomy type:  urostomy  Stoma location:  RLQ  Stoma assessment:    Appearance:  pink   Size: 1\"   Protrusion:  Well budded   Output: moderate jumana with mucus shreds   MC jxn: intact   Peristomal skin: intact    Ostomy Appliance (type and size):  2 1/4\" two piece attached to down drain     Interventions and Education:  Removed old appliance and cleansed peristomal skin with water, pat dry.  Cut barrier to fit and applied to peristomal skin.  Snapped on pouch and attached to down drain.  Ostomy orders written for staff and supplies ordered.      Evaluation: established urostomy that staff will manage    Plan: assist PRN request    Anticipated discharge needs:  none    "

## 2017-07-13 NOTE — CARE PLAN
Problem: Communication  Goal: The ability to communicate needs accurately and effectively will improve  Outcome: PROGRESSING AS EXPECTED  Patient effectively states his needs to staff.    Problem: Venous Thromboembolism (VTW)/Deep Vein Thrombosis (DVT) Prevention:  Goal: Patient will participate in Venous Thrombosis (VTE)/Deep Vein Thrombosis (DVT)Prevention Measures  Outcome: PROGRESSING AS EXPECTED  Pt given Lovenox for DVT/VTE prophylaxis.
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
## 2017-07-13 NOTE — CARE PLAN
Problem: Safety  Goal: Will remain free from injury  Outcome: PROGRESSING AS EXPECTED  Bed in lowest position. Call light within reach.

## 2017-07-14 ENCOUNTER — APPOINTMENT (OUTPATIENT)
Dept: RADIOLOGY | Facility: MEDICAL CENTER | Age: 57
DRG: 539 | End: 2017-07-14
Attending: INTERNAL MEDICINE
Payer: MEDICAID

## 2017-07-14 LAB
ANION GAP SERPL CALC-SCNC: 11 MMOL/L (ref 0–11.9)
BASOPHILS # BLD AUTO: 1.2 % (ref 0–1.8)
BASOPHILS # BLD: 0.1 K/UL (ref 0–0.12)
BUN SERPL-MCNC: 16 MG/DL (ref 8–22)
CALCIUM SERPL-MCNC: 9.1 MG/DL (ref 8.5–10.5)
CHLORIDE SERPL-SCNC: 109 MMOL/L (ref 96–112)
CO2 SERPL-SCNC: 20 MMOL/L (ref 20–33)
CREAT SERPL-MCNC: 0.39 MG/DL (ref 0.5–1.4)
EOSINOPHIL # BLD AUTO: 0.55 K/UL (ref 0–0.51)
EOSINOPHIL NFR BLD: 6.5 % (ref 0–6.9)
ERYTHROCYTE [DISTWIDTH] IN BLOOD BY AUTOMATED COUNT: 50.4 FL (ref 35.9–50)
GFR SERPL CREATININE-BSD FRML MDRD: >60 ML/MIN/1.73 M 2
GLUCOSE SERPL-MCNC: 85 MG/DL (ref 65–99)
HCT VFR BLD AUTO: 35.3 % (ref 42–52)
HGB BLD-MCNC: 10.3 G/DL (ref 14–18)
IMM GRANULOCYTES # BLD AUTO: 0.06 K/UL (ref 0–0.11)
IMM GRANULOCYTES NFR BLD AUTO: 0.7 % (ref 0–0.9)
LYMPHOCYTES # BLD AUTO: 2.12 K/UL (ref 1–4.8)
LYMPHOCYTES NFR BLD: 25 % (ref 22–41)
MCH RBC QN AUTO: 22.9 PG (ref 27–33)
MCHC RBC AUTO-ENTMCNC: 29.2 G/DL (ref 33.7–35.3)
MCV RBC AUTO: 78.6 FL (ref 81.4–97.8)
MONOCYTES # BLD AUTO: 0.71 K/UL (ref 0–0.85)
MONOCYTES NFR BLD AUTO: 8.4 % (ref 0–13.4)
NEUTROPHILS # BLD AUTO: 4.94 K/UL (ref 1.82–7.42)
NEUTROPHILS NFR BLD: 58.2 % (ref 44–72)
NRBC # BLD AUTO: 0 K/UL
NRBC BLD AUTO-RTO: 0 /100 WBC
PLATELET # BLD AUTO: 537 K/UL (ref 164–446)
PMV BLD AUTO: 9.7 FL (ref 9–12.9)
POTASSIUM SERPL-SCNC: 4.3 MMOL/L (ref 3.6–5.5)
RBC # BLD AUTO: 4.49 M/UL (ref 4.7–6.1)
SODIUM SERPL-SCNC: 140 MMOL/L (ref 135–145)
WBC # BLD AUTO: 8.5 K/UL (ref 4.8–10.8)

## 2017-07-14 PROCEDURE — A9270 NON-COVERED ITEM OR SERVICE: HCPCS | Performed by: INTERNAL MEDICINE

## 2017-07-14 PROCEDURE — 700105 HCHG RX REV CODE 258: Performed by: INTERNAL MEDICINE

## 2017-07-14 PROCEDURE — 99232 SBSQ HOSP IP/OBS MODERATE 35: CPT | Performed by: INTERNAL MEDICINE

## 2017-07-14 PROCEDURE — 770021 HCHG ROOM/CARE - ISO PRIVATE

## 2017-07-14 PROCEDURE — 36415 COLL VENOUS BLD VENIPUNCTURE: CPT

## 2017-07-14 PROCEDURE — 85025 COMPLETE CBC W/AUTO DIFF WBC: CPT

## 2017-07-14 PROCEDURE — 700102 HCHG RX REV CODE 250 W/ 637 OVERRIDE(OP): Performed by: INTERNAL MEDICINE

## 2017-07-14 PROCEDURE — 700111 HCHG RX REV CODE 636 W/ 250 OVERRIDE (IP): Performed by: INTERNAL MEDICINE

## 2017-07-14 PROCEDURE — 80048 BASIC METABOLIC PNL TOTAL CA: CPT

## 2017-07-14 RX ADMIN — OXYCODONE HYDROCHLORIDE AND ACETAMINOPHEN 500 MG: 500 TABLET ORAL at 08:58

## 2017-07-14 RX ADMIN — Medication 325 MG: at 08:58

## 2017-07-14 RX ADMIN — ENOXAPARIN SODIUM 40 MG: 100 INJECTION SUBCUTANEOUS at 16:33

## 2017-07-14 RX ADMIN — OXYCODONE HYDROCHLORIDE AND ACETAMINOPHEN 500 MG: 500 TABLET ORAL at 20:22

## 2017-07-14 RX ADMIN — AMPICILLIN SODIUM AND SULBACTAM SODIUM 3 G: 2; 1 INJECTION, POWDER, FOR SOLUTION INTRAMUSCULAR; INTRAVENOUS at 00:00

## 2017-07-14 RX ADMIN — TRAMADOL HYDROCHLORIDE 50 MG: 50 TABLET, COATED ORAL at 20:22

## 2017-07-14 RX ADMIN — AMPICILLIN SODIUM AND SULBACTAM SODIUM 3 G: 2; 1 INJECTION, POWDER, FOR SOLUTION INTRAMUSCULAR; INTRAVENOUS at 11:38

## 2017-07-14 RX ADMIN — MIDODRINE HYDROCHLORIDE 10 MG: 5 TABLET ORAL at 16:33

## 2017-07-14 RX ADMIN — AMPICILLIN SODIUM AND SULBACTAM SODIUM 3 G: 2; 1 INJECTION, POWDER, FOR SOLUTION INTRAMUSCULAR; INTRAVENOUS at 05:23

## 2017-07-14 RX ADMIN — STANDARDIZED SENNA CONCENTRATE AND DOCUSATE SODIUM 2 TABLET: 8.6; 5 TABLET, FILM COATED ORAL at 20:22

## 2017-07-14 RX ADMIN — GABAPENTIN 900 MG: 300 CAPSULE ORAL at 20:22

## 2017-07-14 RX ADMIN — AMPICILLIN SODIUM AND SULBACTAM SODIUM 3 G: 2; 1 INJECTION, POWDER, FOR SOLUTION INTRAMUSCULAR; INTRAVENOUS at 16:33

## 2017-07-14 RX ADMIN — MIDODRINE HYDROCHLORIDE 10 MG: 5 TABLET ORAL at 08:58

## 2017-07-14 RX ADMIN — MIDODRINE HYDROCHLORIDE 10 MG: 5 TABLET ORAL at 11:39

## 2017-07-14 RX ADMIN — GABAPENTIN 900 MG: 300 CAPSULE ORAL at 05:23

## 2017-07-14 RX ADMIN — ASPIRIN 81 MG: 81 TABLET ORAL at 08:58

## 2017-07-14 RX ADMIN — GABAPENTIN 900 MG: 300 CAPSULE ORAL at 11:38

## 2017-07-14 ASSESSMENT — ENCOUNTER SYMPTOMS
BACK PAIN: 1
CONSTIPATION: 0
HEARTBURN: 0
CARDIOVASCULAR NEGATIVE: 1
DIZZINESS: 0
NAUSEA: 0
HEMOPTYSIS: 0
BLURRED VISION: 0
WEAKNESS: 1
DIARRHEA: 0
EYE PAIN: 0
NECK PAIN: 0
WEIGHT LOSS: 0
TREMORS: 0
DEPRESSION: 0
RESPIRATORY NEGATIVE: 1
SPUTUM PRODUCTION: 0
WHEEZING: 0
FEVER: 0
FALLS: 0
SPEECH CHANGE: 0
PND: 0
GASTROINTESTINAL NEGATIVE: 1
PHOTOPHOBIA: 0
PALPITATIONS: 0
SEIZURES: 0
HEADACHES: 0
CHILLS: 0
COUGH: 0

## 2017-07-14 ASSESSMENT — PAIN SCALES - GENERAL: PAINLEVEL_OUTOF10: 0

## 2017-07-14 ASSESSMENT — LIFESTYLE VARIABLES: SUBSTANCE_ABUSE: 0

## 2017-07-14 NOTE — PROGRESS NOTES
PLASTICS    Patient known to me, previous bedside debridement of sacral ulcer.  He had ischial ulcers at the time that were stable and we discussed possible flap closure, which was tentatively planned for this spring, but the patient develop a small bowel obstruction with nutritional insufficiency from not eating for a week.  He has since recovered, and we discussed flap surgery again, but he wanted to postpone it to the fall because of some personal issues.  He states his wounds were doing well, with his sacral wound closing down significantly until his home care giver was unable to continue with wound care.  He came to the hospital with concerns of his sacral wound appearance.  He initially did have an elevated WBC with has come down.  Wound culture showed mixed enteric geeta as well as Group B beta strep.  Urine culture also showed mixed enteric geeta, despite being collected from his ileal conduit bag.  MRI shows osteomyelitis of bilateral ischia, which was also shown on his MRI from 2015.  Ischial wounds are granulating, pink, with mild amount of discharge.  Sacral wound has some yellow slough, with an area of thin eschar at the superior aspect.  Compared to photos of his sacral wound on admission, it has significantly improved.  At this point, I have recommended to him continued local wound care with absorptive dressings, and possible bedside debridement of the thin eschar if it does not lift with local wound care.  We have discussed flap surgery again for the ischial wounds.  As we discussed earlier, he will need postoperative pressure offloading, for which he is in the process of obtaining an appropriate bed for home.  A better option would be admission to a skilled nursing or rehab facility in the immediate postop period.  I will be out of town this weekend and then again at the middle of next week until the end of July, so would be unable to do his surgery until the beginning of August.  He has again  requested waiting until October to do his flap surgery.  I will see him back on Monday if he is still admitted, otherwise will work with his primary care physician to arrange appropriate postop care for eventual flap surgery.

## 2017-07-14 NOTE — DISCHARGE PLANNING
Medical Social Work  PC from [facility] , concerned about patient's 6 weeks of IV abx.  Asked about him going to an LTAC with his insurance or going home with H/H.  I stated he has gone home in past with [facility] and [facility] providing the medications.  I suggested that a LTAC order be placed and we can see if he can be accepted.  If not then we could look at home health.    Talked to DR. Hebert, he will discuss with patient.
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
## 2017-07-14 NOTE — CARE PLAN
Problem: Safety  Goal: Will remain free from falls  Bed in low and locked position, hourly rounding in place, call light within reach and used appropriately.     Problem: Skin Integrity  Goal: Risk for impaired skin integrity will decrease  Intervention: Assess risk factors for impaired skin integrity and/or pressure ulcers  Heel float boots in place, q2 turns, low air loss mattress, appropriate dressings on wounds.
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
## 2017-07-15 ENCOUNTER — APPOINTMENT (OUTPATIENT)
Dept: RADIOLOGY | Facility: MEDICAL CENTER | Age: 57
DRG: 539 | End: 2017-07-15
Attending: INTERNAL MEDICINE
Payer: MEDICAID

## 2017-07-15 LAB
ANION GAP SERPL CALC-SCNC: 6 MMOL/L (ref 0–11.9)
BASOPHILS # BLD AUTO: 1 % (ref 0–1.8)
BASOPHILS # BLD: 0.08 K/UL (ref 0–0.12)
BUN SERPL-MCNC: 17 MG/DL (ref 8–22)
CALCIUM SERPL-MCNC: 9 MG/DL (ref 8.5–10.5)
CHLORIDE SERPL-SCNC: 111 MMOL/L (ref 96–112)
CO2 SERPL-SCNC: 22 MMOL/L (ref 20–33)
CREAT SERPL-MCNC: 0.44 MG/DL (ref 0.5–1.4)
EOSINOPHIL # BLD AUTO: 0.59 K/UL (ref 0–0.51)
EOSINOPHIL NFR BLD: 7.4 % (ref 0–6.9)
ERYTHROCYTE [DISTWIDTH] IN BLOOD BY AUTOMATED COUNT: 49 FL (ref 35.9–50)
GFR SERPL CREATININE-BSD FRML MDRD: >60 ML/MIN/1.73 M 2
GLUCOSE SERPL-MCNC: 92 MG/DL (ref 65–99)
HCT VFR BLD AUTO: 34.8 % (ref 42–52)
HGB BLD-MCNC: 10.3 G/DL (ref 14–18)
IMM GRANULOCYTES # BLD AUTO: 0.1 K/UL (ref 0–0.11)
IMM GRANULOCYTES NFR BLD AUTO: 1.3 % (ref 0–0.9)
LYMPHOCYTES # BLD AUTO: 2.52 K/UL (ref 1–4.8)
LYMPHOCYTES NFR BLD: 31.6 % (ref 22–41)
MCH RBC QN AUTO: 22.7 PG (ref 27–33)
MCHC RBC AUTO-ENTMCNC: 29.6 G/DL (ref 33.7–35.3)
MCV RBC AUTO: 76.8 FL (ref 81.4–97.8)
MONOCYTES # BLD AUTO: 0.7 K/UL (ref 0–0.85)
MONOCYTES NFR BLD AUTO: 8.8 % (ref 0–13.4)
MORPHOLOGY BLD-IMP: NORMAL
NEUTROPHILS # BLD AUTO: 3.99 K/UL (ref 1.82–7.42)
NEUTROPHILS NFR BLD: 49.9 % (ref 44–72)
NRBC # BLD AUTO: 0 K/UL
NRBC BLD AUTO-RTO: 0 /100 WBC
PLATELET # BLD AUTO: 570 K/UL (ref 164–446)
PMV BLD AUTO: 8.6 FL (ref 9–12.9)
POTASSIUM SERPL-SCNC: 4.3 MMOL/L (ref 3.6–5.5)
RBC # BLD AUTO: 4.53 M/UL (ref 4.7–6.1)
SODIUM SERPL-SCNC: 139 MMOL/L (ref 135–145)
WBC # BLD AUTO: 8 K/UL (ref 4.8–10.8)

## 2017-07-15 PROCEDURE — C1751 CATH, INF, PER/CENT/MIDLINE: HCPCS

## 2017-07-15 PROCEDURE — 36569 INSJ PICC 5 YR+ W/O IMAGING: CPT

## 2017-07-15 PROCEDURE — 99232 SBSQ HOSP IP/OBS MODERATE 35: CPT | Performed by: INTERNAL MEDICINE

## 2017-07-15 PROCEDURE — 80048 BASIC METABOLIC PNL TOTAL CA: CPT

## 2017-07-15 PROCEDURE — 85025 COMPLETE CBC W/AUTO DIFF WBC: CPT

## 2017-07-15 PROCEDURE — 700111 HCHG RX REV CODE 636 W/ 250 OVERRIDE (IP): Performed by: INTERNAL MEDICINE

## 2017-07-15 PROCEDURE — 36415 COLL VENOUS BLD VENIPUNCTURE: CPT

## 2017-07-15 PROCEDURE — 700101 HCHG RX REV CODE 250: Performed by: INTERNAL MEDICINE

## 2017-07-15 PROCEDURE — 700102 HCHG RX REV CODE 250 W/ 637 OVERRIDE(OP): Performed by: INTERNAL MEDICINE

## 2017-07-15 PROCEDURE — 770021 HCHG ROOM/CARE - ISO PRIVATE

## 2017-07-15 PROCEDURE — A9270 NON-COVERED ITEM OR SERVICE: HCPCS | Performed by: INTERNAL MEDICINE

## 2017-07-15 PROCEDURE — 700105 HCHG RX REV CODE 258: Performed by: INTERNAL MEDICINE

## 2017-07-15 RX ADMIN — GABAPENTIN 900 MG: 300 CAPSULE ORAL at 19:49

## 2017-07-15 RX ADMIN — POTASSIUM CHLORIDE AND SODIUM CHLORIDE: 900; 150 INJECTION, SOLUTION INTRAVENOUS at 03:55

## 2017-07-15 RX ADMIN — MIDODRINE HYDROCHLORIDE 10 MG: 5 TABLET ORAL at 09:51

## 2017-07-15 RX ADMIN — ENOXAPARIN SODIUM 40 MG: 100 INJECTION SUBCUTANEOUS at 17:37

## 2017-07-15 RX ADMIN — MIDODRINE HYDROCHLORIDE 10 MG: 5 TABLET ORAL at 13:20

## 2017-07-15 RX ADMIN — Medication 325 MG: at 09:52

## 2017-07-15 RX ADMIN — OXYCODONE HYDROCHLORIDE AND ACETAMINOPHEN 500 MG: 500 TABLET ORAL at 19:49

## 2017-07-15 RX ADMIN — AMPICILLIN SODIUM AND SULBACTAM SODIUM 3 G: 2; 1 INJECTION, POWDER, FOR SOLUTION INTRAMUSCULAR; INTRAVENOUS at 17:37

## 2017-07-15 RX ADMIN — AMPICILLIN SODIUM AND SULBACTAM SODIUM 3 G: 2; 1 INJECTION, POWDER, FOR SOLUTION INTRAMUSCULAR; INTRAVENOUS at 13:08

## 2017-07-15 RX ADMIN — AMPICILLIN SODIUM AND SULBACTAM SODIUM 3 G: 2; 1 INJECTION, POWDER, FOR SOLUTION INTRAMUSCULAR; INTRAVENOUS at 00:09

## 2017-07-15 RX ADMIN — AMPICILLIN SODIUM AND SULBACTAM SODIUM 3 G: 2; 1 INJECTION, POWDER, FOR SOLUTION INTRAMUSCULAR; INTRAVENOUS at 06:17

## 2017-07-15 RX ADMIN — OXYCODONE HYDROCHLORIDE AND ACETAMINOPHEN 500 MG: 500 TABLET ORAL at 09:52

## 2017-07-15 RX ADMIN — ASPIRIN 81 MG: 81 TABLET ORAL at 09:51

## 2017-07-15 RX ADMIN — GABAPENTIN 900 MG: 300 CAPSULE ORAL at 13:20

## 2017-07-15 RX ADMIN — MIDODRINE HYDROCHLORIDE 10 MG: 5 TABLET ORAL at 17:37

## 2017-07-15 RX ADMIN — GABAPENTIN 900 MG: 300 CAPSULE ORAL at 06:17

## 2017-07-15 ASSESSMENT — ENCOUNTER SYMPTOMS
DIARRHEA: 0
PND: 0
COUGH: 0
EYE PAIN: 0
SHORTNESS OF BREATH: 0
CHILLS: 0
NAUSEA: 0
CARDIOVASCULAR NEGATIVE: 1
WEAKNESS: 1
BLURRED VISION: 0
CONSTIPATION: 0
VOMITING: 0
FEVER: 0
FALLS: 0
SPUTUM PRODUCTION: 0
SEIZURES: 0
DEPRESSION: 0
BACK PAIN: 1
PHOTOPHOBIA: 0
TREMORS: 0
HEADACHES: 0
WHEEZING: 0
NECK PAIN: 0
PALPITATIONS: 0
SPEECH CHANGE: 0
HEARTBURN: 0
RESPIRATORY NEGATIVE: 1
DIZZINESS: 0
GASTROINTESTINAL NEGATIVE: 1
NERVOUS/ANXIOUS: 1

## 2017-07-15 ASSESSMENT — LIFESTYLE VARIABLES: SUBSTANCE_ABUSE: 0

## 2017-07-15 ASSESSMENT — PAIN SCALES - GENERAL: PAINLEVEL_OUTOF10: 0

## 2017-07-15 NOTE — CARE PLAN
Problem: Safety  Goal: Will remain free from falls  Bed in low and locked position, non-slip socks on patient, hourly rounding in place.     Problem: Skin Integrity  Goal: Risk for impaired skin integrity will decrease  Low air loss mattress in use, linens changed when wet, heel float boots in place, dressings in place.

## 2017-07-15 NOTE — FACE TO FACE
Face to Face Supporting Documentation - Home Health    The encounter with this patient was in whole or in part the primary reason for home health admission.    Date of encounter:   Patient:                    MRN:                       YOB: 2015  Stevie Phoenix  [id number]  1960     Home health to see patient for:  Skilled Nursing care for assessment, interventions & education and Wound Care    Skilled need for:  Medication Management IV antibiotics and wound care    Skilled nursing interventions to include:  Venous access care    Homebound status evidenced by:  Need the aid of supportive devices such as crutches, canes, wheelchairs or walkers. Leaving home requires a considerable and taxing effort. There is a normal inability to leave the home.    Community Physician to provide follow up care: Gail Perez N.P.     Optional Interventions? No      I certify the face to face encounter for this home health care referral meets the CMS requirements and the encounter/clinical assessment with the patient was, in whole, or in part, for the medical condition(s) listed above, which is the primary reason for home health care. Based on my clinical findings: the service(s) are medically necessary, support the need for home health care, and the homebound criteria are met.  I certify that this patient has had a face to face encounter by myself.  Zari Hebert M.D. - NPI: [id number]

## 2017-07-15 NOTE — CARE PLAN
Problem: Bowel/Gastric:  Goal: Normal bowel function is maintained or improved  Outcome: PROGRESSING AS EXPECTED  Pt was disimpacted this am, after digital stim.=nothing. Stool is soft
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
## 2017-07-15 NOTE — PROGRESS NOTES
"Pt is resting in bed. C/o chronic \"pain always-where i can feel\". Refused pain meds. Disimpacted him and gave his meds.  "

## 2017-07-16 LAB
BACTERIA BLD CULT: NORMAL
BACTERIA BLD CULT: NORMAL
SIGNIFICANT IND 70042: NORMAL
SIGNIFICANT IND 70042: NORMAL
SITE SITE: NORMAL
SITE SITE: NORMAL
SOURCE SOURCE: NORMAL
SOURCE SOURCE: NORMAL

## 2017-07-16 PROCEDURE — 700111 HCHG RX REV CODE 636 W/ 250 OVERRIDE (IP): Performed by: INTERNAL MEDICINE

## 2017-07-16 PROCEDURE — 99232 SBSQ HOSP IP/OBS MODERATE 35: CPT | Performed by: INTERNAL MEDICINE

## 2017-07-16 PROCEDURE — 770021 HCHG ROOM/CARE - ISO PRIVATE

## 2017-07-16 PROCEDURE — 700102 HCHG RX REV CODE 250 W/ 637 OVERRIDE(OP): Performed by: INTERNAL MEDICINE

## 2017-07-16 PROCEDURE — 700105 HCHG RX REV CODE 258: Performed by: INTERNAL MEDICINE

## 2017-07-16 PROCEDURE — A9270 NON-COVERED ITEM OR SERVICE: HCPCS | Performed by: INTERNAL MEDICINE

## 2017-07-16 RX ADMIN — AMPICILLIN SODIUM AND SULBACTAM SODIUM 3 G: 2; 1 INJECTION, POWDER, FOR SOLUTION INTRAMUSCULAR; INTRAVENOUS at 05:30

## 2017-07-16 RX ADMIN — AMPICILLIN SODIUM AND SULBACTAM SODIUM 3 G: 2; 1 INJECTION, POWDER, FOR SOLUTION INTRAMUSCULAR; INTRAVENOUS at 23:50

## 2017-07-16 RX ADMIN — AMPICILLIN SODIUM AND SULBACTAM SODIUM 3 G: 2; 1 INJECTION, POWDER, FOR SOLUTION INTRAMUSCULAR; INTRAVENOUS at 17:37

## 2017-07-16 RX ADMIN — MIDODRINE HYDROCHLORIDE 10 MG: 5 TABLET ORAL at 07:51

## 2017-07-16 RX ADMIN — AMPICILLIN SODIUM AND SULBACTAM SODIUM 3 G: 2; 1 INJECTION, POWDER, FOR SOLUTION INTRAMUSCULAR; INTRAVENOUS at 00:53

## 2017-07-16 RX ADMIN — ENOXAPARIN SODIUM 40 MG: 100 INJECTION SUBCUTANEOUS at 17:39

## 2017-07-16 RX ADMIN — GABAPENTIN 900 MG: 300 CAPSULE ORAL at 20:41

## 2017-07-16 RX ADMIN — OXYCODONE HYDROCHLORIDE AND ACETAMINOPHEN 500 MG: 500 TABLET ORAL at 20:41

## 2017-07-16 RX ADMIN — MIDODRINE HYDROCHLORIDE 10 MG: 5 TABLET ORAL at 17:40

## 2017-07-16 RX ADMIN — GABAPENTIN 900 MG: 300 CAPSULE ORAL at 12:31

## 2017-07-16 RX ADMIN — GABAPENTIN 900 MG: 300 CAPSULE ORAL at 05:30

## 2017-07-16 RX ADMIN — OXYCODONE HYDROCHLORIDE AND ACETAMINOPHEN 500 MG: 500 TABLET ORAL at 07:52

## 2017-07-16 RX ADMIN — AMPICILLIN SODIUM AND SULBACTAM SODIUM 3 G: 2; 1 INJECTION, POWDER, FOR SOLUTION INTRAMUSCULAR; INTRAVENOUS at 12:42

## 2017-07-16 RX ADMIN — STANDARDIZED SENNA CONCENTRATE AND DOCUSATE SODIUM 2 TABLET: 8.6; 5 TABLET, FILM COATED ORAL at 07:52

## 2017-07-16 RX ADMIN — ASPIRIN 81 MG: 81 TABLET ORAL at 07:51

## 2017-07-16 RX ADMIN — Medication 325 MG: at 07:52

## 2017-07-16 RX ADMIN — MIDODRINE HYDROCHLORIDE 10 MG: 5 TABLET ORAL at 12:31

## 2017-07-16 ASSESSMENT — ENCOUNTER SYMPTOMS
FALLS: 0
FEVER: 0
CHILLS: 0
CARDIOVASCULAR NEGATIVE: 1
WEAKNESS: 1
NAUSEA: 0
BACK PAIN: 1
SEIZURES: 0
SHORTNESS OF BREATH: 0
PALPITATIONS: 0
VOMITING: 0
NECK PAIN: 0
TREMORS: 0
DIZZINESS: 0
BLURRED VISION: 0
PND: 0
RESPIRATORY NEGATIVE: 1
CONSTIPATION: 0
COUGH: 0
GASTROINTESTINAL NEGATIVE: 1
SPEECH CHANGE: 0
HEADACHES: 0
NERVOUS/ANXIOUS: 1
DEPRESSION: 0
DIARRHEA: 0
DOUBLE VISION: 0
PHOTOPHOBIA: 0
HEARTBURN: 0
WHEEZING: 0
MYALGIAS: 0
CLAUDICATION: 0

## 2017-07-16 ASSESSMENT — LIFESTYLE VARIABLES: SUBSTANCE_ABUSE: 0

## 2017-07-16 ASSESSMENT — PAIN SCALES - GENERAL
PAINLEVEL_OUTOF10: 0
PAINLEVEL_OUTOF10: 3

## 2017-07-16 NOTE — PROGRESS NOTES
Assumed patient care at 1900, bedside report completed. Patient denying pain or needs at this time. Patient alert, oriented, and resting in bed in no apparent distress. Fall precautions in place, call light within reach.

## 2017-07-16 NOTE — CARE PLAN
EXAM NOTE    HISTORY:  Jaxon Wilson is a 56 year old male who presents for routine followup on hypertension, hyperlipidemia, prediabetes, history of gout, erectile dysfunction. Overall he has been feeling okay. No chest pain or dyspnea. Energy level okay.    Having some issues with eczema this time he year.    I have reviewed the past medical, family and social history sections including the medications and allergies listed in the above medical record as well as the nursing notes.    REVIEW OF SYSTEMS:  He is eating okay.  He is sleeping okay.  Constitutional:  Patient denies fever, chills, tiredness or malaise.  Eyes:  Denies change in visual acuity, pain, burning or itching.  HENT:  Denies sinus problems, ear infections, nasal congestion or sore throat.  Integument:  Rash:  Eczema.  Skin lesions:  None.  Easy bruising:  None.  Immunologic:  Denies hives, seasonal allergies.  Respiratory:  No cough.  No  shortness of breath.  Cardiovascular:  No chest pain.  No  edema.  Gastrointestinal:  No abdominal pain.  No nausea, vomiting, bloody stools or diarrhea.  Bowel movements daily.  Genitourinary:  Denies urine retention, painful urination, urinary frequency, blood in urine.  Nocturia 0-1.  No incontinence.  Musculoskeletal:  Occasional chronic back pain.  No  neck pain.  Joint pain:  None.  Neurologic:  No headache.  No focal weakness or sensory changes.  No anesthesias or paresthesias.  Endocrine:  Denies polyuria, polydipsia or temperature intolerance.  Lymphatic:  Denies swollen glands, weight loss.  Psychiatric:  Good mood and affect.    PHYSICAL EXAM:  Vital Signs:    Visit Vitals   • /74   • Pulse 64   • Temp 97.9 °F (36.6 °C) (Oral)   • Resp 16   • Ht 5' 10.5\" (1.791 m)   • Wt 86.4 kg   • SpO2 99%   • BMI 26.93 kg/m2     Last 4 Weights:    Wt Readings from Last 4 Encounters:   03/14/17 86.4 kg   12/13/16 89.4 kg   09/12/16 86 kg   06/02/16 87.7 kg     Constitutional:  He presents in good  Problem: Skin Integrity  Goal: Risk for impaired skin integrity will decrease  Intervention: Implement precautions to protect skin integrity in collaboration with the interdisciplinary team    07/15/17 2100 07/16/17 0671   OTHER   Skin Preventative Measures --  Pillows in Use for Support / Positioning   Bed Types --  Pressure Redistribution Mattress (Atmosair)   Friction Interventions --  Draw Sheet / Pad Used for Repositioning   Protocols --  Appropriate Wound Protocols in Place   Moisturizers --  Containment Devices   Containment Devices (Urostomy) --    Activity  --  Bed   Patient Turns / Repositioning --  Supine   Assistance / Tolerance for Turning/Repositioning --  Assistance of Two or More   Patient is Receiving Nutrition --  Oral Intake Adequate   Nutrition Consult Ordered --  No, Consult has Already been Placed   Vitamin Therapy in Use --  No                spirits today.  Integument:  Warm.  Dry.  No erythema.  No rash.  Skin lesions:  A few benign nevi. Patch of eczema noted right lower leg.  HENT:  Normocephalic.  Atraumatic.  Bilateral external ears normal.  Oropharynx moist.  No oral exudates.  Nose normal.  Eyes:  PERRL (Pupils equal, round, reactive to light), EOMI (extraocular movements intact).  Conjunctivae normal.  No discharge.  Neck:  Supple.  No thyromegaly.  No lymphadenopathy.  No carotid bruits.  Cardiovascular:  Normal heart rate.  Regular rhythm.  No  murmur.  No rubs.  No gallops.  No heaves, lifts or thrills.  Respiratory:  Normal breath sounds.  No respiratory distress.  No wheezing.  No chest tenderness.  Gastrointestinal:  Bowel sounds normal.  Soft.  No tenderness.  No masses.  No pulsatile masses.   No obvious hepatosplenomegaly.  Neurologic:  Alert and oriented x3.  Normal motor function.  Normal sensory function.  No focal deficits noted.  Musculoskeletal:  No obvious back or CVA (costovertebral angle) tenderness.  No peripheral edema.  He is using all extremities well.  Psychiatric:  Good mood and affect.    LAB:  Lab Services on 03/09/2017   Component Date Value Ref Range Status   • Fasting Status 03/09/2017 11  hrs Final   • Sodium 03/09/2017 140  135 - 145 mmol/L Final   • Potassium 03/09/2017 4.1  3.4 - 5.1 mmol/L Final   • Chloride 03/09/2017 101  98 - 107 mmol/L Final   • Carbon Dioxide 03/09/2017 28  21 - 32 mmol/L Final   • Anion Gap 03/09/2017 15  10 - 20 mmol/L Final   • Glucose 03/09/2017 98  65 - 99 mg/dL Final   • BUN 03/09/2017 9  6 - 20 mg/dL Final   • Creatinine 03/09/2017 0.71  0.67 - 1.17 mg/dL Final   • GFR Estimate,  03/09/2017 >90   Final   • GFR Estimate, Non  03/09/2017 >90   Final   • BUN/Creatinine Ratio 03/09/2017 13  7 - 25 Final   • CALCIUM 03/09/2017 8.7  8.4 - 10.2 mg/dL Final   • FASTING STATUS 03/09/2017 11  hrs Final   • CHOLESTEROL 03/09/2017 147  <200 mg/dL Final   •  CALCULATED LDL 03/09/2017 67  <130 mg/dL Final   • HDL 03/09/2017 61  >59 mg/dL Final   • TRIGLYCERIDE 03/09/2017 93  <150 mg/dL Final   • CALCULATED NON HDL 03/09/2017 86  mg/dL Final   • CHOL/HDL 03/09/2017 2.4  <4.5 Final   • CPK 03/09/2017 151  39 - 308 Units/L Final   • ALT/SGPT 03/09/2017 29  <79 Units/L Final        RADIOLOGY:  none    ASSESSMENT:  #1 hypertension under good control.    #2 hyperlipidemia doing very nicely currently.    #3 prediabetes with good numbers this time.    #4 eczema with a little bit of a flare currently.    PLAN:  I reviewed the lab data with him and copy given.    I reviewed and renewed his medication.    He will continue to work on diet and exercise.    Return in 3 months for follow-up. Fasting lab prior.  To see Elvira Cevallos.
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
## 2017-07-16 NOTE — PROGRESS NOTES
PICC Insertion Final 3CG    Consents confirmed, vessel patency confirmed with ultrasound. Risks and benefits of procedure explained to patient/family and education regarding central line associated bloodstream infections provided. Questions answered.     PICC placed in RUE per MD order with ultrasound guidance. 4 Fr, single lumen PICC placed in basilic vein after one attempt(s). 3 cc's of 1% lidocaine injected intradermally, 21 gauge microintroducer needle and modified Seldinger technique used. 40 cm catheter inserted with good blood return. Secured at 0cm marker. Each lumen flushed without resistance with 10 mL 0.9% normal saline. PICC line secured with Biopatch and Tegaderm.    PICC placement is confirmed by nurse using 3CG technology. PICC line is appropriate for use at this time. Pt tolerated procedure well.  Patient condition relayed to unit RN or ordering physician via this post procedure note in the EMR.     [employer] Power PICC ref # [id number], Lot # [id number]

## 2017-07-16 NOTE — CARE PLAN
Problem: Infection  Goal: Will remain free from infection  Intervention: Assess signs and symptoms of infection  No s/s of infection noted at this time.

## 2017-07-17 LAB
ALBUMIN SERPL BCP-MCNC: 3.4 G/DL (ref 3.2–4.9)
ALBUMIN/GLOB SERPL: 0.9 G/DL
ALP SERPL-CCNC: 69 U/L (ref 30–99)
ALT SERPL-CCNC: 25 U/L (ref 2–50)
ANION GAP SERPL CALC-SCNC: 8 MMOL/L (ref 0–11.9)
AST SERPL-CCNC: 24 U/L (ref 12–45)
BASOPHILS # BLD AUTO: 1.2 % (ref 0–1.8)
BASOPHILS # BLD: 0.11 K/UL (ref 0–0.12)
BILIRUB SERPL-MCNC: 0.2 MG/DL (ref 0.1–1.5)
BUN SERPL-MCNC: 22 MG/DL (ref 8–22)
CALCIUM SERPL-MCNC: 9.2 MG/DL (ref 8.5–10.5)
CHLORIDE SERPL-SCNC: 107 MMOL/L (ref 96–112)
CO2 SERPL-SCNC: 24 MMOL/L (ref 20–33)
CREAT SERPL-MCNC: 0.48 MG/DL (ref 0.5–1.4)
CRP SERPL HS-MCNC: 0.73 MG/DL (ref 0–0.75)
EOSINOPHIL # BLD AUTO: 0.79 K/UL (ref 0–0.51)
EOSINOPHIL NFR BLD: 8.7 % (ref 0–6.9)
ERYTHROCYTE [DISTWIDTH] IN BLOOD BY AUTOMATED COUNT: 50.7 FL (ref 35.9–50)
ERYTHROCYTE [SEDIMENTATION RATE] IN BLOOD BY WESTERGREN METHOD: 45 MM/HOUR (ref 0–20)
GFR SERPL CREATININE-BSD FRML MDRD: >60 ML/MIN/1.73 M 2
GLOBULIN SER CALC-MCNC: 3.6 G/DL (ref 1.9–3.5)
GLUCOSE SERPL-MCNC: 81 MG/DL (ref 65–99)
HCT VFR BLD AUTO: 37.5 % (ref 42–52)
HGB BLD-MCNC: 11 G/DL (ref 14–18)
IMM GRANULOCYTES # BLD AUTO: 0.19 K/UL (ref 0–0.11)
IMM GRANULOCYTES NFR BLD AUTO: 2.1 % (ref 0–0.9)
LYMPHOCYTES # BLD AUTO: 3.03 K/UL (ref 1–4.8)
LYMPHOCYTES NFR BLD: 33.3 % (ref 22–41)
MCH RBC QN AUTO: 22.8 PG (ref 27–33)
MCHC RBC AUTO-ENTMCNC: 29.3 G/DL (ref 33.7–35.3)
MCV RBC AUTO: 77.8 FL (ref 81.4–97.8)
MONOCYTES # BLD AUTO: 0.68 K/UL (ref 0–0.85)
MONOCYTES NFR BLD AUTO: 7.5 % (ref 0–13.4)
MORPHOLOGY BLD-IMP: NORMAL
NEUTROPHILS # BLD AUTO: 4.29 K/UL (ref 1.82–7.42)
NEUTROPHILS NFR BLD: 47.2 % (ref 44–72)
NRBC # BLD AUTO: 0 K/UL
NRBC BLD AUTO-RTO: 0 /100 WBC
PLATELET # BLD AUTO: 610 K/UL (ref 164–446)
PMV BLD AUTO: 9 FL (ref 9–12.9)
POTASSIUM SERPL-SCNC: 4.3 MMOL/L (ref 3.6–5.5)
PROT SERPL-MCNC: 7 G/DL (ref 6–8.2)
RBC # BLD AUTO: 4.82 M/UL (ref 4.7–6.1)
SODIUM SERPL-SCNC: 139 MMOL/L (ref 135–145)
WBC # BLD AUTO: 9.1 K/UL (ref 4.8–10.8)

## 2017-07-17 PROCEDURE — 86140 C-REACTIVE PROTEIN: CPT

## 2017-07-17 PROCEDURE — 700111 HCHG RX REV CODE 636 W/ 250 OVERRIDE (IP): Performed by: INTERNAL MEDICINE

## 2017-07-17 PROCEDURE — 85652 RBC SED RATE AUTOMATED: CPT

## 2017-07-17 PROCEDURE — 700105 HCHG RX REV CODE 258: Performed by: INTERNAL MEDICINE

## 2017-07-17 PROCEDURE — 80053 COMPREHEN METABOLIC PANEL: CPT

## 2017-07-17 PROCEDURE — 770021 HCHG ROOM/CARE - ISO PRIVATE

## 2017-07-17 PROCEDURE — 700102 HCHG RX REV CODE 250 W/ 637 OVERRIDE(OP): Performed by: INTERNAL MEDICINE

## 2017-07-17 PROCEDURE — 85025 COMPLETE CBC W/AUTO DIFF WBC: CPT

## 2017-07-17 PROCEDURE — A9270 NON-COVERED ITEM OR SERVICE: HCPCS | Performed by: INTERNAL MEDICINE

## 2017-07-17 PROCEDURE — 99232 SBSQ HOSP IP/OBS MODERATE 35: CPT | Performed by: INTERNAL MEDICINE

## 2017-07-17 RX ADMIN — AMPICILLIN SODIUM AND SULBACTAM SODIUM 3 G: 2; 1 INJECTION, POWDER, FOR SOLUTION INTRAMUSCULAR; INTRAVENOUS at 12:51

## 2017-07-17 RX ADMIN — MIDODRINE HYDROCHLORIDE 10 MG: 5 TABLET ORAL at 09:32

## 2017-07-17 RX ADMIN — MIDODRINE HYDROCHLORIDE 10 MG: 5 TABLET ORAL at 18:12

## 2017-07-17 RX ADMIN — AMPICILLIN SODIUM AND SULBACTAM SODIUM 3 G: 2; 1 INJECTION, POWDER, FOR SOLUTION INTRAMUSCULAR; INTRAVENOUS at 05:42

## 2017-07-17 RX ADMIN — OXYCODONE HYDROCHLORIDE AND ACETAMINOPHEN 500 MG: 500 TABLET ORAL at 19:50

## 2017-07-17 RX ADMIN — MIDODRINE HYDROCHLORIDE 10 MG: 5 TABLET ORAL at 12:50

## 2017-07-17 RX ADMIN — AMPICILLIN SODIUM AND SULBACTAM SODIUM 3 G: 2; 1 INJECTION, POWDER, FOR SOLUTION INTRAMUSCULAR; INTRAVENOUS at 18:13

## 2017-07-17 RX ADMIN — GABAPENTIN 900 MG: 300 CAPSULE ORAL at 05:41

## 2017-07-17 RX ADMIN — ASPIRIN 81 MG: 81 TABLET ORAL at 09:33

## 2017-07-17 RX ADMIN — GABAPENTIN 900 MG: 300 CAPSULE ORAL at 19:49

## 2017-07-17 RX ADMIN — Medication 325 MG: at 09:32

## 2017-07-17 RX ADMIN — OXYCODONE HYDROCHLORIDE AND ACETAMINOPHEN 500 MG: 500 TABLET ORAL at 09:32

## 2017-07-17 RX ADMIN — ENOXAPARIN SODIUM 40 MG: 100 INJECTION SUBCUTANEOUS at 18:12

## 2017-07-17 RX ADMIN — AMPICILLIN SODIUM AND SULBACTAM SODIUM 3 G: 2; 1 INJECTION, POWDER, FOR SOLUTION INTRAMUSCULAR; INTRAVENOUS at 23:21

## 2017-07-17 RX ADMIN — GABAPENTIN 900 MG: 300 CAPSULE ORAL at 12:50

## 2017-07-17 ASSESSMENT — ENCOUNTER SYMPTOMS
SPEECH CHANGE: 0
PND: 0
NERVOUS/ANXIOUS: 1
SHORTNESS OF BREATH: 0
GASTROINTESTINAL NEGATIVE: 1
RESPIRATORY NEGATIVE: 1
WEAKNESS: 1
PHOTOPHOBIA: 0
CHILLS: 0
COUGH: 0
FALLS: 0
TREMORS: 0
SEIZURES: 0
BLURRED VISION: 0
CLAUDICATION: 0
VOMITING: 0
DIZZINESS: 0
DIARRHEA: 0
MYALGIAS: 0
HEADACHES: 0
NAUSEA: 0
FEVER: 0
HEARTBURN: 0
BACK PAIN: 1
PALPITATIONS: 0
WHEEZING: 0
DEPRESSION: 0
CARDIOVASCULAR NEGATIVE: 1

## 2017-07-17 ASSESSMENT — LIFESTYLE VARIABLES: SUBSTANCE_ABUSE: 0

## 2017-07-17 ASSESSMENT — PAIN SCALES - GENERAL
PAINLEVEL_OUTOF10: 2
PAINLEVEL_OUTOF10: 0

## 2017-07-17 NOTE — CARE PLAN
Problem: Discharge Barriers/Planning  Goal: Patient’s continuum of care needs will be met  Pt has a PICC line for long term abx.     Problem: Skin Integrity  Goal: Risk for impaired skin integrity will decrease  Pt is on a low air loss mattress. Pt remains on every 2 hour turning schedule. Rand boots on. Daily dressing changes ordered.
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
## 2017-07-17 NOTE — PROGRESS NOTES
Assumed care of patient at 1915, received report from day RN at that time. Communication board updated and reviewed with pt. Pt has refused the bed alarm and has been educated on it's importance. Pt calls appropriately. Pt reports pain but refuses any intervention, pt explained that he has chronic pain but just rest is enough and his regular scheduled medications. Assessment complete. No other needs at this time. Pt is A&Ox4. Call light and personal belongings within reach. No other needs at this time.
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
## 2017-07-17 NOTE — PROGRESS NOTES
Pt. Resting quietly in bed, and is anxious about discharge planning as he has been told he may not be able to go home and rather a SNF which he does not want.  CNA Cash to room with this RN for digital disimpaction of stool, medium amount of brown stool disimpacted at this time.  Pt. Continues on room air and is alert and oriented x 4.  Pt. Requesting to speak with Hospitalist MYRIAM Bassett, so she is made aware, no further needs at this time.

## 2017-07-17 NOTE — PROGRESS NOTES
Dr. Hebert and Hospitalist RN made aware that pt.'s blood pressure this morning is 79/61, but that pt. Is completely asymptomatic and looking back, most mornings, pt.'s blood pressures have been low.  Dr. Hebert states that this is ok and just to continue to monitor, no orders received at this time.

## 2017-07-17 NOTE — DISCHARGE PLANNING
Medical Social Work  Talked to patient about his options as his primary doctor will not follow him for infusion due to his lack of follow up the last time she did this.  When I informed patient of this and that the [facility] won't due to his insurance. Stated he doesn't understand why sudden concern over his bone infection.  Stating Dr. Arriola has repeatedly told him not to worry about it as they will deal with it when he has surgery.  Patient stated he will not be in any facility for 6 weeks, he will leave then, patient is aware this is against Medical Advice.  Patient stated he knows, he will get a ride home from his family.  Requested we call his daughter and give her an update.     Informed the patient's Dr. Hebert that patient is not wanting to go a facility for 6 weeks, possible AMA and his request for him to call his daughter.

## 2017-07-18 VITALS
HEART RATE: 80 BPM | OXYGEN SATURATION: 96 % | RESPIRATION RATE: 14 BRPM | TEMPERATURE: 98.1 F | WEIGHT: 160 LBS | SYSTOLIC BLOOD PRESSURE: 122 MMHG | HEIGHT: 72 IN | BODY MASS INDEX: 21.67 KG/M2 | DIASTOLIC BLOOD PRESSURE: 72 MMHG

## 2017-07-18 PROCEDURE — 700105 HCHG RX REV CODE 258: Performed by: INTERNAL MEDICINE

## 2017-07-18 PROCEDURE — A6212 FOAM DRG <=16 SQ IN W/BORDER: HCPCS | Performed by: INTERNAL MEDICINE

## 2017-07-18 PROCEDURE — 99239 HOSP IP/OBS DSCHRG MGMT >30: CPT | Performed by: FAMILY MEDICINE

## 2017-07-18 PROCEDURE — A9270 NON-COVERED ITEM OR SERVICE: HCPCS | Performed by: INTERNAL MEDICINE

## 2017-07-18 PROCEDURE — 700102 HCHG RX REV CODE 250 W/ 637 OVERRIDE(OP): Performed by: INTERNAL MEDICINE

## 2017-07-18 PROCEDURE — 700111 HCHG RX REV CODE 636 W/ 250 OVERRIDE (IP): Performed by: INTERNAL MEDICINE

## 2017-07-18 RX ADMIN — GABAPENTIN 900 MG: 300 CAPSULE ORAL at 05:17

## 2017-07-18 RX ADMIN — MIDODRINE HYDROCHLORIDE 10 MG: 5 TABLET ORAL at 13:03

## 2017-07-18 RX ADMIN — OXYCODONE HYDROCHLORIDE AND ACETAMINOPHEN 500 MG: 500 TABLET ORAL at 08:20

## 2017-07-18 RX ADMIN — MIDODRINE HYDROCHLORIDE 10 MG: 5 TABLET ORAL at 08:21

## 2017-07-18 RX ADMIN — GABAPENTIN 900 MG: 300 CAPSULE ORAL at 13:03

## 2017-07-18 RX ADMIN — ASPIRIN 81 MG: 81 TABLET ORAL at 08:20

## 2017-07-18 RX ADMIN — AMPICILLIN SODIUM AND SULBACTAM SODIUM 3 G: 2; 1 INJECTION, POWDER, FOR SOLUTION INTRAMUSCULAR; INTRAVENOUS at 05:17

## 2017-07-18 RX ADMIN — AMPICILLIN SODIUM AND SULBACTAM SODIUM 3 G: 2; 1 INJECTION, POWDER, FOR SOLUTION INTRAMUSCULAR; INTRAVENOUS at 13:03

## 2017-07-18 RX ADMIN — Medication 325 MG: at 08:21

## 2017-07-18 ASSESSMENT — ENCOUNTER SYMPTOMS
FALLS: 0
HEADACHES: 0
SHORTNESS OF BREATH: 0
DEPRESSION: 0
COUGH: 0
DIZZINESS: 0
TREMORS: 0
CLAUDICATION: 0
SPEECH CHANGE: 0
MYALGIAS: 0
WEAKNESS: 1
NAUSEA: 0
CHILLS: 0
PND: 0
DIARRHEA: 0
VOMITING: 0
FEVER: 0
HEARTBURN: 0
BACK PAIN: 1
SEIZURES: 0
PHOTOPHOBIA: 0
BLURRED VISION: 0
PALPITATIONS: 0
WHEEZING: 0

## 2017-07-18 ASSESSMENT — PAIN SCALES - GENERAL
PAINLEVEL_OUTOF10: 0
PAINLEVEL_OUTOF10: 0

## 2017-07-18 ASSESSMENT — LIFESTYLE VARIABLES: SUBSTANCE_ABUSE: 0

## 2017-07-18 NOTE — PROGRESS NOTES
Pt a&o. Cooperative. On low airloss mattress. bilat moonboot in place. On ra. States has generalized pain but denies pain med needs. Urostomy appliance changed tonight. Contracted upper extremity but able to lift it up and down. Plan of care reviewed including dressing change to heel in am. Verbalized understanding and agrees.

## 2017-07-18 NOTE — DISCHARGE INSTRUCTIONS
Discharge Instructions    Discharged to home by car with relative. Discharged via wheelchair, hospital escort: Yes.  Special equipment needed: Not Applicable    Be sure to schedule a follow-up appointment with your primary care doctor or any specialists as instructed.     Discharge Plan:   Diet Plan: Discussed (Resume normal diet)  Activity Level: Discussed  Confirmed Follow up Appointment: Patient to Call and Schedule Appointment (Follow up with PCP)  Confirmed Symptoms Management: Discussed  Medication Reconciliation Updated: Yes (Augmentin 800mg every 8 hrs)  Influenza Vaccine Indication: Not indicated: Previously immunized this influenza season and > 8 years of age    I understand that a diet low in cholesterol, fat, and sodium is recommended for good health. Unless I have been given specific instructions below for another diet, I accept this instruction as my diet prescription.   Other diet: Regular    Special Instructions: Please take antibiotics as ordered    · Is patient discharged on Warfarin / Coumadin?   No     · Is patient Post Blood Transfusion?  No    Depression / Suicide Risk    As you are discharged from this [facility] facility, it is important to learn how to keep safe from harming yourself.    Recognize the warning signs:  · Abrupt changes in personality, positive or negative- including increase in energy   · Giving away possessions  · Change in eating patterns- significant weight changes-  positive or negative  · Change in sleeping patterns- unable to sleep or sleeping all the time   · Unwillingness or inability to communicate  · Depression  · Unusual sadness, discouragement and loneliness  · Talk of wanting to die  · Neglect of personal appearance   · Rebelliousness- reckless behavior  · Withdrawal from people/activities they love  · Confusion- inability to concentrate     If you or a loved one observes any of these behaviors or has concerns about self-harm, here's what you can do:  · Talk  about it- your feelings and reasons for harming yourself  · Remove any means that you might use to hurt yourself (examples: pills, rope, extension cords, firearm)  · Get professional help from the community (Mental Health, Substance Abuse, psychological counseling)  · Do not be alone:Call your Safe Contact- someone whom you trust who will be there for you.  · Call your local CRISIS HOTLINE 804-0915 or 027-338-8485  · Call your local Children's Mobile Crisis Response Team [place] (373) 904-6739 or wwwMobi Rider  · Call the toll free National Suicide Prevention Hotlines   · National Suicide Prevention Lifeline 288-581-LADN (8451)  · National Hope Line Network 800-SUICIDE (120-9579)

## 2017-07-18 NOTE — DISCHARGE PLANNING
Received DME choice from [facility]) at 1218.  DME referral for Hospital Bed and Low Air Loss Mattress sent to [facility] at 1337.

## 2017-07-18 NOTE — PROGRESS NOTES
Pt discharged to home. Taken off unit in pt's own wheelchair by family. PICC line removed prior to discharge. No other changes from morning assessment. Pt read and understood discharge instructions. Madeleine Espitia R.N.
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
## 2017-07-18 NOTE — CARE PLAN
Problem: Oxygenation/Respiratory Function  Goal: Patient will Achieve/Maintain Normal Respiratory Rate/Effort  Pt on ra. Respirations equal and unlabored. No sob.     Problem: Nutrition Deficit  Goal: Adequate Food and Fluid Intake  Pt with good appetite. No n/v. bm tonight.
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
## 2017-10-08 DIAGNOSIS — L89.314 DECUBITUS ULCER OF RIGHT ISCHIUM, STAGE 4 (HCC): ICD-10-CM

## 2017-10-08 DIAGNOSIS — L89.324 DECUBITUS ULCER OF LEFT ISCHIUM, STAGE 4 (HCC): ICD-10-CM

## 2017-10-08 DIAGNOSIS — Z01.812 ENCOUNTER FOR PRE-OPERATIVE LABORATORY TESTING: ICD-10-CM

## 2017-10-11 ENCOUNTER — HOSPITAL ENCOUNTER (OUTPATIENT)
Dept: LAB | Facility: MEDICAL CENTER | Age: 57
End: 2017-10-11
Attending: SURGERY
Payer: MEDICAID

## 2017-10-11 DIAGNOSIS — Z01.812 ENCOUNTER FOR PRE-OPERATIVE LABORATORY TESTING: ICD-10-CM

## 2017-10-11 DIAGNOSIS — L89.314 DECUBITUS ULCER OF RIGHT ISCHIUM, STAGE 4 (HCC): ICD-10-CM

## 2017-10-11 DIAGNOSIS — L89.324 DECUBITUS ULCER OF LEFT ISCHIUM, STAGE 4 (HCC): ICD-10-CM

## 2017-10-11 LAB
ALBUMIN SERPL BCP-MCNC: 3.4 G/DL (ref 3.2–4.9)
ALBUMIN/GLOB SERPL: 0.7 G/DL
ALP SERPL-CCNC: 78 U/L (ref 30–99)
ALT SERPL-CCNC: 19 U/L (ref 2–50)
ANION GAP SERPL CALC-SCNC: 11 MMOL/L (ref 0–11.9)
AST SERPL-CCNC: 22 U/L (ref 12–45)
BILIRUB SERPL-MCNC: 0.4 MG/DL (ref 0.1–1.5)
BUN SERPL-MCNC: 18 MG/DL (ref 8–22)
CALCIUM SERPL-MCNC: 10 MG/DL (ref 8.5–10.5)
CHLORIDE SERPL-SCNC: 104 MMOL/L (ref 96–112)
CO2 SERPL-SCNC: 24 MMOL/L (ref 20–33)
CREAT SERPL-MCNC: 0.47 MG/DL (ref 0.5–1.4)
ERYTHROCYTE [DISTWIDTH] IN BLOOD BY AUTOMATED COUNT: 57.4 FL (ref 35.9–50)
GFR SERPL CREATININE-BSD FRML MDRD: >60 ML/MIN/1.73 M 2
GLOBULIN SER CALC-MCNC: 4.7 G/DL (ref 1.9–3.5)
GLUCOSE SERPL-MCNC: 98 MG/DL (ref 65–99)
HCT VFR BLD AUTO: 49.1 % (ref 42–52)
HGB BLD-MCNC: 14.7 G/DL (ref 14–18)
MCH RBC QN AUTO: 24.3 PG (ref 27–33)
MCHC RBC AUTO-ENTMCNC: 29.9 G/DL (ref 33.7–35.3)
MCV RBC AUTO: 81.3 FL (ref 81.4–97.8)
PLATELET # BLD AUTO: 461 K/UL (ref 164–446)
PMV BLD AUTO: 10 FL (ref 9–12.9)
POTASSIUM SERPL-SCNC: 3.7 MMOL/L (ref 3.6–5.5)
PREALB SERPL-MCNC: 21 MG/DL (ref 18–38)
PROT SERPL-MCNC: 8.1 G/DL (ref 6–8.2)
RBC # BLD AUTO: 6.04 M/UL (ref 4.7–6.1)
SODIUM SERPL-SCNC: 139 MMOL/L (ref 135–145)
WBC # BLD AUTO: 5.9 K/UL (ref 4.8–10.8)

## 2017-10-11 PROCEDURE — 85027 COMPLETE CBC AUTOMATED: CPT

## 2017-10-11 PROCEDURE — 36415 COLL VENOUS BLD VENIPUNCTURE: CPT

## 2017-10-11 PROCEDURE — 80053 COMPREHEN METABOLIC PANEL: CPT

## 2017-10-11 PROCEDURE — 84134 ASSAY OF PREALBUMIN: CPT

## 2017-10-17 ENCOUNTER — APPOINTMENT (OUTPATIENT)
Dept: ADMISSIONS | Facility: MEDICAL CENTER | Age: 57
DRG: 981 | End: 2017-10-17
Attending: SURGERY
Payer: MEDICAID

## 2017-10-17 NOTE — OR NURSING
PreAdmit Appointment: Reviewed Preparing for your procedure handout. Patient instructed to continue regularly prescribed medications through day before surgery. Instructed to take the following medications the day of surgery with a sip of water per Anesthesia protocol: Gabapentin, Midodrine.

## 2017-10-18 ENCOUNTER — RESOLUTE PROFESSIONAL BILLING HOSPITAL PROF FEE (OUTPATIENT)
Dept: HOSPITALIST | Facility: MEDICAL CENTER | Age: 57
End: 2017-10-18
Payer: MEDICAID

## 2017-10-18 ENCOUNTER — HOSPITAL ENCOUNTER (INPATIENT)
Facility: MEDICAL CENTER | Age: 57
LOS: 12 days | DRG: 981 | End: 2017-11-06
Attending: SURGERY | Admitting: INTERNAL MEDICINE
Payer: MEDICAID

## 2017-10-18 DIAGNOSIS — L89.314 STAGE IV PRESSURE ULCER OF RIGHT BUTTOCK (HCC): ICD-10-CM

## 2017-10-18 LAB
ALBUMIN SERPL BCP-MCNC: 3.8 G/DL (ref 3.2–4.9)
ALBUMIN/GLOB SERPL: 1.1 G/DL
ALP SERPL-CCNC: 80 U/L (ref 30–99)
ALT SERPL-CCNC: 60 U/L (ref 2–50)
ANION GAP SERPL CALC-SCNC: 10 MMOL/L (ref 0–11.9)
AST SERPL-CCNC: 75 U/L (ref 12–45)
BASOPHILS # BLD AUTO: 0.2 % (ref 0–1.8)
BASOPHILS # BLD: 0.03 K/UL (ref 0–0.12)
BILIRUB SERPL-MCNC: 0.4 MG/DL (ref 0.1–1.5)
BUN SERPL-MCNC: 19 MG/DL (ref 8–22)
CALCIUM SERPL-MCNC: 9.4 MG/DL (ref 8.4–10.2)
CHLORIDE SERPL-SCNC: 106 MMOL/L (ref 96–112)
CO2 SERPL-SCNC: 20 MMOL/L (ref 20–33)
CREAT SERPL-MCNC: 0.55 MG/DL (ref 0.5–1.4)
CRP SERPL HS-MCNC: 0.61 MG/DL (ref 0–0.75)
EOSINOPHIL # BLD AUTO: 0.02 K/UL (ref 0–0.51)
EOSINOPHIL NFR BLD: 0.2 % (ref 0–6.9)
ERYTHROCYTE [DISTWIDTH] IN BLOOD BY AUTOMATED COUNT: 52.9 FL (ref 35.9–50)
ERYTHROCYTE [SEDIMENTATION RATE] IN BLOOD BY WESTERGREN METHOD: 11 MM/HOUR (ref 0–20)
GFR SERPL CREATININE-BSD FRML MDRD: >60 ML/MIN/1.73 M 2
GLOBULIN SER CALC-MCNC: 3.4 G/DL (ref 1.9–3.5)
GLUCOSE SERPL-MCNC: 143 MG/DL (ref 65–99)
HCT VFR BLD AUTO: 45.8 % (ref 42–52)
HGB BLD-MCNC: 14.4 G/DL (ref 14–18)
IMM GRANULOCYTES # BLD AUTO: 0.06 K/UL (ref 0–0.11)
IMM GRANULOCYTES NFR BLD AUTO: 0.5 % (ref 0–0.9)
LYMPHOCYTES # BLD AUTO: 0.7 K/UL (ref 1–4.8)
LYMPHOCYTES NFR BLD: 5.5 % (ref 22–41)
MCH RBC QN AUTO: 24.5 PG (ref 27–33)
MCHC RBC AUTO-ENTMCNC: 31.4 G/DL (ref 33.7–35.3)
MCV RBC AUTO: 78 FL (ref 81.4–97.8)
MONOCYTES # BLD AUTO: 0.06 K/UL (ref 0–0.85)
MONOCYTES NFR BLD AUTO: 0.5 % (ref 0–13.4)
NEUTROPHILS # BLD AUTO: 11.92 K/UL (ref 1.82–7.42)
NEUTROPHILS NFR BLD: 93.1 % (ref 44–72)
NRBC # BLD AUTO: 0 K/UL
NRBC BLD AUTO-RTO: 0 /100 WBC
PATHOLOGY CONSULT NOTE: NORMAL
PLATELET # BLD AUTO: 411 K/UL (ref 164–446)
PMV BLD AUTO: 9.4 FL (ref 9–12.9)
POTASSIUM SERPL-SCNC: 4.1 MMOL/L (ref 3.6–5.5)
PROT SERPL-MCNC: 7.2 G/DL (ref 6–8.2)
RBC # BLD AUTO: 5.87 M/UL (ref 4.7–6.1)
SODIUM SERPL-SCNC: 136 MMOL/L (ref 135–145)
WBC # BLD AUTO: 12.8 K/UL (ref 4.8–10.8)

## 2017-10-18 PROCEDURE — A4450 NON-WATERPROOF TAPE: HCPCS | Performed by: SURGERY

## 2017-10-18 PROCEDURE — 160048 HCHG OR STATISTICAL LEVEL 1-5: Performed by: SURGERY

## 2017-10-18 PROCEDURE — 36415 COLL VENOUS BLD VENIPUNCTURE: CPT

## 2017-10-18 PROCEDURE — 700111 HCHG RX REV CODE 636 W/ 250 OVERRIDE (IP)

## 2017-10-18 PROCEDURE — 501838 HCHG SUTURE GENERAL: Performed by: SURGERY

## 2017-10-18 PROCEDURE — 88305 TISSUE EXAM BY PATHOLOGIST: CPT

## 2017-10-18 PROCEDURE — 307728: Performed by: SURGERY

## 2017-10-18 PROCEDURE — 0KXQ0ZZ TRANSFER RIGHT UPPER LEG MUSCLE, OPEN APPROACH: ICD-10-PCS | Performed by: SURGERY

## 2017-10-18 PROCEDURE — 160035 HCHG PACU - 1ST 60 MINS PHASE I: Performed by: SURGERY

## 2017-10-18 PROCEDURE — 700101 HCHG RX REV CODE 250

## 2017-10-18 PROCEDURE — 500423 HCHG DRESSING, ABD COMBINE: Performed by: SURGERY

## 2017-10-18 PROCEDURE — 86140 C-REACTIVE PROTEIN: CPT

## 2017-10-18 PROCEDURE — 80053 COMPREHEN METABOLIC PANEL: CPT

## 2017-10-18 PROCEDURE — 160038 HCHG SURGERY MINUTES - EA ADDL 1 MIN LEVEL 2: Performed by: SURGERY

## 2017-10-18 PROCEDURE — 700102 HCHG RX REV CODE 250 W/ 637 OVERRIDE(OP): Performed by: SURGERY

## 2017-10-18 PROCEDURE — 500452 HCHG DRESSING, WOUND VAC MED.: Performed by: SURGERY

## 2017-10-18 PROCEDURE — 160027 HCHG SURGERY MINUTES - 1ST 30 MINS LEVEL 2: Performed by: SURGERY

## 2017-10-18 PROCEDURE — A6223 GAUZE >16<=48 NO W/SAL W/O B: HCPCS | Performed by: SURGERY

## 2017-10-18 PROCEDURE — 160009 HCHG ANES TIME/MIN: Performed by: SURGERY

## 2017-10-18 PROCEDURE — G0378 HOSPITAL OBSERVATION PER HR: HCPCS

## 2017-10-18 PROCEDURE — 160002 HCHG RECOVERY MINUTES (STAT): Performed by: SURGERY

## 2017-10-18 PROCEDURE — 500389 HCHG DRAIN, RESERVOIR SUCT JP 100CC: Performed by: SURGERY

## 2017-10-18 PROCEDURE — A9270 NON-COVERED ITEM OR SERVICE: HCPCS | Performed by: SURGERY

## 2017-10-18 PROCEDURE — 160036 HCHG PACU - EA ADDL 30 MINS PHASE I: Performed by: SURGERY

## 2017-10-18 PROCEDURE — 0QB20ZZ EXCISION OF RIGHT PELVIC BONE, OPEN APPROACH: ICD-10-PCS | Performed by: SURGERY

## 2017-10-18 PROCEDURE — 307732 DRAIN ACCESSORY KIT,15FR CHNL: Performed by: SURGERY

## 2017-10-18 PROCEDURE — 85652 RBC SED RATE AUTOMATED: CPT

## 2017-10-18 PROCEDURE — 500371 HCHG DRAIN, BLAKE 10MM: Performed by: SURGERY

## 2017-10-18 PROCEDURE — 307736 CANISTER GO RENASYS 300ML: Performed by: SURGERY

## 2017-10-18 PROCEDURE — 501445 HCHG STAPLER, SKIN DISP: Performed by: SURGERY

## 2017-10-18 PROCEDURE — 99220 PR INITIAL OBSERVATION CARE,LEVL III: CPT | Performed by: FAMILY MEDICINE

## 2017-10-18 PROCEDURE — 85025 COMPLETE CBC W/AUTO DIFF WBC: CPT

## 2017-10-18 RX ORDER — ACETAMINOPHEN 325 MG/1
650 TABLET ORAL EVERY 6 HOURS PRN
Status: DISCONTINUED | OUTPATIENT
Start: 2017-10-18 | End: 2017-11-06 | Stop reason: HOSPADM

## 2017-10-18 RX ORDER — BISACODYL 10 MG
10 SUPPOSITORY, RECTAL RECTAL
Status: DISCONTINUED | OUTPATIENT
Start: 2017-10-18 | End: 2017-11-06 | Stop reason: HOSPADM

## 2017-10-18 RX ORDER — TRAMADOL HYDROCHLORIDE 50 MG/1
50 TABLET ORAL EVERY 4 HOURS PRN
Status: DISCONTINUED | OUTPATIENT
Start: 2017-10-18 | End: 2017-11-06 | Stop reason: HOSPADM

## 2017-10-18 RX ORDER — POLYETHYLENE GLYCOL 3350 17 G/17G
1 POWDER, FOR SOLUTION ORAL
Status: DISCONTINUED | OUTPATIENT
Start: 2017-10-18 | End: 2017-11-06 | Stop reason: HOSPADM

## 2017-10-18 RX ORDER — AMOXICILLIN 250 MG
2 CAPSULE ORAL 2 TIMES DAILY
Status: DISCONTINUED | OUTPATIENT
Start: 2017-10-18 | End: 2017-11-02

## 2017-10-18 RX ORDER — MIDODRINE HYDROCHLORIDE 10 MG/1
10 TABLET ORAL
Status: DISCONTINUED | OUTPATIENT
Start: 2017-10-18 | End: 2017-10-23

## 2017-10-18 RX ORDER — GABAPENTIN 300 MG/1
900 CAPSULE ORAL 3 TIMES DAILY
Status: DISCONTINUED | OUTPATIENT
Start: 2017-10-18 | End: 2017-11-06 | Stop reason: HOSPADM

## 2017-10-18 RX ORDER — SODIUM CHLORIDE 9 MG/ML
1000 INJECTION, SOLUTION INTRAVENOUS
Status: ACTIVE | OUTPATIENT
Start: 2017-10-18 | End: 2017-10-19

## 2017-10-18 RX ORDER — FERROUS SULFATE 325(65) MG
325 TABLET ORAL
Status: DISCONTINUED | OUTPATIENT
Start: 2017-10-19 | End: 2017-11-06 | Stop reason: HOSPADM

## 2017-10-18 RX ORDER — ASCORBIC ACID 500 MG
500 TABLET ORAL 2 TIMES DAILY
Status: DISCONTINUED | OUTPATIENT
Start: 2017-10-18 | End: 2017-11-06 | Stop reason: HOSPADM

## 2017-10-18 RX ADMIN — MIDODRINE HYDROCHLORIDE 10 MG: 2.5 TABLET ORAL at 20:51

## 2017-10-18 RX ADMIN — OXYCODONE HYDROCHLORIDE AND ACETAMINOPHEN 500 MG: 500 TABLET ORAL at 13:03

## 2017-10-18 RX ADMIN — GABAPENTIN 900 MG: 300 CAPSULE ORAL at 20:51

## 2017-10-18 RX ADMIN — OXYCODONE HYDROCHLORIDE AND ACETAMINOPHEN 500 MG: 500 TABLET ORAL at 20:51

## 2017-10-18 RX ADMIN — ASPIRIN 81 MG: 81 TABLET, COATED ORAL at 13:03

## 2017-10-18 RX ADMIN — GABAPENTIN 900 MG: 300 CAPSULE ORAL at 13:03

## 2017-10-18 RX ADMIN — MIDODRINE HYDROCHLORIDE 10 MG: 2.5 TABLET ORAL at 13:04

## 2017-10-18 ASSESSMENT — PAIN SCALES - GENERAL
PAINLEVEL_OUTOF10: 0

## 2017-10-18 ASSESSMENT — LIFESTYLE VARIABLES
EVER_SMOKED: NEVER
ALCOHOL_USE: NO

## 2017-10-18 NOTE — PROGRESS NOTES
Pt to floor via PACU. A$0x4. VSS. No c/o pain at this time. Educated on POC. Call light w/in reach. Will continue to monitor.
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
## 2017-10-18 NOTE — OR NURSING
0815 Patient to preop via own wheelchair. Patient transferred to bed times four staff without difficulty. Patient gowned for procedure, vital signs taken, consent form signed. Call light within reach, patient awaits MD.

## 2017-10-18 NOTE — OR SURGEON
Immediate Post OP Note    PreOp Diagnosis: Stage 4 ischial pressure ulcer    PostOp Diagnosis: Same    Procedure(s):  ULCER EXCISION- ISCHIAL PRESSURE UCLER - Wound Class: Dirty or Infected  FLAP CLOSURE- MUSCLE - Wound Class: Dirty or Infected    Surgeon(s):  Marbin Arriola M.D.    Anesthesiologist/Type of Anesthesia:  Anesthesiologist: Dylan Kuhn M.D.  Anesthesia Technician: Suellen Barajas/General    Surgical Staff:  Circulator: Kaitlin Beth R.N.  Relief Scrub: Mikey Wiley  Scrub Person: Vipul Warren    Specimens:  * No specimens in log *    Estimated Blood Loss: 50 cc     Findings:     Complications: none        10/18/2017 10:51 AM Marbin Arriola

## 2017-10-18 NOTE — OR NURSING
1059: To PACU post excision right ischial pressure ulcer flap closure. Pt is awake, breathing is spontaneous and unlabored. DOTTIE drain in place and compressed. Also present is left ischial pressure ulcer w/ tegaderm present on admission.  1150: Pt remains pain/nausea free. Meets criteria for transfer to room.

## 2017-10-19 PROCEDURE — 700111 HCHG RX REV CODE 636 W/ 250 OVERRIDE (IP): Performed by: INTERNAL MEDICINE

## 2017-10-19 PROCEDURE — G0378 HOSPITAL OBSERVATION PER HR: HCPCS

## 2017-10-19 PROCEDURE — 700105 HCHG RX REV CODE 258: Performed by: INTERNAL MEDICINE

## 2017-10-19 PROCEDURE — 99225 PR SUBSEQUENT OBSERVATION CARE,LEVEL II: CPT | Performed by: INTERNAL MEDICINE

## 2017-10-19 RX ORDER — ONDANSETRON 2 MG/ML
4 INJECTION INTRAMUSCULAR; INTRAVENOUS EVERY 4 HOURS PRN
Status: DISCONTINUED | OUTPATIENT
Start: 2017-10-19 | End: 2017-11-06 | Stop reason: HOSPADM

## 2017-10-19 RX ORDER — SODIUM CHLORIDE 9 MG/ML
INJECTION, SOLUTION INTRAVENOUS CONTINUOUS
Status: DISCONTINUED | OUTPATIENT
Start: 2017-10-19 | End: 2017-10-21

## 2017-10-19 RX ADMIN — ONDANSETRON 4 MG: 2 INJECTION INTRAMUSCULAR; INTRAVENOUS at 23:26

## 2017-10-19 RX ADMIN — SODIUM CHLORIDE: 9 INJECTION, SOLUTION INTRAVENOUS at 23:26

## 2017-10-19 RX ADMIN — SODIUM CHLORIDE: 9 INJECTION, SOLUTION INTRAVENOUS at 15:45

## 2017-10-19 RX ADMIN — ONDANSETRON 4 MG: 2 INJECTION INTRAMUSCULAR; INTRAVENOUS at 14:16

## 2017-10-19 RX ADMIN — ONDANSETRON 4 MG: 2 INJECTION INTRAMUSCULAR; INTRAVENOUS at 18:36

## 2017-10-19 ASSESSMENT — ENCOUNTER SYMPTOMS
FOCAL WEAKNESS: 1
SENSORY CHANGE: 1
VOMITING: 1
MUSCULOSKELETAL NEGATIVE: 1
RESPIRATORY NEGATIVE: 1
CONSTITUTIONAL NEGATIVE: 1
NAUSEA: 1
CARDIOVASCULAR NEGATIVE: 1
EYES NEGATIVE: 1

## 2017-10-19 ASSESSMENT — PAIN SCALES - GENERAL: PAINLEVEL_OUTOF10: 0

## 2017-10-19 NOTE — DISCHARGE PLANNING
DENI notified new referral for LTAC referral placed for this patient.  DENI met with this patient bedside and patient stated that he would like to go to [facility]  DENI completed choice form and faxed to AYANNA Maya for referral follow up.

## 2017-10-19 NOTE — CARE PLAN
Problem: Communication  Goal: The ability to communicate needs accurately and effectively will improve  Outcome: PROGRESSING AS EXPECTED  Educate patient on expressing needs. Teach use of call light.  Concerns addressed and questions answered regarding plan of care.     Problem: Skin Integrity  Goal: Risk for impaired skin integrity will decrease  Outcome: PROGRESSING AS EXPECTED  Pt to be kept off of right side to promote healing of pressure ulcer on ischium.  Air mattress to prevent pressure to sacrum.

## 2017-10-19 NOTE — PROGRESS NOTES
Pt not feeling well today.has had 3 episodes of n/v.  Vomited 700 cc x 2.notified ,orders received.    Medicated with zofran and iv fluids started.    Pt remains on lt side.wound nurse here.orders for wound care placed.  Pt.on low air loss mattress.  Dig stim performed this am,but no stool so far.small amt of stool felt in rectum.

## 2017-10-19 NOTE — WOUND TEAM
Spoke with Dr. Arriola who reports wound consult for sacral and left ischial wound care.  Wound team will see tomorrow.  Staff aware of importance of patient not lying on right side due to flap.

## 2017-10-19 NOTE — DIETARY
Nutrition Services:  56 year old male admitted for ischial pressure ulcer stage IV.   PMH:  Quadraplegia  Labs reviewed  Pertinent Medications:  Ascorbic Acid, ferrous sulfate  BMI:  21.18  Diet Rx:  Regular  Patient pre-admitted for ulcer excision and flap closure.  Visited with patient.  Per patient, he took in 75% of breakfast this am.  He did vomit later, but states that he thinks that it was from anesthesia s/p surgery.  He states that this is usually not a problem.  Discussed protein for wound healing. Patient would like chocolate Boost w/meals.  Plan/Recommendations:  1)  Chocolate Boost w/meals.  2)  RD to monitor PO intake and clinical course.

## 2017-10-19 NOTE — WOUND TEAM
"[facility]  Inpatient Services  Initial Wound and Skin Care Evaluation    Admission Date:  10/18/17  HPI, PMH, SH: Reviewed  Unit where seen by Wound Team: [facility] [id number]    WOUND CONSULT RELATED TO:  Evaluation and wound care recommendations for sacral and left ischial wounds    SUBJECTIVE:  \"So what is the place downstairs?\"    Self Report / Pain Level:  Denies due to paralysis    OBJECTIVE:    Dressing intact over right ischial flap; dressings coming off ischial and sacral wounds; on ASHOK surface    WOUND TYPE, LOCATION, CHARACTERISTICS (Pressure ulcers: location, stage, POA or date identified)    Wound Type/Location:  Stage 4 pressure ulcer sacrum and left ischium POA    Periwound:  Intact, pink both wounds     Drainage:  Both with min-mod tan serous     Tissue Type and %:   Red 100% both wounds   Wound Edges:  Rolled, attached, macerated    Odor:  None either wound      Exposed structure(s):  None either wound   S&S of Infection:   none   Measurements:  (taken 10/19/17)   Sacrum              Left ischium     Length:              3cm                    5.3cm   Width:                7.5cm                 3cm   Depth:                 1.5cm                 <0.5cm   Tracts/undermining:  None either wound     INTERVENTIONS BY WOUND TEAM:   Assisted patient onto left side so wounds could be visualized.  Removed old dressings and cleansed wounds with NS gauze.  Measurements and photo taken.  Covered wound beds with silver hydrofiber and secured with adhesive foam dressing.  Instructed staff RN to be sure that patient uses only dri jena pads on  ASHOK bed and to support/float feet with pillows ensuring that he isn't lying on any tubing.  Patient has urostomy which he is knowledgeable with and can direct staff in care.  Supplies left at bedside.  Stoma red per pouch and patent with concentrated yellow urine with shreds mucus and attached to down drain.    Interdisciplinary consultation: staff RN, " patient    EVALUATION:   Chronic stage 4 pressure ulcers sacrum and left ischium with flap right ischial pressure ulcer yesterday.  Silver hydrofiber to control moisture and promote antimicrobial environment    Factors affecting wound healing:  Chronicity of wounds and history of poor compliance  Goals:  Wounds to decrease by 1% weekly    NURSING PLAN OF CARE ORDERS (X):    Dressing changes: See Dressing Maintenance orders:  X  Skin care: See Skin Care orders:   Rectal tube care: See Rectal Tube Care orders:    Other orders:    RSKIN: CURRENT (X) ORDERED (O)  Q shift Sebas:  X  Q shift pressure point assessments:  X  Atmosair         ASHOK    X   Bariatric ASHOK       Bariatric foam         Heel float boots        Heels floated on pillows   X    Barrier wipes       Barrier Cream       Barrier paste    Sacral silicone dressing      Padded O2 tubing       Anchorfast       Trach with Optifoam split foam        Waffle cushion       Rectal tube or BMS       Antifungal tx    Turn q 2 hours  X   Up to chair      Ambulate    PT/OT      Dietician      PO  X   TF   TPN     PVN    NPO   # days    Other       WOUND TEAM PLAN OF CARE (X):    NPWT change 3 x week:         Dressing changes by wound team:       Follow up as needed:   X     Other (explain):    Anticipated discharge plans (X):  SNF:           Home Care:           Outpatient Wound Center:            Self Care:            Other:    X LTAC

## 2017-10-19 NOTE — CARE PLAN
Problem: Nutritional:  Goal: Achieve adequate nutritional intake  Patient will consume 50-75% of meals  Outcome: PROGRESSING AS EXPECTED

## 2017-10-19 NOTE — PROGRESS NOTES
PLASTICS    POD#1 right ischial pressure ulcer excision, posterior thigh flap closure.  Patients as comfortable as he can be in the pressure-offloading bed.  Afebrile.   Drain 40cc serosanguinous.  Brief look through dressings shows flap is pink and warm.  Will take down dressings tomorrow.  Appreciate wound care management of additional ulcers.  Continue pressure offloading on right side.  Try to keep right hip as straight as possible.   Awaiting placement.
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
## 2017-10-19 NOTE — PROGRESS NOTES
Report received from day RN, pt aao, on air mattress with pressure off of right side, readjusted pillows for comfort, vss, drink within reach, call button within reach, DOTTIE with 40ml sanguinous drainage, dressing to right hip c/d/i, urostomy intact draining yellow urine with mucous strands, denies any additional needs, will continue rounding.

## 2017-10-19 NOTE — CARE PLAN
Problem: Venous Thromboembolism (VTW)/Deep Vein Thrombosis (DVT) Prevention:  Goal: Patient will participate in Venous Thrombosis (VTE)/Deep Vein Thrombosis (DVT)Prevention Measures  SCDs on    Problem: Bowel/Gastric:  Goal: Normal bowel function is maintained or improved  Pt reports to have digital stimulation every day at 0900 for BM

## 2017-10-19 NOTE — ASSESSMENT & PLAN NOTE
S/p R ischial decub ulcer excision and muscle flap closure;   Dr. Arriola recommends further inpatient treatment of patient's wounds  Return for infection patient accepted to the SNF will likely discharge tomorrow

## 2017-10-20 ENCOUNTER — APPOINTMENT (OUTPATIENT)
Dept: RADIOLOGY | Facility: MEDICAL CENTER | Age: 57
DRG: 981 | End: 2017-10-20
Attending: INTERNAL MEDICINE
Payer: MEDICAID

## 2017-10-20 PROBLEM — N39.0 UTI (URINARY TRACT INFECTION): Status: ACTIVE | Noted: 2017-10-20

## 2017-10-20 LAB
APPEARANCE UR: ABNORMAL
BACTERIA #/AREA URNS HPF: ABNORMAL /HPF
BILIRUB UR QL STRIP.AUTO: NEGATIVE
COLOR UR: YELLOW
EPI CELLS #/AREA URNS HPF: ABNORMAL /HPF
GLUCOSE UR STRIP.AUTO-MCNC: NEGATIVE MG/DL
KETONES UR STRIP.AUTO-MCNC: ABNORMAL MG/DL
LEUKOCYTE ESTERASE UR QL STRIP.AUTO: NEGATIVE
MICRO URNS: ABNORMAL
MUCOUS THREADS #/AREA URNS HPF: ABNORMAL /HPF
NITRITE UR QL STRIP.AUTO: NEGATIVE
PH UR STRIP.AUTO: 8.5 [PH]
PROT UR QL STRIP: NEGATIVE MG/DL
RBC # URNS HPF: ABNORMAL /HPF
RBC UR QL AUTO: ABNORMAL
SP GR UR STRIP.AUTO: 1.01
UNIDENT CRYS URNS QL MICRO: ABNORMAL /HPF
WBC #/AREA URNS HPF: ABNORMAL /HPF

## 2017-10-20 PROCEDURE — 700105 HCHG RX REV CODE 258: Performed by: INTERNAL MEDICINE

## 2017-10-20 PROCEDURE — 87086 URINE CULTURE/COLONY COUNT: CPT

## 2017-10-20 PROCEDURE — 87186 SC STD MICRODIL/AGAR DIL: CPT

## 2017-10-20 PROCEDURE — G0378 HOSPITAL OBSERVATION PER HR: HCPCS

## 2017-10-20 PROCEDURE — 74000 DX-ABDOMEN-1 VIEW: CPT

## 2017-10-20 PROCEDURE — 700111 HCHG RX REV CODE 636 W/ 250 OVERRIDE (IP): Performed by: INTERNAL MEDICINE

## 2017-10-20 PROCEDURE — 87077 CULTURE AEROBIC IDENTIFY: CPT

## 2017-10-20 PROCEDURE — 81001 URINALYSIS AUTO W/SCOPE: CPT

## 2017-10-20 PROCEDURE — A9270 NON-COVERED ITEM OR SERVICE: HCPCS | Performed by: SURGERY

## 2017-10-20 PROCEDURE — 99225 PR SUBSEQUENT OBSERVATION CARE,LEVEL II: CPT | Performed by: INTERNAL MEDICINE

## 2017-10-20 PROCEDURE — 700102 HCHG RX REV CODE 250 W/ 637 OVERRIDE(OP): Performed by: SURGERY

## 2017-10-20 RX ORDER — POLYETHYLENE GLYCOL 3350 17 G/17G
1 POWDER, FOR SOLUTION ORAL DAILY
Status: DISCONTINUED | OUTPATIENT
Start: 2017-10-20 | End: 2017-10-21

## 2017-10-20 RX ORDER — CIPROFLOXACIN 2 MG/ML
400 INJECTION, SOLUTION INTRAVENOUS EVERY 12 HOURS
Status: DISCONTINUED | OUTPATIENT
Start: 2017-10-20 | End: 2017-10-21

## 2017-10-20 RX ADMIN — PROCHLORPERAZINE EDISYLATE 10 MG: 5 INJECTION INTRAMUSCULAR; INTRAVENOUS at 13:17

## 2017-10-20 RX ADMIN — ASPIRIN 81 MG: 81 TABLET, COATED ORAL at 10:33

## 2017-10-20 RX ADMIN — SODIUM CHLORIDE: 9 INJECTION, SOLUTION INTRAVENOUS at 13:18

## 2017-10-20 RX ADMIN — OXYCODONE HYDROCHLORIDE AND ACETAMINOPHEN 500 MG: 500 TABLET ORAL at 10:33

## 2017-10-20 RX ADMIN — OXYCODONE HYDROCHLORIDE AND ACETAMINOPHEN 500 MG: 500 TABLET ORAL at 21:17

## 2017-10-20 RX ADMIN — PROCHLORPERAZINE EDISYLATE 10 MG: 5 INJECTION INTRAMUSCULAR; INTRAVENOUS at 02:18

## 2017-10-20 RX ADMIN — FERROUS SULFATE TAB 325 MG (65 MG ELEMENTAL FE) 325 MG: 325 (65 FE) TAB at 10:33

## 2017-10-20 RX ADMIN — GABAPENTIN 900 MG: 300 CAPSULE ORAL at 21:17

## 2017-10-20 RX ADMIN — MIDODRINE HYDROCHLORIDE 10 MG: 2.5 TABLET ORAL at 10:33

## 2017-10-20 RX ADMIN — POLYETHYLENE GLYCOL 3350 1 PACKET: 17 POWDER, FOR SOLUTION ORAL at 06:40

## 2017-10-20 RX ADMIN — GABAPENTIN 900 MG: 300 CAPSULE ORAL at 10:33

## 2017-10-20 RX ADMIN — CIPROFLOXACIN 400 MG: 2 INJECTION, SOLUTION INTRAVENOUS at 21:19

## 2017-10-20 ASSESSMENT — ENCOUNTER SYMPTOMS
CONSTITUTIONAL NEGATIVE: 1
MUSCULOSKELETAL NEGATIVE: 1
SENSORY CHANGE: 1
FOCAL WEAKNESS: 1
RESPIRATORY NEGATIVE: 1
VOMITING: 1
EYES NEGATIVE: 1
CARDIOVASCULAR NEGATIVE: 1
NAUSEA: 1

## 2017-10-20 ASSESSMENT — PAIN SCALES - GENERAL
PAINLEVEL_OUTOF10: 0
PAINLEVEL_OUTOF10: 0

## 2017-10-20 NOTE — PROGRESS NOTES
Pt was able to eat a small amt of brkfst w/o emesis this am/  Ate small amt of lunch.around 1315 pt c/o nausea.medicated with compazine.  1430-had 2 emesis of 100 cc each liquid.  Pt shaking.feels miserable.repositioned pt.  Checked for any noxious stimuli.none noted  Spoke with .regarding vss,shaking and urine.which is foul smelling and cloudy.  New orders received.  Changed urostomy bag and smyth collection bag.waiting for clean specimen to send to lab.

## 2017-10-20 NOTE — PROGRESS NOTES
Pt received dig stim at this time.stool in rectum soft.pt states at home his caregiver does dig stim with disimpaction.  This RN proceeded to disimpact pt for lg amt soft brown stool.

## 2017-10-20 NOTE — PROGRESS NOTES
At 0155: Pt vomited and c/o of gas pain & bloating, given Zofran at 2326; however pt still c/o feeling sick to the stomach. Paged on-call hospitalist.  At 0158: Dr. Trevino returned phone call & being aware of patient's condition. Dr. Trevino concerned of bowel obstruction due to multiple emeses. Stat xray-abdomen & compazine ordered.
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
## 2017-10-20 NOTE — DISCHARGE PLANNING
DENI Jha called Monika with [facility] [phone] who stated she never received a referral.  DENI stated the fax referral went through yesterday at noon.  Monika will follow up and get back to DENI.

## 2017-10-20 NOTE — PROGRESS NOTES
Ordered abd xray to rule out bowel obstruction for pt with persistent nausea and vomiting and abd pain. Nausea/vomiting resistant to zofran. Compazine added.

## 2017-10-20 NOTE — DISCHARGE PLANNING
DENI Jha received a call from Monika at [facility] who stated they have to decline as pt has a flap and his Medicaid will not cover the cost of care for that.   DENI informed. Dr. Estevez.

## 2017-10-20 NOTE — PROGRESS NOTES
PLASTICS    POD#2  Had persistent nausea, vomiting last night, relieved with compazine.  Abd x-ray showed constipation.  Otherwise doing okay.  Flap pink and viable, incision line intact.  Drain 30-40cc serosanguinous.  Will leave in for now.  Awaiting LTAC.

## 2017-10-20 NOTE — PROGRESS NOTES
Received report from day shift nurse. Assumed pt care at 1915. Pt is A&Ox4, resting comfortably in bed. Pt on r.a.. Pt c/o no pain at this moment. No signs of SOB/respiratory distress. Labs noted, VSS.  Assessment completed; pt had RLQ urostomy bad in place & draining properly; surgical dressing to R buttock and sacrum CDI, no purulent drainage noted. Fall precautions in place. Bed locked & at lowest position. Call light and personal belongings within reach. Encouraged pt to call for any assistance. Continue to monitor

## 2017-10-21 LAB
ALBUMIN SERPL BCP-MCNC: 3 G/DL (ref 3.2–4.9)
ALBUMIN/GLOB SERPL: 1 G/DL
ALP SERPL-CCNC: 55 U/L (ref 30–99)
ALT SERPL-CCNC: 21 U/L (ref 2–50)
ANION GAP SERPL CALC-SCNC: 5 MMOL/L (ref 0–11.9)
AST SERPL-CCNC: 18 U/L (ref 12–45)
BASOPHILS # BLD AUTO: 0.5 % (ref 0–1.8)
BASOPHILS # BLD: 0.06 K/UL (ref 0–0.12)
BILIRUB SERPL-MCNC: 0.6 MG/DL (ref 0.1–1.5)
BUN SERPL-MCNC: 15 MG/DL (ref 8–22)
CALCIUM SERPL-MCNC: 8.6 MG/DL (ref 8.4–10.2)
CHLORIDE SERPL-SCNC: 113 MMOL/L (ref 96–112)
CO2 SERPL-SCNC: 23 MMOL/L (ref 20–33)
CREAT SERPL-MCNC: 0.44 MG/DL (ref 0.5–1.4)
EOSINOPHIL # BLD AUTO: 0.58 K/UL (ref 0–0.51)
EOSINOPHIL NFR BLD: 5.3 % (ref 0–6.9)
ERYTHROCYTE [DISTWIDTH] IN BLOOD BY AUTOMATED COUNT: 57.1 FL (ref 35.9–50)
GFR SERPL CREATININE-BSD FRML MDRD: >60 ML/MIN/1.73 M 2
GLOBULIN SER CALC-MCNC: 3 G/DL (ref 1.9–3.5)
GLUCOSE SERPL-MCNC: 91 MG/DL (ref 65–99)
HCT VFR BLD AUTO: 38.2 % (ref 42–52)
HGB BLD-MCNC: 11.8 G/DL (ref 14–18)
IMM GRANULOCYTES # BLD AUTO: 0.05 K/UL (ref 0–0.11)
IMM GRANULOCYTES NFR BLD AUTO: 0.5 % (ref 0–0.9)
LYMPHOCYTES # BLD AUTO: 2.78 K/UL (ref 1–4.8)
LYMPHOCYTES NFR BLD: 25.4 % (ref 22–41)
MCH RBC QN AUTO: 24.3 PG (ref 27–33)
MCHC RBC AUTO-ENTMCNC: 30.2 G/DL (ref 33.7–35.3)
MCV RBC AUTO: 80.4 FL (ref 81.4–97.8)
MONOCYTES # BLD AUTO: 1 K/UL (ref 0–0.85)
MONOCYTES NFR BLD AUTO: 9.1 % (ref 0–13.4)
NEUTROPHILS # BLD AUTO: 6.46 K/UL (ref 1.82–7.42)
NEUTROPHILS NFR BLD: 59.2 % (ref 44–72)
NRBC # BLD AUTO: 0 K/UL
NRBC BLD AUTO-RTO: 0 /100 WBC
PLATELET # BLD AUTO: 295 K/UL (ref 164–446)
PMV BLD AUTO: 9.9 FL (ref 9–12.9)
POTASSIUM SERPL-SCNC: 3.7 MMOL/L (ref 3.6–5.5)
PROT SERPL-MCNC: 6 G/DL (ref 6–8.2)
RBC # BLD AUTO: 4.74 M/UL (ref 4.7–6.1)
SODIUM SERPL-SCNC: 141 MMOL/L (ref 135–145)
WBC # BLD AUTO: 10.9 K/UL (ref 4.8–10.8)

## 2017-10-21 PROCEDURE — A9270 NON-COVERED ITEM OR SERVICE: HCPCS | Performed by: INTERNAL MEDICINE

## 2017-10-21 PROCEDURE — G0378 HOSPITAL OBSERVATION PER HR: HCPCS

## 2017-10-21 PROCEDURE — A9270 NON-COVERED ITEM OR SERVICE: HCPCS | Performed by: SURGERY

## 2017-10-21 PROCEDURE — 700111 HCHG RX REV CODE 636 W/ 250 OVERRIDE (IP): Performed by: INTERNAL MEDICINE

## 2017-10-21 PROCEDURE — 36415 COLL VENOUS BLD VENIPUNCTURE: CPT

## 2017-10-21 PROCEDURE — 700102 HCHG RX REV CODE 250 W/ 637 OVERRIDE(OP): Performed by: SURGERY

## 2017-10-21 PROCEDURE — 80053 COMPREHEN METABOLIC PANEL: CPT

## 2017-10-21 PROCEDURE — 85025 COMPLETE CBC W/AUTO DIFF WBC: CPT

## 2017-10-21 PROCEDURE — 700102 HCHG RX REV CODE 250 W/ 637 OVERRIDE(OP): Performed by: INTERNAL MEDICINE

## 2017-10-21 PROCEDURE — 99225 PR SUBSEQUENT OBSERVATION CARE,LEVEL II: CPT | Performed by: INTERNAL MEDICINE

## 2017-10-21 RX ORDER — CIPROFLOXACIN 500 MG/1
500 TABLET, FILM COATED ORAL EVERY 12 HOURS
Status: DISCONTINUED | OUTPATIENT
Start: 2017-10-21 | End: 2017-10-22

## 2017-10-21 RX ORDER — POLYETHYLENE GLYCOL 3350 17 G/17G
1 POWDER, FOR SOLUTION ORAL DAILY
Status: DISCONTINUED | OUTPATIENT
Start: 2017-10-21 | End: 2017-11-06 | Stop reason: HOSPADM

## 2017-10-21 RX ADMIN — CIPROFLOXACIN 400 MG: 2 INJECTION, SOLUTION INTRAVENOUS at 08:29

## 2017-10-21 RX ADMIN — MIDODRINE HYDROCHLORIDE 10 MG: 2.5 TABLET ORAL at 08:27

## 2017-10-21 RX ADMIN — GABAPENTIN 900 MG: 300 CAPSULE ORAL at 16:23

## 2017-10-21 RX ADMIN — MIDODRINE HYDROCHLORIDE 10 MG: 2.5 TABLET ORAL at 16:23

## 2017-10-21 RX ADMIN — GABAPENTIN 900 MG: 300 CAPSULE ORAL at 08:29

## 2017-10-21 RX ADMIN — GABAPENTIN 900 MG: 300 CAPSULE ORAL at 20:32

## 2017-10-21 RX ADMIN — CIPROFLOXACIN 500 MG: 500 TABLET, FILM COATED ORAL at 20:32

## 2017-10-21 RX ADMIN — MIDODRINE HYDROCHLORIDE 10 MG: 2.5 TABLET ORAL at 12:26

## 2017-10-21 RX ADMIN — OXYCODONE HYDROCHLORIDE AND ACETAMINOPHEN 500 MG: 500 TABLET ORAL at 20:32

## 2017-10-21 RX ADMIN — ASPIRIN 81 MG: 81 TABLET, COATED ORAL at 08:29

## 2017-10-21 RX ADMIN — OXYCODONE HYDROCHLORIDE AND ACETAMINOPHEN 500 MG: 500 TABLET ORAL at 08:27

## 2017-10-21 RX ADMIN — FERROUS SULFATE TAB 325 MG (65 MG ELEMENTAL FE) 325 MG: 325 (65 FE) TAB at 08:29

## 2017-10-21 ASSESSMENT — LIFESTYLE VARIABLES: DO YOU DRINK ALCOHOL: NO

## 2017-10-21 ASSESSMENT — ENCOUNTER SYMPTOMS
RESPIRATORY NEGATIVE: 1
MUSCULOSKELETAL NEGATIVE: 1
EYES NEGATIVE: 1
NAUSEA: 0
FOCAL WEAKNESS: 1
CONSTITUTIONAL NEGATIVE: 1
VOMITING: 0
SENSORY CHANGE: 1
CARDIOVASCULAR NEGATIVE: 1

## 2017-10-21 ASSESSMENT — PAIN SCALES - GENERAL
PAINLEVEL_OUTOF10: 0

## 2017-10-21 NOTE — DISCHARGE PLANNING
Medical Social Work    Referral: DENI reviewed the chart this AM.      Intervention: [facility] declined.  Not therapies evaluated so DENI is unsure if SNF is appropriate for referral.  DENI will discuss at IDT rounds today.      Plan: DENI Jha available to assist with any d.c planning.      Care Transition Team Assessment    Information Source  Orientation : Oriented x 4  Information Given By: Patient    Readmission Evaluation  Is this a readmission?: No    Elopement Risk  Legal Hold: No  Ambulatory or Self Mobile in Wheelchair: No-Not an Elopement Risk  Elopement Risk: Not at Risk for Elopement    Interdisciplinary Discharge Planning  Does Admitting Nurse Feel This Could be a Complex Discharge?: No  Do You Take your Prescribed Medications Regularly: Yes  Able to Return to Previous ADL's: Future Time w/Therapy  Mobility Issues: Yes  Prior Services: Continuous (24 Hour) Care Giving Per Service  Patient Expects to be Discharged to:: home  Assistance Needed: Unknown at this Time  Durable Medical Equipment: Other - Specify (wheelchair)    Discharge Preparedness  What is your plan after discharge?: Uncertain - pending medical team collaboration         Finances  Prescription Coverage: Yes    Vision / Hearing Impairment  Right Eye Vision: Wears Glasses  Left Eye Vision: Wears Glasses    Values / Beliefs / Concerns  Values / Beliefs Concerns : No  Spiritual Requests During Hospitalization: No    Advance Directive  Advance Directive?: None    Domestic Abuse  Have you ever been the victim of abuse or violence?: No  Physical Abuse or Sexual Abuse: No  Verbal Abuse or Emotional Abuse: No  Possible Abuse Reported to:: Not Applicable    Psychological Assessment  History of Substance Abuse: None

## 2017-10-21 NOTE — CARE PLAN
Problem: Bowel/Gastric:  Goal: Normal bowel function is maintained or improved  Outcome: PROGRESSING AS EXPECTED  Pt had dig stim. with disimpaction ,this is what pt.does at home.

## 2017-10-21 NOTE — PROGRESS NOTES
Pt resting in bed, alternating to left side and back. Pt denies pain and N/V. Tolerating meals well. Urostomy bag draining and hanging to gravity. Dressings changed per order, linens changed. Pt watching TV and denies needs at this time, will cont with care.

## 2017-10-21 NOTE — CARE PLAN
Problem: Safety  Goal: Will remain free from injury  Outcome: PROGRESSING AS EXPECTED      Problem: Respiratory:  Goal: Respiratory status will improve  Outcome: PROGRESSING AS EXPECTED
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
## 2017-10-21 NOTE — DISCHARGE PLANNING
DENI Jha met with pt to obtain verbal consent for blanket SNF referral and pt's first choice is [facility] as it is close to his home.  DENI faxed choice form to [facility] for blanket referral.

## 2017-10-22 LAB
ANION GAP SERPL CALC-SCNC: 7 MMOL/L (ref 0–11.9)
BACTERIA UR CULT: ABNORMAL
BACTERIA UR CULT: ABNORMAL
BASOPHILS # BLD AUTO: 0.7 % (ref 0–1.8)
BASOPHILS # BLD: 0.08 K/UL (ref 0–0.12)
BUN SERPL-MCNC: 11 MG/DL (ref 8–22)
CALCIUM SERPL-MCNC: 8.7 MG/DL (ref 8.4–10.2)
CHLORIDE SERPL-SCNC: 109 MMOL/L (ref 96–112)
CO2 SERPL-SCNC: 22 MMOL/L (ref 20–33)
CREAT SERPL-MCNC: 0.47 MG/DL (ref 0.5–1.4)
EOSINOPHIL # BLD AUTO: 0.97 K/UL (ref 0–0.51)
EOSINOPHIL NFR BLD: 8.1 % (ref 0–6.9)
ERYTHROCYTE [DISTWIDTH] IN BLOOD BY AUTOMATED COUNT: 57 FL (ref 35.9–50)
GFR SERPL CREATININE-BSD FRML MDRD: >60 ML/MIN/1.73 M 2
GLUCOSE SERPL-MCNC: 90 MG/DL (ref 65–99)
HCT VFR BLD AUTO: 40.7 % (ref 42–52)
HGB BLD-MCNC: 12.2 G/DL (ref 14–18)
IMM GRANULOCYTES # BLD AUTO: 0.05 K/UL (ref 0–0.11)
IMM GRANULOCYTES NFR BLD AUTO: 0.4 % (ref 0–0.9)
LYMPHOCYTES # BLD AUTO: 2.13 K/UL (ref 1–4.8)
LYMPHOCYTES NFR BLD: 17.8 % (ref 22–41)
MCH RBC QN AUTO: 24.4 PG (ref 27–33)
MCHC RBC AUTO-ENTMCNC: 30 G/DL (ref 33.7–35.3)
MCV RBC AUTO: 81.4 FL (ref 81.4–97.8)
MONOCYTES # BLD AUTO: 1.03 K/UL (ref 0–0.85)
MONOCYTES NFR BLD AUTO: 8.6 % (ref 0–13.4)
NEUTROPHILS # BLD AUTO: 7.69 K/UL (ref 1.82–7.42)
NEUTROPHILS NFR BLD: 64.4 % (ref 44–72)
NRBC # BLD AUTO: 0 K/UL
NRBC BLD AUTO-RTO: 0 /100 WBC
PLATELET # BLD AUTO: 319 K/UL (ref 164–446)
PMV BLD AUTO: 9.8 FL (ref 9–12.9)
POTASSIUM SERPL-SCNC: 3.7 MMOL/L (ref 3.6–5.5)
RBC # BLD AUTO: 5 M/UL (ref 4.7–6.1)
SIGNIFICANT IND 70042: ABNORMAL
SITE SITE: ABNORMAL
SODIUM SERPL-SCNC: 138 MMOL/L (ref 135–145)
SOURCE SOURCE: ABNORMAL
WBC # BLD AUTO: 12 K/UL (ref 4.8–10.8)

## 2017-10-22 PROCEDURE — 36415 COLL VENOUS BLD VENIPUNCTURE: CPT

## 2017-10-22 PROCEDURE — A9270 NON-COVERED ITEM OR SERVICE: HCPCS | Performed by: SURGERY

## 2017-10-22 PROCEDURE — 80048 BASIC METABOLIC PNL TOTAL CA: CPT

## 2017-10-22 PROCEDURE — G0378 HOSPITAL OBSERVATION PER HR: HCPCS

## 2017-10-22 PROCEDURE — 85025 COMPLETE CBC W/AUTO DIFF WBC: CPT

## 2017-10-22 PROCEDURE — 700102 HCHG RX REV CODE 250 W/ 637 OVERRIDE(OP): Performed by: INTERNAL MEDICINE

## 2017-10-22 PROCEDURE — 700102 HCHG RX REV CODE 250 W/ 637 OVERRIDE(OP): Performed by: SURGERY

## 2017-10-22 PROCEDURE — 99225 PR SUBSEQUENT OBSERVATION CARE,LEVEL II: CPT | Performed by: INTERNAL MEDICINE

## 2017-10-22 PROCEDURE — A9270 NON-COVERED ITEM OR SERVICE: HCPCS | Performed by: INTERNAL MEDICINE

## 2017-10-22 RX ORDER — NITROFURANTOIN MACROCRYSTALS 100 MG/1
100 CAPSULE ORAL
Status: COMPLETED | OUTPATIENT
Start: 2017-10-22 | End: 2017-10-29

## 2017-10-22 RX ADMIN — GABAPENTIN 900 MG: 300 CAPSULE ORAL at 20:44

## 2017-10-22 RX ADMIN — NITROFURANTOIN (MACROCRYSTALS) 100 MG: 100 CAPSULE ORAL at 20:44

## 2017-10-22 RX ADMIN — MIDODRINE HYDROCHLORIDE 10 MG: 2.5 TABLET ORAL at 08:44

## 2017-10-22 RX ADMIN — OXYCODONE HYDROCHLORIDE AND ACETAMINOPHEN 500 MG: 500 TABLET ORAL at 20:44

## 2017-10-22 RX ADMIN — CIPROFLOXACIN 500 MG: 500 TABLET, FILM COATED ORAL at 08:46

## 2017-10-22 RX ADMIN — NITROFURANTOIN (MACROCRYSTALS) 100 MG: 100 CAPSULE ORAL at 16:50

## 2017-10-22 RX ADMIN — FERROUS SULFATE TAB 325 MG (65 MG ELEMENTAL FE) 325 MG: 325 (65 FE) TAB at 08:45

## 2017-10-22 RX ADMIN — ASPIRIN 81 MG: 81 TABLET, COATED ORAL at 08:46

## 2017-10-22 RX ADMIN — GABAPENTIN 900 MG: 300 CAPSULE ORAL at 16:49

## 2017-10-22 RX ADMIN — MIDODRINE HYDROCHLORIDE 10 MG: 2.5 TABLET ORAL at 12:24

## 2017-10-22 RX ADMIN — GABAPENTIN 900 MG: 300 CAPSULE ORAL at 08:44

## 2017-10-22 RX ADMIN — OXYCODONE HYDROCHLORIDE AND ACETAMINOPHEN 500 MG: 500 TABLET ORAL at 08:45

## 2017-10-22 RX ADMIN — MIDODRINE HYDROCHLORIDE 10 MG: 2.5 TABLET ORAL at 16:50

## 2017-10-22 ASSESSMENT — ENCOUNTER SYMPTOMS
FOCAL WEAKNESS: 1
RESPIRATORY NEGATIVE: 1
VOMITING: 0
NAUSEA: 0
CARDIOVASCULAR NEGATIVE: 1
SENSORY CHANGE: 1
MUSCULOSKELETAL NEGATIVE: 1
EYES NEGATIVE: 1
CONSTITUTIONAL NEGATIVE: 1

## 2017-10-22 ASSESSMENT — PAIN SCALES - GENERAL
PAINLEVEL_OUTOF10: 0
PAINLEVEL_OUTOF10: 0

## 2017-10-22 ASSESSMENT — LIFESTYLE VARIABLES: DO YOU DRINK ALCOHOL: NO

## 2017-10-22 NOTE — PROGRESS NOTES
Fazal called to update on urine culture result of Klebsiella pneumoniae ESBL. Updated Dr. Estevez, will cont with care.
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
## 2017-10-23 ENCOUNTER — APPOINTMENT (OUTPATIENT)
Dept: RADIOLOGY | Facility: MEDICAL CENTER | Age: 57
DRG: 981 | End: 2017-10-23
Attending: INTERNAL MEDICINE
Payer: MEDICAID

## 2017-10-23 PROCEDURE — 700102 HCHG RX REV CODE 250 W/ 637 OVERRIDE(OP): Performed by: INTERNAL MEDICINE

## 2017-10-23 PROCEDURE — 94640 AIRWAY INHALATION TREATMENT: CPT

## 2017-10-23 PROCEDURE — A9270 NON-COVERED ITEM OR SERVICE: HCPCS | Performed by: INTERNAL MEDICINE

## 2017-10-23 PROCEDURE — G0378 HOSPITAL OBSERVATION PER HR: HCPCS

## 2017-10-23 PROCEDURE — A9270 NON-COVERED ITEM OR SERVICE: HCPCS | Performed by: SURGERY

## 2017-10-23 PROCEDURE — 700101 HCHG RX REV CODE 250: Performed by: INTERNAL MEDICINE

## 2017-10-23 PROCEDURE — 94760 N-INVAS EAR/PLS OXIMETRY 1: CPT

## 2017-10-23 PROCEDURE — 99225 PR SUBSEQUENT OBSERVATION CARE,LEVEL II: CPT | Performed by: INTERNAL MEDICINE

## 2017-10-23 PROCEDURE — 700102 HCHG RX REV CODE 250 W/ 637 OVERRIDE(OP): Performed by: SURGERY

## 2017-10-23 RX ORDER — MIDODRINE HYDROCHLORIDE 10 MG/1
10 TABLET ORAL
Status: DISCONTINUED | OUTPATIENT
Start: 2017-10-23 | End: 2017-11-06 | Stop reason: HOSPADM

## 2017-10-23 RX ORDER — LEVALBUTEROL INHALATION SOLUTION 1.25 MG/3ML
1.25 SOLUTION RESPIRATORY (INHALATION)
Status: DISCONTINUED | OUTPATIENT
Start: 2017-10-23 | End: 2017-10-24

## 2017-10-23 RX ORDER — MIDODRINE HYDROCHLORIDE 10 MG/1
10 TABLET ORAL ONCE
Status: DISCONTINUED | OUTPATIENT
Start: 2017-10-23 | End: 2017-10-24

## 2017-10-23 RX ORDER — LEVALBUTEROL INHALATION SOLUTION 1.25 MG/3ML
1.25 SOLUTION RESPIRATORY (INHALATION)
Status: DISCONTINUED | OUTPATIENT
Start: 2017-10-23 | End: 2017-10-23

## 2017-10-23 RX ORDER — GUAIFENESIN 600 MG/1
600 TABLET, EXTENDED RELEASE ORAL EVERY 12 HOURS
Status: DISCONTINUED | OUTPATIENT
Start: 2017-10-23 | End: 2017-11-02

## 2017-10-23 RX ADMIN — MIDODRINE HYDROCHLORIDE 10 MG: 10 TABLET ORAL at 11:04

## 2017-10-23 RX ADMIN — GABAPENTIN 900 MG: 300 CAPSULE ORAL at 08:13

## 2017-10-23 RX ADMIN — GUAIFENESIN 600 MG: 600 TABLET, EXTENDED RELEASE ORAL at 20:51

## 2017-10-23 RX ADMIN — NITROFURANTOIN (MACROCRYSTALS) 100 MG: 100 CAPSULE ORAL at 12:00

## 2017-10-23 RX ADMIN — LEVALBUTEROL 1.25 MG: 1.25 SOLUTION RESPIRATORY (INHALATION) at 12:30

## 2017-10-23 RX ADMIN — NITROFURANTOIN (MACROCRYSTALS) 100 MG: 100 CAPSULE ORAL at 18:15

## 2017-10-23 RX ADMIN — GABAPENTIN 900 MG: 300 CAPSULE ORAL at 20:50

## 2017-10-23 RX ADMIN — DOCUSATE SODIUM AND SENNOSIDES 2 TABLET: 8.6; 5 TABLET, FILM COATED ORAL at 20:51

## 2017-10-23 RX ADMIN — GABAPENTIN 900 MG: 300 CAPSULE ORAL at 14:27

## 2017-10-23 RX ADMIN — LEVALBUTEROL 1.25 MG: 1.25 SOLUTION RESPIRATORY (INHALATION) at 21:09

## 2017-10-23 RX ADMIN — NITROFURANTOIN (MACROCRYSTALS) 100 MG: 100 CAPSULE ORAL at 08:13

## 2017-10-23 RX ADMIN — NITROFURANTOIN (MACROCRYSTALS) 100 MG: 100 CAPSULE ORAL at 20:51

## 2017-10-23 RX ADMIN — MIDODRINE HYDROCHLORIDE 10 MG: 10 TABLET ORAL at 18:15

## 2017-10-23 RX ADMIN — OXYCODONE HYDROCHLORIDE AND ACETAMINOPHEN 500 MG: 500 TABLET ORAL at 08:14

## 2017-10-23 RX ADMIN — ASPIRIN 81 MG: 81 TABLET, COATED ORAL at 08:13

## 2017-10-23 RX ADMIN — OXYCODONE HYDROCHLORIDE AND ACETAMINOPHEN 500 MG: 500 TABLET ORAL at 20:51

## 2017-10-23 RX ADMIN — GUAIFENESIN 600 MG: 600 TABLET, EXTENDED RELEASE ORAL at 11:03

## 2017-10-23 ASSESSMENT — ENCOUNTER SYMPTOMS
VOMITING: 0
SENSORY CHANGE: 1
CARDIOVASCULAR NEGATIVE: 1
MUSCULOSKELETAL NEGATIVE: 1
NAUSEA: 0
FOCAL WEAKNESS: 1
RESPIRATORY NEGATIVE: 1
CONSTITUTIONAL NEGATIVE: 1
EYES NEGATIVE: 1

## 2017-10-23 ASSESSMENT — PAIN SCALES - GENERAL
PAINLEVEL_OUTOF10: 0

## 2017-10-23 ASSESSMENT — COPD QUESTIONNAIRES
COPD SCREENING SCORE: 2
DO YOU EVER COUGH UP ANY MUCUS OR PHLEGM?: NO/ONLY WITH OCCASIONAL COLDS OR INFECTIONS
HAVE YOU SMOKED AT LEAST 100 CIGARETTES IN YOUR ENTIRE LIFE: NO/DON'T KNOW
DURING THE PAST 4 WEEKS HOW MUCH DID YOU FEEL SHORT OF BREATH: NONE/LITTLE OF THE TIME

## 2017-10-23 ASSESSMENT — LIFESTYLE VARIABLES: EVER_SMOKED: NEVER

## 2017-10-23 NOTE — CARE PLAN
Problem: Safety  Goal: Will remain free from injury  Outcome: PROGRESSING AS EXPECTED  Mobility assessed. Pt requiring two assist for ADLs/ Educated on the importance of calling for assistance, vebalizes understanding. Upper bed rails up x2, bed in low/locked position, hourly rounding in place.     Problem: Skin Integrity  Goal: Risk for impaired skin integrity will decrease  Outcome: PROGRESSING AS EXPECTED  Skin assessments q shift. Turns in place. Wound dressings daily as ordered.

## 2017-10-23 NOTE — FLOWSHEET NOTE
10/23/17 1211   Interdisciplinary Plan of Care-Goals (Indications)   Obstructive Ventilatory Defect or Pulmonary Disease without Obvious Obstruction History / Diagnosis   Bronchopulmonary Hygiene Indications Difficulty with Secretion Clearance   Interdisciplinary Plan of Care-Outcomes    Bronchodilator Outcome Patient at Stable Baseline;Improvement in Airflow (peak flow, PFT)   Bronchopulmonary Hygiene Outcome Patient Subjective Impression of Less Retention and Improved Clearance   Education   Education Yes - Pt. / Family has been Instructed in use of Respiratory Medications and Adverse Reactions;Yes - Pt. / Family has been Instructed in use of Respiratory Equipment   RT Assessment of Delivered Medications   Evaluation of Medication Delivery Daily Yes-- Pt /Family has been Instructed in use of Respiratory Medications and Adverse Reactions   SVN Group   #SVN Performed Yes   Given By: Mouthpiece   Date SVN Last Changed 10/23/17   Date SVN Next Change Due (Q 7 Days) 10/30/17   Incentive Spirometry Group   Incentive Spirometry Instruction Yes   Breathing Exercises Yes   Incentive Spirometer Volume 1250 mL   Incentive Spirometer Date Last Changed 10/23/17   Incentive Spirometer Next Change Date (Q 30 Days) 11/23/17   Respiratory WDL   Respiratory (WDL) X   Chest Exam   Work Of Breathing / Effort Moderate;Increased Work of Breathing   Respiration (!) 24   Heart Rate (Monitored) (!) 110   Breath Sounds   Pre/Post Intervention Pre Intervention Assessment   RUL Breath Sounds Coarse Crackles   RML Breath Sounds Coarse Crackles;Diminished   RLL Breath Sounds Diminished   EDDIE Breath Sounds Diminished;Coarse Crackles   LLL Breath Sounds Diminished   Secretions   Cough Quad;Weak;Non Productive   Sputum Amount Unable to Evaluate   Oximetry   #Pulse Oximetry (Single Determination) Yes   Oxygen   Home O2 Use Prior To Admission? No   Pulse Oximetry 93 %   O2 (LPM) 2   O2 Daily Delivery Respiratory  Silicone Nasal Cannula

## 2017-10-23 NOTE — PROGRESS NOTES
Pt c/o cough/SOB. Attempted to assist with quad cough without significant relief. O2 sat 94 on RA. DW Dr. Estevez. New orders received for CXR and RT protocol. Called RT to assess the pt.

## 2017-10-23 NOTE — PROGRESS NOTES
Received Bedside report. Assumed care at 0715. This pt is AOx4, requiring two person assist, no N/V this morning, voiding, declines pain. Patient and RN discussed plan of care including abx, turns, dressing maintenance, and dc planning: questions answered. Labs noted, assessment complete, patient tolerating regular diet. Call light in place, fall precautions in place, patient educated on importance of calling for assistance. No additional needs at this time. VSS

## 2017-10-23 NOTE — PROGRESS NOTES
Reporting relief after RT treatment. Declines pain. Respirations even and unlabored at this time. Awaiting CXR.
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
## 2017-10-23 NOTE — PROGRESS NOTES
Attempted to manually stimulate BM. No stool felt in rectum. Small amount of light brown mucous expressed. Changed wound dressings as ordered.

## 2017-10-24 PROCEDURE — 94760 N-INVAS EAR/PLS OXIMETRY 1: CPT

## 2017-10-24 PROCEDURE — 700102 HCHG RX REV CODE 250 W/ 637 OVERRIDE(OP): Performed by: INTERNAL MEDICINE

## 2017-10-24 PROCEDURE — 700105 HCHG RX REV CODE 258: Performed by: INTERNAL MEDICINE

## 2017-10-24 PROCEDURE — A9270 NON-COVERED ITEM OR SERVICE: HCPCS | Performed by: SURGERY

## 2017-10-24 PROCEDURE — 99225 PR SUBSEQUENT OBSERVATION CARE,LEVEL II: CPT | Performed by: INTERNAL MEDICINE

## 2017-10-24 PROCEDURE — 94640 AIRWAY INHALATION TREATMENT: CPT

## 2017-10-24 PROCEDURE — 700101 HCHG RX REV CODE 250: Performed by: INTERNAL MEDICINE

## 2017-10-24 PROCEDURE — G0378 HOSPITAL OBSERVATION PER HR: HCPCS

## 2017-10-24 PROCEDURE — A9270 NON-COVERED ITEM OR SERVICE: HCPCS | Performed by: INTERNAL MEDICINE

## 2017-10-24 PROCEDURE — 700102 HCHG RX REV CODE 250 W/ 637 OVERRIDE(OP): Performed by: SURGERY

## 2017-10-24 RX ORDER — SIMETHICONE 80 MG
80 TABLET,CHEWABLE ORAL 3 TIMES DAILY PRN
Status: DISCONTINUED | OUTPATIENT
Start: 2017-10-24 | End: 2017-11-06 | Stop reason: HOSPADM

## 2017-10-24 RX ORDER — SODIUM CHLORIDE 9 MG/ML
INJECTION, SOLUTION INTRAVENOUS CONTINUOUS
Status: DISCONTINUED | OUTPATIENT
Start: 2017-10-24 | End: 2017-10-27

## 2017-10-24 RX ORDER — LEVALBUTEROL INHALATION SOLUTION 1.25 MG/3ML
1.25 SOLUTION RESPIRATORY (INHALATION)
Status: DISCONTINUED | OUTPATIENT
Start: 2017-10-24 | End: 2017-11-06 | Stop reason: HOSPADM

## 2017-10-24 RX ADMIN — MIDODRINE HYDROCHLORIDE 10 MG: 10 TABLET ORAL at 13:14

## 2017-10-24 RX ADMIN — LEVALBUTEROL 1.25 MG: 1.25 SOLUTION RESPIRATORY (INHALATION) at 18:36

## 2017-10-24 RX ADMIN — NITROFURANTOIN (MACROCRYSTALS) 100 MG: 100 CAPSULE ORAL at 18:20

## 2017-10-24 RX ADMIN — GABAPENTIN 900 MG: 300 CAPSULE ORAL at 21:32

## 2017-10-24 RX ADMIN — SIMETHICONE CHEW TAB 80 MG 80 MG: 80 TABLET ORAL at 21:32

## 2017-10-24 RX ADMIN — GABAPENTIN 900 MG: 300 CAPSULE ORAL at 09:00

## 2017-10-24 RX ADMIN — OXYCODONE HYDROCHLORIDE AND ACETAMINOPHEN 500 MG: 500 TABLET ORAL at 21:32

## 2017-10-24 RX ADMIN — SIMETHICONE CHEW TAB 80 MG 80 MG: 80 TABLET ORAL at 13:13

## 2017-10-24 RX ADMIN — GUAIFENESIN 600 MG: 600 TABLET, EXTENDED RELEASE ORAL at 21:33

## 2017-10-24 RX ADMIN — GABAPENTIN 900 MG: 300 CAPSULE ORAL at 16:20

## 2017-10-24 RX ADMIN — NITROFURANTOIN (MACROCRYSTALS) 100 MG: 100 CAPSULE ORAL at 09:00

## 2017-10-24 RX ADMIN — SODIUM CHLORIDE: 9 INJECTION, SOLUTION INTRAVENOUS at 22:23

## 2017-10-24 RX ADMIN — GUAIFENESIN 600 MG: 600 TABLET, EXTENDED RELEASE ORAL at 09:00

## 2017-10-24 RX ADMIN — FERROUS SULFATE TAB 325 MG (65 MG ELEMENTAL FE) 325 MG: 325 (65 FE) TAB at 09:00

## 2017-10-24 RX ADMIN — NITROFURANTOIN (MACROCRYSTALS) 100 MG: 100 CAPSULE ORAL at 21:33

## 2017-10-24 RX ADMIN — MIDODRINE HYDROCHLORIDE 10 MG: 10 TABLET ORAL at 18:20

## 2017-10-24 RX ADMIN — OXYCODONE HYDROCHLORIDE AND ACETAMINOPHEN 500 MG: 500 TABLET ORAL at 09:00

## 2017-10-24 RX ADMIN — NITROFURANTOIN (MACROCRYSTALS) 100 MG: 100 CAPSULE ORAL at 13:13

## 2017-10-24 RX ADMIN — ASPIRIN 81 MG: 81 TABLET, COATED ORAL at 09:00

## 2017-10-24 RX ADMIN — MIDODRINE HYDROCHLORIDE 10 MG: 10 TABLET ORAL at 09:00

## 2017-10-24 ASSESSMENT — ENCOUNTER SYMPTOMS
CONSTITUTIONAL NEGATIVE: 1
CARDIOVASCULAR NEGATIVE: 1
RESPIRATORY NEGATIVE: 1
FOCAL WEAKNESS: 1
EYES NEGATIVE: 1
SENSORY CHANGE: 1
MUSCULOSKELETAL NEGATIVE: 1

## 2017-10-24 ASSESSMENT — PAIN SCALES - GENERAL
PAINLEVEL_OUTOF10: 0

## 2017-10-24 NOTE — FLOWSHEET NOTE
10/23/17 2109   Events/Summary/Plan   Events/Summary/Plan SVN    Interdisciplinary Plan of Care-Goals (Indications)   Obstructive Ventilatory Defect or Pulmonary Disease without Obvious Obstruction History / Diagnosis   Interdisciplinary Plan of Care-Outcomes    Bronchodilator Outcome Patient at Stable Baseline;Improvement in Airflow (peak flow, PFT)   Education   Education Yes - Pt. / Family has been Instructed in use of Respiratory Equipment;Yes - Pt. / Family has been Instructed in use of Respiratory Medications and Adverse Reactions   RT Assessment of Delivered Medications   Evaluation of Medication Delivery Daily Yes-- Pt /Family has been Instructed in use of Respiratory Medications and Adverse Reactions   SVN Group   #SVN Performed Yes   Given By: Mouthpiece   Respiratory WDL   Respiratory (WDL) X   Chest Exam   Respiration 18   Pulse (!) 102   Breath Sounds   Pre/Post Intervention Post Intervention Assessment   RUL Breath Sounds Coarse Crackles   RML Breath Sounds Coarse Crackles;Diminished   RLL Breath Sounds Diminished   EDDIE Breath Sounds Diminished;Coarse Crackles   LLL Breath Sounds Diminished   Oximetry   #Pulse Oximetry (Single Determination) Yes   Oxygen   Pulse Oximetry 99 %   O2 (LPM) 2   O2 Daily Delivery Respiratory  Silicone Nasal Cannula

## 2017-10-24 NOTE — FLOWSHEET NOTE
"   10/24/17 0714   Therapy Not Performed   Type of Therapy Not Performed SVN;PEP   Reason Therapy Not Performed Refused   Breath Sounds   RUL Breath Sounds Clear   RML Breath Sounds Clear   RLL Breath Sounds Diminished   EDDIE Breath Sounds Clear   LLL Breath Sounds Diminished   Oximetry   #Pulse Oximetry (Single Determination) Yes   Oxygen   Pulse Oximetry 96 %   O2 (LPM) 0   O2 Daily Delivery Respiratory  Room Air with O2 Available   Room Air Challenge Pass     Patient refusing treatment despite a discussion about him getting PNA while he is here.  Patient states that \"I'm OK, I know when my lungs are getting bad.  I haven't slept all night and I'm tired\"  "

## 2017-10-24 NOTE — PROGRESS NOTES
Called hospitalist, Dr. Olson, to report change in vitals, BP 93/59, ,  Temp 100.6. Dr. Olson's orders were to continue monitoring vitals but expected his BP to run low due to paraplegic status. Will continue to monitor vitals.

## 2017-10-24 NOTE — CARE PLAN
Problem: Knowledge Deficit  Goal: Knowledge of disease process/condition, treatment plan, diagnostic tests, and medications will improve  Outcome: PROGRESSING AS EXPECTED  updated on plan of care. Questions answered. Discussing all medications/treatments.     Problem: Skin Integrity  Goal: Risk for impaired skin integrity will decrease  Outcome: PROGRESSING AS EXPECTED  Skin assessments q shift. Dressing changes as ordered. Floating heels, turns in place.

## 2017-10-24 NOTE — FLOWSHEET NOTE
10/23/17 1907   Therapy Not Performed   Type of Therapy Not Performed SVN;PEP   Reason Therapy Not Performed Refused   Chest Exam   Respiration 16   Pulse 97   Oximetry   #Pulse Oximetry (Single Determination) Yes   Oxygen   Pulse Oximetry 94 %   O2 Daily Delivery Respiratory  Silicone Nasal Cannula

## 2017-10-24 NOTE — PROGRESS NOTES
Pt resting in bed, appears comfortable, eyes closed. Respirations even and unlabored. No needs at this time.

## 2017-10-24 NOTE — PROGRESS NOTES
PLASTICS    No new issues  Drain 15 cc  Flap pink and viable, skin edges intact.  Will D/C drain.  Staples for 2 weeks.  Awaiting placement.
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
## 2017-10-24 NOTE — PROGRESS NOTES
Patient requesting RN's in the morning, to look at the wound on his left buttock. He is concerned that this will get worse due to the restriction from repositioning on right side due to wound. I assured patient that I, and another RN, will check this for him in the morning before shift change.     Patient ready for bed after RT treatment; RT at bedside now: 21:05

## 2017-10-24 NOTE — DISCHARGE PLANNING
Received call from DENI to follow up with SNFs.   Contacted [facility], spoke to Luisa Lucas, NO Medicaid beds.  Contacted [facility], spoke to Rafa, NO Medicaid beds.  Notified DENI Purcell via phone.

## 2017-10-24 NOTE — CARE PLAN
Problem: Skin Integrity  Goal: Risk for impaired skin integrity will decrease  Progressing as expected; q2h turns, skin assessment q shift, dressing change daily    Problem: Respiratory:  Goal: Respiratory status will improve  Patient uses IS with help. RT in place.

## 2017-10-24 NOTE — PROGRESS NOTES
Received Bedside report. Assumed care at 0715. This pt is AOx4, requiring two person assist, no N/V this morning, declines pain. Patient and RN discussed plan of care including dc planning/awaiting placement: questions answered. Labs noted, assessment complete, patient tolerating regular diet. Call light in place, fall precautions in place, patient educated on importance of calling for assistance. No additional needs at this time. VSS

## 2017-10-24 NOTE — DISCHARGE PLANNING
SW received phone call from this patient's WIN  Hanny ([phone]) she informed this SW that she will be holding this patient's services open for 45 day but will have to discontinue his services if he is out of the home pas the 45th day.  She will continue to monitor for discharge updates.

## 2017-10-25 ENCOUNTER — APPOINTMENT (OUTPATIENT)
Dept: RADIOLOGY | Facility: MEDICAL CENTER | Age: 57
DRG: 981 | End: 2017-10-25
Attending: INTERNAL MEDICINE
Payer: MEDICAID

## 2017-10-25 LAB
BASOPHILS # BLD AUTO: 0.5 % (ref 0–1.8)
BASOPHILS # BLD: 0.05 K/UL (ref 0–0.12)
CRP SERPL HS-MCNC: 14.38 MG/DL (ref 0–0.75)
EOSINOPHIL # BLD AUTO: 1.04 K/UL (ref 0–0.51)
EOSINOPHIL NFR BLD: 10.5 % (ref 0–6.9)
ERYTHROCYTE [DISTWIDTH] IN BLOOD BY AUTOMATED COUNT: 54.5 FL (ref 35.9–50)
HCT VFR BLD AUTO: 38.6 % (ref 42–52)
HGB BLD-MCNC: 12.1 G/DL (ref 14–18)
IMM GRANULOCYTES # BLD AUTO: 0.06 K/UL (ref 0–0.11)
IMM GRANULOCYTES NFR BLD AUTO: 0.6 % (ref 0–0.9)
LYMPHOCYTES # BLD AUTO: 0.46 K/UL (ref 1–4.8)
LYMPHOCYTES NFR BLD: 4.6 % (ref 22–41)
MCH RBC QN AUTO: 24.9 PG (ref 27–33)
MCHC RBC AUTO-ENTMCNC: 31.3 G/DL (ref 33.7–35.3)
MCV RBC AUTO: 79.6 FL (ref 81.4–97.8)
MONOCYTES # BLD AUTO: 0.59 K/UL (ref 0–0.85)
MONOCYTES NFR BLD AUTO: 5.9 % (ref 0–13.4)
NEUTROPHILS # BLD AUTO: 7.75 K/UL (ref 1.82–7.42)
NEUTROPHILS NFR BLD: 77.9 % (ref 44–72)
NRBC # BLD AUTO: 0 K/UL
NRBC BLD AUTO-RTO: 0 /100 WBC
PLATELET # BLD AUTO: 316 K/UL (ref 164–446)
PMV BLD AUTO: 9.7 FL (ref 9–12.9)
PROCALCITONIN SERPL-MCNC: 0.52 NG/ML
RBC # BLD AUTO: 4.85 M/UL (ref 4.7–6.1)
WBC # BLD AUTO: 10 K/UL (ref 4.8–10.8)

## 2017-10-25 PROCEDURE — A9270 NON-COVERED ITEM OR SERVICE: HCPCS | Performed by: INTERNAL MEDICINE

## 2017-10-25 PROCEDURE — 3E0234Z INTRODUCTION OF SERUM, TOXOID AND VACCINE INTO MUSCLE, PERCUTANEOUS APPROACH: ICD-10-PCS | Performed by: INTERNAL MEDICINE

## 2017-10-25 PROCEDURE — 90471 IMMUNIZATION ADMIN: CPT

## 2017-10-25 PROCEDURE — 36415 COLL VENOUS BLD VENIPUNCTURE: CPT

## 2017-10-25 PROCEDURE — 770006 HCHG ROOM/CARE - MED/SURG/GYN SEMI*

## 2017-10-25 PROCEDURE — A9270 NON-COVERED ITEM OR SERVICE: HCPCS | Performed by: SURGERY

## 2017-10-25 PROCEDURE — 71010 DX-CHEST-LIMITED (1 VIEW): CPT

## 2017-10-25 PROCEDURE — 86140 C-REACTIVE PROTEIN: CPT

## 2017-10-25 PROCEDURE — 700111 HCHG RX REV CODE 636 W/ 250 OVERRIDE (IP): Performed by: INTERNAL MEDICINE

## 2017-10-25 PROCEDURE — 85025 COMPLETE CBC W/AUTO DIFF WBC: CPT

## 2017-10-25 PROCEDURE — 90686 IIV4 VACC NO PRSV 0.5 ML IM: CPT | Performed by: INTERNAL MEDICINE

## 2017-10-25 PROCEDURE — 700102 HCHG RX REV CODE 250 W/ 637 OVERRIDE(OP): Performed by: SURGERY

## 2017-10-25 PROCEDURE — 700105 HCHG RX REV CODE 258: Performed by: INTERNAL MEDICINE

## 2017-10-25 PROCEDURE — 700102 HCHG RX REV CODE 250 W/ 637 OVERRIDE(OP): Performed by: INTERNAL MEDICINE

## 2017-10-25 PROCEDURE — 99225 PR SUBSEQUENT OBSERVATION CARE,LEVEL II: CPT | Performed by: INTERNAL MEDICINE

## 2017-10-25 PROCEDURE — 84145 PROCALCITONIN (PCT): CPT

## 2017-10-25 RX ADMIN — INFLUENZA A VIRUS A/MICHIGAN/45/2015 X-275 (H1N1) ANTIGEN (FORMALDEHYDE INACTIVATED), INFLUENZA A VIRUS A/HONG KONG/4801/2014 X-263B (H3N2) ANTIGEN (FORMALDEHYDE INACTIVATED), INFLUENZA B VIRUS B/PHUKET/3073/2013 ANTIGEN (FORMALDEHYDE INACTIVATED), AND INFLUENZA B VIRUS B/BRISBANE/60/2008 ANTIGEN (FORMALDEHYDE INACTIVATED) 0.5 ML: 15; 15; 15; 15 INJECTION, SUSPENSION INTRAMUSCULAR at 12:17

## 2017-10-25 RX ADMIN — FERROUS SULFATE TAB 325 MG (65 MG ELEMENTAL FE) 325 MG: 325 (65 FE) TAB at 08:18

## 2017-10-25 RX ADMIN — NITROFURANTOIN (MACROCRYSTALS) 100 MG: 100 CAPSULE ORAL at 18:36

## 2017-10-25 RX ADMIN — ASPIRIN 81 MG: 81 TABLET, COATED ORAL at 08:19

## 2017-10-25 RX ADMIN — MIDODRINE HYDROCHLORIDE 10 MG: 10 TABLET ORAL at 18:36

## 2017-10-25 RX ADMIN — GUAIFENESIN 600 MG: 600 TABLET, EXTENDED RELEASE ORAL at 20:38

## 2017-10-25 RX ADMIN — GUAIFENESIN 600 MG: 600 TABLET, EXTENDED RELEASE ORAL at 08:18

## 2017-10-25 RX ADMIN — NITROFURANTOIN (MACROCRYSTALS) 100 MG: 100 CAPSULE ORAL at 08:18

## 2017-10-25 RX ADMIN — MIDODRINE HYDROCHLORIDE 10 MG: 10 TABLET ORAL at 08:19

## 2017-10-25 RX ADMIN — NITROFURANTOIN (MACROCRYSTALS) 100 MG: 100 CAPSULE ORAL at 12:16

## 2017-10-25 RX ADMIN — GABAPENTIN 900 MG: 300 CAPSULE ORAL at 08:18

## 2017-10-25 RX ADMIN — SIMETHICONE CHEW TAB 80 MG 80 MG: 80 TABLET ORAL at 18:00

## 2017-10-25 RX ADMIN — SIMETHICONE CHEW TAB 80 MG 80 MG: 80 TABLET ORAL at 08:25

## 2017-10-25 RX ADMIN — NITROFURANTOIN (MACROCRYSTALS) 100 MG: 100 CAPSULE ORAL at 20:38

## 2017-10-25 RX ADMIN — GABAPENTIN 900 MG: 300 CAPSULE ORAL at 16:10

## 2017-10-25 RX ADMIN — GABAPENTIN 900 MG: 300 CAPSULE ORAL at 20:38

## 2017-10-25 RX ADMIN — OXYCODONE HYDROCHLORIDE AND ACETAMINOPHEN 500 MG: 500 TABLET ORAL at 20:38

## 2017-10-25 RX ADMIN — MIDODRINE HYDROCHLORIDE 10 MG: 10 TABLET ORAL at 12:16

## 2017-10-25 RX ADMIN — SODIUM CHLORIDE: 9 INJECTION, SOLUTION INTRAVENOUS at 20:40

## 2017-10-25 RX ADMIN — OXYCODONE HYDROCHLORIDE AND ACETAMINOPHEN 500 MG: 500 TABLET ORAL at 08:18

## 2017-10-25 ASSESSMENT — PAIN SCALES - GENERAL
PAINLEVEL_OUTOF10: 0

## 2017-10-25 ASSESSMENT — ENCOUNTER SYMPTOMS
SENSORY CHANGE: 1
CARDIOVASCULAR NEGATIVE: 1
MUSCULOSKELETAL NEGATIVE: 1
FOCAL WEAKNESS: 1
RESPIRATORY NEGATIVE: 1
EYES NEGATIVE: 1

## 2017-10-25 NOTE — PROGRESS NOTES
"Eyes on patient at 1900. Assessment @1940. Patient expressed interest to Day RN that he would like his urostomy bag changed at some point but did not want it done earlier with day shift. During my assessment, I asked him if I could change it for him and he said \"no, I'll wait until tomorrow.\" I asked if I could take a look at the urostomy and he said, \"no it's fine. They checked it earlier.\" I explained to him that each RN who is caring for him will check it every shift. Patient stated, \"I know my body and I don't need it checked again.\" Patient is not aggressive or combative, just resistant or non-compliant with complete assessments.    Patient asked during assessment if he could have his IV fluids restarted. He feels like he is not drinking enough fluids and has not felt as well as he would like. I paged the hospitalist and now waiting a return call to restart fluids. Patient also requested to have his flu shot and pneumonia shot.      2130, gave all medications. Patient resting.    2155 Dr. Becerril returned call. She doesn't feel he needs the fluids but can restart NS @100 for tonight and then reassess in the morning with Day shift. She also recommended to bring up patient's request for immunizations to day shift.   "

## 2017-10-25 NOTE — CARE PLAN
Problem: Safety  Goal: Will remain free from injury  Patient educated in use of call light and is compliant.     Problem: Knowledge Deficit  Goal: Knowledge of the prescribed therapeutic regimen will improve  Patient educated in the importance of daily assessments per shift to prevent infection and to provide care.

## 2017-10-25 NOTE — FLOWSHEET NOTE
10/24/17 1836   Events/Summary/Plan   Non-Invasive Resp Device Site Inspection Completed Intact   Interdisciplinary Plan of Care-Goals (Indications)   Obstructive Ventilatory Defect or Pulmonary Disease without Obvious Obstruction Strong Subjective / Objective Improvement   Interdisciplinary Plan of Care-Outcomes    Bronchodilator Outcome Patient at Stable Baseline   Education   Education Yes - Pt. / Family has been Instructed in use of Respiratory Equipment;Yes - Pt. / Family has been Instructed in use of Respiratory Medications and Adverse Reactions   RT Assessment of Delivered Medications   Evaluation of Medication Delivery Daily Yes-- Pt /Family has been Instructed in use of Respiratory Medications and Adverse Reactions   SVN Group   #SVN Performed Yes   Given By: Mask   Incentive Spirometry Group   Breathing Exercises Yes   Respiratory WDL   Respiratory (WDL) X   Chest Exam   Work Of Breathing / Effort Mild   Respiration 18   Pulse 96   Breath Sounds   Pre/Post Intervention Pre Intervention Assessment   RUL Breath Sounds Clear   RML Breath Sounds Clear   RLL Breath Sounds Diminished   EDDIE Breath Sounds Clear   LLL Breath Sounds Diminished   Secretions   Cough Weak;Non Productive   Oximetry   #Pulse Oximetry (Single Determination) Yes   Oxygen   Pulse Oximetry 98 %   O2 (LPM) 0   O2 Daily Delivery Respiratory  Room Air with O2 Available

## 2017-10-25 NOTE — DISCHARGE PLANNING
DENI spoke with Hanny from [facility] and was informed that this patient received 35 hours of PCA services.

## 2017-10-25 NOTE — PROGRESS NOTES
4750 Patient used call light and requested his bed to be changed. He also requested the urostomy bag to be changed. Earlier in the shift, patient wouldn't allow an assessment to verify status of urostomy. Eventually, the patient called to say that the bed was wet and asked to have it all changed. CNA, and 2 RN's changed ostomy bag, and while changing the bedding, discovered he had a bowel movement and the dressing above and to the left of the rectum was soiled and needed to be changed. Wound and surrounding area was cleaned first with warm, wet towel and then with saline and gauze pads. Hydrofera blue foam dressing replaced, sheets changed and patient now resting.

## 2017-10-25 NOTE — CARE PLAN
Problem: Bowel/Gastric:  Goal: Normal bowel function is maintained or improved  Outcome: PROGRESSING AS EXPECTED  Digital stimulation daily for BM. Encouraging PO intake.     Problem: Skin Integrity  Goal: Risk for impaired skin integrity will decrease  Outcome: PROGRESSING AS EXPECTED  Skin assessments q shift. Dressing changes per MAR. Floating heels, turns in place.
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
## 2017-10-25 NOTE — PROGRESS NOTES
Received Bedside report. Assumed care at 0715. This pt is AOx4, requiring two person assist, no N/V this morning, adequate urine output, declines pain. Patient and RN discussed plan of care including dc planning, awaiting snf placement: questions answered. Labs noted, assessment complete, patient tolerating regular diet. Call light in place, fall precautions in place, patient educated on importance of calling for assistance. No additional needs at this time. VSS

## 2017-10-26 LAB
ALBUMIN SERPL BCP-MCNC: 2.5 G/DL (ref 3.2–4.9)
ALBUMIN/GLOB SERPL: 0.8 G/DL
ALP SERPL-CCNC: 58 U/L (ref 30–99)
ALT SERPL-CCNC: 22 U/L (ref 2–50)
ANION GAP SERPL CALC-SCNC: 9 MMOL/L (ref 0–11.9)
APPEARANCE UR: CLEAR
AST SERPL-CCNC: 15 U/L (ref 12–45)
BACTERIA #/AREA URNS HPF: ABNORMAL /HPF
BASOPHILS # BLD AUTO: 0.3 % (ref 0–1.8)
BASOPHILS # BLD: 0.02 K/UL (ref 0–0.12)
BILIRUB SERPL-MCNC: 0.6 MG/DL (ref 0.1–1.5)
BILIRUB UR QL STRIP.AUTO: NEGATIVE
BUN SERPL-MCNC: 11 MG/DL (ref 8–22)
CALCIUM SERPL-MCNC: 8 MG/DL (ref 8.4–10.2)
CHLORIDE SERPL-SCNC: 107 MMOL/L (ref 96–112)
CO2 SERPL-SCNC: 19 MMOL/L (ref 20–33)
COLOR UR: YELLOW
CREAT SERPL-MCNC: 0.51 MG/DL (ref 0.5–1.4)
CRYSTALS 1718B: ABNORMAL /HPF
EOSINOPHIL # BLD AUTO: 0.92 K/UL (ref 0–0.51)
EOSINOPHIL NFR BLD: 11.8 % (ref 0–6.9)
EPI CELLS #/AREA URNS HPF: ABNORMAL /HPF
ERYTHROCYTE [DISTWIDTH] IN BLOOD BY AUTOMATED COUNT: 54.3 FL (ref 35.9–50)
GFR SERPL CREATININE-BSD FRML MDRD: >60 ML/MIN/1.73 M 2
GLOBULIN SER CALC-MCNC: 3.3 G/DL (ref 1.9–3.5)
GLUCOSE SERPL-MCNC: 103 MG/DL (ref 65–99)
GLUCOSE UR STRIP.AUTO-MCNC: NEGATIVE MG/DL
HCT VFR BLD AUTO: 34.7 % (ref 42–52)
HGB BLD-MCNC: 10.7 G/DL (ref 14–18)
IMM GRANULOCYTES # BLD AUTO: 0.03 K/UL (ref 0–0.11)
IMM GRANULOCYTES NFR BLD AUTO: 0.4 % (ref 0–0.9)
KETONES UR STRIP.AUTO-MCNC: NEGATIVE MG/DL
LEUKOCYTE ESTERASE UR QL STRIP.AUTO: NEGATIVE
LYMPHOCYTES # BLD AUTO: 0.47 K/UL (ref 1–4.8)
LYMPHOCYTES NFR BLD: 6 % (ref 22–41)
MAGNESIUM SERPL-MCNC: 1.9 MG/DL (ref 1.5–2.5)
MCH RBC QN AUTO: 24.5 PG (ref 27–33)
MCHC RBC AUTO-ENTMCNC: 30.8 G/DL (ref 33.7–35.3)
MCV RBC AUTO: 79.6 FL (ref 81.4–97.8)
MICRO URNS: ABNORMAL
MONOCYTES # BLD AUTO: 0.76 K/UL (ref 0–0.85)
MONOCYTES NFR BLD AUTO: 9.7 % (ref 0–13.4)
MUCOUS THREADS #/AREA URNS HPF: ABNORMAL /HPF
NEUTROPHILS # BLD AUTO: 5.61 K/UL (ref 1.82–7.42)
NEUTROPHILS NFR BLD: 71.8 % (ref 44–72)
NITRITE UR QL STRIP.AUTO: NEGATIVE
NRBC # BLD AUTO: 0 K/UL
NRBC BLD AUTO-RTO: 0 /100 WBC
PH UR STRIP.AUTO: 7 [PH]
PLATELET # BLD AUTO: 281 K/UL (ref 164–446)
PMV BLD AUTO: 10.1 FL (ref 9–12.9)
POTASSIUM SERPL-SCNC: 2.8 MMOL/L (ref 3.6–5.5)
PROT SERPL-MCNC: 5.8 G/DL (ref 6–8.2)
PROT UR QL STRIP: NEGATIVE MG/DL
RBC # BLD AUTO: 4.36 M/UL (ref 4.7–6.1)
RBC # URNS HPF: ABNORMAL /HPF
RBC UR QL AUTO: ABNORMAL
SODIUM SERPL-SCNC: 135 MMOL/L (ref 135–145)
SP GR UR STRIP.AUTO: 1.01
UNIDENT CRYS URNS QL MICRO: ABNORMAL /HPF
WBC # BLD AUTO: 7.8 K/UL (ref 4.8–10.8)
WBC #/AREA URNS HPF: ABNORMAL /HPF

## 2017-10-26 PROCEDURE — 700111 HCHG RX REV CODE 636 W/ 250 OVERRIDE (IP): Performed by: INTERNAL MEDICINE

## 2017-10-26 PROCEDURE — 700102 HCHG RX REV CODE 250 W/ 637 OVERRIDE(OP): Performed by: INTERNAL MEDICINE

## 2017-10-26 PROCEDURE — 85025 COMPLETE CBC W/AUTO DIFF WBC: CPT

## 2017-10-26 PROCEDURE — 770006 HCHG ROOM/CARE - MED/SURG/GYN SEMI*

## 2017-10-26 PROCEDURE — 36415 COLL VENOUS BLD VENIPUNCTURE: CPT

## 2017-10-26 PROCEDURE — A9270 NON-COVERED ITEM OR SERVICE: HCPCS | Performed by: INTERNAL MEDICINE

## 2017-10-26 PROCEDURE — 81001 URINALYSIS AUTO W/SCOPE: CPT

## 2017-10-26 PROCEDURE — A9270 NON-COVERED ITEM OR SERVICE: HCPCS | Performed by: SURGERY

## 2017-10-26 PROCEDURE — 80053 COMPREHEN METABOLIC PANEL: CPT

## 2017-10-26 PROCEDURE — 700102 HCHG RX REV CODE 250 W/ 637 OVERRIDE(OP): Performed by: SURGERY

## 2017-10-26 PROCEDURE — 83735 ASSAY OF MAGNESIUM: CPT

## 2017-10-26 PROCEDURE — 87086 URINE CULTURE/COLONY COUNT: CPT

## 2017-10-26 PROCEDURE — 99232 SBSQ HOSP IP/OBS MODERATE 35: CPT | Performed by: INTERNAL MEDICINE

## 2017-10-26 RX ORDER — MAGNESIUM SULFATE HEPTAHYDRATE 40 MG/ML
2 INJECTION, SOLUTION INTRAVENOUS ONCE
Status: COMPLETED | OUTPATIENT
Start: 2017-10-26 | End: 2017-10-26

## 2017-10-26 RX ORDER — POTASSIUM CHLORIDE 20 MEQ/1
40 TABLET, EXTENDED RELEASE ORAL 2 TIMES DAILY
Status: COMPLETED | OUTPATIENT
Start: 2017-10-26 | End: 2017-10-26

## 2017-10-26 RX ADMIN — ASPIRIN 81 MG: 81 TABLET, COATED ORAL at 09:40

## 2017-10-26 RX ADMIN — GABAPENTIN 900 MG: 300 CAPSULE ORAL at 09:40

## 2017-10-26 RX ADMIN — POTASSIUM CHLORIDE 40 MEQ: 1500 TABLET, EXTENDED RELEASE ORAL at 09:40

## 2017-10-26 RX ADMIN — NITROFURANTOIN (MACROCRYSTALS) 100 MG: 100 CAPSULE ORAL at 15:15

## 2017-10-26 RX ADMIN — MIDODRINE HYDROCHLORIDE 10 MG: 10 TABLET ORAL at 09:40

## 2017-10-26 RX ADMIN — MIDODRINE HYDROCHLORIDE 10 MG: 10 TABLET ORAL at 17:49

## 2017-10-26 RX ADMIN — GABAPENTIN 900 MG: 300 CAPSULE ORAL at 20:21

## 2017-10-26 RX ADMIN — NITROFURANTOIN (MACROCRYSTALS) 100 MG: 100 CAPSULE ORAL at 09:40

## 2017-10-26 RX ADMIN — NITROFURANTOIN (MACROCRYSTALS) 100 MG: 100 CAPSULE ORAL at 17:57

## 2017-10-26 RX ADMIN — FERROUS SULFATE TAB 325 MG (65 MG ELEMENTAL FE) 325 MG: 325 (65 FE) TAB at 07:30

## 2017-10-26 RX ADMIN — OXYCODONE HYDROCHLORIDE AND ACETAMINOPHEN 500 MG: 500 TABLET ORAL at 09:40

## 2017-10-26 RX ADMIN — POTASSIUM CHLORIDE 40 MEQ: 1500 TABLET, EXTENDED RELEASE ORAL at 20:21

## 2017-10-26 RX ADMIN — MAGNESIUM SULFATE IN WATER 2 G: 40 INJECTION, SOLUTION INTRAVENOUS at 09:39

## 2017-10-26 RX ADMIN — GABAPENTIN 900 MG: 300 CAPSULE ORAL at 15:15

## 2017-10-26 RX ADMIN — NITROFURANTOIN (MACROCRYSTALS) 100 MG: 100 CAPSULE ORAL at 20:21

## 2017-10-26 RX ADMIN — OXYCODONE HYDROCHLORIDE AND ACETAMINOPHEN 500 MG: 500 TABLET ORAL at 20:21

## 2017-10-26 ASSESSMENT — ENCOUNTER SYMPTOMS
FOCAL WEAKNESS: 1
EYES NEGATIVE: 1
CARDIOVASCULAR NEGATIVE: 1
SENSORY CHANGE: 1
RESPIRATORY NEGATIVE: 1
MUSCULOSKELETAL NEGATIVE: 1

## 2017-10-26 ASSESSMENT — PAIN SCALES - GENERAL: PAINLEVEL_OUTOF10: 0

## 2017-10-26 NOTE — PROGRESS NOTES
Patient asked to be allowed to sleep with little to no interruptions tonight after evening meds given. Partial assessment completed but patient did not want wounds or urostomy assessed until morning. Patient is having trouble sleeping but is not hostile or irritable. He did state that he is feeling better (no nausea) than he has since his admission date.

## 2017-10-26 NOTE — DIETARY
"Nutrition services follow-up with pt status post ischial decub ulcer (stage IV) excision and muscle flap closure.  History paraplegia (able to move BUE but weak per MD note).   No new weights.  6'2\"  165 lbs. BMI:  21.18  Medications and labs reviewed.  Vitamin C  500 mg times 2.      Spoke to patient who states his appetite is much improved.  Food preferences updated.  No dietary concerns per pt at this time.  Intake at dinner 50-75%.  States he is drinking his Chocolate boost plus with meals.    RD to continue to monitor PO intake.  Continue boost plus with meals.               "

## 2017-10-26 NOTE — PROGRESS NOTES
Changed dressings after soiled from BM. Pictures uploaded into EPIC. Placed wound consult per protocol as sacral wound appears somewhat declined where edges of adhesive gauze adhered to skin.

## 2017-10-26 NOTE — CARE PLAN
Problem: Safety  Goal: Will remain free from injury  Patient progressing as expected. Patient is cooperative and engaged in his repositioning and shares his knowledge regarding positions for his lower extremities that feel best to him.     Problem: Psychosocial Needs:  Goal: Level of anxiety will decrease  Patient has been anxious regarding his health status. He has expressed concern over his nausea and lack of sleep the last few days but tonight he stated that he has felt better than he has since admission. Patient still struggling to sleep but is less anxious.

## 2017-10-26 NOTE — PROGRESS NOTES
Patient struggled to fall asleep until 0300 and is now sleeping soundly. Changed IV fluid bag but did not wake him to check dressings. Will reassess at shift change with day RN. Patient had asked at beginning of night shift to allow him to sleep without interruptions. Patient committed to use call light when needing anything.

## 2017-10-26 NOTE — DISCHARGE PLANNING
DENI received phone call Ester from Dr. Arriola's office.  She stated that Dr. Arriola would be ok discharging this patient home with HH after November 1st.  She stated that the wound needs to heal further before he would be comfortable with the home with HH option.  She also stated that he would need a air mattress to be ordered to go home with him at that time.

## 2017-10-26 NOTE — PROGRESS NOTES
Greeted patient at 1850. Patient requested minimal interruptions tonight after medication pass. Will cluster care tonight with CNA and encourage sleep.

## 2017-10-27 PROBLEM — E87.6 LOW SERUM POTASSIUM: Status: ACTIVE | Noted: 2017-10-27

## 2017-10-27 LAB
ANION GAP SERPL CALC-SCNC: 6 MMOL/L (ref 0–11.9)
BUN SERPL-MCNC: 12 MG/DL (ref 8–22)
CALCIUM SERPL-MCNC: 8.2 MG/DL (ref 8.4–10.2)
CHLORIDE SERPL-SCNC: 113 MMOL/L (ref 96–112)
CO2 SERPL-SCNC: 19 MMOL/L (ref 20–33)
CREAT SERPL-MCNC: 0.57 MG/DL (ref 0.5–1.4)
GFR SERPL CREATININE-BSD FRML MDRD: >60 ML/MIN/1.73 M 2
GLUCOSE SERPL-MCNC: 104 MG/DL (ref 65–99)
MAGNESIUM SERPL-MCNC: 2.2 MG/DL (ref 1.5–2.5)
POTASSIUM SERPL-SCNC: 4 MMOL/L (ref 3.6–5.5)
SODIUM SERPL-SCNC: 138 MMOL/L (ref 135–145)

## 2017-10-27 PROCEDURE — A9270 NON-COVERED ITEM OR SERVICE: HCPCS | Performed by: SURGERY

## 2017-10-27 PROCEDURE — A9270 NON-COVERED ITEM OR SERVICE: HCPCS | Performed by: INTERNAL MEDICINE

## 2017-10-27 PROCEDURE — 80048 BASIC METABOLIC PNL TOTAL CA: CPT

## 2017-10-27 PROCEDURE — 700102 HCHG RX REV CODE 250 W/ 637 OVERRIDE(OP): Performed by: INTERNAL MEDICINE

## 2017-10-27 PROCEDURE — 302043 TAPE, HYPAFIX: Performed by: SURGERY

## 2017-10-27 PROCEDURE — 36415 COLL VENOUS BLD VENIPUNCTURE: CPT

## 2017-10-27 PROCEDURE — 700102 HCHG RX REV CODE 250 W/ 637 OVERRIDE(OP): Performed by: SURGERY

## 2017-10-27 PROCEDURE — 83735 ASSAY OF MAGNESIUM: CPT

## 2017-10-27 PROCEDURE — 99232 SBSQ HOSP IP/OBS MODERATE 35: CPT | Performed by: INTERNAL MEDICINE

## 2017-10-27 PROCEDURE — 770006 HCHG ROOM/CARE - MED/SURG/GYN SEMI*

## 2017-10-27 PROCEDURE — 302104 KIT,UROSTOMY 2-PIECE 2 1/4": Performed by: SURGERY

## 2017-10-27 RX ADMIN — MIDODRINE HYDROCHLORIDE 10 MG: 10 TABLET ORAL at 18:02

## 2017-10-27 RX ADMIN — NITROFURANTOIN (MACROCRYSTALS) 100 MG: 100 CAPSULE ORAL at 12:39

## 2017-10-27 RX ADMIN — OXYCODONE HYDROCHLORIDE AND ACETAMINOPHEN 500 MG: 500 TABLET ORAL at 08:50

## 2017-10-27 RX ADMIN — GABAPENTIN 900 MG: 300 CAPSULE ORAL at 08:49

## 2017-10-27 RX ADMIN — MIDODRINE HYDROCHLORIDE 10 MG: 10 TABLET ORAL at 12:38

## 2017-10-27 RX ADMIN — GABAPENTIN 900 MG: 300 CAPSULE ORAL at 15:49

## 2017-10-27 RX ADMIN — MIDODRINE HYDROCHLORIDE 10 MG: 10 TABLET ORAL at 08:50

## 2017-10-27 RX ADMIN — FERROUS SULFATE TAB 325 MG (65 MG ELEMENTAL FE) 325 MG: 325 (65 FE) TAB at 08:50

## 2017-10-27 RX ADMIN — NITROFURANTOIN (MACROCRYSTALS) 100 MG: 100 CAPSULE ORAL at 20:18

## 2017-10-27 RX ADMIN — NITROFURANTOIN (MACROCRYSTALS) 100 MG: 100 CAPSULE ORAL at 18:02

## 2017-10-27 RX ADMIN — OXYCODONE HYDROCHLORIDE AND ACETAMINOPHEN 500 MG: 500 TABLET ORAL at 20:18

## 2017-10-27 RX ADMIN — NITROFURANTOIN (MACROCRYSTALS) 100 MG: 100 CAPSULE ORAL at 08:50

## 2017-10-27 RX ADMIN — GABAPENTIN 900 MG: 300 CAPSULE ORAL at 20:18

## 2017-10-27 RX ADMIN — ASPIRIN 81 MG: 81 TABLET, COATED ORAL at 08:49

## 2017-10-27 ASSESSMENT — ENCOUNTER SYMPTOMS
CARDIOVASCULAR NEGATIVE: 1
SENSORY CHANGE: 1
MUSCULOSKELETAL NEGATIVE: 1
RESPIRATORY NEGATIVE: 1
EYES NEGATIVE: 1
FOCAL WEAKNESS: 1

## 2017-10-27 ASSESSMENT — PAIN SCALES - GENERAL: PAINLEVEL_OUTOF10: 0

## 2017-10-27 NOTE — DISCHARGE PLANNING
Medical Social Work    Referral: DENI reviewed the chart this AM.      Intervention: Per report from DENI Purcell, no LTAC or SNF will accept.  Dr. Arriola wants pt here until November 1st and can d.c with HH and an air mattress.  DENI will obtain choice once orders are received.     Plan: SW available to assist with any d.c planning.

## 2017-10-27 NOTE — PROGRESS NOTES
Pt was incont.liquid stool this am.all drsgs were changed.  All wounds cleansed with saline prior to new drsgs applied.  Iv came out this afternoon,pt disd not want it replaced.md ok with leaving it.  U/a,c&s sent to the lab.  Pt drinking fluids well.

## 2017-10-27 NOTE — PROGRESS NOTES
Received report for this pt at 1900. Pt awake and alert. Repositioned throughout the night. No complaints of pain or sob. Pt refused some of his meds this evening. Urostomy in place - not leaking. Will monitor pt.
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
## 2017-10-27 NOTE — PROGRESS NOTES
Pt laying in stool, at shift start. Pt was cleaned. Sacrum pressure ulcer was covered in loose stools. Ulcers were washed with normal saline and dressed with Hydrofiber dressings as ordered and recommended by wound nurse. Left skin flap was cleaned and island dressing were changed. POC discussed with pt. Skin breakdown precaution in place. Safety measures in place. Heels floated and pressure off left side as ordered.

## 2017-10-27 NOTE — PROGRESS NOTES
PLASTICS    No new issues.   Patient laying in stool this morning, but flap remains pink and healthy, incision line intact with no erythema.  No fluid collections since drain removed.  Will continue to follow.

## 2017-10-28 LAB
BACTERIA UR CULT: NORMAL
SIGNIFICANT IND 70042: NORMAL
SITE SITE: NORMAL
SOURCE SOURCE: NORMAL

## 2017-10-28 PROCEDURE — 700102 HCHG RX REV CODE 250 W/ 637 OVERRIDE(OP): Performed by: INTERNAL MEDICINE

## 2017-10-28 PROCEDURE — 99232 SBSQ HOSP IP/OBS MODERATE 35: CPT | Performed by: INTERNAL MEDICINE

## 2017-10-28 PROCEDURE — A9270 NON-COVERED ITEM OR SERVICE: HCPCS | Performed by: INTERNAL MEDICINE

## 2017-10-28 PROCEDURE — 770006 HCHG ROOM/CARE - MED/SURG/GYN SEMI*

## 2017-10-28 PROCEDURE — 700102 HCHG RX REV CODE 250 W/ 637 OVERRIDE(OP): Performed by: SURGERY

## 2017-10-28 PROCEDURE — A9270 NON-COVERED ITEM OR SERVICE: HCPCS | Performed by: SURGERY

## 2017-10-28 RX ORDER — M-VIT,TX,IRON,MINS/CALC/FOLIC 27MG-0.4MG
1 TABLET ORAL DAILY
Status: DISCONTINUED | OUTPATIENT
Start: 2017-10-28 | End: 2017-11-06 | Stop reason: HOSPADM

## 2017-10-28 RX ADMIN — NITROFURANTOIN (MACROCRYSTALS) 100 MG: 100 CAPSULE ORAL at 12:00

## 2017-10-28 RX ADMIN — MULTIPLE VITAMINS W/ MINERALS TAB 1 TABLET: TAB at 16:28

## 2017-10-28 RX ADMIN — OXYCODONE HYDROCHLORIDE AND ACETAMINOPHEN 500 MG: 500 TABLET ORAL at 20:06

## 2017-10-28 RX ADMIN — NITROFURANTOIN (MACROCRYSTALS) 100 MG: 100 CAPSULE ORAL at 18:25

## 2017-10-28 RX ADMIN — MIDODRINE HYDROCHLORIDE 10 MG: 10 TABLET ORAL at 18:25

## 2017-10-28 RX ADMIN — GABAPENTIN 900 MG: 300 CAPSULE ORAL at 20:05

## 2017-10-28 RX ADMIN — MIDODRINE HYDROCHLORIDE 10 MG: 10 TABLET ORAL at 08:51

## 2017-10-28 RX ADMIN — MIDODRINE HYDROCHLORIDE 10 MG: 10 TABLET ORAL at 12:00

## 2017-10-28 RX ADMIN — GABAPENTIN 900 MG: 300 CAPSULE ORAL at 08:50

## 2017-10-28 RX ADMIN — NITROFURANTOIN (MACROCRYSTALS) 100 MG: 100 CAPSULE ORAL at 20:07

## 2017-10-28 RX ADMIN — FERROUS SULFATE TAB 325 MG (65 MG ELEMENTAL FE) 325 MG: 325 (65 FE) TAB at 08:51

## 2017-10-28 RX ADMIN — NITROFURANTOIN (MACROCRYSTALS) 100 MG: 100 CAPSULE ORAL at 08:51

## 2017-10-28 RX ADMIN — ASPIRIN 81 MG: 81 TABLET, COATED ORAL at 08:51

## 2017-10-28 RX ADMIN — OXYCODONE HYDROCHLORIDE AND ACETAMINOPHEN 500 MG: 500 TABLET ORAL at 08:51

## 2017-10-28 RX ADMIN — GABAPENTIN 900 MG: 300 CAPSULE ORAL at 16:28

## 2017-10-28 RX ADMIN — GUAIFENESIN 600 MG: 600 TABLET, EXTENDED RELEASE ORAL at 20:06

## 2017-10-28 ASSESSMENT — COPD QUESTIONNAIRES
HAVE YOU SMOKED AT LEAST 100 CIGARETTES IN YOUR ENTIRE LIFE: NO/DON'T KNOW
DO YOU EVER COUGH UP ANY MUCUS OR PHLEGM?: NO/ONLY WITH OCCASIONAL COLDS OR INFECTIONS
DURING THE PAST 4 WEEKS HOW MUCH DID YOU FEEL SHORT OF BREATH: NONE/LITTLE OF THE TIME
COPD SCREENING SCORE: 2

## 2017-10-28 ASSESSMENT — PAIN SCALES - GENERAL
PAINLEVEL_OUTOF10: 0
PAINLEVEL_OUTOF10: 0

## 2017-10-28 NOTE — PROGRESS NOTES
Pt resting in bed, no signs of distress. Urostomy in place, no leaking noted. Pt clean. Dressing in place, c/d/i. Heels floated, no signs of new skin breakdown. Pressure off right side. Pt on frequent repositioned and pressure kept off right side at all times as ordered. Safety measures in place.

## 2017-10-28 NOTE — CARE PLAN
Problem: Skin Integrity  Goal: Risk for impaired skin integrity will decrease  Pt repositioned every two hour. Pressure kept off skin flap. Pt repositioned with pillows and heels floated with boots. Dressings kept c/d/i and changed prn.

## 2017-10-28 NOTE — WOUND TEAM
"[facility]  Inpatient Services  Wound and Skin Care Progress Note    Admission Date:  10/18/17  HPI, PMH, SH: Reviewed  Unit where seen by Wound Team: [id number]    WOUND CONSULT RELATED TO:  Re evaluation and wound care recommendations for sacral and left ischial wounds and evaluation of DTI pressure injury bilateral heels    SUBJECTIVE:  \"I'm coming back here and not [place] because this is more restful\"    Self Report / Pain Level:  Denies due to paralysis    OBJECTIVE:    Dressings partially intact; changed x 2 today due to diarrhea; purple intact pressure injuries bilateral heels with feet resting in heel float boot; on ASHOK surface    WOUND TYPE, LOCATION, CHARACTERISTICS (Pressure ulcers: location, stage, POA or date identified)    Wound Type/Location:  Stage 4 pressure ulcer sacrum and left ischium POA    Periwound:  Intact, pink both wounds     Drainage:  Both with min-mod tan serous     Tissue Type and %:   Red 100% both wounds   Wound Edges:  Rolled, attached, macerated    Odor:  None either wound      Exposed structure(s):  None either wound   S&S of Infection:   none   Measurements:  (taken 10/27/17)   Sacrum              Left ischium     Length:              4cm                    5.5cm   Width:                8.3cm                 1.4cm   Depth:                 1.5cm                 <0.5cm   Tracts/undermining:  None either wound     Wound Type/Location:  DTI pressure injury bilateral posterior 10/25/17    Periwound:  Intact, pink both wounds     Drainage:  none     Tissue Type and %:   purple 100% both wounds   Wound Edges:   attached    Odor:  None either wound      Exposed structure(s):  None either wound   S&S of Infection:   none   Measurements:  (taken 10/27/17)   Right                 Left  X 2    Length:              1cm             1cm   Width:                1.5cm          1.5cm   Depth:                 DTI               DTI     Tracts/undermining:  None " either wound       INTERVENTIONS BY WOUND TEAM:   Assisted patient onto left side so wounds could be visualized.  Removed old dressings and cleansed wounds with NS gauze.  Note stool under dry dressing for flap.  Cleansed all wounds with NS gauze and measurements and photo taken.  Covered wound beds with silver hydrofiber and secured with non adhesive foam dressing and hypafix tape. Replaced island dressing to flap incision.  Due to foot drop heel float boots don't float heels so instructed staff to place pillow on top of boots and then rest bilateral feet on pillow to float heels in air.  Discussed with staff RN change in orders.  Discussed with patient need for him to be aware of where his body is lying to work with staff to try and prevent breakdown although he can't visualize feet with sheet in place.    Interdisciplinary consultation: staff RN, patient, CNA    EVALUATION:   Chronic stage 4 pressure ulcers sacrum and left ischium that appear unchanged from last visit but measurements slightly larger.  Flap incision intact.  DTI pressure ulcers bilateral heels with need to offload and hopefully will resolve without further deterioration.  Wound dressing changed using hypafix tape to see if this will be more inclusive for maintaining dressing with recent stooling increase.    Factors affecting wound healing:  Chronicity of wounds and history of poor compliance  Goals:  Wounds to decrease by 1% weekly    NURSING PLAN OF CARE ORDERS (X):    Dressing changes: See Dressing Maintenance orders:  X updated  Skin care: See Skin Care orders:   Rectal tube care: See Rectal Tube Care orders:    Other orders:    WOUND TEAM PLAN OF CARE (X):    NPWT change 3 x week:         Dressing changes by wound team:       Follow up as needed:   X     Other (explain):    Anticipated discharge plans (X):  SNF:   X        Home Care:           Outpatient Wound Center:            Self Care:            Other:

## 2017-10-28 NOTE — CARE PLAN
Problem: Skin Integrity  Goal: Risk for impaired skin integrity will decrease    Intervention: Assess risk factors for impaired skin integrity and/or pressure ulcers  Skin assessment t complete, no new signs of skin breakdown, heels are floated, pt repositioned every 2 hours. Pressure of right side as ordered. Pillows used for positioning.       Problem: Respiratory:  Goal: Respiratory status will improve    Intervention: Assess and monitor pulmonary status  Pt remains on room air without signs of respiratory distress. No signs of PNA, no cough present at this time.

## 2017-10-28 NOTE — PROGRESS NOTES
Received report for this pt at 1900. Pt awake and alert. No complaints of pain or sob. Checking pt every hour for stool and keeping his heels floating all night. Will monitor pt.      Checked pt every hour during my shift. No stool. Urostomy in place and not leaking.

## 2017-10-28 NOTE — CARE PLAN
Problem: Bowel/Gastric:  Goal: Normal bowel function is maintained or improved  Pt having loose stools, stool softners and miralax held.

## 2017-10-29 PROCEDURE — 700102 HCHG RX REV CODE 250 W/ 637 OVERRIDE(OP): Performed by: SURGERY

## 2017-10-29 PROCEDURE — 700102 HCHG RX REV CODE 250 W/ 637 OVERRIDE(OP): Performed by: INTERNAL MEDICINE

## 2017-10-29 PROCEDURE — 770006 HCHG ROOM/CARE - MED/SURG/GYN SEMI*

## 2017-10-29 PROCEDURE — A9270 NON-COVERED ITEM OR SERVICE: HCPCS | Performed by: INTERNAL MEDICINE

## 2017-10-29 PROCEDURE — 99232 SBSQ HOSP IP/OBS MODERATE 35: CPT | Performed by: INTERNAL MEDICINE

## 2017-10-29 PROCEDURE — A9270 NON-COVERED ITEM OR SERVICE: HCPCS | Performed by: SURGERY

## 2017-10-29 RX ADMIN — GABAPENTIN 900 MG: 300 CAPSULE ORAL at 10:04

## 2017-10-29 RX ADMIN — GABAPENTIN 900 MG: 300 CAPSULE ORAL at 20:28

## 2017-10-29 RX ADMIN — OXYCODONE HYDROCHLORIDE AND ACETAMINOPHEN 500 MG: 500 TABLET ORAL at 20:28

## 2017-10-29 RX ADMIN — MIDODRINE HYDROCHLORIDE 10 MG: 10 TABLET ORAL at 17:54

## 2017-10-29 RX ADMIN — MIDODRINE HYDROCHLORIDE 10 MG: 10 TABLET ORAL at 12:13

## 2017-10-29 RX ADMIN — FERROUS SULFATE TAB 325 MG (65 MG ELEMENTAL FE) 325 MG: 325 (65 FE) TAB at 08:56

## 2017-10-29 RX ADMIN — ASPIRIN 81 MG: 81 TABLET, COATED ORAL at 10:04

## 2017-10-29 RX ADMIN — MIDODRINE HYDROCHLORIDE 10 MG: 10 TABLET ORAL at 08:56

## 2017-10-29 RX ADMIN — GUAIFENESIN 600 MG: 600 TABLET, EXTENDED RELEASE ORAL at 20:28

## 2017-10-29 RX ADMIN — OXYCODONE HYDROCHLORIDE AND ACETAMINOPHEN 500 MG: 500 TABLET ORAL at 10:04

## 2017-10-29 RX ADMIN — NITROFURANTOIN (MACROCRYSTALS) 100 MG: 100 CAPSULE ORAL at 08:56

## 2017-10-29 RX ADMIN — GABAPENTIN 900 MG: 300 CAPSULE ORAL at 15:23

## 2017-10-29 RX ADMIN — NITROFURANTOIN (MACROCRYSTALS) 100 MG: 100 CAPSULE ORAL at 12:13

## 2017-10-29 RX ADMIN — MULTIPLE VITAMINS W/ MINERALS TAB 1 TABLET: TAB at 10:05

## 2017-10-29 ASSESSMENT — COPD QUESTIONNAIRES
DO YOU EVER COUGH UP ANY MUCUS OR PHLEGM?: NO/ONLY WITH OCCASIONAL COLDS OR INFECTIONS
HAVE YOU SMOKED AT LEAST 100 CIGARETTES IN YOUR ENTIRE LIFE: NO/DON'T KNOW
DURING THE PAST 4 WEEKS HOW MUCH DID YOU FEEL SHORT OF BREATH: NONE/LITTLE OF THE TIME
COPD SCREENING SCORE: 2

## 2017-10-29 ASSESSMENT — ENCOUNTER SYMPTOMS
EYES NEGATIVE: 1
FOCAL WEAKNESS: 1
MUSCULOSKELETAL NEGATIVE: 1
CARDIOVASCULAR NEGATIVE: 1
SENSORY CHANGE: 1
RESPIRATORY NEGATIVE: 1

## 2017-10-29 ASSESSMENT — PAIN SCALES - GENERAL
PAINLEVEL_OUTOF10: 0
PAINLEVEL_OUTOF10: 0
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
## 2017-10-29 NOTE — PROGRESS NOTES
Wound dressings changed.  Wounds were cleansed with NS and silver hydrofiber and non adhesive foam were used to cover dressing, hypafix tape was not used, there is skin irritation from tape on periwound. Dressing. Sheets were changed, pt was left clean and dry with dressing c/d/i.  Dr. magdaleno at bedside during wound assessment.

## 2017-10-29 NOTE — CARE PLAN
Problem: Infection  Goal: Will remain free from infection  ABX course complete. ABX administered as ordered.     Problem: Skin Integrity  Goal: Risk for impaired skin integrity will decrease  Heels assessed every hour, heels floated at all times, dressings changed as ordered.

## 2017-10-30 PROBLEM — I10 HYPERTENSION: Status: ACTIVE | Noted: 2017-10-30

## 2017-10-30 PROCEDURE — A9270 NON-COVERED ITEM OR SERVICE: HCPCS | Performed by: SURGERY

## 2017-10-30 PROCEDURE — A9270 NON-COVERED ITEM OR SERVICE: HCPCS | Performed by: INTERNAL MEDICINE

## 2017-10-30 PROCEDURE — 99232 SBSQ HOSP IP/OBS MODERATE 35: CPT | Performed by: INTERNAL MEDICINE

## 2017-10-30 PROCEDURE — 700102 HCHG RX REV CODE 250 W/ 637 OVERRIDE(OP): Performed by: INTERNAL MEDICINE

## 2017-10-30 PROCEDURE — 770006 HCHG ROOM/CARE - MED/SURG/GYN SEMI*

## 2017-10-30 PROCEDURE — 700102 HCHG RX REV CODE 250 W/ 637 OVERRIDE(OP): Performed by: SURGERY

## 2017-10-30 RX ADMIN — OXYCODONE HYDROCHLORIDE AND ACETAMINOPHEN 500 MG: 500 TABLET ORAL at 20:39

## 2017-10-30 RX ADMIN — OXYCODONE HYDROCHLORIDE AND ACETAMINOPHEN 500 MG: 500 TABLET ORAL at 08:14

## 2017-10-30 RX ADMIN — GABAPENTIN 900 MG: 300 CAPSULE ORAL at 08:14

## 2017-10-30 RX ADMIN — ASPIRIN 81 MG: 81 TABLET, COATED ORAL at 08:14

## 2017-10-30 RX ADMIN — MULTIPLE VITAMINS W/ MINERALS TAB 1 TABLET: TAB at 08:14

## 2017-10-30 RX ADMIN — MIDODRINE HYDROCHLORIDE 10 MG: 10 TABLET ORAL at 17:52

## 2017-10-30 RX ADMIN — MIDODRINE HYDROCHLORIDE 10 MG: 10 TABLET ORAL at 08:13

## 2017-10-30 RX ADMIN — MIDODRINE HYDROCHLORIDE 10 MG: 10 TABLET ORAL at 12:27

## 2017-10-30 RX ADMIN — GABAPENTIN 900 MG: 300 CAPSULE ORAL at 15:29

## 2017-10-30 RX ADMIN — FERROUS SULFATE TAB 325 MG (65 MG ELEMENTAL FE) 325 MG: 325 (65 FE) TAB at 08:13

## 2017-10-30 RX ADMIN — GABAPENTIN 900 MG: 300 CAPSULE ORAL at 20:39

## 2017-10-30 ASSESSMENT — ENCOUNTER SYMPTOMS
CARDIOVASCULAR NEGATIVE: 1
EYES NEGATIVE: 1
SENSORY CHANGE: 1
FOCAL WEAKNESS: 1
RESPIRATORY NEGATIVE: 1
MUSCULOSKELETAL NEGATIVE: 1

## 2017-10-30 ASSESSMENT — PAIN SCALES - GENERAL: PAINLEVEL_OUTOF10: 0
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
## 2017-10-30 NOTE — CARE PLAN
Problem: Discharge Barriers/Planning  Goal: Patient's continuum of care needs will be met  Outcome: PROGRESSING SLOWER THAN EXPECTED  Awaiting placement or to home depending on what is available.    Problem: Skin Integrity  Goal: Risk for impaired skin integrity will decrease  Outcome: PROGRESSING AS EXPECTED  Air mattress, wound care in progress.

## 2017-10-30 NOTE — OP REPORT
DATE OF SERVICE:  10/18/2017    PREOPERATIVE DIAGNOSIS:  Stage IV right ischial pressure ulcer.    POSTOPERATIVE DIAGNOSIS:  Stage IV right ischial pressure ulcer.    PROCEDURE PERFORMED:  1.  Excision of stage IV right ischial pressure ulcer in preparation for   myocutaneous flap closure.  2.  Right posterior thigh V-Y advancement myocutaneous flap.    SURGEON:  Marbin Arriola MD    ASSISTANT:  None.    ANESTHESIOLOGIST:  Dylan Chen MD    ANESTHESIA TYPE:  General.    SPECIMENS:  Right ischial pressure ulcer.    DRAINS:  A 15-Yoruba DOTTIE drain.    ESTIMATED BLOOD LOSS:  50 mL    COMPLICATIONS:  None.    BRIEF HISTORY:  The patient is a 56-year-old male suffering from paraplegia   who has had a longstanding history of bilateral ischial pressure ulcers.    These have been debrided in the past, but patient has never had definitive   closure.  After optimizing his nutrition status and getting him to quit   smoking, I agreed to perform closure on his wounds.  We decided to close one   at a time and he desired the right side to be closed first.  Risks, benefits   and alternatives of the procedure were explained to him, risks including   bleeding, infection, anesthesia risks as well as potential wound breakdown,   which could lead to an even larger wound in the future.  We discussed the   likely nature of pressure ulcers, with a high recurrence rate that could   require readvancement of flaps.  We also discussed the absolute need for   pressure offloading on specialized mattress as well as the need for him to   refrain from any nicotine products.  Patient stated he understood and wished   to proceed.    PROCEDURE IN DETAIL:  After informed consent was obtained, patient was brought   to the operating room.  After induction of general anesthesia, he was placed   in the prone position.  His buttocks and right posterior thigh were prepped   and draped in the usual sterile fashion.  A time-out was called correctly    identifying patient and procedure.  He received 2 g of Ancef IV   preoperatively.  Patient had a right ischial pressure ulcer measuring 4x4 cm.    The plan had originally been to perform an inferior gluteus muscle closure,   but in the interim, the patient had developed a sizeable sacral pressure   ulcer.  I decided to save the gluteus muscle for this closure and performed a   posterior thigh flap instead.  I began by excising the ulcer.  Incision was   made around the circumference of the skin edge of the ulcer.  The entire ulcer   including the pseudo-bursa was removed by dissecting the skin just outside of   the ulcer cavity using electrocautery.  This was taken all the way down to   bone.  A curette was used to scrape the bone at the base of the wound   including any sharp portions.  There were no soft areas of bone encountered to   indicate concern for osteomyelitis, though the patient had been treated in   the recent past.  Once the ulcer was completely removed and all borders of the   cavity were clean and healthy, the wound was irrigated with 3 liters of   sterile saline via pulse lavage.  Hemostasis was obtained with electrocautery.    I then began the muscle flap portion of the procedure.  A large V-shaped   incision was made nearly the full length of his posterior thigh.  Dissection   was carried down with electrocautery through the subcutaneous fat until the   muscle fibers were identified.  The flap including the semitendinosis muscle   and biceps femoris muscle was then raised, keeping the skin attached to these   underlying muscles.  The muscles had been freed up, then there superior   insertions were divided.  This then allowed forward advancement of the skin   muscle flap into the ulcer wound.  The superior edges of the muscle were   secured in place with 2-0 Vicryl sutures, allowing for complete coverage of   the previously exposed ischial bone.  The skin edges were then closed in a V-Y   fashion  using 3-0 Monocryl deep dermal sutures and skin staples.  A 15-Greek   drain had been brought out through a separate lateral incision prior to the   closure.  The incision line was then closed with Xeroform and a bulky gauze   dressing.  Patient was placed on to the pressure offloading mattress,   extubated, and taken to the recovery room in good condition.  The sponge and   instrument count was correct at the end of the case.       ____________________________________     CHARBEL PAUL MD P[facility] / ERICK    DD:  10/30/2017 05:53:40  DT:  10/30/2017 06:53:18    D#:  [id number]  Job#:  [id number]

## 2017-10-31 PROCEDURE — 700102 HCHG RX REV CODE 250 W/ 637 OVERRIDE(OP): Performed by: INTERNAL MEDICINE

## 2017-10-31 PROCEDURE — 700102 HCHG RX REV CODE 250 W/ 637 OVERRIDE(OP): Performed by: SURGERY

## 2017-10-31 PROCEDURE — 770006 HCHG ROOM/CARE - MED/SURG/GYN SEMI*

## 2017-10-31 PROCEDURE — A9270 NON-COVERED ITEM OR SERVICE: HCPCS | Performed by: SURGERY

## 2017-10-31 PROCEDURE — A9270 NON-COVERED ITEM OR SERVICE: HCPCS | Performed by: INTERNAL MEDICINE

## 2017-10-31 PROCEDURE — 99232 SBSQ HOSP IP/OBS MODERATE 35: CPT | Performed by: HOSPITALIST

## 2017-10-31 RX ADMIN — MULTIPLE VITAMINS W/ MINERALS TAB 1 TABLET: TAB at 08:03

## 2017-10-31 RX ADMIN — GABAPENTIN 900 MG: 300 CAPSULE ORAL at 20:03

## 2017-10-31 RX ADMIN — ASPIRIN 81 MG: 81 TABLET, COATED ORAL at 08:03

## 2017-10-31 RX ADMIN — OXYCODONE HYDROCHLORIDE AND ACETAMINOPHEN 500 MG: 500 TABLET ORAL at 20:03

## 2017-10-31 RX ADMIN — MIDODRINE HYDROCHLORIDE 10 MG: 10 TABLET ORAL at 11:47

## 2017-10-31 RX ADMIN — FERROUS SULFATE TAB 325 MG (65 MG ELEMENTAL FE) 325 MG: 325 (65 FE) TAB at 08:03

## 2017-10-31 RX ADMIN — GABAPENTIN 900 MG: 300 CAPSULE ORAL at 08:03

## 2017-10-31 RX ADMIN — MIDODRINE HYDROCHLORIDE 10 MG: 10 TABLET ORAL at 08:03

## 2017-10-31 RX ADMIN — OXYCODONE HYDROCHLORIDE AND ACETAMINOPHEN 500 MG: 500 TABLET ORAL at 08:03

## 2017-10-31 RX ADMIN — MIDODRINE HYDROCHLORIDE 10 MG: 10 TABLET ORAL at 16:26

## 2017-10-31 RX ADMIN — GABAPENTIN 900 MG: 300 CAPSULE ORAL at 16:26

## 2017-10-31 ASSESSMENT — ENCOUNTER SYMPTOMS
CARDIOVASCULAR NEGATIVE: 1
SENSORY CHANGE: 1
RESPIRATORY NEGATIVE: 1
FOCAL WEAKNESS: 1
EYES NEGATIVE: 1
MUSCULOSKELETAL NEGATIVE: 1

## 2017-10-31 ASSESSMENT — PAIN SCALES - GENERAL
PAINLEVEL_OUTOF10: 0
PAINLEVEL_OUTOF10: 0

## 2017-10-31 NOTE — PROGRESS NOTES
Pt requesting digital stimulation for BM. Bowel movement achieved. Dressings reinforced. No c/o pain.  Updated on POC. Will continue to monitor.

## 2017-10-31 NOTE — PROGRESS NOTES
No change from morning assessment except attempted digital stimulation this AM but no stool noted in the rectal vault with exam. Tolerating diet and meds.  Urine cloudy with sediment and foul odor. Has been repositioned as he tolerates and heel protector boots have been in place all day.  Denies pain, states none of the meds work anyway.
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
## 2017-10-31 NOTE — PROGRESS NOTES
Hospitalist requesting wound care to follow-up before discharge tomorrow.  Wound care updated. New IP consult ordered. Will see today.

## 2017-10-31 NOTE — CARE PLAN
Problem: Venous Thromboembolism (VTW)/Deep Vein Thrombosis (DVT) Prevention:  Goal: Patient will participate in Venous Thrombosis (VTE)/Deep Vein Thrombosis (DVT)Prevention Measures  Outcome: PROGRESSING SLOWER THAN EXPECTED   10/30/17 2211   Mechanical/VTE Prophylaxis   Mechanical Prophylaxis  SCDs, Sequential Compression Device   SCDs, Sequential Compression Device Refused   OTHER   Risk Assessment Score 6   VTE RISK Very High   Pharmacologic Prophylaxis Used (ASA)       Problem: Bowel/Gastric:  Goal: Normal bowel function is maintained or improved  Outcome: PROGRESSING AS EXPECTED   10/31/17 0000   OTHER   Last BM 10/31/17   Number of Times Stooled 1

## 2017-10-31 NOTE — PROGRESS NOTES
Pt encouraged to take Senna since pt has had BM today. Pt continues to refuse. Pt also encouraged to be turned every 2 hours, from back to L side to prevent further ulcers. Pt refused to be turned.

## 2017-10-31 NOTE — DISCHARGE PLANNING
Patient discussed in rounds.  DENI called Dr. Dooley's office.  DENI was notified that Dr. Arriola is out of the office today however they would get him a message to call this SW today regarding discharge planning.

## 2017-11-01 PROBLEM — E87.6 LOW SERUM POTASSIUM: Status: RESOLVED | Noted: 2017-10-27 | Resolved: 2017-11-01

## 2017-11-01 PROBLEM — I10 HYPERTENSION: Status: RESOLVED | Noted: 2017-10-30 | Resolved: 2017-11-01

## 2017-11-01 LAB
BASOPHILS # BLD AUTO: 1.6 % (ref 0–1.8)
BASOPHILS # BLD: 0.12 K/UL (ref 0–0.12)
EOSINOPHIL # BLD AUTO: 0.75 K/UL (ref 0–0.51)
EOSINOPHIL NFR BLD: 9.8 % (ref 0–6.9)
ERYTHROCYTE [DISTWIDTH] IN BLOOD BY AUTOMATED COUNT: 54.7 FL (ref 35.9–50)
HCT VFR BLD AUTO: 38.7 % (ref 42–52)
HGB BLD-MCNC: 12 G/DL (ref 14–18)
IMM GRANULOCYTES # BLD AUTO: 0.11 K/UL (ref 0–0.11)
IMM GRANULOCYTES NFR BLD AUTO: 1.4 % (ref 0–0.9)
LYMPHOCYTES # BLD AUTO: 2.36 K/UL (ref 1–4.8)
LYMPHOCYTES NFR BLD: 31 % (ref 22–41)
MCH RBC QN AUTO: 24.7 PG (ref 27–33)
MCHC RBC AUTO-ENTMCNC: 31 G/DL (ref 33.7–35.3)
MCV RBC AUTO: 79.6 FL (ref 81.4–97.8)
MONOCYTES # BLD AUTO: 0.46 K/UL (ref 0–0.85)
MONOCYTES NFR BLD AUTO: 6 % (ref 0–13.4)
NEUTROPHILS # BLD AUTO: 3.82 K/UL (ref 1.82–7.42)
NEUTROPHILS NFR BLD: 50.2 % (ref 44–72)
NRBC # BLD AUTO: 0 K/UL
NRBC BLD AUTO-RTO: 0 /100 WBC
PLATELET # BLD AUTO: 577 K/UL (ref 164–446)
PMV BLD AUTO: 9.3 FL (ref 9–12.9)
RBC # BLD AUTO: 4.86 M/UL (ref 4.7–6.1)
WBC # BLD AUTO: 7.6 K/UL (ref 4.8–10.8)

## 2017-11-01 PROCEDURE — A9270 NON-COVERED ITEM OR SERVICE: HCPCS | Performed by: INTERNAL MEDICINE

## 2017-11-01 PROCEDURE — 700102 HCHG RX REV CODE 250 W/ 637 OVERRIDE(OP): Performed by: SURGERY

## 2017-11-01 PROCEDURE — 85025 COMPLETE CBC W/AUTO DIFF WBC: CPT

## 2017-11-01 PROCEDURE — 770006 HCHG ROOM/CARE - MED/SURG/GYN SEMI*

## 2017-11-01 PROCEDURE — A9270 NON-COVERED ITEM OR SERVICE: HCPCS | Performed by: SURGERY

## 2017-11-01 PROCEDURE — 36415 COLL VENOUS BLD VENIPUNCTURE: CPT

## 2017-11-01 PROCEDURE — 302146: Performed by: SURGERY

## 2017-11-01 PROCEDURE — 700102 HCHG RX REV CODE 250 W/ 637 OVERRIDE(OP): Performed by: INTERNAL MEDICINE

## 2017-11-01 PROCEDURE — 99232 SBSQ HOSP IP/OBS MODERATE 35: CPT | Performed by: HOSPITALIST

## 2017-11-01 RX ADMIN — GABAPENTIN 900 MG: 300 CAPSULE ORAL at 21:20

## 2017-11-01 RX ADMIN — GABAPENTIN 900 MG: 300 CAPSULE ORAL at 08:33

## 2017-11-01 RX ADMIN — MIDODRINE HYDROCHLORIDE 10 MG: 10 TABLET ORAL at 16:41

## 2017-11-01 RX ADMIN — GABAPENTIN 900 MG: 300 CAPSULE ORAL at 16:41

## 2017-11-01 RX ADMIN — OXYCODONE HYDROCHLORIDE AND ACETAMINOPHEN 500 MG: 500 TABLET ORAL at 21:20

## 2017-11-01 RX ADMIN — MULTIPLE VITAMINS W/ MINERALS TAB 1 TABLET: TAB at 08:33

## 2017-11-01 RX ADMIN — ASPIRIN 81 MG: 81 TABLET, COATED ORAL at 08:33

## 2017-11-01 RX ADMIN — FERROUS SULFATE TAB 325 MG (65 MG ELEMENTAL FE) 325 MG: 325 (65 FE) TAB at 08:33

## 2017-11-01 RX ADMIN — MIDODRINE HYDROCHLORIDE 10 MG: 10 TABLET ORAL at 08:33

## 2017-11-01 RX ADMIN — OXYCODONE HYDROCHLORIDE AND ACETAMINOPHEN 500 MG: 500 TABLET ORAL at 08:33

## 2017-11-01 ASSESSMENT — PAIN SCALES - GENERAL
PAINLEVEL_OUTOF10: 0
PAINLEVEL_OUTOF10: 0

## 2017-11-01 ASSESSMENT — ENCOUNTER SYMPTOMS
FOCAL WEAKNESS: 1
CARDIOVASCULAR NEGATIVE: 1
MUSCULOSKELETAL NEGATIVE: 1
EYES NEGATIVE: 1
SENSORY CHANGE: 1
RESPIRATORY NEGATIVE: 1

## 2017-11-01 NOTE — ASSESSMENT & PLAN NOTE
Patient had episodes of asymptomatic hypotension, most likely secondary to AUTONOMIC dysregulation.  Blood pressure improving  Continue midodrine  Boluses of IV fluids as needed

## 2017-11-01 NOTE — CARE PLAN
Problem: Venous Thromboembolism (VTW)/Deep Vein Thrombosis (DVT) Prevention:  Goal: Patient will participate in Venous Thrombosis (VTE)/Deep Vein Thrombosis (DVT)Prevention Measures  Outcome: PROGRESSING SLOWER THAN EXPECTED   10/30/17 2211 10/31/17 2106   Mechanical/VTE Prophylaxis   Mechanical Prophylaxis  SCDs, Sequential Compression Device --    SCDs, Sequential Compression Device Refused --    OTHER   Risk Assessment Score --  6   VTE RISK --  Very High   Pharmacologic Prophylaxis Used --  (ASA)       Problem: Skin Integrity  Goal: Risk for impaired skin integrity will decrease  Outcome: PROGRESSING SLOWER THAN EXPECTED  Pt encouraged to turn Q2 hours. Pt refused.

## 2017-11-01 NOTE — PROGRESS NOTES
Update to Dr. Arriola by primary RN, SW and wound care on pt status. Orders for CBC.  Staples will remain at least another week. Pt will be here for a few days for observation. Referral for SNF.

## 2017-11-01 NOTE — WOUND TEAM
"[facility]  Inpatient Services  Wound and Skin Care Progress Note    HPI, PMH, SH: Reviewed  Unit where seen by Wound Team: 2211/01    WOUND TEAM FOLLOW UP: follow up pressure injuries    SUBJECTIVE: \"okay.\"    Self Report / Pain Level: no c/o pain  OBJECTIVE: on bariatric ASHOK bed,   WOUND TYPE, LOCATION, CHARACTERISTICS:  Surgical Incision  Incision Right Buttocks  Periwound Skin: Pink  Drainage : Scant, Green (on drsg)  Tissue Type and %:    100% pink  Wound Edges:    approximated    Odor:     None   Exposed structure(s):   No   Signs and Symptoms of Infection: none    Pressure Ulcer  Stage 4 POA Ischium Left Upper  Periwound Skin: Intact  Drainage : Scant, Serosanguinous  Tissue Type and %:    100% red  Wound Edges:    open   Odor:     None   Exposed structure(s):   No   Signs and Symptoms of Infection: none    Pressure Ulcer  Stage 4 POA Sacrum Midline  Periwound Skin:  (scarred)  Drainage : Minimal, Serosanguinous  Tissue Type and %:    100% red  Wound Edges:    open   Odor:     None   Exposed structure(s):   No   Signs and Symptoms of Infection: none    Pressure Ulcer  Deep Tissue Injury Not POA Heel Left Posterior  Periwound Skin:  (peeling)  Drainage : None  Tissue Type and %:    100% red/purple  Wound Edges:    open   Odor:     None   Exposed structure(s):   No   Signs and Symptoms of Infection: none    Pressure Ulcer  Deep Tissue Injury Not POA Heel Right  Periwound Skin:  (peeling)  Drainage : None    Tissue Type and %:    100% red/purple  Wound Edges:    open   Odor:     None   Exposed structure(s):   No   Signs and Symptoms of Infection: none    Measurements : taken 11/1/17     Ischium Left   Sacrum L heel  R heel  Length (cm):  2.5   3.5  5  1.5  Width (cm):  2.5   6.5  2.5  2  Depth (cm):  0.4   1  nm  nm    Tracts/undermining:  All None       INTERVENTIONS BY WOUND TEAM: pt turned to L side, removed sacral and R ischium drsg, cleaned wounds with NS, pictures taken. Pt then had BM, " cleaned up and dry clean towel placed under pt. Gloves changed. Placed silver hydrofiber strips over incision, covered with island drsgs. Hydrofera blue to sacrum and L ischium. Covered with non-adhesive foam and secured with hypafix. Heels DEBI.  Incision Right Buttocks-Dressing Options: Hydrofiber Silver, Island Dressing  Ischium Left Upper-Dressing Options:  (hydrofera blue, non-adhesive foam, hypafix)  Sacrum Midline-Dressing Options:  (hydrofera blue, non-adhesive foam)  Heel Left and Right Posterior-Dressing Options: Open to Air    Interdisciplinary consultation:  RN, pt, Dr Arriola    EVALUATION AND PROGRESS OF WOUND(S): pt's incision with scant green drainage to proximal incision. Dr viewed photo. Silver hydrofiber to incision for antimicrobial. Changed from silver hydrofiber to hydrofera blue r/t drsgs saturated. Urostomy appliance intact.    Factors affecting wound healing: chronic wounds, Hx of non-compliance, incomplete quadriplegia,      Goals:decreased wound size 0.5% each week    NURSING PLAN OF CARE:    Dressing changes: Continue previous Dressing Maintenance orders:        See new Dressing Maintenance orders:  Sacrum, L ischium     Skin care: See Skin Care orders:        Rectal tube care: See Rectal Tube Care orders:      Other orders:           WOUND TEAM PLAN OF CARE (X):   NPWT change 3 x week:        Dressing changes:       Follow up as needed:    weekly   Other:

## 2017-11-01 NOTE — PROGRESS NOTES
Call to Dr. Arriola's office. Spoke w/ MD's RN.  Update on pt status and being seen by wound care. Wound team will call RN after dressing change.  Updated by wound care that flap to ishium does not seem ready to have staples removed at this time. Pt exhibits green discharge- will update hospitalist.
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
## 2017-11-02 PROCEDURE — 700102 HCHG RX REV CODE 250 W/ 637 OVERRIDE(OP): Performed by: SURGERY

## 2017-11-02 PROCEDURE — 700102 HCHG RX REV CODE 250 W/ 637 OVERRIDE(OP): Performed by: INTERNAL MEDICINE

## 2017-11-02 PROCEDURE — 94760 N-INVAS EAR/PLS OXIMETRY 1: CPT

## 2017-11-02 PROCEDURE — 770006 HCHG ROOM/CARE - MED/SURG/GYN SEMI*

## 2017-11-02 PROCEDURE — 99232 SBSQ HOSP IP/OBS MODERATE 35: CPT | Performed by: HOSPITALIST

## 2017-11-02 PROCEDURE — A9270 NON-COVERED ITEM OR SERVICE: HCPCS | Performed by: SURGERY

## 2017-11-02 PROCEDURE — A9270 NON-COVERED ITEM OR SERVICE: HCPCS | Performed by: INTERNAL MEDICINE

## 2017-11-02 RX ADMIN — MIDODRINE HYDROCHLORIDE 10 MG: 10 TABLET ORAL at 08:19

## 2017-11-02 RX ADMIN — GABAPENTIN 900 MG: 300 CAPSULE ORAL at 14:42

## 2017-11-02 RX ADMIN — DOCUSATE SODIUM AND SENNOSIDES 2 TABLET: 8.6; 5 TABLET, FILM COATED ORAL at 08:19

## 2017-11-02 RX ADMIN — MIDODRINE HYDROCHLORIDE 10 MG: 10 TABLET ORAL at 18:02

## 2017-11-02 RX ADMIN — GABAPENTIN 900 MG: 300 CAPSULE ORAL at 08:19

## 2017-11-02 RX ADMIN — ASPIRIN 81 MG: 81 TABLET, COATED ORAL at 08:20

## 2017-11-02 RX ADMIN — OXYCODONE HYDROCHLORIDE AND ACETAMINOPHEN 500 MG: 500 TABLET ORAL at 08:20

## 2017-11-02 RX ADMIN — FERROUS SULFATE TAB 325 MG (65 MG ELEMENTAL FE) 325 MG: 325 (65 FE) TAB at 08:20

## 2017-11-02 RX ADMIN — GABAPENTIN 900 MG: 300 CAPSULE ORAL at 20:03

## 2017-11-02 RX ADMIN — MULTIPLE VITAMINS W/ MINERALS TAB 1 TABLET: TAB at 08:20

## 2017-11-02 RX ADMIN — MIDODRINE HYDROCHLORIDE 10 MG: 10 TABLET ORAL at 13:18

## 2017-11-02 RX ADMIN — OXYCODONE HYDROCHLORIDE AND ACETAMINOPHEN 500 MG: 500 TABLET ORAL at 20:03

## 2017-11-02 ASSESSMENT — ENCOUNTER SYMPTOMS
RESPIRATORY NEGATIVE: 1
MUSCULOSKELETAL NEGATIVE: 1
CARDIOVASCULAR NEGATIVE: 1
SENSORY CHANGE: 1
EYES NEGATIVE: 1
FOCAL WEAKNESS: 1
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
## 2017-11-02 NOTE — CARE PLAN
Problem: Bowel/Gastric:  Goal: Normal bowel function is maintained or improved  Outcome: PROGRESSING AS EXPECTED  BM this shift

## 2017-11-02 NOTE — PROGRESS NOTES
[id number] Received report resting in bed alert and oriented x4,no pain verbalized .Discussed POC and verbalized understanding.CNA assisted pt.for breakfast.    0930-Dressing change done as pt.incontinent of stoot,dressing soiled from BM.Digital stimulation done large amount of soft BM obtained.Encouraged to turn on his left side all the way but states,pillow placement on his right side is enough.

## 2017-11-02 NOTE — DISCHARGE PLANNING
SW met with this patient bedside to discuss discharge plan.  SW confirmed with this patient that there is no discharge date at this time. Patient stated to this SW that he already has a low air loss mattress at home, so all he would need is HH set up to go.  SW discussed with him that Dr. Arriola stated that he would be back in a couple of days to see him but that no actual d/c date has ever been set.  DENI is still looking into SNF possibilities.

## 2017-11-02 NOTE — DIETARY
Nutrition Services follow-up:  Diet Rx: Regular.  Patient continues with good PO intake.  He is taking in % of meals and Boost Plus.  Plan:  Continue to monitor PO intake.

## 2017-11-02 NOTE — CARE PLAN
Problem: Nutritional:  Goal: Achieve adequate nutritional intake  Patient will consume 50-75% of meals   Outcome: MET Date Met: 11/02/17

## 2017-11-03 PROCEDURE — 700102 HCHG RX REV CODE 250 W/ 637 OVERRIDE(OP): Performed by: INTERNAL MEDICINE

## 2017-11-03 PROCEDURE — 302146: Performed by: SURGERY

## 2017-11-03 PROCEDURE — A9270 NON-COVERED ITEM OR SERVICE: HCPCS | Performed by: SURGERY

## 2017-11-03 PROCEDURE — A9270 NON-COVERED ITEM OR SERVICE: HCPCS | Performed by: INTERNAL MEDICINE

## 2017-11-03 PROCEDURE — 700102 HCHG RX REV CODE 250 W/ 637 OVERRIDE(OP): Performed by: SURGERY

## 2017-11-03 PROCEDURE — A6209 FOAM DRSG <=16 SQ IN W/O BDR: HCPCS | Performed by: SURGERY

## 2017-11-03 PROCEDURE — 99232 SBSQ HOSP IP/OBS MODERATE 35: CPT | Performed by: HOSPITALIST

## 2017-11-03 PROCEDURE — 770006 HCHG ROOM/CARE - MED/SURG/GYN SEMI*

## 2017-11-03 RX ADMIN — MIDODRINE HYDROCHLORIDE 10 MG: 10 TABLET ORAL at 17:32

## 2017-11-03 RX ADMIN — OXYCODONE HYDROCHLORIDE AND ACETAMINOPHEN 500 MG: 500 TABLET ORAL at 20:47

## 2017-11-03 RX ADMIN — OXYCODONE HYDROCHLORIDE AND ACETAMINOPHEN 500 MG: 500 TABLET ORAL at 09:58

## 2017-11-03 RX ADMIN — GABAPENTIN 900 MG: 300 CAPSULE ORAL at 15:00

## 2017-11-03 RX ADMIN — GABAPENTIN 900 MG: 300 CAPSULE ORAL at 09:58

## 2017-11-03 RX ADMIN — MIDODRINE HYDROCHLORIDE 10 MG: 10 TABLET ORAL at 09:58

## 2017-11-03 RX ADMIN — GABAPENTIN 900 MG: 300 CAPSULE ORAL at 20:47

## 2017-11-03 RX ADMIN — MULTIPLE VITAMINS W/ MINERALS TAB 1 TABLET: TAB at 09:58

## 2017-11-03 RX ADMIN — FERROUS SULFATE TAB 325 MG (65 MG ELEMENTAL FE) 325 MG: 325 (65 FE) TAB at 10:05

## 2017-11-03 RX ADMIN — ASPIRIN 81 MG: 81 TABLET, COATED ORAL at 09:58

## 2017-11-03 ASSESSMENT — ENCOUNTER SYMPTOMS
CARDIOVASCULAR NEGATIVE: 1
FOCAL WEAKNESS: 1
RESPIRATORY NEGATIVE: 1
SENSORY CHANGE: 1
MUSCULOSKELETAL NEGATIVE: 1
EYES NEGATIVE: 1

## 2017-11-03 ASSESSMENT — PAIN SCALES - GENERAL: PAINLEVEL_OUTOF10: 0

## 2017-11-03 NOTE — DISCHARGE PLANNING
Spoke to Shayy at [facility], they can not take pt due to the cost of care. Pt needs a bed and a lot of hours of wound care. Supervisor Sara A notified.     Shayy will get cost in case we are able to do JOYCE.

## 2017-11-03 NOTE — CARE PLAN
Problem: Bowel/Gastric:  Goal: Normal bowel function is maintained or improved  Outcome: PROGRESSING AS EXPECTED   10/31/17 0800 11/02/17 0900   OTHER   Last BM --  11/02/17   Number of Times Stooled 1 --        Problem: Skin Integrity  Goal: Risk for impaired skin integrity will decrease  Outcome: PROGRESSING SLOWER THAN EXPECTED    Intervention: Assess risk factors for impaired skin integrity and/or pressure ulcers  Pt encouraged to turn every 2 hours

## 2017-11-03 NOTE — FACE TO FACE
Face to Face Note  -  Durable Medical Equipment    Maryse Peraza M.D. - NPI: [id number]  I certify that this patient is under my care and that they had a durable medical equipment(DME)face to face encounter by myself that meets the physician DME face-to-face encounter requirements with this patient on:    Date of encounter:   Patient:                    MRN:                       YOB: 2015  Stevie Phoenix  [id number]  1960     The encounter with the patient was in whole, or in part, for the following medical condition, which is the primary reason for durable medical equipment:  Wound Care    I certify that, based on my findings, the following durable medical equipment is medically necessary:  Other DME Equipment - wound care.    HOME O2 Saturation Measurements:(Values must be present for Home Oxygen orders)         ,     ,         My Clinical findings support the need for the above equipment due to:  Bedbound/non-weight bearing    Supporting Symptoms:

## 2017-11-03 NOTE — DISCHARGE PLANNING
We have received acceptance from [facility] per Shayy. They will rent bed for pt. No JOYCE needed. No special needs for wound Care, just the standard products. Pt is not ready to go home yet. [facility] has okayed him to come on Monday or when he is cleared to go. MIKE Yudelka notified via voicemail.
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
## 2017-11-03 NOTE — DISCHARGE PLANNING
DENI Jha received a call from [facility] stating [facility] can accept pt on Monday if cleared to go.

## 2017-11-04 PROCEDURE — A9270 NON-COVERED ITEM OR SERVICE: HCPCS | Performed by: SURGERY

## 2017-11-04 PROCEDURE — 700102 HCHG RX REV CODE 250 W/ 637 OVERRIDE(OP): Performed by: INTERNAL MEDICINE

## 2017-11-04 PROCEDURE — A9270 NON-COVERED ITEM OR SERVICE: HCPCS | Performed by: INTERNAL MEDICINE

## 2017-11-04 PROCEDURE — 99232 SBSQ HOSP IP/OBS MODERATE 35: CPT | Performed by: HOSPITALIST

## 2017-11-04 PROCEDURE — 700102 HCHG RX REV CODE 250 W/ 637 OVERRIDE(OP): Performed by: SURGERY

## 2017-11-04 PROCEDURE — 770006 HCHG ROOM/CARE - MED/SURG/GYN SEMI*

## 2017-11-04 RX ADMIN — GABAPENTIN 900 MG: 300 CAPSULE ORAL at 08:24

## 2017-11-04 RX ADMIN — MIDODRINE HYDROCHLORIDE 10 MG: 10 TABLET ORAL at 08:24

## 2017-11-04 RX ADMIN — MIDODRINE HYDROCHLORIDE 10 MG: 10 TABLET ORAL at 11:30

## 2017-11-04 RX ADMIN — MIDODRINE HYDROCHLORIDE 10 MG: 10 TABLET ORAL at 17:30

## 2017-11-04 RX ADMIN — FERROUS SULFATE TAB 325 MG (65 MG ELEMENTAL FE) 325 MG: 325 (65 FE) TAB at 08:24

## 2017-11-04 RX ADMIN — ASPIRIN 81 MG: 81 TABLET, COATED ORAL at 08:24

## 2017-11-04 RX ADMIN — OXYCODONE HYDROCHLORIDE AND ACETAMINOPHEN 500 MG: 500 TABLET ORAL at 08:24

## 2017-11-04 RX ADMIN — MULTIPLE VITAMINS W/ MINERALS TAB 1 TABLET: TAB at 08:24

## 2017-11-04 RX ADMIN — OXYCODONE HYDROCHLORIDE AND ACETAMINOPHEN 500 MG: 500 TABLET ORAL at 20:08

## 2017-11-04 RX ADMIN — GABAPENTIN 900 MG: 300 CAPSULE ORAL at 20:08

## 2017-11-04 RX ADMIN — GABAPENTIN 900 MG: 300 CAPSULE ORAL at 15:00

## 2017-11-04 ASSESSMENT — ENCOUNTER SYMPTOMS
MUSCULOSKELETAL NEGATIVE: 1
RESPIRATORY NEGATIVE: 1
SENSORY CHANGE: 1
EYES NEGATIVE: 1
FOCAL WEAKNESS: 1
CARDIOVASCULAR NEGATIVE: 1

## 2017-11-04 ASSESSMENT — PAIN SCALES - GENERAL
PAINLEVEL_OUTOF10: 0

## 2017-11-04 NOTE — PROGRESS NOTES
Pt turns to lt side and back t/o the day.  drsgs to sacral and ischial areas changed as ordered.  Wounds with some sang. drainage noted.rt ishial wound clean and dry with staples intact,no drainage noted.
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
## 2017-11-04 NOTE — DISCHARGE PLANNING
DENI requested HH order at IDT rounds yesterday.  After rounds, DENI received a call from AYANNA Maya stating the [facility] has accepted once doctor medically clears pt.  DENI informed bs MYRIAM Armendariz and pt of SNF acceptance and did not get HH choice.

## 2017-11-05 PROCEDURE — 700102 HCHG RX REV CODE 250 W/ 637 OVERRIDE(OP): Performed by: SURGERY

## 2017-11-05 PROCEDURE — A9270 NON-COVERED ITEM OR SERVICE: HCPCS | Performed by: SURGERY

## 2017-11-05 PROCEDURE — 99231 SBSQ HOSP IP/OBS SF/LOW 25: CPT | Performed by: HOSPITALIST

## 2017-11-05 PROCEDURE — 770006 HCHG ROOM/CARE - MED/SURG/GYN SEMI*

## 2017-11-05 PROCEDURE — A9270 NON-COVERED ITEM OR SERVICE: HCPCS | Performed by: INTERNAL MEDICINE

## 2017-11-05 PROCEDURE — 700102 HCHG RX REV CODE 250 W/ 637 OVERRIDE(OP): Performed by: INTERNAL MEDICINE

## 2017-11-05 RX ADMIN — MIDODRINE HYDROCHLORIDE 10 MG: 10 TABLET ORAL at 17:30

## 2017-11-05 RX ADMIN — GABAPENTIN 900 MG: 300 CAPSULE ORAL at 15:00

## 2017-11-05 RX ADMIN — FERROUS SULFATE TAB 325 MG (65 MG ELEMENTAL FE) 325 MG: 325 (65 FE) TAB at 07:30

## 2017-11-05 RX ADMIN — GABAPENTIN 900 MG: 300 CAPSULE ORAL at 09:00

## 2017-11-05 RX ADMIN — MIDODRINE HYDROCHLORIDE 10 MG: 10 TABLET ORAL at 11:30

## 2017-11-05 RX ADMIN — ASPIRIN 81 MG: 81 TABLET, COATED ORAL at 09:00

## 2017-11-05 RX ADMIN — MIDODRINE HYDROCHLORIDE 10 MG: 10 TABLET ORAL at 07:30

## 2017-11-05 RX ADMIN — GABAPENTIN 900 MG: 300 CAPSULE ORAL at 20:57

## 2017-11-05 RX ADMIN — OXYCODONE HYDROCHLORIDE AND ACETAMINOPHEN 500 MG: 500 TABLET ORAL at 09:00

## 2017-11-05 RX ADMIN — MULTIPLE VITAMINS W/ MINERALS TAB 1 TABLET: TAB at 09:00

## 2017-11-05 RX ADMIN — OXYCODONE HYDROCHLORIDE AND ACETAMINOPHEN 500 MG: 500 TABLET ORAL at 20:58

## 2017-11-05 ASSESSMENT — ENCOUNTER SYMPTOMS
MUSCULOSKELETAL NEGATIVE: 1
CARDIOVASCULAR NEGATIVE: 1
FOCAL WEAKNESS: 1
SENSORY CHANGE: 1
RESPIRATORY NEGATIVE: 1
EYES NEGATIVE: 1

## 2017-11-05 ASSESSMENT — PAIN SCALES - GENERAL
PAINLEVEL_OUTOF10: 0
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
## 2017-11-06 VITALS
DIASTOLIC BLOOD PRESSURE: 66 MMHG | OXYGEN SATURATION: 95 % | HEIGHT: 74 IN | SYSTOLIC BLOOD PRESSURE: 100 MMHG | RESPIRATION RATE: 18 BRPM | HEART RATE: 78 BPM | WEIGHT: 165 LBS | BODY MASS INDEX: 21.17 KG/M2 | TEMPERATURE: 97.7 F

## 2017-11-06 PROBLEM — N39.0 UTI (URINARY TRACT INFECTION): Status: RESOLVED | Noted: 2017-10-20 | Resolved: 2017-11-06

## 2017-11-06 PROCEDURE — 700102 HCHG RX REV CODE 250 W/ 637 OVERRIDE(OP): Performed by: SURGERY

## 2017-11-06 PROCEDURE — 306396 CUSHION, WAFFLE ADULT 17X17: Performed by: SURGERY

## 2017-11-06 PROCEDURE — 700102 HCHG RX REV CODE 250 W/ 637 OVERRIDE(OP): Performed by: INTERNAL MEDICINE

## 2017-11-06 PROCEDURE — A9270 NON-COVERED ITEM OR SERVICE: HCPCS | Performed by: INTERNAL MEDICINE

## 2017-11-06 PROCEDURE — 99239 HOSP IP/OBS DSCHRG MGMT >30: CPT | Performed by: HOSPITALIST

## 2017-11-06 PROCEDURE — A9270 NON-COVERED ITEM OR SERVICE: HCPCS | Performed by: SURGERY

## 2017-11-06 RX ORDER — POLYETHYLENE GLYCOL 3350 17 G/17G
17 POWDER, FOR SOLUTION ORAL
Qty: 10 EACH | Refills: 3 | Status: SHIPPED | OUTPATIENT
Start: 2017-11-06 | End: 2021-10-02

## 2017-11-06 RX ORDER — ACETAMINOPHEN 325 MG/1
650 TABLET ORAL EVERY 6 HOURS PRN
Qty: 30 TAB | Refills: 0 | Status: SHIPPED | OUTPATIENT
Start: 2017-11-06 | End: 2021-10-02

## 2017-11-06 RX ORDER — ONDANSETRON 2 MG/ML
4 INJECTION INTRAMUSCULAR; INTRAVENOUS EVERY 4 HOURS PRN
Qty: 15 ML | Refills: 0 | Status: SHIPPED | OUTPATIENT
Start: 2017-11-06 | End: 2021-10-02

## 2017-11-06 RX ORDER — LEVALBUTEROL INHALATION SOLUTION 1.25 MG/3ML
1.25 SOLUTION RESPIRATORY (INHALATION) EVERY 4 HOURS PRN
Qty: 24 ML | Refills: 0 | Status: SHIPPED | OUTPATIENT
Start: 2017-11-06 | End: 2021-10-02

## 2017-11-06 RX ORDER — M-VIT,TX,IRON,MINS/CALC/FOLIC 27MG-0.4MG
1 TABLET ORAL DAILY
Qty: 30 TAB | Refills: 11 | Status: SHIPPED | OUTPATIENT
Start: 2017-11-06 | End: 2021-10-02

## 2017-11-06 RX ORDER — BISACODYL 10 MG
10 SUPPOSITORY, RECTAL RECTAL PRN
Qty: 10 SUPPOSITORY | Refills: 0 | Status: SHIPPED | OUTPATIENT
Start: 2017-11-06 | End: 2017-12-06

## 2017-11-06 RX ADMIN — GABAPENTIN 900 MG: 300 CAPSULE ORAL at 14:33

## 2017-11-06 RX ADMIN — GABAPENTIN 900 MG: 300 CAPSULE ORAL at 09:20

## 2017-11-06 RX ADMIN — MIDODRINE HYDROCHLORIDE 10 MG: 10 TABLET ORAL at 09:21

## 2017-11-06 RX ADMIN — MIDODRINE HYDROCHLORIDE 10 MG: 10 TABLET ORAL at 14:33

## 2017-11-06 RX ADMIN — MULTIPLE VITAMINS W/ MINERALS TAB 1 TABLET: TAB at 09:22

## 2017-11-06 RX ADMIN — ASPIRIN 81 MG: 81 TABLET, COATED ORAL at 09:20

## 2017-11-06 RX ADMIN — FERROUS SULFATE TAB 325 MG (65 MG ELEMENTAL FE) 325 MG: 325 (65 FE) TAB at 09:20

## 2017-11-06 RX ADMIN — OXYCODONE HYDROCHLORIDE AND ACETAMINOPHEN 500 MG: 500 TABLET ORAL at 09:19

## 2017-11-06 ASSESSMENT — LIFESTYLE VARIABLES: DO YOU DRINK ALCOHOL: NO

## 2017-11-06 ASSESSMENT — PAIN SCALES - GENERAL: PAINLEVEL_OUTOF10: 0

## 2017-11-06 NOTE — PROGRESS NOTES
Received report from day RN, eyes on patient at 1925. Patient awake, alert with clear speech. Patient states he has no needs at this time other than his night medications due at 2100. Will assess wound dressings during medication administration.

## 2017-11-06 NOTE — DISCHARGE PLANNING
Transportation has been arranged with Aleksandr at [facility]. Pt's transport is scheduled for 3:00 today via [facility]. Pt will discharge to [facility] . MIKE Purcell notified voicemail.

## 2017-11-06 NOTE — DISCHARGE PLANNING
DENI discussed patient with Hospitalist.  Plan is for this patient to discharge to [facility] today.  DENI called and left updated information for Dr. Arriola at his office.  DENI completed [employer] PCS form and faxed to AYANNA Maya for transportation assistance.

## 2017-11-06 NOTE — CARE PLAN
Problem: Knowledge Deficit  Goal: Knowledge of disease process/condition, treatment plan, diagnostic tests, and medications will improve    Intervention: Assess knowledge level of disease process/condition, treatment plan, diagnostic tests, and medications  Discussed current meds dressing change as prescribed, digital stimulation as per his normal protocol with small amt brown stool return, will discharge to [facility] when bed is ready call placed to Dr Arriola to ask when staples may be dced.

## 2017-11-06 NOTE — DISCHARGE PLANNING
Received [facility] PCS form, right-faxed PCS for the arrange transport to [facility]. Message left for Denton at [facility] to call to confirm they are all ready for pt to go.
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
## 2017-11-06 NOTE — PROGRESS NOTES
drsgs changed .area around wounds excoriated.barrier cream applied to these areas.mesh underwear and kerlix used to hold drsgs in place.no tape on skin.  Dig stim given with removal of medium soft brown stool.  Turned from supine to lt side t/o the day.

## 2017-11-07 NOTE — PROGRESS NOTES
1953: Pt's nephew Niraj Early came to [facility] floor to  pt's motorized wheelchair from room 211. Madeline left with motorized wheechair and some of pt's personal belongings that where sitting in wheelchair.

## 2017-12-04 NOTE — DOCUMENTATION QUERY
DOCUMENTATION QUERY    PROVIDERS: Please select “Cosign w/ note”to reply to query.        Dear Dr Arriola,    Per notes on Oct 18th, 2017 Excision of stage IV right ischial pressure ulcer in preparation for   myocutaneous flap closure and right posterior thigh V-Y advancement myocutaneous flap was performed.    In order to accurately capture the extent of the procedure we ask that you document the size of the defect repaired via addendum to the procedure note.    Thank you in advance

## 2017-12-05 NOTE — DOCUMENTATION QUERY
DOCUMENTATION QUERY    PROVIDERS: Please select “Cosign w/ note”to reply to query.    To better represent the severity of illness of your patient, please review the following information and exercise your independent professional judgment in responding to this query.       Please document the size of the defect repaired       The medical record reflects the following:   Clinical Findings  Stage IV right ischial pressure ulcer.   Treatment   Excision of stage IV right ischial pressure ulcer in preparation for   myocutaneous flap closure.  2.  Right posterior thigh V-Y advancement myocutaneous flap.   Risk Factors    Location within medical record      Thank you,

## 2021-10-02 ENCOUNTER — HOSPITAL ENCOUNTER (OUTPATIENT)
Facility: MEDICAL CENTER | Age: 61
End: 2021-11-01
Attending: EMERGENCY MEDICINE | Admitting: FAMILY MEDICINE
Payer: MEDICAID

## 2021-10-02 DIAGNOSIS — I48.0 PAROXYSMAL ATRIAL FIBRILLATION (HCC): ICD-10-CM

## 2021-10-02 DIAGNOSIS — G82.50 QUADRIPLEGIC SPINAL PARALYSIS (HCC): ICD-10-CM

## 2021-10-02 DIAGNOSIS — G82.20 PARAPLEGIA (HCC): ICD-10-CM

## 2021-10-02 DIAGNOSIS — L89.319 DECUBITUS ULCER OF RIGHT ISCHIAL AREA, UNSPECIFIED ULCER STAGE: ICD-10-CM

## 2021-10-02 DIAGNOSIS — E78.5 HYPERLIPIDEMIA, UNSPECIFIED HYPERLIPIDEMIA TYPE: ICD-10-CM

## 2021-10-02 DIAGNOSIS — L89.314 STAGE IV PRESSURE ULCER OF RIGHT BUTTOCK (HCC): ICD-10-CM

## 2021-10-02 DIAGNOSIS — L89.104 PRESSURE INJURY OF BACK, STAGE 4 (HCC): ICD-10-CM

## 2021-10-02 DIAGNOSIS — I95.9 HYPOTENSION, UNSPECIFIED HYPOTENSION TYPE: ICD-10-CM

## 2021-10-02 DIAGNOSIS — I95.1 AUTONOMIC POSTURAL HYPOTENSION: ICD-10-CM

## 2021-10-02 DIAGNOSIS — L89.103 PRESSURE INJURY OF BACK, STAGE 3 (HCC): ICD-10-CM

## 2021-10-02 DIAGNOSIS — E87.20 METABOLIC ACIDOSIS: ICD-10-CM

## 2021-10-02 DIAGNOSIS — L97.419 CHRONIC HEEL ULCER, RIGHT, WITH UNSPECIFIED SEVERITY (HCC): ICD-10-CM

## 2021-10-02 DIAGNOSIS — Z02.9 PROBLEM RELATED TO DISCHARGE PLANNING: ICD-10-CM

## 2021-10-02 DIAGNOSIS — I48.0 PAF (PAROXYSMAL ATRIAL FIBRILLATION) (HCC): ICD-10-CM

## 2021-10-02 DIAGNOSIS — E86.0 DEHYDRATION: ICD-10-CM

## 2021-10-02 DIAGNOSIS — L97.421 CHRONIC ULCER OF LEFT HEEL LIMITED TO BREAKDOWN OF SKIN (HCC): ICD-10-CM

## 2021-10-02 DIAGNOSIS — L89.323 DECUBITUS ULCER OF LEFT ISCHIUM, STAGE 3 (HCC): ICD-10-CM

## 2021-10-02 DIAGNOSIS — L97.411 CHRONIC ULCER OF RIGHT HEEL LIMITED TO BREAKDOWN OF SKIN (HCC): ICD-10-CM

## 2021-10-02 DIAGNOSIS — N31.9 BLADDER DYSFUNCTION: ICD-10-CM

## 2021-10-02 PROBLEM — Z75.8 PROBLEM RELATED TO DISCHARGE PLANNING: Status: ACTIVE | Noted: 2021-10-02

## 2021-10-02 PROBLEM — T83.518A PURPLE URINE BAG SYNDROME (HCC): Status: ACTIVE | Noted: 2021-10-02

## 2021-10-02 PROBLEM — N39.0 PURPLE URINE BAG SYNDROME (HCC): Status: ACTIVE | Noted: 2021-10-02

## 2021-10-02 LAB
ALBUMIN SERPL BCP-MCNC: 4.1 G/DL (ref 3.2–4.9)
ALBUMIN/GLOB SERPL: 1.1 G/DL
ALP SERPL-CCNC: 76 U/L (ref 30–99)
ALT SERPL-CCNC: 11 U/L (ref 2–50)
ANION GAP SERPL CALC-SCNC: 24 MMOL/L (ref 7–16)
AST SERPL-CCNC: 17 U/L (ref 12–45)
BASOPHILS # BLD AUTO: 0.6 % (ref 0–1.8)
BASOPHILS # BLD: 0.06 K/UL (ref 0–0.12)
BILIRUB SERPL-MCNC: 0.9 MG/DL (ref 0.1–1.5)
BUN SERPL-MCNC: 15 MG/DL (ref 8–22)
CALCIUM SERPL-MCNC: 9.5 MG/DL (ref 8.4–10.2)
CHLORIDE SERPL-SCNC: 102 MMOL/L (ref 96–112)
CO2 SERPL-SCNC: 13 MMOL/L (ref 20–33)
CREAT SERPL-MCNC: 0.51 MG/DL (ref 0.5–1.4)
EKG IMPRESSION: NORMAL
EOSINOPHIL # BLD AUTO: 0.26 K/UL (ref 0–0.51)
EOSINOPHIL NFR BLD: 2.7 % (ref 0–6.9)
ERYTHROCYTE [DISTWIDTH] IN BLOOD BY AUTOMATED COUNT: 49.6 FL (ref 35.9–50)
GLOBULIN SER CALC-MCNC: 3.8 G/DL (ref 1.9–3.5)
GLUCOSE SERPL-MCNC: 54 MG/DL (ref 65–99)
HCT VFR BLD AUTO: 49.2 % (ref 42–52)
HGB BLD-MCNC: 15.3 G/DL (ref 14–18)
IMM GRANULOCYTES # BLD AUTO: 0.06 K/UL (ref 0–0.11)
IMM GRANULOCYTES NFR BLD AUTO: 0.6 % (ref 0–0.9)
LACTATE BLD-SCNC: 1.9 MMOL/L (ref 0.5–2)
LYMPHOCYTES # BLD AUTO: 1.04 K/UL (ref 1–4.8)
LYMPHOCYTES NFR BLD: 10.9 % (ref 22–41)
MCH RBC QN AUTO: 27.6 PG (ref 27–33)
MCHC RBC AUTO-ENTMCNC: 31.1 G/DL (ref 33.7–35.3)
MCV RBC AUTO: 88.8 FL (ref 81.4–97.8)
MONOCYTES # BLD AUTO: 0.68 K/UL (ref 0–0.85)
MONOCYTES NFR BLD AUTO: 7.1 % (ref 0–13.4)
NEUTROPHILS # BLD AUTO: 7.42 K/UL (ref 1.82–7.42)
NEUTROPHILS NFR BLD: 78.1 % (ref 44–72)
NRBC # BLD AUTO: 0 K/UL
NRBC BLD-RTO: 0 /100 WBC
PLATELET # BLD AUTO: 316 K/UL (ref 164–446)
PMV BLD AUTO: 10.7 FL (ref 9–12.9)
POTASSIUM SERPL-SCNC: 4 MMOL/L (ref 3.6–5.5)
PROCALCITONIN SERPL-MCNC: 0.05 NG/ML
PROT SERPL-MCNC: 7.9 G/DL (ref 6–8.2)
RBC # BLD AUTO: 5.54 M/UL (ref 4.7–6.1)
SODIUM SERPL-SCNC: 139 MMOL/L (ref 135–145)
WBC # BLD AUTO: 9.5 K/UL (ref 4.8–10.8)

## 2021-10-02 PROCEDURE — 80053 COMPREHEN METABOLIC PANEL: CPT

## 2021-10-02 PROCEDURE — 99220 PR INITIAL OBSERVATION CARE,LEVL III: CPT | Performed by: FAMILY MEDICINE

## 2021-10-02 PROCEDURE — 302146: Performed by: FAMILY MEDICINE

## 2021-10-02 PROCEDURE — 700102 HCHG RX REV CODE 250 W/ 637 OVERRIDE(OP): Performed by: FAMILY MEDICINE

## 2021-10-02 PROCEDURE — 84145 PROCALCITONIN (PCT): CPT

## 2021-10-02 PROCEDURE — A9270 NON-COVERED ITEM OR SERVICE: HCPCS | Performed by: FAMILY MEDICINE

## 2021-10-02 PROCEDURE — 700105 HCHG RX REV CODE 258: Performed by: FAMILY MEDICINE

## 2021-10-02 PROCEDURE — 700105 HCHG RX REV CODE 258: Performed by: EMERGENCY MEDICINE

## 2021-10-02 PROCEDURE — 96372 THER/PROPH/DIAG INJ SC/IM: CPT

## 2021-10-02 PROCEDURE — 83605 ASSAY OF LACTIC ACID: CPT

## 2021-10-02 PROCEDURE — 700111 HCHG RX REV CODE 636 W/ 250 OVERRIDE (IP): Performed by: FAMILY MEDICINE

## 2021-10-02 PROCEDURE — G0378 HOSPITAL OBSERVATION PER HR: HCPCS

## 2021-10-02 PROCEDURE — 85025 COMPLETE CBC W/AUTO DIFF WBC: CPT

## 2021-10-02 PROCEDURE — 99285 EMERGENCY DEPT VISIT HI MDM: CPT

## 2021-10-02 PROCEDURE — 93005 ELECTROCARDIOGRAM TRACING: CPT | Performed by: EMERGENCY MEDICINE

## 2021-10-02 RX ORDER — SODIUM CHLORIDE, SODIUM LACTATE, POTASSIUM CHLORIDE, CALCIUM CHLORIDE 600; 310; 30; 20 MG/100ML; MG/100ML; MG/100ML; MG/100ML
INJECTION, SOLUTION INTRAVENOUS CONTINUOUS
Status: DISCONTINUED | OUTPATIENT
Start: 2021-10-02 | End: 2021-10-12

## 2021-10-02 RX ORDER — METOPROLOL TARTRATE 1 MG/ML
5 INJECTION, SOLUTION INTRAVENOUS
Status: DISCONTINUED | OUTPATIENT
Start: 2021-10-02 | End: 2021-11-01 | Stop reason: HOSPADM

## 2021-10-02 RX ORDER — PROMETHAZINE HYDROCHLORIDE 25 MG/1
12.5-25 TABLET ORAL EVERY 4 HOURS PRN
Status: DISCONTINUED | OUTPATIENT
Start: 2021-10-02 | End: 2021-11-01 | Stop reason: HOSPADM

## 2021-10-02 RX ORDER — CLONIDINE HYDROCHLORIDE 0.1 MG/1
0.1 TABLET ORAL EVERY 6 HOURS PRN
Status: DISCONTINUED | OUTPATIENT
Start: 2021-10-02 | End: 2021-11-01 | Stop reason: HOSPADM

## 2021-10-02 RX ORDER — MORPHINE SULFATE 4 MG/ML
2 INJECTION, SOLUTION INTRAMUSCULAR; INTRAVENOUS
Status: DISCONTINUED | OUTPATIENT
Start: 2021-10-02 | End: 2021-11-01 | Stop reason: HOSPADM

## 2021-10-02 RX ORDER — SODIUM CHLORIDE 9 MG/ML
1000 INJECTION, SOLUTION INTRAVENOUS ONCE
Status: COMPLETED | OUTPATIENT
Start: 2021-10-02 | End: 2021-10-02

## 2021-10-02 RX ORDER — PROMETHAZINE HYDROCHLORIDE 25 MG/1
12.5-25 SUPPOSITORY RECTAL EVERY 4 HOURS PRN
Status: DISCONTINUED | OUTPATIENT
Start: 2021-10-02 | End: 2021-11-01 | Stop reason: HOSPADM

## 2021-10-02 RX ORDER — POLYETHYLENE GLYCOL 3350 17 G/17G
1 POWDER, FOR SOLUTION ORAL
Status: DISCONTINUED | OUTPATIENT
Start: 2021-10-02 | End: 2021-11-01 | Stop reason: HOSPADM

## 2021-10-02 RX ORDER — ACETAMINOPHEN 325 MG/1
650 TABLET ORAL EVERY 6 HOURS PRN
Status: DISCONTINUED | OUTPATIENT
Start: 2021-10-02 | End: 2021-11-01 | Stop reason: HOSPADM

## 2021-10-02 RX ORDER — METOPROLOL TARTRATE 1 MG/ML
5 INJECTION, SOLUTION INTRAVENOUS
Status: DISCONTINUED | OUTPATIENT
Start: 2021-10-02 | End: 2021-10-02

## 2021-10-02 RX ORDER — ONDANSETRON 4 MG/1
4 TABLET, ORALLY DISINTEGRATING ORAL EVERY 4 HOURS PRN
Status: DISCONTINUED | OUTPATIENT
Start: 2021-10-02 | End: 2021-11-01 | Stop reason: HOSPADM

## 2021-10-02 RX ORDER — OXYCODONE HYDROCHLORIDE 5 MG/1
5 TABLET ORAL
Status: DISCONTINUED | OUTPATIENT
Start: 2021-10-02 | End: 2021-11-01 | Stop reason: HOSPADM

## 2021-10-02 RX ORDER — LABETALOL HYDROCHLORIDE 5 MG/ML
10 INJECTION, SOLUTION INTRAVENOUS EVERY 4 HOURS PRN
Status: DISCONTINUED | OUTPATIENT
Start: 2021-10-02 | End: 2021-11-01 | Stop reason: HOSPADM

## 2021-10-02 RX ORDER — DEXTROSE, SODIUM CHLORIDE, SODIUM LACTATE, POTASSIUM CHLORIDE, AND CALCIUM CHLORIDE 5; .6; .31; .03; .02 G/100ML; G/100ML; G/100ML; G/100ML; G/100ML
INJECTION, SOLUTION INTRAVENOUS CONTINUOUS
Status: DISCONTINUED | OUTPATIENT
Start: 2021-10-02 | End: 2021-10-02

## 2021-10-02 RX ORDER — PROCHLORPERAZINE EDISYLATE 5 MG/ML
5-10 INJECTION INTRAMUSCULAR; INTRAVENOUS EVERY 4 HOURS PRN
Status: DISCONTINUED | OUTPATIENT
Start: 2021-10-02 | End: 2021-11-01 | Stop reason: HOSPADM

## 2021-10-02 RX ORDER — AMOXICILLIN 250 MG
1 CAPSULE ORAL DAILY
Status: ON HOLD | COMMUNITY
End: 2021-11-01

## 2021-10-02 RX ORDER — BISACODYL 10 MG
10 SUPPOSITORY, RECTAL RECTAL
Status: DISCONTINUED | OUTPATIENT
Start: 2021-10-02 | End: 2021-11-01 | Stop reason: HOSPADM

## 2021-10-02 RX ORDER — ONDANSETRON 2 MG/ML
4 INJECTION INTRAMUSCULAR; INTRAVENOUS EVERY 4 HOURS PRN
Status: DISCONTINUED | OUTPATIENT
Start: 2021-10-02 | End: 2021-11-01 | Stop reason: HOSPADM

## 2021-10-02 RX ORDER — OXYCODONE HYDROCHLORIDE 5 MG/1
2.5 TABLET ORAL
Status: DISCONTINUED | OUTPATIENT
Start: 2021-10-02 | End: 2021-11-01 | Stop reason: HOSPADM

## 2021-10-02 RX ORDER — AMOXICILLIN 250 MG
2 CAPSULE ORAL 2 TIMES DAILY
Status: DISCONTINUED | OUTPATIENT
Start: 2021-10-02 | End: 2021-11-01 | Stop reason: HOSPADM

## 2021-10-02 RX ORDER — ENALAPRILAT 1.25 MG/ML
1.25 INJECTION INTRAVENOUS EVERY 6 HOURS PRN
Status: DISCONTINUED | OUTPATIENT
Start: 2021-10-02 | End: 2021-11-01 | Stop reason: HOSPADM

## 2021-10-02 RX ADMIN — SODIUM CHLORIDE, POTASSIUM CHLORIDE, SODIUM LACTATE AND CALCIUM CHLORIDE: 600; 310; 30; 20 INJECTION, SOLUTION INTRAVENOUS at 16:44

## 2021-10-02 RX ADMIN — SODIUM CHLORIDE 1000 ML: 9 INJECTION, SOLUTION INTRAVENOUS at 12:02

## 2021-10-02 RX ADMIN — ASPIRIN 81 MG: 81 TABLET, COATED ORAL at 17:45

## 2021-10-02 RX ADMIN — ENOXAPARIN SODIUM 40 MG: 40 INJECTION SUBCUTANEOUS at 17:45

## 2021-10-02 ASSESSMENT — COGNITIVE AND FUNCTIONAL STATUS - GENERAL
SUGGESTED CMS G CODE MODIFIER MOBILITY: CN
DAILY ACTIVITIY SCORE: 7
TOILETING: TOTAL
DRESSING REGULAR LOWER BODY CLOTHING: TOTAL
MOBILITY SCORE: 6
HELP NEEDED FOR BATHING: TOTAL
CLIMB 3 TO 5 STEPS WITH RAILING: TOTAL
WALKING IN HOSPITAL ROOM: TOTAL
DRESSING REGULAR UPPER BODY CLOTHING: TOTAL
STANDING UP FROM CHAIR USING ARMS: TOTAL
MOVING TO AND FROM BED TO CHAIR: UNABLE
TURNING FROM BACK TO SIDE WHILE IN FLAT BAD: UNABLE
SUGGESTED CMS G CODE MODIFIER DAILY ACTIVITY: CM
PERSONAL GROOMING: TOTAL
MOVING FROM LYING ON BACK TO SITTING ON SIDE OF FLAT BED: UNABLE
EATING MEALS: A LOT

## 2021-10-02 ASSESSMENT — ENCOUNTER SYMPTOMS
ABDOMINAL PAIN: 0
SHORTNESS OF BREATH: 0
NAUSEA: 0
CHILLS: 0
VOMITING: 0
FEVER: 0
COUGH: 0

## 2021-10-02 ASSESSMENT — PAIN DESCRIPTION - PAIN TYPE: TYPE: ACUTE PAIN

## 2021-10-02 ASSESSMENT — FIBROSIS 4 INDEX: FIB4 SCORE: 0.94
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
## 2021-10-02 NOTE — ASSESSMENT & PLAN NOTE
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  Patient has remained afebrile.  We will not do further work-up at this time.  Monitor. Urine looks clear today. Continue to monitor.    RESOLVED
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
## 2021-10-02 NOTE — ASSESSMENT & PLAN NOTE
Continue with midodrine to optimize blood pressure  Currently blood pressure 150/97, given the elevated blood pressure ongoing to cut back on the midodrine to 5 mg 3 times daily
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
## 2021-10-03 LAB
ALBUMIN SERPL BCP-MCNC: 3.1 G/DL (ref 3.2–4.9)
ALBUMIN/GLOB SERPL: 1 G/DL
ALP SERPL-CCNC: 66 U/L (ref 30–99)
ALT SERPL-CCNC: 10 U/L (ref 2–50)
ANION GAP SERPL CALC-SCNC: 13 MMOL/L (ref 7–16)
AST SERPL-CCNC: 15 U/L (ref 12–45)
BASOPHILS # BLD AUTO: 0.9 % (ref 0–1.8)
BASOPHILS # BLD: 0.06 K/UL (ref 0–0.12)
BILIRUB SERPL-MCNC: 0.5 MG/DL (ref 0.1–1.5)
BUN SERPL-MCNC: 14 MG/DL (ref 8–22)
CALCIUM SERPL-MCNC: 9 MG/DL (ref 8.4–10.2)
CHLORIDE SERPL-SCNC: 109 MMOL/L (ref 96–112)
CO2 SERPL-SCNC: 15 MMOL/L (ref 20–33)
CREAT SERPL-MCNC: 0.36 MG/DL (ref 0.5–1.4)
EOSINOPHIL # BLD AUTO: 0.29 K/UL (ref 0–0.51)
EOSINOPHIL NFR BLD: 4.3 % (ref 0–6.9)
ERYTHROCYTE [DISTWIDTH] IN BLOOD BY AUTOMATED COUNT: 48.5 FL (ref 35.9–50)
GLOBULIN SER CALC-MCNC: 3.2 G/DL (ref 1.9–3.5)
GLUCOSE SERPL-MCNC: 93 MG/DL (ref 65–99)
HCT VFR BLD AUTO: 43.6 % (ref 42–52)
HGB BLD-MCNC: 13.6 G/DL (ref 14–18)
IMM GRANULOCYTES # BLD AUTO: 0.03 K/UL (ref 0–0.11)
IMM GRANULOCYTES NFR BLD AUTO: 0.4 % (ref 0–0.9)
LYMPHOCYTES # BLD AUTO: 1.12 K/UL (ref 1–4.8)
LYMPHOCYTES NFR BLD: 16.4 % (ref 22–41)
MAGNESIUM SERPL-MCNC: 1.8 MG/DL (ref 1.5–2.5)
MCH RBC QN AUTO: 27.2 PG (ref 27–33)
MCHC RBC AUTO-ENTMCNC: 31.2 G/DL (ref 33.7–35.3)
MCV RBC AUTO: 87.2 FL (ref 81.4–97.8)
MONOCYTES # BLD AUTO: 0.91 K/UL (ref 0–0.85)
MONOCYTES NFR BLD AUTO: 13.4 % (ref 0–13.4)
NEUTROPHILS # BLD AUTO: 4.4 K/UL (ref 1.82–7.42)
NEUTROPHILS NFR BLD: 64.6 % (ref 44–72)
NRBC # BLD AUTO: 0 K/UL
NRBC BLD-RTO: 0 /100 WBC
PLATELET # BLD AUTO: 309 K/UL (ref 164–446)
PMV BLD AUTO: 10.1 FL (ref 9–12.9)
POTASSIUM SERPL-SCNC: 3.8 MMOL/L (ref 3.6–5.5)
PROT SERPL-MCNC: 6.3 G/DL (ref 6–8.2)
RBC # BLD AUTO: 5 M/UL (ref 4.7–6.1)
SODIUM SERPL-SCNC: 137 MMOL/L (ref 135–145)
WBC # BLD AUTO: 6.8 K/UL (ref 4.8–10.8)

## 2021-10-03 PROCEDURE — 99225 PR SUBSEQUENT OBSERVATION CARE,LEVEL II: CPT | Performed by: INTERNAL MEDICINE

## 2021-10-03 PROCEDURE — G0378 HOSPITAL OBSERVATION PER HR: HCPCS

## 2021-10-03 PROCEDURE — 700102 HCHG RX REV CODE 250 W/ 637 OVERRIDE(OP): Performed by: FAMILY MEDICINE

## 2021-10-03 PROCEDURE — 700111 HCHG RX REV CODE 636 W/ 250 OVERRIDE (IP): Performed by: INTERNAL MEDICINE

## 2021-10-03 PROCEDURE — 85025 COMPLETE CBC W/AUTO DIFF WBC: CPT

## 2021-10-03 PROCEDURE — 80053 COMPREHEN METABOLIC PANEL: CPT

## 2021-10-03 PROCEDURE — 700102 HCHG RX REV CODE 250 W/ 637 OVERRIDE(OP): Performed by: INTERNAL MEDICINE

## 2021-10-03 PROCEDURE — 96365 THER/PROPH/DIAG IV INF INIT: CPT

## 2021-10-03 PROCEDURE — 83735 ASSAY OF MAGNESIUM: CPT

## 2021-10-03 PROCEDURE — 700105 HCHG RX REV CODE 258: Performed by: FAMILY MEDICINE

## 2021-10-03 PROCEDURE — 97535 SELF CARE MNGMENT TRAINING: CPT

## 2021-10-03 PROCEDURE — A9270 NON-COVERED ITEM OR SERVICE: HCPCS | Performed by: FAMILY MEDICINE

## 2021-10-03 PROCEDURE — A9270 NON-COVERED ITEM OR SERVICE: HCPCS | Performed by: INTERNAL MEDICINE

## 2021-10-03 RX ORDER — MAGNESIUM SULFATE 1 G/100ML
1 INJECTION INTRAVENOUS ONCE
Status: COMPLETED | OUTPATIENT
Start: 2021-10-03 | End: 2021-10-03

## 2021-10-03 RX ADMIN — ASPIRIN 81 MG: 81 TABLET, COATED ORAL at 05:27

## 2021-10-03 RX ADMIN — MAGNESIUM SULFATE 1 G: 1 INJECTION INTRAVENOUS at 11:59

## 2021-10-03 RX ADMIN — SODIUM CHLORIDE, POTASSIUM CHLORIDE, SODIUM LACTATE AND CALCIUM CHLORIDE: 600; 310; 30; 20 INJECTION, SOLUTION INTRAVENOUS at 02:55

## 2021-10-03 RX ADMIN — RIVAROXABAN 20 MG: 20 TABLET, FILM COATED ORAL at 18:35

## 2021-10-03 ASSESSMENT — ENCOUNTER SYMPTOMS
COUGH: 0
CHILLS: 0
HEARTBURN: 0
FEVER: 0
DOUBLE VISION: 0
BLURRED VISION: 0
DIZZINESS: 0
BACK PAIN: 0
FALLS: 0
NERVOUS/ANXIOUS: 0
SHORTNESS OF BREATH: 0
PALPITATIONS: 0
ABDOMINAL PAIN: 0
VOMITING: 0
HEADACHES: 0

## 2021-10-03 ASSESSMENT — CHA2DS2 SCORE
AGE 75 OR GREATER: NO
HYPERTENSION: NO
AGE 65 TO 74: NO
SEX: MALE
CHA2DS2 VASC SCORE: 2
DIABETES: NO
PRIOR STROKE OR TIA OR THROMBOEMBOLISM: YES
CHF OR LEFT VENTRICULAR DYSFUNCTION: NO
VASCULAR DISEASE: NO

## 2021-10-03 ASSESSMENT — PAIN DESCRIPTION - PAIN TYPE
TYPE: ACUTE PAIN
TYPE: ACUTE PAIN

## 2021-10-03 ASSESSMENT — LIFESTYLE VARIABLES: SUBSTANCE_ABUSE: 0

## 2021-10-03 NOTE — PROGRESS NOTES
Assumed report and patient care from MYRIAM Means. Patient is resting comfortably in bed in no signs of pain or distress. No questions or concerns at this time. Safety measures in place, call light within reach.     covid-19 surge in effect.

## 2021-10-03 NOTE — PROGRESS NOTES
Monitor Summary    Rhythm: A. Flutter   HR: 90  Ectopy: 0  Measurements: -/0.8/-      Normal values    Rhythm: SR  HR:   Measurements: 0.12-0.20/0.08-0.10/0.30-0.52
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
## 2021-10-03 NOTE — PROGRESS NOTES
4 Eyes Skin Assessment Completed by MYRIAM Means and MYRIAM Hoffman.    Head WDL  Ears WDL  Nose WDL  Mouth WDL  Neck WDL  Breast/Chest WDL  Shoulder Blades WDL  Spine WDL  (R) Arm/Elbow/Hand WDL  (L) Arm/Elbow/Hand WDL  Abdomen WDL  Groin Redness  Scrotum/Coccyx/Buttocks Redness and Excoriation  (R) Leg WDL  (L) Leg WDL  (R) Heel/Foot/Toe Redness, Non-Blanching and Boggy  (L) Heel/Foot/Toe Redness, Non-Blanching, Discoloration and Boggy          Devices In Places       Interventions In Place Heel Mepilex, Sacral Mepilex, Heel Float Boots, Q2 Turns and Pressure Redistribution Mattress    Possible Skin Injury Yes    Pictures Uploaded Into Epic Yes  Wound Consult Placed Yes  RN Wound Prevention Protocol Ordered No

## 2021-10-03 NOTE — PROGRESS NOTES
Telemetry Shift Summary     Rhythm: Afib/ Aflutter  Rate:   Measurements: -/.08/-  Ectopy (reported by Monitor Tech): n/a     Normal Values  Rhythm: Sinus  HR:   Measurements: 0.12-0.20/0.06-0.10/0.30-0.52

## 2021-10-04 LAB
ANION GAP SERPL CALC-SCNC: 11 MMOL/L (ref 7–16)
BUN SERPL-MCNC: 13 MG/DL (ref 8–22)
CALCIUM SERPL-MCNC: 8.6 MG/DL (ref 8.4–10.2)
CHLORIDE SERPL-SCNC: 109 MMOL/L (ref 96–112)
CO2 SERPL-SCNC: 19 MMOL/L (ref 20–33)
CREAT SERPL-MCNC: 0.44 MG/DL (ref 0.5–1.4)
GLUCOSE SERPL-MCNC: 98 MG/DL (ref 65–99)
POTASSIUM SERPL-SCNC: 3.3 MMOL/L (ref 3.6–5.5)
SODIUM SERPL-SCNC: 139 MMOL/L (ref 135–145)

## 2021-10-04 PROCEDURE — 99225 PR SUBSEQUENT OBSERVATION CARE,LEVEL II: CPT | Performed by: INTERNAL MEDICINE

## 2021-10-04 PROCEDURE — A9270 NON-COVERED ITEM OR SERVICE: HCPCS | Performed by: FAMILY MEDICINE

## 2021-10-04 PROCEDURE — A9270 NON-COVERED ITEM OR SERVICE: HCPCS | Performed by: INTERNAL MEDICINE

## 2021-10-04 PROCEDURE — 97535 SELF CARE MNGMENT TRAINING: CPT

## 2021-10-04 PROCEDURE — 302098 PASTE RING (FLAT): Performed by: INTERNAL MEDICINE

## 2021-10-04 PROCEDURE — 700105 HCHG RX REV CODE 258: Performed by: INTERNAL MEDICINE

## 2021-10-04 PROCEDURE — 302009 SKINTEGRITY WOUND CLEANSER: Performed by: INTERNAL MEDICINE

## 2021-10-04 PROCEDURE — 700102 HCHG RX REV CODE 250 W/ 637 OVERRIDE(OP): Performed by: INTERNAL MEDICINE

## 2021-10-04 PROCEDURE — 97605 NEG PRS WND THER DME<=50SQCM: CPT

## 2021-10-04 PROCEDURE — 700102 HCHG RX REV CODE 250 W/ 637 OVERRIDE(OP): Performed by: FAMILY MEDICINE

## 2021-10-04 PROCEDURE — 80048 BASIC METABOLIC PNL TOTAL CA: CPT

## 2021-10-04 PROCEDURE — G0378 HOSPITAL OBSERVATION PER HR: HCPCS

## 2021-10-04 PROCEDURE — 302111 WAFER OST 2.25IN N IMG RD 2 PC (BARRIER): Performed by: INTERNAL MEDICINE

## 2021-10-04 PROCEDURE — 700105 HCHG RX REV CODE 258: Performed by: FAMILY MEDICINE

## 2021-10-04 RX ORDER — POTASSIUM CHLORIDE 20 MEQ/1
40 TABLET, EXTENDED RELEASE ORAL ONCE
Status: COMPLETED | OUTPATIENT
Start: 2021-10-04 | End: 2021-10-04

## 2021-10-04 RX ORDER — SODIUM CHLORIDE, SODIUM LACTATE, POTASSIUM CHLORIDE, AND CALCIUM CHLORIDE .6; .31; .03; .02 G/100ML; G/100ML; G/100ML; G/100ML
500 INJECTION, SOLUTION INTRAVENOUS ONCE
Status: COMPLETED | OUTPATIENT
Start: 2021-10-04 | End: 2021-10-04

## 2021-10-04 RX ORDER — MIDODRINE HYDROCHLORIDE 5 MG/1
10 TABLET ORAL
Status: DISCONTINUED | OUTPATIENT
Start: 2021-10-04 | End: 2021-10-25

## 2021-10-04 RX ADMIN — MIDODRINE HYDROCHLORIDE 10 MG: 5 TABLET ORAL at 16:39

## 2021-10-04 RX ADMIN — RIVAROXABAN 20 MG: 20 TABLET, FILM COATED ORAL at 16:39

## 2021-10-04 RX ADMIN — MIDODRINE HYDROCHLORIDE 10 MG: 5 TABLET ORAL at 07:58

## 2021-10-04 RX ADMIN — SODIUM CHLORIDE, POTASSIUM CHLORIDE, SODIUM LACTATE AND CALCIUM CHLORIDE: 600; 310; 30; 20 INJECTION, SOLUTION INTRAVENOUS at 02:23

## 2021-10-04 RX ADMIN — POTASSIUM CHLORIDE 40 MEQ: 1500 TABLET, EXTENDED RELEASE ORAL at 07:56

## 2021-10-04 RX ADMIN — ASPIRIN 81 MG: 81 TABLET, COATED ORAL at 06:04

## 2021-10-04 RX ADMIN — SODIUM CHLORIDE, POTASSIUM CHLORIDE, SODIUM LACTATE AND CALCIUM CHLORIDE 500 ML: 600; 310; 30; 20 INJECTION, SOLUTION INTRAVENOUS at 01:53

## 2021-10-04 RX ADMIN — SODIUM CHLORIDE, POTASSIUM CHLORIDE, SODIUM LACTATE AND CALCIUM CHLORIDE 500 ML: 600; 310; 30; 20 INJECTION, SOLUTION INTRAVENOUS at 22:50

## 2021-10-04 RX ADMIN — MIDODRINE HYDROCHLORIDE 10 MG: 5 TABLET ORAL at 12:23

## 2021-10-04 ASSESSMENT — ENCOUNTER SYMPTOMS
ABDOMINAL PAIN: 0
FALLS: 0
CHILLS: 0
PALPITATIONS: 0
DOUBLE VISION: 0
SHORTNESS OF BREATH: 0
COUGH: 0
HEADACHES: 0
VOMITING: 0
DIZZINESS: 0
FEVER: 0
BACK PAIN: 0
NERVOUS/ANXIOUS: 0
BLURRED VISION: 0
HEARTBURN: 0

## 2021-10-04 ASSESSMENT — COGNITIVE AND FUNCTIONAL STATUS - GENERAL
MOVING FROM LYING ON BACK TO SITTING ON SIDE OF FLAT BED: UNABLE
WALKING IN HOSPITAL ROOM: TOTAL
CLIMB 3 TO 5 STEPS WITH RAILING: TOTAL
MOVING TO AND FROM BED TO CHAIR: UNABLE
STANDING UP FROM CHAIR USING ARMS: TOTAL
MOBILITY SCORE: 6
TURNING FROM BACK TO SIDE WHILE IN FLAT BAD: UNABLE
SUGGESTED CMS G CODE MODIFIER MOBILITY: CN

## 2021-10-04 ASSESSMENT — PAIN DESCRIPTION - PAIN TYPE: TYPE: ACUTE PAIN;CHRONIC PAIN

## 2021-10-04 ASSESSMENT — LIFESTYLE VARIABLES: SUBSTANCE_ABUSE: 0

## 2021-10-04 NOTE — PROGRESS NOTES
Telemetry Shift Summary     Rhythm: Afib/ Aflutter  Rate:   Measurements: -/.06/-  Ectopy (reported by Monitor Tech): rPVC, rCouplet     Normal Values  Rhythm: Sinus  HR:   Measurements: 0.12-0.20/0.06-0.10/0.30-0.52

## 2021-10-04 NOTE — THERAPY
OT note: Pt is dependent with all ADL's and mobility at home; caregiver is currently unavailable to assist. No acute OT needs. TK

## 2021-10-04 NOTE — PROGRESS NOTES
Patient's most recent BP 66/41. Patient started on a 500 mL bolus of LR and Midodrine. Patient denies any pain or new symptom onset.

## 2021-10-04 NOTE — THERAPY
Physical Therapy Screening/Education only     PT order received; However, after discussion and subjective questioning it is deemed pt would not benefit from ongoing acute PT skilled services while in hospital setting. Pt is dependent for all functional mobility such as, bed mobility, transfers, and ADL's. Per patient he has a electric w/c at home and is able to use it Inpd with joystick use. Pt only has about 5 hours of assist at home from caregiver who assists in morning and evening. However, since caregive is not avaiable now pt is not a safe d/c home given lack of support. Pt would require placement for the lack of care and caregiving services. However is in no need for therapy services as pt is at his baseline. Pt and nsg staff educated on offloading buttocks and rolling in bed to avoid bed sores and wounds. Nsg encouraged to obtain low airloss bed to assist in managing wounds/bed sores. Pt will not be actively followed at this time, however, pt will be on d/c needs only for questions or d/c planning needs.        10/04/21 1030   Initial Contact Note    Initial Contact Note Order Received and Verified. Physical Therapy Evaluation NOT Completed Because Patient Does Not Require Acute Physical Therapy at this Time.   Precautions   Comments hx of parapelgia    Pain 0 - 10 Group   Therapist Pain Assessment 0;Nurse Notified   Prior Living Situation   Prior Services Intermittent Physical Support for ADL Per Service   Housing / Facility 1 Story House   Steps Into Home   (ramp installed )   Steps In Home 0   Equipment Owned Power Wheel Chair;Slide Board;Ramp;Hospital Bed   Lives with - Patient's Self Care Capacity Attendant / Paid Care Giver   Comments pt states is he requires total assist for all mobility and has a caregiver to come in assist every day for 5 hours   Prior Level of Functional Mobility   Bed Mobility Dependent   Transfer Status Dependent   Ambulation Dependent   Distance Ambulation (Feet)   (unable )    Wheelchair Independent  (electric w/c with joystick )   Comments pt states he requires assist with all mobility, however, once transferred in w/c pt is able to mobilize with w/c using joystick; pt states caregiver provides dependent lift transfer to w/c    Cognition    Cognition / Consciousness WDL   Comments pleasant/cooperative    How much difficulty does the patient currently have...   Turning over in bed (including adjusting bedclothes, sheets and blankets)? 1   Sitting down on and standing up from a chair with arms (e.g., wheelchair, bedside commode, etc.) 1   Moving from lying on back to sitting on the side of the bed? 1   How much help from another person does the patient currently need...   Moving to and from a bed to a chair (including a wheelchair)? 1   Need to walk in a hospital room? 1   Climbing 3-5 steps with a railing? 1   6 clicks Mobility Score 6   Education Group   Education Provided Role of Physical Therapist   Role of Physical Therapist Patient Response Patient;Acceptance;Explanation;Demonstration;Verbal Demonstration;Action Demonstration   Anticipated Discharge Equipment and Recommendations   DC Equipment Recommendations None   Discharge Recommendations Other -  (recommend placement for caregiving; no therapy needs )   Interdisciplinary Plan of Care Collaboration   IDT Collaboration with  Nursing   Patient Position at End of Therapy In Bed;Call Light within Reach;Tray Table within Reach;Phone within Reach   Collaboration Comments PT order received; However, after discussion and subjective questioning it is deemed pt would not benefit from ongoing acute PT skilled services while in hospital setting. Pt is dependent for all functional mobility such as, bed mobility, transfers, and ADL's. Per patient he has a electric w/c at home and is able to use it Inpd with joystick use. Pt only has about 5 hours of assist at home from caregiver who assists in morning and evening. However, since [facility] is  not avaiable now pt is not a safe d/c home given lack of support. Pt would require placement for the lack of care and caregiving services. However is in no need for therapy services as pt is at his baseline. Pt and nsg staff educated on offloading buttocks and rolling in bed to avoid bed sores and wounds. Nsg encouraged to obtain low airloss bed to assist in managing wounds/bed sores. Pt will not be actively followed at this time, however, pt will be on d/c needs only for questions or d/c planning needs.    Session Information   Date / Session Number  d/c needs only

## 2021-10-04 NOTE — PROGRESS NOTES
Report received and assumed care of patient. Patient resting in bed with no distress noted. Repositioned for comfort. Call bell in reach and safety measures in place.

## 2021-10-04 NOTE — CARE PLAN
Problem: Knowledge Deficit - Standard  Goal: Patient and family/care givers will demonstrate understanding of plan of care, disease process/condition, diagnostic tests and medications  Outcome: Progressing     Problem: Skin Integrity  Goal: Skin integrity is maintained or improved  Outcome: Progressing     Problem: Fall Risk  Goal: Patient will remain free from falls  Outcome: Progressing   The patient is Stable - Low risk of patient condition declining or worsening    Shift Goals  Clinical Goals: Q 2 turns  Patient Goals: Q 2 turns    Progress made toward(s) clinical / shift goals:  yes    Patient is not progressing towards the following goals:
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
## 2021-10-05 LAB
ANION GAP SERPL CALC-SCNC: 12 MMOL/L (ref 7–16)
BUN SERPL-MCNC: 8 MG/DL (ref 8–22)
CALCIUM SERPL-MCNC: 8.6 MG/DL (ref 8.4–10.2)
CHLORIDE SERPL-SCNC: 108 MMOL/L (ref 96–112)
CO2 SERPL-SCNC: 23 MMOL/L (ref 20–33)
CREAT SERPL-MCNC: 0.26 MG/DL (ref 0.5–1.4)
GLUCOSE SERPL-MCNC: 86 MG/DL (ref 65–99)
MAGNESIUM SERPL-MCNC: 1.7 MG/DL (ref 1.5–2.5)
POTASSIUM SERPL-SCNC: 3.5 MMOL/L (ref 3.6–5.5)
SODIUM SERPL-SCNC: 143 MMOL/L (ref 135–145)

## 2021-10-05 PROCEDURE — 99225 PR SUBSEQUENT OBSERVATION CARE,LEVEL II: CPT | Performed by: INTERNAL MEDICINE

## 2021-10-05 PROCEDURE — 700102 HCHG RX REV CODE 250 W/ 637 OVERRIDE(OP): Performed by: FAMILY MEDICINE

## 2021-10-05 PROCEDURE — 80048 BASIC METABOLIC PNL TOTAL CA: CPT

## 2021-10-05 PROCEDURE — A9270 NON-COVERED ITEM OR SERVICE: HCPCS | Performed by: FAMILY MEDICINE

## 2021-10-05 PROCEDURE — 700102 HCHG RX REV CODE 250 W/ 637 OVERRIDE(OP): Performed by: INTERNAL MEDICINE

## 2021-10-05 PROCEDURE — A9270 NON-COVERED ITEM OR SERVICE: HCPCS | Performed by: INTERNAL MEDICINE

## 2021-10-05 PROCEDURE — 83735 ASSAY OF MAGNESIUM: CPT

## 2021-10-05 PROCEDURE — G0378 HOSPITAL OBSERVATION PER HR: HCPCS

## 2021-10-05 PROCEDURE — 700105 HCHG RX REV CODE 258: Performed by: INTERNAL MEDICINE

## 2021-10-05 RX ADMIN — MIDODRINE HYDROCHLORIDE 10 MG: 5 TABLET ORAL at 06:44

## 2021-10-05 RX ADMIN — MIDODRINE HYDROCHLORIDE 10 MG: 5 TABLET ORAL at 17:11

## 2021-10-05 RX ADMIN — RIVAROXABAN 20 MG: 20 TABLET, FILM COATED ORAL at 17:11

## 2021-10-05 RX ADMIN — SODIUM CHLORIDE, POTASSIUM CHLORIDE, SODIUM LACTATE AND CALCIUM CHLORIDE: 600; 310; 30; 20 INJECTION, SOLUTION INTRAVENOUS at 09:13

## 2021-10-05 RX ADMIN — MIDODRINE HYDROCHLORIDE 10 MG: 5 TABLET ORAL at 11:19

## 2021-10-05 RX ADMIN — ASPIRIN 81 MG: 81 TABLET, COATED ORAL at 05:45

## 2021-10-05 ASSESSMENT — ENCOUNTER SYMPTOMS
SPUTUM PRODUCTION: 0
CHILLS: 0
FEVER: 0
DIZZINESS: 0
BRUISES/BLEEDS EASILY: 0
SHORTNESS OF BREATH: 0
VOMITING: 0
HEMOPTYSIS: 0
MYALGIAS: 0
PHOTOPHOBIA: 0
DEPRESSION: 0
PALPITATIONS: 0
BACK PAIN: 0
NAUSEA: 0
ABDOMINAL PAIN: 0
HEARTBURN: 0
SPEECH CHANGE: 0
ORTHOPNEA: 0
FLANK PAIN: 0
WEAKNESS: 0
SENSORY CHANGE: 0
BLURRED VISION: 0
EYE DISCHARGE: 0
CLAUDICATION: 0
SORE THROAT: 0
DIARRHEA: 0
COUGH: 0
HEADACHES: 0
BLOOD IN STOOL: 0
WHEEZING: 0
DOUBLE VISION: 0

## 2021-10-05 ASSESSMENT — PATIENT HEALTH QUESTIONNAIRE - PHQ9
1. LITTLE INTEREST OR PLEASURE IN DOING THINGS: NOT AT ALL
2. FEELING DOWN, DEPRESSED, IRRITABLE, OR HOPELESS: NOT AT ALL
SUM OF ALL RESPONSES TO PHQ9 QUESTIONS 1 AND 2: 0

## 2021-10-05 ASSESSMENT — PAIN DESCRIPTION - PAIN TYPE: TYPE: CHRONIC PAIN
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
## 2021-10-05 NOTE — PROGRESS NOTES
Telemetry Shift Summary    Rhythm Afib  HR Range 51-71 down to 42  Ectopy R-PVC  Measurements -/0.08/-        Normal Values  Rhythm SR  HR Range    Measurements 0.12-0.20 / 0.06-0.10  / 0.30-0.52
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
## 2021-10-05 NOTE — WOUND TEAM
"[facility] Wound & Ostomy Care   Inpatient Services   Established Ostomy Management/ troubleshooting  HPI: Reviewed  PMH: Reviewed   SH: Reviewed   Reason for Ostomy nurse consult:  Wounds, no consult for urostomy  Subjective: \"It smells like it leaked.\"  Objective: appliance leaking  Ostomy type: ileal conduit (urostomy)  Stoma location: RLQ  Stoma assessment: beffy red height changes with peristasis         Urostomy Ileal conduit RLQ (Active)   Stomal Appliance Assessment Leaking    Stoma Assessment Beefy red    Stoma Shape Oval    Stoma Size (in) 1.25    Peristomal Assessment Intact    Mucocutaneous Junction Intact    Treatment Cleansed with water/washcloth;Appliance Changed    Stomal Appliance 2 1/4\" (57mm) CTF;Urostomy Pouch;Paste Ring, 2\"    Output Color Yellow    WOUND RN ONLY - Stomal Appliance  2 Piece;2 1/4\" (57mm) CTF;Urostomy Pouch;Paste Ring, 2\";Down Drain Bag    Appliance Brand Paulina             Ostomy Appliance (type and size): two piece 2.25\" barrier and urostomy pouch with DD adapter and bag  Assessment: stoma red, viable with yellow urine and mucus  Interventions:  Removed appliance, cleaned skin. Dried. Cut barrier to 1.25\" slight oval, applied an piece of paste ring. Applied barrier to clean dry skin. Attached pouch and then attached to DD bag with adapter.   Pt education: Questions and concerns addressed.  Plan: Nsg to perform ostomy care. Ostomy nurses to continue to follow for ostomy needs PRN difficulties.  Anticipated discharge needs:make sure pt has extra dd bag    "

## 2021-10-05 NOTE — PROGRESS NOTES
Assumed report and patient care from Nagi MIGUEL via report at the bedside. Patient is resting comfortably in bed with no signs of labored breathing or restlessness. POC discussed. No questions or concerns at this time. Safety measures in place, call light within reach.     COVID-19 surge in effect.

## 2021-10-06 LAB
ANION GAP SERPL CALC-SCNC: 11 MMOL/L (ref 7–16)
BUN SERPL-MCNC: 9 MG/DL (ref 8–22)
CALCIUM SERPL-MCNC: 8.6 MG/DL (ref 8.4–10.2)
CHLORIDE SERPL-SCNC: 108 MMOL/L (ref 96–112)
CO2 SERPL-SCNC: 21 MMOL/L (ref 20–33)
CREAT SERPL-MCNC: 0.29 MG/DL (ref 0.5–1.4)
ERYTHROCYTE [DISTWIDTH] IN BLOOD BY AUTOMATED COUNT: 48.7 FL (ref 35.9–50)
GLUCOSE SERPL-MCNC: 96 MG/DL (ref 65–99)
HCT VFR BLD AUTO: 39.1 % (ref 42–52)
HGB BLD-MCNC: 12.3 G/DL (ref 14–18)
MAGNESIUM SERPL-MCNC: 1.8 MG/DL (ref 1.5–2.5)
MCH RBC QN AUTO: 27.2 PG (ref 27–33)
MCHC RBC AUTO-ENTMCNC: 31.5 G/DL (ref 33.7–35.3)
MCV RBC AUTO: 86.3 FL (ref 81.4–97.8)
PHOSPHATE SERPL-MCNC: 3.7 MG/DL (ref 2.5–4.5)
PLATELET # BLD AUTO: 304 K/UL (ref 164–446)
PMV BLD AUTO: 10.2 FL (ref 9–12.9)
POTASSIUM SERPL-SCNC: 3.6 MMOL/L (ref 3.6–5.5)
RBC # BLD AUTO: 4.53 M/UL (ref 4.7–6.1)
SODIUM SERPL-SCNC: 140 MMOL/L (ref 135–145)
WBC # BLD AUTO: 7 K/UL (ref 4.8–10.8)

## 2021-10-06 PROCEDURE — 83735 ASSAY OF MAGNESIUM: CPT

## 2021-10-06 PROCEDURE — 700105 HCHG RX REV CODE 258: Performed by: INTERNAL MEDICINE

## 2021-10-06 PROCEDURE — 80048 BASIC METABOLIC PNL TOTAL CA: CPT

## 2021-10-06 PROCEDURE — 99225 PR SUBSEQUENT OBSERVATION CARE,LEVEL II: CPT | Performed by: INTERNAL MEDICINE

## 2021-10-06 PROCEDURE — A9270 NON-COVERED ITEM OR SERVICE: HCPCS | Performed by: INTERNAL MEDICINE

## 2021-10-06 PROCEDURE — 97605 NEG PRS WND THER DME<=50SQCM: CPT

## 2021-10-06 PROCEDURE — 700102 HCHG RX REV CODE 250 W/ 637 OVERRIDE(OP): Performed by: FAMILY MEDICINE

## 2021-10-06 PROCEDURE — G0378 HOSPITAL OBSERVATION PER HR: HCPCS

## 2021-10-06 PROCEDURE — A9270 NON-COVERED ITEM OR SERVICE: HCPCS | Performed by: FAMILY MEDICINE

## 2021-10-06 PROCEDURE — 84100 ASSAY OF PHOSPHORUS: CPT

## 2021-10-06 PROCEDURE — 302098 PASTE RING (FLAT): Performed by: INTERNAL MEDICINE

## 2021-10-06 PROCEDURE — 700102 HCHG RX REV CODE 250 W/ 637 OVERRIDE(OP): Performed by: INTERNAL MEDICINE

## 2021-10-06 PROCEDURE — 85027 COMPLETE CBC AUTOMATED: CPT

## 2021-10-06 RX ORDER — NYSTATIN 100000 [USP'U]/G
POWDER TOPICAL 2 TIMES DAILY
Status: DISPENSED | OUTPATIENT
Start: 2021-10-06 | End: 2021-10-13

## 2021-10-06 RX ADMIN — SENNOSIDES AND DOCUSATE SODIUM 2 TABLET: 50; 8.6 TABLET ORAL at 06:22

## 2021-10-06 RX ADMIN — RIVAROXABAN 20 MG: 20 TABLET, FILM COATED ORAL at 17:31

## 2021-10-06 RX ADMIN — MIDODRINE HYDROCHLORIDE 10 MG: 5 TABLET ORAL at 11:52

## 2021-10-06 RX ADMIN — MIDODRINE HYDROCHLORIDE 10 MG: 5 TABLET ORAL at 06:22

## 2021-10-06 RX ADMIN — MIDODRINE HYDROCHLORIDE 10 MG: 5 TABLET ORAL at 17:31

## 2021-10-06 RX ADMIN — SODIUM CHLORIDE, POTASSIUM CHLORIDE, SODIUM LACTATE AND CALCIUM CHLORIDE: 600; 310; 30; 20 INJECTION, SOLUTION INTRAVENOUS at 07:01

## 2021-10-06 ASSESSMENT — PAIN DESCRIPTION - PAIN TYPE
TYPE: CHRONIC PAIN
TYPE: CHRONIC PAIN

## 2021-10-06 ASSESSMENT — ENCOUNTER SYMPTOMS
SENSORY CHANGE: 0
PALPITATIONS: 0
HEARTBURN: 0
EYE DISCHARGE: 0
DIZZINESS: 0
DEPRESSION: 0
NAUSEA: 0
HEMOPTYSIS: 0
BACK PAIN: 0
BRUISES/BLEEDS EASILY: 0
HEADACHES: 0
COUGH: 0
CHILLS: 0
DOUBLE VISION: 0
SORE THROAT: 0
BLOOD IN STOOL: 0
FEVER: 0
BLURRED VISION: 0
MYALGIAS: 0

## 2021-10-06 ASSESSMENT — CHA2DS2 SCORE
SEX: MALE
VASCULAR DISEASE: NO
PRIOR STROKE OR TIA OR THROMBOEMBOLISM: YES
HYPERTENSION: NO
VASCULAR DISEASE: NO
CHF OR LEFT VENTRICULAR DYSFUNCTION: NO
PRIOR STROKE OR TIA OR THROMBOEMBOLISM: YES
SEX: MALE
DIABETES: NO
HYPERTENSION: NO
CHA2DS2 VASC SCORE: 2
AGE 65 TO 74: NO
DIABETES: NO
CHF OR LEFT VENTRICULAR DYSFUNCTION: NO
CHA2DS2 VASC SCORE: 2
AGE 75 OR GREATER: NO
AGE 75 OR GREATER: NO
AGE 65 TO 74: NO

## 2021-10-06 NOTE — PROGRESS NOTES
Report given to Paola MIGUEL via report at the bedside. Patient handed off in stable condition. Safety measures in place. Call light within reach. Care relinquished.
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
## 2021-10-06 NOTE — PROGRESS NOTES
RN to bedside with Corinne, RN to complete bowel regiment per pt home regiment. Upon rolling pt to right side, RN visualized soiled sacral mepilex over sacral wound vac dressing. Dressings soiled with stool. RN immediately called Tammi wound care RN for guidance. MYRIAM Cadena notified RN to remove all soiled dressings and both wound vac dressings and replace with wet-to-dry dressings. RN manually disimpacted pt bowel and cleaned appropriately. RN then removed both wound vac dressings and replaced with wet-to-dry dressings x2. MYRIAM Cadena to come to bedside this afternoon to redress and reassess.

## 2021-10-06 NOTE — PROGRESS NOTES
Received bedside report from NOC RN, pt care assumed. VSS, pt assessment complete. Pt AAOx4, No c/o pain at this time. POC discussed with pt and verbalizes no questions. Pt denies any additional needs at this time. Bed locked and in lowest position. Pt educated on fall risk and verbalized understanding, call light within reach, hourly rounding initiated.

## 2021-10-06 NOTE — PROGRESS NOTES
Assumed report and patient care from Paola MIGUEL via report at the bedside. Patient is resting comfortably in bed with no signs of labored breathing or restlessness. POC discussed. No questions or concerns at this time. Safety measures in place, call light within reach.     COVID-19 surge in effect.

## 2021-10-06 NOTE — PROGRESS NOTES
Telemetry Shift Summary     Rhythm: AFib  Rate: 59-96  Measurements: -/0.08/-  Ectopy (reported by Monitor Tech): R PVC     Normal Values  Rhythm: Sinus  HR:   Measurements: 0.12-0.20/0.06-0.10/0.30-0.52

## 2021-10-06 NOTE — PROGRESS NOTES
Telemetry Shift Summary     Rhythm: AFib  Rate: 62-85  Measurements: -/0.08/-  Ectopy (reported by Monitor Tech): R PVC     Normal Values  Rhythm: Sinus  HR:   Measurements: 0.12-0.20/0.06-0.10/0.30-0.52

## 2021-10-07 PROCEDURE — A9270 NON-COVERED ITEM OR SERVICE: HCPCS | Performed by: INTERNAL MEDICINE

## 2021-10-07 PROCEDURE — 302098 PASTE RING (FLAT): Performed by: INTERNAL MEDICINE

## 2021-10-07 PROCEDURE — 700102 HCHG RX REV CODE 250 W/ 637 OVERRIDE(OP): Performed by: INTERNAL MEDICINE

## 2021-10-07 PROCEDURE — 99225 PR SUBSEQUENT OBSERVATION CARE,LEVEL II: CPT | Performed by: INTERNAL MEDICINE

## 2021-10-07 PROCEDURE — 700105 HCHG RX REV CODE 258: Performed by: INTERNAL MEDICINE

## 2021-10-07 PROCEDURE — G0378 HOSPITAL OBSERVATION PER HR: HCPCS

## 2021-10-07 RX ADMIN — MIDODRINE HYDROCHLORIDE 10 MG: 5 TABLET ORAL at 17:36

## 2021-10-07 RX ADMIN — MIDODRINE HYDROCHLORIDE 10 MG: 5 TABLET ORAL at 12:03

## 2021-10-07 RX ADMIN — NYSTATIN: 100000 POWDER TOPICAL at 17:36

## 2021-10-07 RX ADMIN — NYSTATIN: 100000 POWDER TOPICAL at 00:12

## 2021-10-07 RX ADMIN — RIVAROXABAN 20 MG: 20 TABLET, FILM COATED ORAL at 17:36

## 2021-10-07 RX ADMIN — SODIUM CHLORIDE, POTASSIUM CHLORIDE, SODIUM LACTATE AND CALCIUM CHLORIDE: 600; 310; 30; 20 INJECTION, SOLUTION INTRAVENOUS at 05:13

## 2021-10-07 RX ADMIN — NYSTATIN: 100000 POWDER TOPICAL at 05:09

## 2021-10-07 RX ADMIN — MIDODRINE HYDROCHLORIDE 10 MG: 5 TABLET ORAL at 08:41

## 2021-10-07 ASSESSMENT — ENCOUNTER SYMPTOMS
HEADACHES: 0
DIZZINESS: 0
HEMOPTYSIS: 0
SHORTNESS OF BREATH: 0
HEARTBURN: 0
NECK PAIN: 0
COUGH: 0
BACK PAIN: 0
WHEEZING: 0
DOUBLE VISION: 0
MYALGIAS: 0
SORE THROAT: 0
PND: 0
FEVER: 0
DEPRESSION: 0
NAUSEA: 0
BLURRED VISION: 0
CHILLS: 0
BRUISES/BLEEDS EASILY: 0

## 2021-10-07 NOTE — DISCHARGE PLANNING
Anticipated Discharge Disposition: SNF     Action: Pt discussed during IDT rounds. Per Dr. Roland pt is medically cleared. Note from 10/2 from RNErna REILLY stating that pt has a brother, Stevie who is attempting to build a space for pt and assistance from caregivers. Stevie doesn't anticipate for this to be available until November. Per bedside RN possibly until after Thanksgiving.     LSW staffed pt with manager, Carolina Estrada. Suggested for SNF blanket referral to be placed and to call [facility] and staff pt with Demar.     Pt currently on a veraflo vac. However, wound team note from Sherly MIGUEL stating that veraflo vac while inpatient, ok to transition to regular vac on d/c.     LSW faxed SNF blanket referral for continuity of care to [facility].     PASRR#- [id number]  LOC#- [id number]    LSW was provided advanced directive by bedside RN. LSW faxed advanced directive to get scanned into media.     Barriers to Discharge: placement, lack of outside supports     Plan: Await SNF acceptance, LSW to continue to follow for d/c needs     Addendum 1157  LSW called Demar with [facility] and provided pt's information and d/c plan.

## 2021-10-07 NOTE — PROGRESS NOTES
Assumed pt care. AOx4. Denies any pain at this time.  Denies any other distress.  Discussed POC.  Pt verbalized understanding.WOund care RN at bedside. Hourly rounding in place. Fall precautions in place and call lights w/in reach.

## 2021-10-07 NOTE — PROGRESS NOTES
Monitor Summary     Rhythm:Afib 69-80  Measurements: -/0.08/-  ECTOPIES: rPVC, rTRIP        Normal Values  Rhythm SR  HR Range    Measurements 0.12-0.20 / 0.06-0.10  / 0.30-0.52

## 2021-10-07 NOTE — DISCHARGE PLANNING
Received Choice form at [phone]  Agency/Facility Name: [facility]  Referral sent per Choice form @ [phone]
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
## 2021-10-07 NOTE — DIETARY
Nutrition Services: Brief Update   Day 0 of admit.  Stevie Phoenix is a 60 y.o. male with admitting DX of Dehydration.    Pt noted per wound team with multiple stage IV pressure wounds to sacrum/coccyx, right ischium, and left heel. Pt is currently on regular diet. PO intake per ADLs is % of all meals. MD notes tolerating PO intake. Wt 10/2: 74.8 kg (165 lbs) which is estimated - no updated weight at this time. No recent weight history, pt was 165 lbs last on 10/17/2017. Estimated weight possibly taken from 2017, asked RN/CNA to get new measured weight. Labs and meds reviewed. Last BM: 10/6 medium loose     Malnutrition Risk: Unable to fully assess at this time.    Recommendations/Plan:  1. Regular diet with Boost Plus with meals and arginaid QID to promote wound healing.  2. Encourage intake of meals and supplements.  3. Document intake of all meals as % taken in ADL's to provide interdisciplinary communication across all shifts.   4. Monitor weight.  5. Nutrition rep will continue to see patient for ongoing meal and snack preferences.    RD to monitor per department policy.

## 2021-10-08 PROCEDURE — 700102 HCHG RX REV CODE 250 W/ 637 OVERRIDE(OP): Performed by: INTERNAL MEDICINE

## 2021-10-08 PROCEDURE — 94760 N-INVAS EAR/PLS OXIMETRY 1: CPT

## 2021-10-08 PROCEDURE — 97605 NEG PRS WND THER DME<=50SQCM: CPT

## 2021-10-08 PROCEDURE — A9270 NON-COVERED ITEM OR SERVICE: HCPCS | Performed by: INTERNAL MEDICINE

## 2021-10-08 PROCEDURE — 700105 HCHG RX REV CODE 258: Performed by: INTERNAL MEDICINE

## 2021-10-08 PROCEDURE — 99225 PR SUBSEQUENT OBSERVATION CARE,LEVEL II: CPT | Performed by: INTERNAL MEDICINE

## 2021-10-08 PROCEDURE — 97602 WOUND(S) CARE NON-SELECTIVE: CPT

## 2021-10-08 PROCEDURE — G0378 HOSPITAL OBSERVATION PER HR: HCPCS

## 2021-10-08 RX ADMIN — SODIUM CHLORIDE, POTASSIUM CHLORIDE, SODIUM LACTATE AND CALCIUM CHLORIDE: 600; 310; 30; 20 INJECTION, SOLUTION INTRAVENOUS at 00:36

## 2021-10-08 RX ADMIN — NYSTATIN: 100000 POWDER TOPICAL at 17:43

## 2021-10-08 RX ADMIN — MIDODRINE HYDROCHLORIDE 10 MG: 5 TABLET ORAL at 17:43

## 2021-10-08 RX ADMIN — MIDODRINE HYDROCHLORIDE 10 MG: 5 TABLET ORAL at 11:29

## 2021-10-08 RX ADMIN — MIDODRINE HYDROCHLORIDE 10 MG: 5 TABLET ORAL at 08:09

## 2021-10-08 RX ADMIN — NYSTATIN: 100000 POWDER TOPICAL at 06:04

## 2021-10-08 RX ADMIN — SODIUM CHLORIDE, POTASSIUM CHLORIDE, SODIUM LACTATE AND CALCIUM CHLORIDE: 600; 310; 30; 20 INJECTION, SOLUTION INTRAVENOUS at 23:00

## 2021-10-08 RX ADMIN — RIVAROXABAN 20 MG: 20 TABLET, FILM COATED ORAL at 17:43

## 2021-10-08 ASSESSMENT — ENCOUNTER SYMPTOMS
HEARTBURN: 0
NECK PAIN: 0
HEADACHES: 0
EYE PAIN: 0
HEMOPTYSIS: 0
FEVER: 0
DIZZINESS: 0
CHILLS: 0
DEPRESSION: 0
PND: 0
MYALGIAS: 0
NAUSEA: 0
COUGH: 0
BLURRED VISION: 0
BRUISES/BLEEDS EASILY: 0

## 2021-10-08 NOTE — PROGRESS NOTES
Received bedside report from MYRIAM Alvarenga, pt care assumed. VSS, pt assessment complete. Pt AAOx4, No c/o pain at this time. RN asked patient about bowel care, patient verbally refused bowel care for the day. POC discussed with pt and verbalizes no questions. Pt denies any additional needs at this time. Bed locked and in lowest position. Pt educated on fall risk and verbalized understanding, call light within reach, hourly rounding initiated.
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
## 2021-10-08 NOTE — PROGRESS NOTES
Patient resting quietly in bed. No c/o pain. Bed is locked and in lowest position. Call light within reach, pt calls appropriately.

## 2021-10-08 NOTE — PROGRESS NOTES
Assumed pt care. AOx4. Denies any pain at this time.  Pt did request to be turned, Pt was repositioned.RN also discussed POC.  Pt verbalized understanding.  Hourly rounding in place. Fall precautions in place and call lights w/in reach.

## 2021-10-08 NOTE — PROGRESS NOTES
Telemetry Shift Summary      Rhythm: A FIB  HR Range: 56-84  Ectopy: R PVC, R Coup  Measurements: -/.08/-    Normal Values:  Rhythm: SR  HR Range:   Measurements: 0.12-0.20/ 0.06-0.10/ 0.30-0.52

## 2021-10-09 LAB
ANION GAP SERPL CALC-SCNC: 11 MMOL/L (ref 7–16)
BUN SERPL-MCNC: 15 MG/DL (ref 8–22)
CALCIUM SERPL-MCNC: 9.1 MG/DL (ref 8.4–10.2)
CHLORIDE SERPL-SCNC: 103 MMOL/L (ref 96–112)
CO2 SERPL-SCNC: 23 MMOL/L (ref 20–33)
CREAT SERPL-MCNC: 0.37 MG/DL (ref 0.5–1.4)
ERYTHROCYTE [DISTWIDTH] IN BLOOD BY AUTOMATED COUNT: 47.8 FL (ref 35.9–50)
GLUCOSE SERPL-MCNC: 89 MG/DL (ref 65–99)
HCT VFR BLD AUTO: 43.7 % (ref 42–52)
HGB BLD-MCNC: 13.8 G/DL (ref 14–18)
MCH RBC QN AUTO: 27.7 PG (ref 27–33)
MCHC RBC AUTO-ENTMCNC: 31.6 G/DL (ref 33.7–35.3)
MCV RBC AUTO: 87.6 FL (ref 81.4–97.8)
PLATELET # BLD AUTO: 396 K/UL (ref 164–446)
PMV BLD AUTO: 10 FL (ref 9–12.9)
POTASSIUM SERPL-SCNC: 4.3 MMOL/L (ref 3.6–5.5)
RBC # BLD AUTO: 4.99 M/UL (ref 4.7–6.1)
SODIUM SERPL-SCNC: 137 MMOL/L (ref 135–145)
WBC # BLD AUTO: 9.6 K/UL (ref 4.8–10.8)

## 2021-10-09 PROCEDURE — A9270 NON-COVERED ITEM OR SERVICE: HCPCS | Performed by: FAMILY MEDICINE

## 2021-10-09 PROCEDURE — 700102 HCHG RX REV CODE 250 W/ 637 OVERRIDE(OP): Performed by: INTERNAL MEDICINE

## 2021-10-09 PROCEDURE — G0378 HOSPITAL OBSERVATION PER HR: HCPCS

## 2021-10-09 PROCEDURE — A9270 NON-COVERED ITEM OR SERVICE: HCPCS | Performed by: INTERNAL MEDICINE

## 2021-10-09 PROCEDURE — 99225 PR SUBSEQUENT OBSERVATION CARE,LEVEL II: CPT | Performed by: INTERNAL MEDICINE

## 2021-10-09 PROCEDURE — 80048 BASIC METABOLIC PNL TOTAL CA: CPT

## 2021-10-09 PROCEDURE — 700102 HCHG RX REV CODE 250 W/ 637 OVERRIDE(OP): Performed by: FAMILY MEDICINE

## 2021-10-09 PROCEDURE — 85027 COMPLETE CBC AUTOMATED: CPT

## 2021-10-09 RX ADMIN — MIDODRINE HYDROCHLORIDE 10 MG: 5 TABLET ORAL at 06:35

## 2021-10-09 RX ADMIN — SENNOSIDES AND DOCUSATE SODIUM 2 TABLET: 50; 8.6 TABLET ORAL at 06:34

## 2021-10-09 RX ADMIN — MIDODRINE HYDROCHLORIDE 10 MG: 5 TABLET ORAL at 12:30

## 2021-10-09 RX ADMIN — NYSTATIN: 100000 POWDER TOPICAL at 18:19

## 2021-10-09 RX ADMIN — SENNOSIDES AND DOCUSATE SODIUM 2 TABLET: 50; 8.6 TABLET ORAL at 18:19

## 2021-10-09 RX ADMIN — NYSTATIN: 100000 POWDER TOPICAL at 06:00

## 2021-10-09 RX ADMIN — RIVAROXABAN 20 MG: 20 TABLET, FILM COATED ORAL at 18:19

## 2021-10-09 RX ADMIN — MIDODRINE HYDROCHLORIDE 10 MG: 5 TABLET ORAL at 18:19

## 2021-10-09 ASSESSMENT — ENCOUNTER SYMPTOMS
WEAKNESS: 0
HEADACHES: 0
EYE REDNESS: 0
MEMORY LOSS: 0
NECK PAIN: 0
PALPITATIONS: 0
DIAPHORESIS: 0
BRUISES/BLEEDS EASILY: 0
COUGH: 0
SEIZURES: 0
FLANK PAIN: 0
BLURRED VISION: 0
HEMOPTYSIS: 0
PND: 0
DIZZINESS: 0
MYALGIAS: 0
HEARTBURN: 0
DEPRESSION: 0

## 2021-10-09 ASSESSMENT — PAIN DESCRIPTION - PAIN TYPE: TYPE: CHRONIC PAIN

## 2021-10-09 NOTE — PROGRESS NOTES
COVID-19 surge in effect. Crisis Charting.      Report received from MYRIAM Medrano. Dx, medications, O2 needs, and poc reviewed. Safety precautions in place and pt has no sign of distress or acute needs at this time. Care fully assumed. Will continue to monitor.

## 2021-10-09 NOTE — PROGRESS NOTES
Notified dr betancourt patient went  appears to have converted to sinus rhythm,  Asked if she would like a EKG,  No orders given.

## 2021-10-09 NOTE — CARE PLAN
The patient is     Shift Goals  Clinical Goals: Maintain wound dressing status  Patient Goals: Rest, q2 turns    Progress made toward(s) clinical / shift goals:    Patient is not progressing towards the following goals:
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
## 2021-10-10 PROCEDURE — G0378 HOSPITAL OBSERVATION PER HR: HCPCS

## 2021-10-10 PROCEDURE — 700102 HCHG RX REV CODE 250 W/ 637 OVERRIDE(OP): Performed by: FAMILY MEDICINE

## 2021-10-10 PROCEDURE — 700102 HCHG RX REV CODE 250 W/ 637 OVERRIDE(OP): Performed by: INTERNAL MEDICINE

## 2021-10-10 PROCEDURE — 99225 PR SUBSEQUENT OBSERVATION CARE,LEVEL II: CPT | Performed by: INTERNAL MEDICINE

## 2021-10-10 PROCEDURE — A9270 NON-COVERED ITEM OR SERVICE: HCPCS | Performed by: INTERNAL MEDICINE

## 2021-10-10 PROCEDURE — A9270 NON-COVERED ITEM OR SERVICE: HCPCS | Performed by: FAMILY MEDICINE

## 2021-10-10 RX ADMIN — NYSTATIN: 100000 POWDER TOPICAL at 18:19

## 2021-10-10 RX ADMIN — MIDODRINE HYDROCHLORIDE 10 MG: 5 TABLET ORAL at 08:06

## 2021-10-10 RX ADMIN — MIDODRINE HYDROCHLORIDE 10 MG: 5 TABLET ORAL at 17:30

## 2021-10-10 RX ADMIN — NYSTATIN: 100000 POWDER TOPICAL at 06:50

## 2021-10-10 RX ADMIN — RIVAROXABAN 20 MG: 20 TABLET, FILM COATED ORAL at 17:30

## 2021-10-10 RX ADMIN — ACETAMINOPHEN 650 MG: 325 TABLET, FILM COATED ORAL at 06:49

## 2021-10-10 RX ADMIN — MIDODRINE HYDROCHLORIDE 10 MG: 5 TABLET ORAL at 11:37

## 2021-10-10 ASSESSMENT — ENCOUNTER SYMPTOMS
BLURRED VISION: 0
WEIGHT LOSS: 0
DEPRESSION: 0
EYE PAIN: 0
DIZZINESS: 0
ABDOMINAL PAIN: 0
CHILLS: 0
HEMOPTYSIS: 0
BRUISES/BLEEDS EASILY: 0
FLANK PAIN: 0
HEARTBURN: 0
DIARRHEA: 0
EYE REDNESS: 0
NECK PAIN: 0
MEMORY LOSS: 0
PHOTOPHOBIA: 0
PND: 0
SEIZURES: 0
DIAPHORESIS: 0
HEADACHES: 0
COUGH: 0
MYALGIAS: 0

## 2021-10-10 ASSESSMENT — PATIENT HEALTH QUESTIONNAIRE - PHQ9
2. FEELING DOWN, DEPRESSED, IRRITABLE, OR HOPELESS: NOT AT ALL
SUM OF ALL RESPONSES TO PHQ9 QUESTIONS 1 AND 2: 0
1. LITTLE INTEREST OR PLEASURE IN DOING THINGS: NOT AT ALL

## 2021-10-10 ASSESSMENT — PAIN DESCRIPTION - PAIN TYPE: TYPE: ACUTE PAIN

## 2021-10-10 NOTE — PROGRESS NOTES
Bedside report received from Ely RN and assumed Pt's cares. Call light within reach. Safety measures in place.    COVID-19 surge in effect

## 2021-10-10 NOTE — PROGRESS NOTES
Received report from Jhonatan. Patient is not expressing any needs at the moment. Safety precautions in place. Assumed care of patient.

## 2021-10-10 NOTE — CARE PLAN
Problem: Skin Integrity  Goal: Skin integrity is maintained or improved  Outcome: Progressing     Problem: Communication  Goal: The ability to communicate needs accurately and effectively will improve  Outcome: Progressing     Problem: Bowel Elimination  Goal: Establish and maintain regular bowel function  Outcome: Progressing   The patient is Stable - Low risk of patient condition declining or worsening    Shift Goals  Clinical Goals: Rest,   Patient Goals: Rest     Progress made toward(s) clinical / shift goals:  Progressing     Patient is not progressing towards the following goals:N/A    Met with patient at bedside to discuss plan of care. Patient is awake, alert and pleasant. Discussed his sacral wound and his constipation. Patient has no expressed needs at this time. Safety precautions in place.

## 2021-10-10 NOTE — PROGRESS NOTES
Telemetry shift summary:  Rhythm: A-flatter     HR Range: 70s to 90s      Measurements from strip printed at 01:57:12   TN: -   QRS 0.08   QT -     Ectopies: rare PVC.

## 2021-10-10 NOTE — PROGRESS NOTES
At 08:00 am and at 11:30 manual disimpactation performed as per Pt request. Both times small amount of formed, brown, stool removed. Pt tolerated well both times.     15:30 Pt resting with eyes closed. Easily to arousal. Regular and unlabored breathing. No signs of acute distress noticed. Safety measures in place.
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
## 2021-10-11 PROCEDURE — G0378 HOSPITAL OBSERVATION PER HR: HCPCS

## 2021-10-11 PROCEDURE — A9270 NON-COVERED ITEM OR SERVICE: HCPCS | Performed by: INTERNAL MEDICINE

## 2021-10-11 PROCEDURE — 700102 HCHG RX REV CODE 250 W/ 637 OVERRIDE(OP): Performed by: FAMILY MEDICINE

## 2021-10-11 PROCEDURE — 97597 DBRDMT OPN WND 1ST 20 CM/<: CPT

## 2021-10-11 PROCEDURE — 700102 HCHG RX REV CODE 250 W/ 637 OVERRIDE(OP): Performed by: INTERNAL MEDICINE

## 2021-10-11 PROCEDURE — 700105 HCHG RX REV CODE 258: Performed by: INTERNAL MEDICINE

## 2021-10-11 PROCEDURE — A9270 NON-COVERED ITEM OR SERVICE: HCPCS | Performed by: FAMILY MEDICINE

## 2021-10-11 PROCEDURE — 99225 PR SUBSEQUENT OBSERVATION CARE,LEVEL II: CPT | Performed by: INTERNAL MEDICINE

## 2021-10-11 PROCEDURE — 302098 PASTE RING (FLAT): Performed by: INTERNAL MEDICINE

## 2021-10-11 PROCEDURE — 94760 N-INVAS EAR/PLS OXIMETRY 1: CPT

## 2021-10-11 PROCEDURE — 700101 HCHG RX REV CODE 250: Performed by: FAMILY MEDICINE

## 2021-10-11 RX ADMIN — NYSTATIN: 100000 POWDER TOPICAL at 17:18

## 2021-10-11 RX ADMIN — MIDODRINE HYDROCHLORIDE 10 MG: 5 TABLET ORAL at 17:18

## 2021-10-11 RX ADMIN — MIDODRINE HYDROCHLORIDE 10 MG: 5 TABLET ORAL at 10:36

## 2021-10-11 RX ADMIN — SODIUM CHLORIDE, POTASSIUM CHLORIDE, SODIUM LACTATE AND CALCIUM CHLORIDE: 600; 310; 30; 20 INJECTION, SOLUTION INTRAVENOUS at 12:55

## 2021-10-11 RX ADMIN — METOPROLOL TARTRATE 5 MG: 5 INJECTION, SOLUTION INTRAVENOUS at 10:01

## 2021-10-11 RX ADMIN — RIVAROXABAN 20 MG: 20 TABLET, FILM COATED ORAL at 17:18

## 2021-10-11 RX ADMIN — ACETAMINOPHEN 650 MG: 325 TABLET, FILM COATED ORAL at 15:54

## 2021-10-11 ASSESSMENT — ENCOUNTER SYMPTOMS
DIARRHEA: 0
HEMOPTYSIS: 0
HEARTBURN: 0
COUGH: 0
DIAPHORESIS: 0
MEMORY LOSS: 0
DEPRESSION: 0
BRUISES/BLEEDS EASILY: 0
SEIZURES: 0
EYE REDNESS: 0
HEADACHES: 0
NECK PAIN: 0
WEIGHT LOSS: 0
BLURRED VISION: 0
FLANK PAIN: 0
PND: 0

## 2021-10-11 ASSESSMENT — PAIN DESCRIPTION - PAIN TYPE: TYPE: ACUTE PAIN

## 2021-10-11 NOTE — DISCHARGE PLANNING
Anticipated Discharge Disposition: SNF for short term LTC until he can return to home with family.     Action: Advised by Supervisor Elisa Estrada to inquire if pt has any behaviors that would prevent him from going to SNF and if he is agreeable to using his SS money towards SNF.     Per RN, no bad behaviors noted and is pleasant.     Met with pt at bedside to inquire about him being okay with using SS funds towards SNF. He stated that he would be, but is going to be critical of where he goes. He stated that he was at [facility] and did not have a good experience and felt like it was a life and death situation. He would like to just stay in the hospital until he is able to go back home with his family.     Explained to him that this is an acute setting and once he is medically clear, he does not need to stay in the hospital.     Barriers to Discharge: placement in snf facility,    Plan: LSW to assist as needed and monitor for barriers to discharge.

## 2021-10-11 NOTE — PROGRESS NOTES
Telemetry Shift Summary     Rhythm: AFib  Rate:   Measurements: -/0.08/-  Ectopy (reported by Monitor Tech): R-O PVC, low HR of 45, high HR of 166     Normal Values  Rhythm: Sinus  HR:   Measurements: 0.12-0.20/0.06-0.10/0.30-0.52

## 2021-10-11 NOTE — PROGRESS NOTES
Assumed report and patient care from Ely RN. Patient is resting comfortably in bed with no signs of labored breathing or restlessness. POC discussed. No questions or concerns at this time. Safety measures in place, call light within reach.     COVID-19 surge in effect.

## 2021-10-11 NOTE — PROGRESS NOTES
Earlier this morning. Pt had a BP of 92/58 manually taken by this RN. Dr Roland notified. After this, the later BP were WDL.
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
## 2021-10-11 NOTE — PROGRESS NOTES
Received report from Cece.Patient is not expressing any needs at the moment. Safety precautions in place. Assumed care of patient.
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
## 2021-10-11 NOTE — CARE PLAN
"  Problem: Knowledge Deficit - Standard  Goal: Patient and family/care givers will demonstrate understanding of plan of care, disease process/condition, diagnostic tests and medications  Outcome: Progressing     Problem: Skin Integrity  Goal: Skin integrity is maintained or improved  Outcome: Progressing     Problem: Psychosocial  Goal: Patient's level of anxiety will decrease  Outcome: Progressing   The patient is Stable - Low risk of patient condition declining or worsening    Shift Goals  Clinical Goals: Rest  Patient Goals: Rest, pain free     Progress made toward(s) clinical / shift goals:  Progressing    Patient is not progressing towards the following goals:N/A    Met with patient at bedside to discuss plan of care. Patient was awake, alert and pleasant. Discussed how his day went. He stated that it went well as soon as the nurse and other members of the care team \"listened to him and understood that he knows his body\". No other needs at this time. Safety precautions in place.       "

## 2021-10-11 NOTE — PROGRESS NOTES
Telemetry Shift Summary    Rhythm:Afib/Aflutter  HR range:  Ectopy:R PVC, R Coup  Measurements:-/0.10/-    Normal measurements  Rhythm: SR  HR range:   Measurements: 0.12-0.20/0.06-0.10/0.30-0.52    Tele strips reviewed and placed in chart.

## 2021-10-12 PROBLEM — T83.518A PURPLE URINE BAG SYNDROME (HCC): Status: RESOLVED | Noted: 2021-10-02 | Resolved: 2021-10-12

## 2021-10-12 PROBLEM — N39.0 PURPLE URINE BAG SYNDROME (HCC): Status: RESOLVED | Noted: 2021-10-02 | Resolved: 2021-10-12

## 2021-10-12 LAB
ANION GAP SERPL CALC-SCNC: 13 MMOL/L (ref 7–16)
BUN SERPL-MCNC: 16 MG/DL (ref 8–22)
CALCIUM SERPL-MCNC: 8.9 MG/DL (ref 8.4–10.2)
CHLORIDE SERPL-SCNC: 104 MMOL/L (ref 96–112)
CO2 SERPL-SCNC: 21 MMOL/L (ref 20–33)
CREAT SERPL-MCNC: 0.41 MG/DL (ref 0.5–1.4)
ERYTHROCYTE [DISTWIDTH] IN BLOOD BY AUTOMATED COUNT: 50.4 FL (ref 35.9–50)
GLUCOSE SERPL-MCNC: 83 MG/DL (ref 65–99)
HCT VFR BLD AUTO: 43.8 % (ref 42–52)
HGB BLD-MCNC: 13.2 G/DL (ref 14–18)
MAGNESIUM SERPL-MCNC: 2 MG/DL (ref 1.5–2.5)
MCH RBC QN AUTO: 27.3 PG (ref 27–33)
MCHC RBC AUTO-ENTMCNC: 30.1 G/DL (ref 33.7–35.3)
MCV RBC AUTO: 90.5 FL (ref 81.4–97.8)
PHOSPHATE SERPL-MCNC: 3.8 MG/DL (ref 2.5–4.5)
PLATELET # BLD AUTO: 369 K/UL (ref 164–446)
PMV BLD AUTO: 9.8 FL (ref 9–12.9)
POTASSIUM SERPL-SCNC: 3.9 MMOL/L (ref 3.6–5.5)
RBC # BLD AUTO: 4.84 M/UL (ref 4.7–6.1)
SODIUM SERPL-SCNC: 138 MMOL/L (ref 135–145)
WBC # BLD AUTO: 8.6 K/UL (ref 4.8–10.8)

## 2021-10-12 PROCEDURE — 84100 ASSAY OF PHOSPHORUS: CPT

## 2021-10-12 PROCEDURE — 700102 HCHG RX REV CODE 250 W/ 637 OVERRIDE(OP): Performed by: INTERNAL MEDICINE

## 2021-10-12 PROCEDURE — 83735 ASSAY OF MAGNESIUM: CPT

## 2021-10-12 PROCEDURE — 80048 BASIC METABOLIC PNL TOTAL CA: CPT

## 2021-10-12 PROCEDURE — A9270 NON-COVERED ITEM OR SERVICE: HCPCS | Performed by: INTERNAL MEDICINE

## 2021-10-12 PROCEDURE — 700102 HCHG RX REV CODE 250 W/ 637 OVERRIDE(OP): Performed by: FAMILY MEDICINE

## 2021-10-12 PROCEDURE — 99225 PR SUBSEQUENT OBSERVATION CARE,LEVEL II: CPT | Performed by: STUDENT IN AN ORGANIZED HEALTH CARE EDUCATION/TRAINING PROGRAM

## 2021-10-12 PROCEDURE — 85027 COMPLETE CBC AUTOMATED: CPT

## 2021-10-12 PROCEDURE — A9270 NON-COVERED ITEM OR SERVICE: HCPCS | Performed by: FAMILY MEDICINE

## 2021-10-12 PROCEDURE — 700105 HCHG RX REV CODE 258: Performed by: INTERNAL MEDICINE

## 2021-10-12 PROCEDURE — G0378 HOSPITAL OBSERVATION PER HR: HCPCS

## 2021-10-12 PROCEDURE — 36415 COLL VENOUS BLD VENIPUNCTURE: CPT

## 2021-10-12 PROCEDURE — 94760 N-INVAS EAR/PLS OXIMETRY 1: CPT

## 2021-10-12 RX ADMIN — NYSTATIN: 100000 POWDER TOPICAL at 20:57

## 2021-10-12 RX ADMIN — MIDODRINE HYDROCHLORIDE 10 MG: 5 TABLET ORAL at 12:59

## 2021-10-12 RX ADMIN — SODIUM CHLORIDE, POTASSIUM CHLORIDE, SODIUM LACTATE AND CALCIUM CHLORIDE: 600; 310; 30; 20 INJECTION, SOLUTION INTRAVENOUS at 00:29

## 2021-10-12 RX ADMIN — RIVAROXABAN 20 MG: 20 TABLET, FILM COATED ORAL at 17:36

## 2021-10-12 RX ADMIN — NYSTATIN: 100000 POWDER TOPICAL at 05:15

## 2021-10-12 RX ADMIN — MIDODRINE HYDROCHLORIDE 10 MG: 5 TABLET ORAL at 17:36

## 2021-10-12 RX ADMIN — SENNOSIDES AND DOCUSATE SODIUM 2 TABLET: 50; 8.6 TABLET ORAL at 17:36

## 2021-10-12 RX ADMIN — MIDODRINE HYDROCHLORIDE 10 MG: 5 TABLET ORAL at 08:56

## 2021-10-12 ASSESSMENT — ENCOUNTER SYMPTOMS
NAUSEA: 0
DIZZINESS: 0
FEVER: 0
FLANK PAIN: 0
BLURRED VISION: 0
CHILLS: 0
FOCAL WEAKNESS: 1
MYALGIAS: 0
DEPRESSION: 0
SHORTNESS OF BREATH: 0
HEADACHES: 0
COUGH: 0
VOMITING: 0

## 2021-10-12 ASSESSMENT — LIFESTYLE VARIABLES: SUBSTANCE_ABUSE: 0

## 2021-10-12 NOTE — PROGRESS NOTES
Telemetry Shift Summary     Rhythm Atrial Fibrillation converted to SR at 1849.  HR Range   Ectopy  rPACs, rPVCs  Measurements .16/.08/.40              Normal Values  Rhythm SR  HR Range    Measurements 0.12-0.20 / 0.06-0.10  / 0.30-0.52

## 2021-10-12 NOTE — PROGRESS NOTES
Report received from Ely MIGUEL. Patient is in bed, A&Ox4, patient turned, VSS. COVID-19 surge in effect

## 2021-10-12 NOTE — PROGRESS NOTES
Report given to Ely RN. Patient handed off in stable condition. Safety measures in place. Call light within reach. Care relinquished.

## 2021-10-12 NOTE — PROGRESS NOTES
COVID-19 surge in effect. Crisis Charting.      Report received from MYRIAM Le. Dx, medications, O2 needs, and poc reviewed. Safety precautions in place and pt has no sign of distress or acute needs at this time. Care fully assumed. Will continue to monitor.

## 2021-10-12 NOTE — PROGRESS NOTES
"Disimpaction      Disimpaction completed earlier this AM or late on the evening on 10/11/21 by previous shift nursing staff. Per patient, \"I think I need to have it [disimpaction] done again. Sometimes I may need it multiple times.\" This writer attempted disimpaction with only smear results. Patient education provided on stool softener medication per MAR. Patient also educated on orders for disimpaction once daily for bowel management. Patient refusing stool softener states, \"I think those meds [stool softener] will give me the shits [diarrhea].\" Patient educated on stool softener medication, but still refusing at this time. Patient educated that disimpaction will be performed once daily, which nursing staff could complete at night or daily early AM for bowel management, but typically once every 24 hours (daily). Patient verbalized understanding with t/b 100%. Patient has no signs of distress or acute care needs at this time. Bowel management completed late evening 10/11/21 or early AM 10/12/21. Will continue to monitor and nursing staff to continue to disimpact bowels late evening/early AM, also referred to as once daily for bowel management per orders.   "

## 2021-10-12 NOTE — PROGRESS NOTES
Received report from Brittany.  Patient is not expressing any needs at the moment. Safety precautions in place. Assumed care of patient.

## 2021-10-12 NOTE — DISCHARGE PLANNING
Anticipated Discharge Disposition:   SNF then home with caregivers and brother     Action:   Chart review complete.     Per MD, this patient is medically cleared to discharge to SNF and then home with brother. Patient is a paraplegic and has a Veraflo wound vac that will be transitioned to a regular upon discharge.     At this time, the following SNF's have declined the patient due to his medicaid insurance: [facility], [facility], [facility], [facility], [facility] and [facility].     0940: MYRIAM REILLY called [facility] and spoke to Demar. Demar is concerned about the patient's level of care. Demar to speak with her DON about patient and will call RN CM back with answer.     1022: MYRIAM REILLY called B[facility] with [facility]. They are not able to accept patient at this time because they do not have medicaid beds.     PASRR: [id number]  LOC: [phone]    1200: RN CM [place]  [facility], They are [place]. On site assessment of patient to be completed.     1240: Mirela and MYRIAM REILLY spoke. Per Mirela, the patient would like to speak with his daughter before making decision about having referral sent.     Barriers to Discharge:   SNF acceptance     Plan:   Hospital care management will continue to assist with discharge planning needs.
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
## 2021-10-12 NOTE — CARE PLAN
Problem: Skin Integrity  Goal: Skin integrity is maintained or improved  Outcome: Progressing     Problem: Communication  Goal: The ability to communicate needs accurately and effectively will improve  Outcome: Progressing     Problem: Discharge Barriers/Planning  Goal: Patient's continuum of care needs are met  Outcome: Progressing   The patient is Stable - Low risk of patient condition declining or worsening    Shift Goals  Clinical Goals: Rest  Patient Goals: Rest, remain comfortable, keep room at comfortable temperature     Progress made toward(s) clinical / shift goals:  Progressing    Patient is not progressing towards the following goals:N/A    Met with patient at bedside to discuss plan of care. Patient is awake and alert. We discussed when he would prefer us to do his turns. Patient requested some water. Assisted him in drinking his water. No other expressed needs at this time. Safety precautions in place.
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
## 2021-10-13 PROCEDURE — 99225 PR SUBSEQUENT OBSERVATION CARE,LEVEL II: CPT | Performed by: STUDENT IN AN ORGANIZED HEALTH CARE EDUCATION/TRAINING PROGRAM

## 2021-10-13 PROCEDURE — 97602 WOUND(S) CARE NON-SELECTIVE: CPT

## 2021-10-13 PROCEDURE — 700102 HCHG RX REV CODE 250 W/ 637 OVERRIDE(OP): Performed by: INTERNAL MEDICINE

## 2021-10-13 PROCEDURE — A9270 NON-COVERED ITEM OR SERVICE: HCPCS | Performed by: FAMILY MEDICINE

## 2021-10-13 PROCEDURE — G0378 HOSPITAL OBSERVATION PER HR: HCPCS

## 2021-10-13 PROCEDURE — A9270 NON-COVERED ITEM OR SERVICE: HCPCS | Performed by: INTERNAL MEDICINE

## 2021-10-13 PROCEDURE — 700102 HCHG RX REV CODE 250 W/ 637 OVERRIDE(OP): Performed by: FAMILY MEDICINE

## 2021-10-13 PROCEDURE — 94760 N-INVAS EAR/PLS OXIMETRY 1: CPT

## 2021-10-13 PROCEDURE — 302098 PASTE RING (FLAT): Performed by: STUDENT IN AN ORGANIZED HEALTH CARE EDUCATION/TRAINING PROGRAM

## 2021-10-13 RX ADMIN — NYSTATIN: 100000 POWDER TOPICAL at 05:01

## 2021-10-13 RX ADMIN — MIDODRINE HYDROCHLORIDE 10 MG: 5 TABLET ORAL at 09:26

## 2021-10-13 RX ADMIN — SENNOSIDES AND DOCUSATE SODIUM 2 TABLET: 50; 8.6 TABLET ORAL at 05:00

## 2021-10-13 RX ADMIN — MIDODRINE HYDROCHLORIDE 10 MG: 5 TABLET ORAL at 17:17

## 2021-10-13 RX ADMIN — SENNOSIDES AND DOCUSATE SODIUM 2 TABLET: 50; 8.6 TABLET ORAL at 17:16

## 2021-10-13 RX ADMIN — RIVAROXABAN 20 MG: 20 TABLET, FILM COATED ORAL at 17:17

## 2021-10-13 RX ADMIN — MIDODRINE HYDROCHLORIDE 10 MG: 5 TABLET ORAL at 12:57

## 2021-10-13 ASSESSMENT — ENCOUNTER SYMPTOMS
BLURRED VISION: 0
NAUSEA: 0
HEADACHES: 0
SHORTNESS OF BREATH: 0
FEVER: 0
DIZZINESS: 0
MYALGIAS: 0
COUGH: 0
FOCAL WEAKNESS: 1
VOMITING: 0
DEPRESSION: 0
CHILLS: 0
FLANK PAIN: 0

## 2021-10-13 ASSESSMENT — LIFESTYLE VARIABLES: SUBSTANCE_ABUSE: 0

## 2021-10-13 NOTE — PROGRESS NOTES
Bedside report received from Jhonatan MIGUEL. Patient is in bed and stable. Bed locked and in lowest position, upper side rails up, call light in reach, whiteboard updated. POC discussed and pt verbalizes understanding.
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
## 2021-10-13 NOTE — CARE PLAN
The patient is Stable - Low risk of patient condition declining or worsening    Shift Goals  Clinical Goals: Q2 turns, manage pressure injuries  Patient Goals: Remain comfortable, keep room good temperature    Progress made toward(s) clinical / shift goals:  Q2 turns in place. Pillows used for positioning, barrier cream used.      Problem: Knowledge Deficit - Standard  Goal: Patient and family/care givers will demonstrate understanding of plan of care, disease process/condition, diagnostic tests and medications  Outcome: Progressing     Problem: Skin Integrity  Goal: Skin integrity is maintained or improved  Outcome: Progressing     Problem: Fall Risk  Goal: Patient will remain free from falls  Outcome: Progressing

## 2021-10-13 NOTE — PROGRESS NOTES
"Patient's wound vac saying \"veraflo blockage.\" No kinks or locked areas on tubing. Attempted to call wound team at [phone] but no one available at this time.   "

## 2021-10-13 NOTE — CARE PLAN
The patient is Stable - Low risk of patient condition declining or worsening    Shift Goals  Clinical Goals: Rest, s8uncxy  Patient Goals: Remain comfortable, keep room good temperature    Progress made toward(s) clinical / shift goals:  Patient is medically cleared and pending placement. Patient VSS. Patient has large pressure injuries on sacrum, left ischium, and left heel. Patient has low pressure mattress, heel float boots, mepilex with barrier cream, and wound team to see patient MWF.     Patient is not progressing towards the following goals: None

## 2021-10-13 NOTE — PROGRESS NOTES
Received report from nightshift RN and assumed care of patient at 0700. Patient denies needs at this time. Wound care at patients bedside. Call light in reach. Bed in lowest locked position. Hourly rounding to continue.

## 2021-10-13 NOTE — WOUND TEAM
Assisted MYRIAM Baez with wound care, non-selective debridement performed using wound cleanser/NS and gauze. Please see Nestor's wound note for further wound care details.

## 2021-10-13 NOTE — DISCHARGE PLANNING
Anticipated Discharge Disposition:   SNF    Action:   Chart review complete.     0948: RN CM asked DPA to resend blanket referral.     0955: MYRIAM REILLY called Demar with [facility] to ask about if she had spoken to her DON about patient. No answer, voicemail left with call back number.     1130: RN CM met with patient at bedside to discuss discharge plan. Patient plans to move in with his brother in mid November. RN CM explained updates regarding medicaid and that at this time, a blanket SNF was sent out and no one has accepted yet. Patient verbalized understanding. Patient understands that he cannot stay in the hospital but states that he doesn't want to lose the care he is receiving. Patient stated that if his daughter, Giselle, is okay with [facility] and the care is good, then he is agreeable to having a referral sent. RN CM and patient also discussed possible LTAC for comobidities and wounds. RN CM to call patient's daughter.     1150: RN MELBA spoke with patient's daughter, Giselle. Giselle states she called [employer] but that no one has called her back yet. Giselle given Mirela's number for her to call. Giselle given info about LTAC as well.     Barriers to Discharge:   SNF acceptance     Plan:   Hospital care management will continue to assist with discharge planning needs.

## 2021-10-14 PROCEDURE — 99225 PR SUBSEQUENT OBSERVATION CARE,LEVEL II: CPT | Performed by: STUDENT IN AN ORGANIZED HEALTH CARE EDUCATION/TRAINING PROGRAM

## 2021-10-14 PROCEDURE — A9270 NON-COVERED ITEM OR SERVICE: HCPCS | Performed by: FAMILY MEDICINE

## 2021-10-14 PROCEDURE — 94760 N-INVAS EAR/PLS OXIMETRY 1: CPT

## 2021-10-14 PROCEDURE — 700102 HCHG RX REV CODE 250 W/ 637 OVERRIDE(OP): Performed by: INTERNAL MEDICINE

## 2021-10-14 PROCEDURE — A9270 NON-COVERED ITEM OR SERVICE: HCPCS | Performed by: INTERNAL MEDICINE

## 2021-10-14 PROCEDURE — G0378 HOSPITAL OBSERVATION PER HR: HCPCS

## 2021-10-14 PROCEDURE — 700102 HCHG RX REV CODE 250 W/ 637 OVERRIDE(OP): Performed by: FAMILY MEDICINE

## 2021-10-14 RX ADMIN — MIDODRINE HYDROCHLORIDE 10 MG: 5 TABLET ORAL at 07:34

## 2021-10-14 RX ADMIN — SENNOSIDES AND DOCUSATE SODIUM 2 TABLET: 50; 8.6 TABLET ORAL at 17:11

## 2021-10-14 RX ADMIN — RIVAROXABAN 20 MG: 20 TABLET, FILM COATED ORAL at 17:11

## 2021-10-14 RX ADMIN — MIDODRINE HYDROCHLORIDE 10 MG: 5 TABLET ORAL at 11:29

## 2021-10-14 RX ADMIN — SENNOSIDES AND DOCUSATE SODIUM 2 TABLET: 50; 8.6 TABLET ORAL at 05:32

## 2021-10-14 RX ADMIN — MIDODRINE HYDROCHLORIDE 10 MG: 5 TABLET ORAL at 17:11

## 2021-10-14 ASSESSMENT — ENCOUNTER SYMPTOMS
HEADACHES: 0
COUGH: 0
SHORTNESS OF BREATH: 0
NAUSEA: 0
CHILLS: 0
DIZZINESS: 0
MYALGIAS: 0
DEPRESSION: 0
FEVER: 0
FOCAL WEAKNESS: 1
FLANK PAIN: 0
VOMITING: 0
BLURRED VISION: 0

## 2021-10-14 ASSESSMENT — PAIN DESCRIPTION - PAIN TYPE
TYPE: ACUTE PAIN
TYPE: ACUTE PAIN

## 2021-10-14 ASSESSMENT — LIFESTYLE VARIABLES: SUBSTANCE_ABUSE: 0

## 2021-10-14 NOTE — PROGRESS NOTES
COVID-19 surge in effect.    Received report from MYRIMA Azevedo.  Assumed Pt. Care at 0720  Pt. A&Ox4  No sign of cardiac and respiratory distress.  Pain is 0/10  Pt. was repositioned. Declines breakfast stating two meals will be ok for him. Patient had a large bowel movement at 0930. Patient had a bed bath after.  Bed at lowest position.  Call light and personal belonging within reach. Continue to monitor.

## 2021-10-14 NOTE — PROGRESS NOTES
Covid-19 surge in effect.    Pt AAOx4. Denies any discomfort. Repositioned for comfort. Pillows used for repositioning. Pt requested to relay to day shift that he wanted to have a sponge bath and bowel care during the day. Offered to have a bed bath, but preferred to have it during the day after bowel care. Wound vac and urostomy observed. Safety and comfort measures in place. No additional needs at this time.

## 2021-10-14 NOTE — DISCHARGE PLANNING
Anticipated Discharge Disposition: SNF     Action: Pt transferred down to [facility] from med/tele unit.   RNTrish REILLY's note stating that a blanket SNF referral placed again yesterday 10/13. Pt's plan upon d/c from SNF is to move in with his brother. However, that will not be available until towards the end of November.     Pt declined by-   [facility]- Medicaid   [facility]- Medicaid  [place]- unable to accommodate wound vac  [facility]- unable to accomodate wound vac  [facility]- No open beds  [facility]- Medicaid  [place]- Medicaid  [place]- No LTC beds available    Epic showing that pt is pending referrals for-   [facility], [facility], [facility].     Bruna Grover completed an onsite eval today. Lenore informed LSW that pt requesting to go to [place] and daughter working on getting him there. Lenore requesting LSW f/u with family.     Barriers to Discharge: Short term SNF placement, Medicaid, wound vac     Plan: Await referral update from [facility], Continue to follow for d/c needs     Addendum 1302  LSW received a voicemail from pt's daughter, Giselle. LSW called Giselle ([phone]) and Giselle requesting f/u with referral for [facility]. LSW informed Giselle that [facility] has stopped by and completed and onsite assessment and they seem to be very hopeful to be able to take pt. LSW informed Gsielle that we have placed a blanket referral and he has been declined by a majority of SNF's.     LSW called [facility] and spoke to Mable with admissions. LSW was informed that they didn't receive a referral for pt.     LSW messaged DPA, Kayla and requested referral be resent to [facility] [place].     Addendum 1661  LSW received a voicemail from Mable with admissions with [facility] informing LSW that they have declined due to pt's Medicaid.

## 2021-10-14 NOTE — PROGRESS NOTES
COVID-19 surge in effect.    Patient is a step down from telemetry. Receive hands off report from MYRIAM Norman at 1815. Patient is A&Ox4. Patient has Urostomy and wound on sacrum and Left ischium with wound vac.
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
## 2021-10-15 PROCEDURE — 700102 HCHG RX REV CODE 250 W/ 637 OVERRIDE(OP): Performed by: INTERNAL MEDICINE

## 2021-10-15 PROCEDURE — A9270 NON-COVERED ITEM OR SERVICE: HCPCS | Performed by: INTERNAL MEDICINE

## 2021-10-15 PROCEDURE — 99225 PR SUBSEQUENT OBSERVATION CARE,LEVEL II: CPT | Performed by: STUDENT IN AN ORGANIZED HEALTH CARE EDUCATION/TRAINING PROGRAM

## 2021-10-15 PROCEDURE — 97608 NEG PRS WND THER NDME>50SQCM: CPT

## 2021-10-15 PROCEDURE — 700102 HCHG RX REV CODE 250 W/ 637 OVERRIDE(OP): Performed by: FAMILY MEDICINE

## 2021-10-15 PROCEDURE — G0378 HOSPITAL OBSERVATION PER HR: HCPCS

## 2021-10-15 PROCEDURE — A9270 NON-COVERED ITEM OR SERVICE: HCPCS | Performed by: FAMILY MEDICINE

## 2021-10-15 RX ADMIN — RIVAROXABAN 20 MG: 20 TABLET, FILM COATED ORAL at 17:12

## 2021-10-15 RX ADMIN — MIDODRINE HYDROCHLORIDE 10 MG: 5 TABLET ORAL at 07:49

## 2021-10-15 RX ADMIN — SENNOSIDES AND DOCUSATE SODIUM 2 TABLET: 50; 8.6 TABLET ORAL at 17:12

## 2021-10-15 RX ADMIN — SENNOSIDES AND DOCUSATE SODIUM 2 TABLET: 50; 8.6 TABLET ORAL at 05:39

## 2021-10-15 RX ADMIN — MIDODRINE HYDROCHLORIDE 10 MG: 5 TABLET ORAL at 17:13

## 2021-10-15 RX ADMIN — MIDODRINE HYDROCHLORIDE 10 MG: 5 TABLET ORAL at 11:58

## 2021-10-15 ASSESSMENT — ENCOUNTER SYMPTOMS
HEADACHES: 0
BLURRED VISION: 0
NAUSEA: 0
MYALGIAS: 0
SHORTNESS OF BREATH: 0
COUGH: 0
CHILLS: 0
VOMITING: 0
FLANK PAIN: 0
FOCAL WEAKNESS: 1
DEPRESSION: 0
DIZZINESS: 0
FEVER: 0

## 2021-10-15 ASSESSMENT — LIFESTYLE VARIABLES: SUBSTANCE_ABUSE: 0

## 2021-10-15 ASSESSMENT — PAIN DESCRIPTION - PAIN TYPE: TYPE: ACUTE PAIN

## 2021-10-15 NOTE — CARE PLAN
The patient is Stable - Low risk of patient condition declining or worsening    Shift Goals  Clinical Goals: skin integrity, prevent infection from skin from pressure injury  Patient Goals: comfort    Progress made toward(s) clinical / shift goals:  Repositioning every 2 hours. Pillows use for positioning.               Dressing completed by wound care.    Patient is not progressing towards the following goals:      Problem: Skin Integrity  Goal: Skin integrity is maintained or improved  Outcome: Progressing     Problem: Fall Risk  Goal: Patient will remain free from falls  Outcome: Progressing     Problem: Infection - Standard  Goal: Patient will remain free from infection  Outcome: Progressing

## 2021-10-15 NOTE — DISCHARGE PLANNING
Received Choice form at [id number]  Agency/Facility Name: [facility]   Referral sent per Choice form @ [phone]
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
## 2021-10-15 NOTE — PROGRESS NOTES
COVID-19 surge in effect    Pt resting comfortably w/o signs of discomfort. Urostomy bag emptied. Repositioned for comfort. Safety precautions in place. No other needs at this time.

## 2021-10-15 NOTE — CARE PLAN
The patient is Stable - Low risk of patient condition declining or worsening    Shift Goals  Clinical Goals: Q2 turns, pressure injury management  Patient Goals: Reposition pt every 2 hrs or as needed    Progress made toward(s) clinical / shift goals:  Pt requested to be repositioned 3x tonight only instead of q2 to promote sleeping time.    Patient is not progressing towards the following goals:    Problem: Knowledge Deficit - Standard  Goal: Patient and family/care givers will demonstrate understanding of plan of care, disease process/condition, diagnostic tests and medications  Outcome: Progressing     Problem: Skin Integrity  Goal: Skin integrity is maintained or improved  Outcome: Progressing     Problem: Fall Risk  Goal: Patient will remain free from falls  Outcome: Progressing     Problem: Psychosocial  Goal: Patient's level of anxiety will decrease  Outcome: Progressing

## 2021-10-15 NOTE — DISCHARGE PLANNING
Anticipated Discharge Disposition: SNF    Action: Discussed pt in morning rounds. Pt is clear to d/c.     LSW called Lenore at [facility]. Per [place], they are declining at this time due to being unable to provide the necessary care to pt. Per Lenore, CM team can keep following up to see if pt can be accommodated at a later time.    Barriers to Discharge: wound vac, medicaid, total assist    Plan: LSW to continue following up with [facility] to see if they can accommodate pt.    1030: Discussed pt in IDT rounds. Pt may be a candidate for LTAC for wound care and needs max assist for all ADLs/iADLs.    1200: LSW faxed LTAC choice form to [facility] for [facility] per protocol and continuity of care.
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
## 2021-10-16 PROCEDURE — 700102 HCHG RX REV CODE 250 W/ 637 OVERRIDE(OP): Performed by: INTERNAL MEDICINE

## 2021-10-16 PROCEDURE — G0378 HOSPITAL OBSERVATION PER HR: HCPCS

## 2021-10-16 PROCEDURE — A9270 NON-COVERED ITEM OR SERVICE: HCPCS | Performed by: INTERNAL MEDICINE

## 2021-10-16 PROCEDURE — A9270 NON-COVERED ITEM OR SERVICE: HCPCS | Performed by: FAMILY MEDICINE

## 2021-10-16 PROCEDURE — 700102 HCHG RX REV CODE 250 W/ 637 OVERRIDE(OP): Performed by: FAMILY MEDICINE

## 2021-10-16 PROCEDURE — 99224 PR SUBSEQUENT OBSERVATION CARE,LEVEL I: CPT | Performed by: STUDENT IN AN ORGANIZED HEALTH CARE EDUCATION/TRAINING PROGRAM

## 2021-10-16 RX ADMIN — RIVAROXABAN 20 MG: 20 TABLET, FILM COATED ORAL at 17:24

## 2021-10-16 RX ADMIN — SENNOSIDES AND DOCUSATE SODIUM 2 TABLET: 50; 8.6 TABLET ORAL at 06:02

## 2021-10-16 RX ADMIN — MIDODRINE HYDROCHLORIDE 10 MG: 5 TABLET ORAL at 11:53

## 2021-10-16 RX ADMIN — MIDODRINE HYDROCHLORIDE 10 MG: 5 TABLET ORAL at 17:23

## 2021-10-16 RX ADMIN — MIDODRINE HYDROCHLORIDE 10 MG: 5 TABLET ORAL at 08:48

## 2021-10-16 ASSESSMENT — ENCOUNTER SYMPTOMS
FLANK PAIN: 0
MYALGIAS: 0
VOMITING: 0
DIZZINESS: 0
COUGH: 0
DEPRESSION: 0
FOCAL WEAKNESS: 1
NAUSEA: 0
FEVER: 0
HEADACHES: 0
SHORTNESS OF BREATH: 0
CHILLS: 0
BLURRED VISION: 0

## 2021-10-16 ASSESSMENT — PAIN DESCRIPTION - PAIN TYPE: TYPE: ACUTE PAIN

## 2021-10-16 ASSESSMENT — LIFESTYLE VARIABLES: SUBSTANCE_ABUSE: 0

## 2021-10-16 NOTE — DISCHARGE PLANNING
Anticipated Discharge Disposition:   SNF or LTAC     Action:   Chart review complete.      Patient is pending placement. LTAC referral sent to [facility] yesterday, 10/15, for review. [facility] declined patient on 10/15; however, [facility] stated that CM should call later to see if they can accommodate patient once [facility]'s current acuity of patients decreases.     1011: MYRIAM REILLY called [facility] case management to inquire if they had received referral. No answer, voicemail left with call back number.     Barriers to Discharge:   Placement   Underinsured    Plan:   Hospital care management will continue to assist with discharge planning needs.
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
## 2021-10-16 NOTE — CARE PLAN
The patient is Stable - Low risk of patient condition declining or worsening    Shift Goals  Clinical Goals: Q2 turns and maintain skin integrity   Patient Goals: sleep     Progress made toward(s) clinical / shift goals:  Pt educated on the importance of turning and infection prevention related to his wounds     Patient is not progressing towards the following goals: n/a        Problem: Skin Integrity  Goal: Skin integrity is maintained or improved  Outcome: Not Progressing     Problem: Knowledge Deficit - Standard  Goal: Patient and family/care givers will demonstrate understanding of plan of care, disease process/condition, diagnostic tests and medications  Outcome: Progressing     Problem: Fall Risk  Goal: Patient will remain free from falls  Outcome: Progressing     Problem: Psychosocial  Goal: Patient's level of anxiety will decrease  Outcome: Progressing  Goal: Patient's ability to verbalize feelings about condition will improve  Outcome: Progressing     Problem: Communication  Goal: The ability to communicate needs accurately and effectively will improve  Outcome: Progressing     Problem: Discharge Barriers/Planning  Goal: Patient's continuum of care needs are met  Outcome: Progressing

## 2021-10-16 NOTE — PROGRESS NOTES
COVID 19 surge in effect     1930: Received report from Day shift RN. Pt is laying in bed, A+OX4 , showing no signs of distress. Pt c/o 0/10 pain and denies the need for pain medication. VSS except for hypotension.  Wound vac in place. Pt educated on Q2 turns and POC.  Call light and belongings at bedside and within reach. All questions answered at this time. Rounding in place

## 2021-10-17 PROCEDURE — 99224 PR SUBSEQUENT OBSERVATION CARE,LEVEL I: CPT | Performed by: STUDENT IN AN ORGANIZED HEALTH CARE EDUCATION/TRAINING PROGRAM

## 2021-10-17 PROCEDURE — 700102 HCHG RX REV CODE 250 W/ 637 OVERRIDE(OP): Performed by: INTERNAL MEDICINE

## 2021-10-17 PROCEDURE — A9270 NON-COVERED ITEM OR SERVICE: HCPCS | Performed by: INTERNAL MEDICINE

## 2021-10-17 PROCEDURE — G0378 HOSPITAL OBSERVATION PER HR: HCPCS

## 2021-10-17 RX ADMIN — MIDODRINE HYDROCHLORIDE 10 MG: 5 TABLET ORAL at 17:41

## 2021-10-17 RX ADMIN — MIDODRINE HYDROCHLORIDE 10 MG: 5 TABLET ORAL at 07:47

## 2021-10-17 RX ADMIN — MIDODRINE HYDROCHLORIDE 10 MG: 5 TABLET ORAL at 11:49

## 2021-10-17 RX ADMIN — RIVAROXABAN 20 MG: 20 TABLET, FILM COATED ORAL at 17:41

## 2021-10-17 ASSESSMENT — ENCOUNTER SYMPTOMS
DIZZINESS: 0
FEVER: 0
SHORTNESS OF BREATH: 0
BLURRED VISION: 0
COUGH: 0
DEPRESSION: 0
VOMITING: 0
FOCAL WEAKNESS: 1
NAUSEA: 0
HEADACHES: 0
FLANK PAIN: 0
MYALGIAS: 0
CHILLS: 0

## 2021-10-17 ASSESSMENT — LIFESTYLE VARIABLES: SUBSTANCE_ABUSE: 0

## 2021-10-17 ASSESSMENT — PAIN DESCRIPTION - PAIN TYPE: TYPE: ACUTE PAIN

## 2021-10-17 NOTE — CARE PLAN
"The patient is Stable - Low risk of patient condition declining or worsening    Shift Goals  Clinical Goals: Manage pressure ulcers   Patient Goals: Monitor urostomy for any leaking/able to rest     Progress made toward(s) clinical / shift goals:  Maintaining/progressing    Patient is not progressing towards the following goals: N/A      Problem: Communication  Goal: The ability to communicate needs accurately and effectively will improve  Outcome: Not Progressing   This RN and the patient had an in depth discussion on the need for clear communication and goal setting. Patient was upset that his bowel program was not done today until 1100 and that \"the notes\" should say that his bowel program should be done every day at 0900. This RN explained to the patient that there is no order for his bowel program and that it is important to communicate with the nurse his needs and expectations. This RN explained that every shift is different based on the nurse's patient assignment and the best thing to do is to discuss with the oncoming nurse for the day at shift change during/after report what time the bowel program can be done. The patient was not agreeable to this and insisted that all the staff know that he wants his bowel program done at 0900. This RN explained to the patient that 0900 can be a very busy part of the day and that although nursing staff will do the best they can to be present at 0900 to complete the bowel program, that the patient and the nurse may need to stay in communication with each other if it might be delayed. This RN asked the patient if this was understood and acceptable; it is unclear if the patient was ok with this. This RN told the patient that a nursing communication will be entered into the chart for all nursing staff to see that the patient is requesting his bowel program to be completed by 1000, however, it is imperative that the patient be flexible and practice clear communication and the " nursing staff will continue to be diligent to meet the patient's needs. The patient did not verbalize his understanding and may require continued education and encouragement to clearly communicate needs.    Problem: Skin Integrity  Goal: Skin integrity is maintained or improved  Outcome: Progressing   Patient's dressing became soaked with urine during a turn due to the urostomy being displaced. All dressings were changed to wet to dry, including the wound with the wound vac. Nursing staff to communicate to wound care team the need for the wound vac to be replaced.   Patient is on a low air loss mattress and agreeable to q2 hour turns. Nova hygiene and barrier cream placed after bowel program. Heel float boots and mepilex in place. Frequent checks for linen changes done.

## 2021-10-17 NOTE — CARE PLAN
The patient is Watcher - Medium risk of patient condition declining or worsening    Shift Goals  Clinical Goals: Manage pressure ulcers   Patient Goals: Monitor urostomy for any leaking/able to rest     Progress made toward(s) clinical / shift goals:    Problem: Discharge Barriers/Planning  Goal: Patient's continuum of care needs are met  Outcome: Not Met  Note: Pending placement     Problem: Wound/ / Incision Healing  Goal: Patient's wound/surgical incision will decrease in size and heals properly  Outcome: Not Met  Note: Dressings from sacrum and ilium were saturated in urine because the urostomy bag leaked - per pt.   Removed old dressing, disconnected wound vac and performed wet-to-dry dressing   Informed wound team about the situation

## 2021-10-17 NOTE — PROGRESS NOTES
Assumed care of patient. Bedside report received from night shift RN. Call light is within reach and fall precautions are in place. All needs met at this time. Hourly rounding in place.     COVID-19 surge in effect.

## 2021-10-17 NOTE — PROGRESS NOTES
Received report from day shift nurse.   Assumed pt care   Pt is A&Ox4, resting comfortably in bed.   Pt on r.a.. No signs of SOB/respiratory distress.   Pt denied any pain  Labs noted, VSS.   Assessment completed  Fall precautions in place.   Call light and personal belongings within reach.   Continue to monitor    Pt c/o leaking from RLQ-urostomy bag that saturated his sacrum and ilium wound dressings in urine. Pt strongly requested it to be changed by wet-to-dry method and await wound nurse to fix the wound vac dressing.   Performed wet-to-dry dressing with the assistance of charge RNLorelei. Disconnected wound dressing from wound vac. Will contact wound team to assess pt in AM     COVID-19 surge in effect
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
## 2021-10-18 PROCEDURE — G0378 HOSPITAL OBSERVATION PER HR: HCPCS

## 2021-10-18 PROCEDURE — 97602 WOUND(S) CARE NON-SELECTIVE: CPT

## 2021-10-18 PROCEDURE — 94760 N-INVAS EAR/PLS OXIMETRY 1: CPT

## 2021-10-18 PROCEDURE — A9270 NON-COVERED ITEM OR SERVICE: HCPCS | Performed by: INTERNAL MEDICINE

## 2021-10-18 PROCEDURE — 700102 HCHG RX REV CODE 250 W/ 637 OVERRIDE(OP): Performed by: INTERNAL MEDICINE

## 2021-10-18 PROCEDURE — 99231 SBSQ HOSP IP/OBS SF/LOW 25: CPT | Performed by: STUDENT IN AN ORGANIZED HEALTH CARE EDUCATION/TRAINING PROGRAM

## 2021-10-18 RX ORDER — NYSTATIN 100000 [USP'U]/G
POWDER TOPICAL 2 TIMES DAILY
Status: DISPENSED | OUTPATIENT
Start: 2021-10-18 | End: 2021-10-25

## 2021-10-18 RX ADMIN — MIDODRINE HYDROCHLORIDE 10 MG: 5 TABLET ORAL at 17:57

## 2021-10-18 RX ADMIN — MIDODRINE HYDROCHLORIDE 10 MG: 5 TABLET ORAL at 14:13

## 2021-10-18 RX ADMIN — MIDODRINE HYDROCHLORIDE 10 MG: 5 TABLET ORAL at 07:30

## 2021-10-18 RX ADMIN — RIVAROXABAN 20 MG: 20 TABLET, FILM COATED ORAL at 17:58

## 2021-10-18 ASSESSMENT — COGNITIVE AND FUNCTIONAL STATUS - GENERAL
WALKING IN HOSPITAL ROOM: TOTAL
STANDING UP FROM CHAIR USING ARMS: TOTAL
MOVING TO AND FROM BED TO CHAIR: UNABLE
MOVING FROM LYING ON BACK TO SITTING ON SIDE OF FLAT BED: UNABLE
SUGGESTED CMS G CODE MODIFIER MOBILITY: CN
MOBILITY SCORE: 6
CLIMB 3 TO 5 STEPS WITH RAILING: TOTAL
TURNING FROM BACK TO SIDE WHILE IN FLAT BAD: UNABLE
EATING MEALS: A LITTLE
TOILETING: TOTAL
DRESSING REGULAR LOWER BODY CLOTHING: TOTAL
DRESSING REGULAR UPPER BODY CLOTHING: TOTAL
DAILY ACTIVITIY SCORE: 8
HELP NEEDED FOR BATHING: TOTAL
PERSONAL GROOMING: TOTAL
SUGGESTED CMS G CODE MODIFIER DAILY ACTIVITY: CM

## 2021-10-18 ASSESSMENT — ENCOUNTER SYMPTOMS
SHORTNESS OF BREATH: 0
FOCAL WEAKNESS: 1
BLURRED VISION: 0
DEPRESSION: 0
FEVER: 0
NAUSEA: 0
MYALGIAS: 0
COUGH: 0
HEADACHES: 0
DIZZINESS: 0
VOMITING: 0
CHILLS: 0
FLANK PAIN: 0

## 2021-10-18 ASSESSMENT — PAIN DESCRIPTION - PAIN TYPE: TYPE: ACUTE PAIN

## 2021-10-18 ASSESSMENT — LIFESTYLE VARIABLES: SUBSTANCE_ABUSE: 0

## 2021-10-18 NOTE — CARE PLAN
The patient is Stable - Low risk of patient condition declining or worsening    Shift Goals  Clinical Goals: Manage pressure ulcers   Patient Goals: Able to rest     Progress made toward(s) clinical / shift goals:    Problem: Discharge Barriers/Planning  Goal: Patient's continuum of care needs are met  Outcome: Not Progressing  Note: Pending placement SNF vs LTAC     Problem: Wound/ / Incision Healing  Goal: Patient's wound/surgical incision will decrease in size and heals properly  Outcome: Not Met  Note: Continued to do wet-to-dry dressing to sacral and ischial wounds. Awaiting wound nurse to replace wound vac
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
## 2021-10-18 NOTE — PROGRESS NOTES
Received report from day shift nurse.   Assumed pt care   Pt is A&Ox4, resting comfortably in bed.   Pt on r.a.. No signs of SOB/respiratory distress.   Pt denied any pain  Labs noted, VSS.   Assessment completed  Fall precautions in place.   Call light and personal belongings within reach.   Continue to monitor    Reassessed sacrum and ischial dressings. Changed dressings with wet to dry method while waiting for wound nurse to put the wound vac in place    COVID-19 surge in effect
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
## 2021-10-19 PROCEDURE — 99225 PR SUBSEQUENT OBSERVATION CARE,LEVEL II: CPT | Performed by: HOSPITALIST

## 2021-10-19 PROCEDURE — 700102 HCHG RX REV CODE 250 W/ 637 OVERRIDE(OP): Performed by: INTERNAL MEDICINE

## 2021-10-19 PROCEDURE — 700102 HCHG RX REV CODE 250 W/ 637 OVERRIDE(OP): Performed by: STUDENT IN AN ORGANIZED HEALTH CARE EDUCATION/TRAINING PROGRAM

## 2021-10-19 PROCEDURE — G0378 HOSPITAL OBSERVATION PER HR: HCPCS

## 2021-10-19 PROCEDURE — 94760 N-INVAS EAR/PLS OXIMETRY 1: CPT

## 2021-10-19 PROCEDURE — A9270 NON-COVERED ITEM OR SERVICE: HCPCS | Performed by: INTERNAL MEDICINE

## 2021-10-19 PROCEDURE — A9270 NON-COVERED ITEM OR SERVICE: HCPCS | Performed by: STUDENT IN AN ORGANIZED HEALTH CARE EDUCATION/TRAINING PROGRAM

## 2021-10-19 RX ADMIN — MIDODRINE HYDROCHLORIDE 10 MG: 5 TABLET ORAL at 12:19

## 2021-10-19 RX ADMIN — NYSTATIN: 100000 POWDER TOPICAL at 18:25

## 2021-10-19 RX ADMIN — MIDODRINE HYDROCHLORIDE 10 MG: 5 TABLET ORAL at 18:24

## 2021-10-19 RX ADMIN — MIDODRINE HYDROCHLORIDE 10 MG: 5 TABLET ORAL at 08:08

## 2021-10-19 RX ADMIN — RIVAROXABAN 20 MG: 20 TABLET, FILM COATED ORAL at 18:24

## 2021-10-19 ASSESSMENT — ENCOUNTER SYMPTOMS
SPUTUM PRODUCTION: 0
NEUROLOGICAL NEGATIVE: 1
TINGLING: 0
EYE DISCHARGE: 0
COUGH: 0
ABDOMINAL PAIN: 0
CHILLS: 0
SINUS PAIN: 0
EYE REDNESS: 0
WHEEZING: 0
FOCAL WEAKNESS: 0
FLANK PAIN: 0
HEARTBURN: 0
BRUISES/BLEEDS EASILY: 0
FEVER: 0
PSYCHIATRIC NEGATIVE: 1
POLYDIPSIA: 0
DEPRESSION: 0
DOUBLE VISION: 0
BLOOD IN STOOL: 0
BACK PAIN: 0
GASTROINTESTINAL NEGATIVE: 1
STRIDOR: 0
DIAPHORESIS: 0
CARDIOVASCULAR NEGATIVE: 1
PND: 0
FALLS: 0
SEIZURES: 0
MYALGIAS: 0
SENSORY CHANGE: 0
RESPIRATORY NEGATIVE: 1
CONSTITUTIONAL NEGATIVE: 1
SHORTNESS OF BREATH: 0
PHOTOPHOBIA: 0
ORTHOPNEA: 0
EYES NEGATIVE: 1
DIZZINESS: 0
INSOMNIA: 0

## 2021-10-19 ASSESSMENT — LIFESTYLE VARIABLES: SUBSTANCE_ABUSE: 0

## 2021-10-19 NOTE — DISCHARGE PLANNING
Anticipated Discharge Disposition: LT SNF    Action: DENI faxed Choice to [facility] for [facility].    Barriers to Discharge: LT SNF placement    Plan: LSW to f/u on [facility]errals. Dr. Nelson asking that all referrals be followed up on by Thursday so that if he is declined we can request Medicaid for a waive to send Pt out of state.
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
## 2021-10-19 NOTE — DISCHARGE PLANNING
Agency/Facility Name: [facility] / [facility]   Outcome: DPA left v-mail for admissions regarding referral status with request for callback.     0945-  Agency/Facility Name: [facility]  Outcome: DPA left v-mail for admissions regarding referral status with request for callback.     0948-  Agency/Facility Name: [facility]  Outcome: DPA left v-mail for admissions regarding referral status with request for callback.     1410-  Received Choice form at 1357  Agency/Facility Name: [facility]  Referral sent per Choice form @ 6140

## 2021-10-19 NOTE — CARE PLAN
The patient is Watcher - Medium risk of patient condition declining or worsening    Shift Goals  Clinical Goals: Remain free of new skin injury  Patient Goals: Sleep  Family Goals: No family present    Progress made toward(s) clinical / shift goals:  yes    Patient is not progressing towards the following goals:

## 2021-10-19 NOTE — PROGRESS NOTES
covid-19 surge in effect  Manual disimpacted patient 0930 am per pt request. Patient tolerated well.

## 2021-10-20 PROCEDURE — 700102 HCHG RX REV CODE 250 W/ 637 OVERRIDE(OP): Performed by: INTERNAL MEDICINE

## 2021-10-20 PROCEDURE — 700102 HCHG RX REV CODE 250 W/ 637 OVERRIDE(OP): Performed by: STUDENT IN AN ORGANIZED HEALTH CARE EDUCATION/TRAINING PROGRAM

## 2021-10-20 PROCEDURE — A9270 NON-COVERED ITEM OR SERVICE: HCPCS | Performed by: STUDENT IN AN ORGANIZED HEALTH CARE EDUCATION/TRAINING PROGRAM

## 2021-10-20 PROCEDURE — G0378 HOSPITAL OBSERVATION PER HR: HCPCS

## 2021-10-20 PROCEDURE — 700102 HCHG RX REV CODE 250 W/ 637 OVERRIDE(OP): Performed by: FAMILY MEDICINE

## 2021-10-20 PROCEDURE — 94760 N-INVAS EAR/PLS OXIMETRY 1: CPT

## 2021-10-20 PROCEDURE — A9270 NON-COVERED ITEM OR SERVICE: HCPCS | Performed by: FAMILY MEDICINE

## 2021-10-20 PROCEDURE — A9270 NON-COVERED ITEM OR SERVICE: HCPCS | Performed by: INTERNAL MEDICINE

## 2021-10-20 PROCEDURE — 99225 PR SUBSEQUENT OBSERVATION CARE,LEVEL II: CPT | Performed by: HOSPITALIST

## 2021-10-20 RX ADMIN — SENNOSIDES AND DOCUSATE SODIUM 2 TABLET: 50; 8.6 TABLET ORAL at 16:29

## 2021-10-20 RX ADMIN — MIDODRINE HYDROCHLORIDE 10 MG: 5 TABLET ORAL at 05:55

## 2021-10-20 RX ADMIN — RIVAROXABAN 20 MG: 20 TABLET, FILM COATED ORAL at 16:29

## 2021-10-20 RX ADMIN — NYSTATIN: 100000 POWDER TOPICAL at 16:30

## 2021-10-20 RX ADMIN — MIDODRINE HYDROCHLORIDE 10 MG: 5 TABLET ORAL at 11:59

## 2021-10-20 RX ADMIN — MIDODRINE HYDROCHLORIDE 10 MG: 5 TABLET ORAL at 16:29

## 2021-10-20 RX ADMIN — NYSTATIN 1 APPLICATION: 100000 POWDER TOPICAL at 05:55

## 2021-10-20 ASSESSMENT — ENCOUNTER SYMPTOMS
GASTROINTESTINAL NEGATIVE: 1
EYE DISCHARGE: 0
PND: 0
EYE PAIN: 0
EYES NEGATIVE: 1
MYALGIAS: 0
SPEECH CHANGE: 0
COUGH: 0
NAUSEA: 0
WEIGHT LOSS: 0
SPUTUM PRODUCTION: 0
FOCAL WEAKNESS: 0
SORE THROAT: 0
ABDOMINAL PAIN: 0
NEUROLOGICAL NEGATIVE: 1
SEIZURES: 0
FEVER: 0
BACK PAIN: 0
MUSCULOSKELETAL NEGATIVE: 1
POLYDIPSIA: 0
CONSTITUTIONAL NEGATIVE: 1
WHEEZING: 0
RESPIRATORY NEGATIVE: 1
CONSTIPATION: 0
CARDIOVASCULAR NEGATIVE: 1
PSYCHIATRIC NEGATIVE: 1
DEPRESSION: 0

## 2021-10-20 ASSESSMENT — PAIN DESCRIPTION - PAIN TYPE
TYPE: ACUTE PAIN
TYPE: ACUTE PAIN

## 2021-10-20 ASSESSMENT — LIFESTYLE VARIABLES: SUBSTANCE_ABUSE: 0
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
## 2021-10-20 NOTE — CARE PLAN
The patient is Stable - Low risk of patient condition declining or worsening    Shift Goals  Clinical Goals: q2 turns, prevent falls  Patient Goals: sleep comfortably  Family Goals: No family present    Progress made toward(s) clinical / shift goals:          Problem: Knowledge Deficit - Standard  Goal: Patient and family/care givers will demonstrate understanding of plan of care, disease process/condition, diagnostic tests and medications  Outcome: Progressing     Problem: Skin Integrity  Goal: Skin integrity is maintained or improved  Outcome: Progressing     Problem: Fall Risk  Goal: Patient will remain free from falls  Outcome: Progressing     Problem: Bowel Elimination  Goal: Establish and maintain regular bowel function  Outcome: Progressing       Patient is not progressing towards the following goals:

## 2021-10-20 NOTE — CARE PLAN
The patient is Stable - Low risk of patient condition declining or worsening    Shift Goals  Clinical Goals: Turn every 2 hours  Patient Goals: rest  Family Goals: No family present    Progress made toward(s) clinical / shift goals:  **yes*    Patient is not progressing towards the following goals:

## 2021-10-20 NOTE — PROGRESS NOTES
Pt is awake in bed. VSS. No pain or n/v at this time. RN discussed POC with pt. All questions answered. Pt is requesting ot rest. Fall precautions in place.

## 2021-10-21 PROCEDURE — A9270 NON-COVERED ITEM OR SERVICE: HCPCS | Performed by: FAMILY MEDICINE

## 2021-10-21 PROCEDURE — G0378 HOSPITAL OBSERVATION PER HR: HCPCS

## 2021-10-21 PROCEDURE — A9270 NON-COVERED ITEM OR SERVICE: HCPCS | Performed by: INTERNAL MEDICINE

## 2021-10-21 PROCEDURE — 700102 HCHG RX REV CODE 250 W/ 637 OVERRIDE(OP): Performed by: INTERNAL MEDICINE

## 2021-10-21 PROCEDURE — 99224 PR SUBSEQUENT OBSERVATION CARE,LEVEL I: CPT | Performed by: HOSPITALIST

## 2021-10-21 PROCEDURE — 700102 HCHG RX REV CODE 250 W/ 637 OVERRIDE(OP): Performed by: FAMILY MEDICINE

## 2021-10-21 RX ADMIN — SENNOSIDES AND DOCUSATE SODIUM 2 TABLET: 50; 8.6 TABLET ORAL at 17:05

## 2021-10-21 RX ADMIN — SENNOSIDES AND DOCUSATE SODIUM 2 TABLET: 50; 8.6 TABLET ORAL at 06:20

## 2021-10-21 RX ADMIN — NYSTATIN: 100000 POWDER TOPICAL at 18:00

## 2021-10-21 RX ADMIN — MIDODRINE HYDROCHLORIDE 10 MG: 5 TABLET ORAL at 12:11

## 2021-10-21 RX ADMIN — MIDODRINE HYDROCHLORIDE 10 MG: 5 TABLET ORAL at 17:04

## 2021-10-21 RX ADMIN — NYSTATIN 1 APPLICATION: 100000 POWDER TOPICAL at 06:20

## 2021-10-21 RX ADMIN — RIVAROXABAN 20 MG: 20 TABLET, FILM COATED ORAL at 17:04

## 2021-10-21 RX ADMIN — MIDODRINE HYDROCHLORIDE 10 MG: 5 TABLET ORAL at 06:21

## 2021-10-21 ASSESSMENT — ENCOUNTER SYMPTOMS
FLANK PAIN: 0
PHOTOPHOBIA: 0
GASTROINTESTINAL NEGATIVE: 1
BLURRED VISION: 0
DEPRESSION: 0
ABDOMINAL PAIN: 0
TINGLING: 0
SENSORY CHANGE: 1
CLAUDICATION: 0
SPUTUM PRODUCTION: 0
SORE THROAT: 0
HEARTBURN: 0
WEAKNESS: 1
RESPIRATORY NEGATIVE: 1
PSYCHIATRIC NEGATIVE: 1
CONSTITUTIONAL NEGATIVE: 1
MUSCULOSKELETAL NEGATIVE: 1
PND: 0
SHORTNESS OF BREATH: 0
TREMORS: 0
MEMORY LOSS: 0
FALLS: 0
MYALGIAS: 0
CARDIOVASCULAR NEGATIVE: 1
VOMITING: 0
WEIGHT LOSS: 0
EYES NEGATIVE: 1
SINUS PAIN: 0

## 2021-10-21 ASSESSMENT — LIFESTYLE VARIABLES: SUBSTANCE_ABUSE: 0

## 2021-10-21 ASSESSMENT — PAIN DESCRIPTION - PAIN TYPE: TYPE: ACUTE PAIN

## 2021-10-21 NOTE — PROGRESS NOTES
Pt is awake in bed. VSS. No pain or n/v at this time. Dressings to L heel, sacrum, and R shin are clean, dry, and intact. Pt is requesting to rest at this time. Fall precautions in place.

## 2021-10-21 NOTE — CARE PLAN
The patient is Stable - Low risk of patient condition declining or worsening    Shift Goals  Clinical Goals: Turn every 2 hours  Patient Goals: rest  Family Goals: No family present    Progress made toward(s) clinical / shift goals:  yes    Patient is not progressing towards the following goals:

## 2021-10-21 NOTE — DISCHARGE PLANNING
Anticipated Discharge Disposition: LT SNF    Action:     LSW called [facility] to follow up on acceptance/denial.    Facility: [facility]  Outcome: LSW left VM with call back number    Facility: [facility]  Outcome: LSW left VM and call back number    Facility: [facility]  Spoke to: Aldrid  Outcome: Per Aldrid, he will relay a message to admissions about the referral. LSW provided call back number.    Facility: [facility]  Outcome: LSW left VM for admissions. LSW left call back number.    Facility: [facility]   Outcome: LSW left VM and call back number for admissions.    Facility: [facility]  Outcome: LSW left VM and call back number for admissions.    Barriers to Discharge: placement, medicaid insurance    Plan: LSW to continue following up with SNFs

## 2021-10-21 NOTE — CARE PLAN
The patient is Stable - Low risk of patient condition declining or worsening    Shift Goals  Clinical Goals: Q2 turns  Patient Goals: sleep comfortably  Family Goals: No family present    Progress made toward(s) clinical / shift goals:        Problem: Knowledge Deficit - Standard  Goal: Patient and family/care givers will demonstrate understanding of plan of care, disease process/condition, diagnostic tests and medications  Outcome: Progressing     Problem: Skin Integrity  Goal: Skin integrity is maintained or improved  Outcome: Progressing     Problem: Fall Risk  Goal: Patient will remain free from falls  Outcome: Progressing     Problem: Psychosocial  Goal: Patient's level of anxiety will decrease  Outcome: Progressing     Problem: Communication  Goal: The ability to communicate needs accurately and effectively will improve  Outcome: Progressing       Patient is not progressing towards the following goals:
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
## 2021-10-22 LAB
ALBUMIN SERPL BCP-MCNC: 3.8 G/DL (ref 3.2–4.9)
ALBUMIN/GLOB SERPL: 1 G/DL
ALP SERPL-CCNC: 75 U/L (ref 30–99)
ALT SERPL-CCNC: 14 U/L (ref 2–50)
ANION GAP SERPL CALC-SCNC: 11 MMOL/L (ref 7–16)
AST SERPL-CCNC: 18 U/L (ref 12–45)
BILIRUB SERPL-MCNC: 0.3 MG/DL (ref 0.1–1.5)
BUN SERPL-MCNC: 14 MG/DL (ref 8–22)
CALCIUM SERPL-MCNC: 9.5 MG/DL (ref 8.4–10.2)
CHLORIDE SERPL-SCNC: 100 MMOL/L (ref 96–112)
CO2 SERPL-SCNC: 25 MMOL/L (ref 20–33)
CREAT SERPL-MCNC: 0.52 MG/DL (ref 0.5–1.4)
GLOBULIN SER CALC-MCNC: 3.7 G/DL (ref 1.9–3.5)
GLUCOSE SERPL-MCNC: 104 MG/DL (ref 65–99)
POTASSIUM SERPL-SCNC: 4 MMOL/L (ref 3.6–5.5)
PROT SERPL-MCNC: 7.5 G/DL (ref 6–8.2)
SODIUM SERPL-SCNC: 136 MMOL/L (ref 135–145)

## 2021-10-22 PROCEDURE — 99224 PR SUBSEQUENT OBSERVATION CARE,LEVEL I: CPT | Performed by: HOSPITALIST

## 2021-10-22 PROCEDURE — A9270 NON-COVERED ITEM OR SERVICE: HCPCS | Performed by: INTERNAL MEDICINE

## 2021-10-22 PROCEDURE — 700102 HCHG RX REV CODE 250 W/ 637 OVERRIDE(OP): Performed by: FAMILY MEDICINE

## 2021-10-22 PROCEDURE — 94760 N-INVAS EAR/PLS OXIMETRY 1: CPT

## 2021-10-22 PROCEDURE — 36415 COLL VENOUS BLD VENIPUNCTURE: CPT

## 2021-10-22 PROCEDURE — 700102 HCHG RX REV CODE 250 W/ 637 OVERRIDE(OP): Performed by: INTERNAL MEDICINE

## 2021-10-22 PROCEDURE — 80053 COMPREHEN METABOLIC PANEL: CPT

## 2021-10-22 PROCEDURE — G0378 HOSPITAL OBSERVATION PER HR: HCPCS

## 2021-10-22 PROCEDURE — A9270 NON-COVERED ITEM OR SERVICE: HCPCS | Performed by: FAMILY MEDICINE

## 2021-10-22 RX ADMIN — MIDODRINE HYDROCHLORIDE 10 MG: 5 TABLET ORAL at 16:56

## 2021-10-22 RX ADMIN — MIDODRINE HYDROCHLORIDE 10 MG: 5 TABLET ORAL at 10:15

## 2021-10-22 RX ADMIN — RIVAROXABAN 20 MG: 20 TABLET, FILM COATED ORAL at 17:03

## 2021-10-22 RX ADMIN — NYSTATIN: 100000 POWDER TOPICAL at 06:04

## 2021-10-22 RX ADMIN — NYSTATIN: 100000 POWDER TOPICAL at 17:03

## 2021-10-22 RX ADMIN — SENNOSIDES AND DOCUSATE SODIUM 2 TABLET: 50; 8.6 TABLET ORAL at 17:03

## 2021-10-22 RX ADMIN — SENNOSIDES AND DOCUSATE SODIUM 2 TABLET: 50; 8.6 TABLET ORAL at 06:04

## 2021-10-22 ASSESSMENT — ENCOUNTER SYMPTOMS
WEIGHT LOSS: 0
DEPRESSION: 0
MUSCULOSKELETAL NEGATIVE: 1
SPUTUM PRODUCTION: 0
DIZZINESS: 0
RESPIRATORY NEGATIVE: 1
SHORTNESS OF BREATH: 0
FOCAL WEAKNESS: 0
SINUS PAIN: 0
WEAKNESS: 1
FLANK PAIN: 0
FALLS: 0
NERVOUS/ANXIOUS: 0
CLAUDICATION: 0
MYALGIAS: 0
PHOTOPHOBIA: 0
SENSORY CHANGE: 1
DIAPHORESIS: 0
CONSTIPATION: 0
PND: 0
MEMORY LOSS: 0
BACK PAIN: 0
HEARTBURN: 0
PSYCHIATRIC NEGATIVE: 1
TINGLING: 0
BLURRED VISION: 0
BLOOD IN STOOL: 0
VOMITING: 0
COUGH: 0
ORTHOPNEA: 0
ABDOMINAL PAIN: 0
TREMORS: 0
EYES NEGATIVE: 1
CARDIOVASCULAR NEGATIVE: 1
CHILLS: 0
SORE THROAT: 0
GASTROINTESTINAL NEGATIVE: 1
CONSTITUTIONAL NEGATIVE: 1

## 2021-10-22 ASSESSMENT — LIFESTYLE VARIABLES: SUBSTANCE_ABUSE: 0

## 2021-10-22 ASSESSMENT — PAIN DESCRIPTION - PAIN TYPE
TYPE: ACUTE PAIN
TYPE: ACUTE PAIN
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
## 2021-10-22 NOTE — PROGRESS NOTES
COvid-19 surge in effect    Pt AAOx4. Denies any discomfort for now. Pt repositioned for comfort. Urostomy bag observed. POC discussed with pt. Pt verbalized understanding. Pt requested to be reposition at 2am and 6am instead of q 2 hrs. Safety and comfort measures in place. Fresh water provided.  No additional needs at this time.

## 2021-10-22 NOTE — CARE PLAN
The patient is Stable - Low risk of patient condition declining or worsening    Shift Goals  Clinical Goals: Pt's skin integrity will be improve.  Patient Goals: Pt will be able to rest comfortably  Family Goals: No family present    Progress made toward(s) clinical / shift goals: Pt is turn/repositioned for comfort and to help with pressure sores. Pt also has wound vac and wound care RN is on board.    Patient is not progressing towards the following goals:      Problem: Knowledge Deficit - Standard  Goal: Patient and family/care givers will demonstrate understanding of plan of care, disease process/condition, diagnostic tests and medications  Outcome: Progressing     Problem: Skin Integrity  Goal: Skin integrity is maintained or improved  Outcome: Progressing     Problem: Psychosocial  Goal: Patient's ability to verbalize feelings about condition will improve  Outcome: Progressing

## 2021-10-22 NOTE — PROGRESS NOTES
Report received from night shift RN. Assume care. Pt. AAOx4 pt is bed,  Assessment completed. VSS. Denies pain, Pt is Paraplegic, Dressing to shin and sacrum in place.in place DCI, Pt was update for the care for the day. White board updated, All question answered. Pt has call light within reach,  bed is in the lowest position. Pt has no other needs at this time.     Wound care and finger stimulation provided as well Had med soft BM.

## 2021-10-22 NOTE — CARE PLAN
Problem: Skin Integrity  Goal: Skin integrity is maintained or improved  Outcome: Progressing     Problem: Fall Risk  Goal: Patient will remain free from falls  Outcome: Progressing     Problem: Hemodynamics  Goal: Patient's hemodynamics, fluid balance and neurologic status will be stable or improve  Outcome: Progressing     Problem: Respiratory  Goal: Patient will achieve/maintain optimum respiratory ventilation and gas exchange  Outcome: Progressing   The patient is Stable - Low risk of patient condition declining or worsening    Shift Goals  Clinical Goals: wound care, able to have BM  Patient Goals: wound care and able to have a BM  Family Goals: No family present    Progress made toward(s) clinical / shift goals:  Wound care provided, and Pt able to have a Medium size BM  Patient is not progressing towards the following goals:Goal were met by 1400

## 2021-10-23 PROCEDURE — G0378 HOSPITAL OBSERVATION PER HR: HCPCS

## 2021-10-23 PROCEDURE — A9270 NON-COVERED ITEM OR SERVICE: HCPCS | Performed by: INTERNAL MEDICINE

## 2021-10-23 PROCEDURE — 700102 HCHG RX REV CODE 250 W/ 637 OVERRIDE(OP): Performed by: INTERNAL MEDICINE

## 2021-10-23 PROCEDURE — A9270 NON-COVERED ITEM OR SERVICE: HCPCS | Performed by: FAMILY MEDICINE

## 2021-10-23 PROCEDURE — 99224 PR SUBSEQUENT OBSERVATION CARE,LEVEL I: CPT | Performed by: HOSPITALIST

## 2021-10-23 PROCEDURE — 700102 HCHG RX REV CODE 250 W/ 637 OVERRIDE(OP): Performed by: FAMILY MEDICINE

## 2021-10-23 RX ADMIN — MIDODRINE HYDROCHLORIDE 10 MG: 5 TABLET ORAL at 08:09

## 2021-10-23 RX ADMIN — RIVAROXABAN 20 MG: 20 TABLET, FILM COATED ORAL at 17:00

## 2021-10-23 RX ADMIN — NYSTATIN: 100000 POWDER TOPICAL at 17:01

## 2021-10-23 RX ADMIN — SENNOSIDES AND DOCUSATE SODIUM 2 TABLET: 50; 8.6 TABLET ORAL at 16:59

## 2021-10-23 RX ADMIN — SENNOSIDES AND DOCUSATE SODIUM 2 TABLET: 50; 8.6 TABLET ORAL at 06:33

## 2021-10-23 RX ADMIN — MIDODRINE HYDROCHLORIDE 10 MG: 5 TABLET ORAL at 12:08

## 2021-10-23 RX ADMIN — NYSTATIN: 100000 POWDER TOPICAL at 06:35

## 2021-10-23 RX ADMIN — MIDODRINE HYDROCHLORIDE 10 MG: 5 TABLET ORAL at 16:31

## 2021-10-23 ASSESSMENT — ENCOUNTER SYMPTOMS
SPUTUM PRODUCTION: 0
NAUSEA: 0
WEAKNESS: 1
SENSORY CHANGE: 1
SORE THROAT: 0
EYE DISCHARGE: 0
SEIZURES: 0
CARDIOVASCULAR NEGATIVE: 1
CONSTITUTIONAL NEGATIVE: 1
MUSCULOSKELETAL NEGATIVE: 1
CONSTIPATION: 0
EYES NEGATIVE: 1
MYALGIAS: 0
GASTROINTESTINAL NEGATIVE: 1
PND: 0
WHEEZING: 0
DEPRESSION: 0
EYE PAIN: 0
SPEECH CHANGE: 0
FEVER: 0
WEIGHT LOSS: 0
COUGH: 0
PSYCHIATRIC NEGATIVE: 1
POLYDIPSIA: 0
FOCAL WEAKNESS: 1
ABDOMINAL PAIN: 0
RESPIRATORY NEGATIVE: 1
BACK PAIN: 0

## 2021-10-23 ASSESSMENT — LIFESTYLE VARIABLES: SUBSTANCE_ABUSE: 0

## 2021-10-23 ASSESSMENT — VISUAL ACUITY: OU: 1

## 2021-10-23 ASSESSMENT — PAIN DESCRIPTION - PAIN TYPE: TYPE: ACUTE PAIN
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
## 2021-10-23 NOTE — PROGRESS NOTES
COVID-19 surge in effect    Pt AAOx4. Sitting in bed w/o signs of distress or discomfort. POC discussed w/ pt including turning and repositioning, possible urostomy bag/aplliance change. Pt verbalized understanding. Safety and comfort measures in place. Fresh water provided. No additional needs at this time.

## 2021-10-23 NOTE — PROGRESS NOTES
Received patient from Night shift RN . Patient is awake and alert.No sign of distress. Patient denies any nausea nor pain..Fall precaution in placed, kept bed in lowest position and call light within reach.
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
## 2021-10-23 NOTE — CARE PLAN
The patient is Stable - Low risk of patient condition declining or worsening    Shift Goals  Clinical Goals: wound care, urostomy care  Patient Goals: Pt's skin integrity will be improved.  Family Goals:       Progress made toward(s) clinical / shift goals: Pt is repositioned and wound care and urostomy care is performed to maintain or improved pt's skin integrity.     Patient is not progressing towards the following goals:      Problem: Fall Risk  Goal: Patient will remain free from falls  Outcome: Progressing     Problem: Psychosocial  Goal: Patient's ability to verbalize feelings about condition will improve  Outcome: Progressing     Problem: Urinary Elimination  Goal: Establish and maintain regular urinary output  Outcome: Progressing     Problem: Bowel Elimination  Goal: Establish and maintain regular bowel function  Outcome: Progressing

## 2021-10-23 NOTE — CARE PLAN
The patient is Stable - Low risk of patient condition declining or worsening    Shift Goals  Clinical Goals: wound care management  Patient Goals: wound care and able to have a BM  Family Goals: No family present    Progress made toward(s) clinical / shift goals:  wound dressings done today.Patient had BM 1 x  via digital manual extraction. Fall precaution observed, kept bed in lowest position and call bell within reach    Patient is not progressing towards the following goals:

## 2021-10-24 PROCEDURE — G0378 HOSPITAL OBSERVATION PER HR: HCPCS

## 2021-10-24 PROCEDURE — A9270 NON-COVERED ITEM OR SERVICE: HCPCS | Performed by: INTERNAL MEDICINE

## 2021-10-24 PROCEDURE — 700102 HCHG RX REV CODE 250 W/ 637 OVERRIDE(OP): Performed by: INTERNAL MEDICINE

## 2021-10-24 PROCEDURE — 700102 HCHG RX REV CODE 250 W/ 637 OVERRIDE(OP): Performed by: FAMILY MEDICINE

## 2021-10-24 PROCEDURE — A9270 NON-COVERED ITEM OR SERVICE: HCPCS | Performed by: FAMILY MEDICINE

## 2021-10-24 PROCEDURE — 99224 PR SUBSEQUENT OBSERVATION CARE,LEVEL I: CPT | Performed by: HOSPITALIST

## 2021-10-24 RX ADMIN — SENNOSIDES AND DOCUSATE SODIUM 2 TABLET: 50; 8.6 TABLET ORAL at 06:22

## 2021-10-24 RX ADMIN — NYSTATIN: 100000 POWDER TOPICAL at 17:22

## 2021-10-24 RX ADMIN — SENNOSIDES AND DOCUSATE SODIUM 2 TABLET: 50; 8.6 TABLET ORAL at 17:22

## 2021-10-24 RX ADMIN — NYSTATIN: 100000 POWDER TOPICAL at 06:23

## 2021-10-24 RX ADMIN — RIVAROXABAN 20 MG: 20 TABLET, FILM COATED ORAL at 17:21

## 2021-10-24 RX ADMIN — MIDODRINE HYDROCHLORIDE 10 MG: 5 TABLET ORAL at 11:35

## 2021-10-24 RX ADMIN — MIDODRINE HYDROCHLORIDE 10 MG: 5 TABLET ORAL at 17:21

## 2021-10-24 RX ADMIN — MIDODRINE HYDROCHLORIDE 10 MG: 5 TABLET ORAL at 08:10

## 2021-10-24 ASSESSMENT — ENCOUNTER SYMPTOMS
FLANK PAIN: 0
DEPRESSION: 0
SEIZURES: 0
SHORTNESS OF BREATH: 0
INSOMNIA: 0
CHILLS: 0
FEVER: 0
WHEEZING: 0
SENSORY CHANGE: 1
BLOOD IN STOOL: 0
DIZZINESS: 0
GASTROINTESTINAL NEGATIVE: 1
DIAPHORESIS: 0
PHOTOPHOBIA: 0
WEAKNESS: 1
EYE DISCHARGE: 0
DOUBLE VISION: 0
EYE REDNESS: 0
TINGLING: 0
MUSCULOSKELETAL NEGATIVE: 1
STRIDOR: 0
BACK PAIN: 0
SINUS PAIN: 0
PND: 0
MYALGIAS: 0
HEARTBURN: 0
BRUISES/BLEEDS EASILY: 0
ORTHOPNEA: 0
CARDIOVASCULAR NEGATIVE: 1
FOCAL WEAKNESS: 1
FALLS: 0
SPUTUM PRODUCTION: 0
EYES NEGATIVE: 1
COUGH: 0
ABDOMINAL PAIN: 0
POLYDIPSIA: 0
PSYCHIATRIC NEGATIVE: 1
RESPIRATORY NEGATIVE: 1

## 2021-10-24 ASSESSMENT — PAIN DESCRIPTION - PAIN TYPE
TYPE: ACUTE PAIN
TYPE: ACUTE PAIN

## 2021-10-24 ASSESSMENT — VISUAL ACUITY: OU: 1

## 2021-10-24 ASSESSMENT — LIFESTYLE VARIABLES: SUBSTANCE_ABUSE: 0

## 2021-10-24 NOTE — PROGRESS NOTES
Received patient from Night shift RN . Patient is awake and alert.No sign of distress. Patient denies any nausea,numbness or tingling. Denies any pain. Fall precaution in placed, kept bed in lowest position and call light within reach.  Covid 19 surge in effect.
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
## 2021-10-24 NOTE — CARE PLAN
The patient is Stable - Low risk of patient condition declining or worsening    Shift Goals  Clinical Goals: reposition  Patient Goals: pt will rest  Family Goals: No family present    Progress made toward(s) clinical / shift goals:  Pt was turnQ2    Patient is not progressing towards the following goals:      Problem: Knowledge Deficit - Standard  Goal: Patient and family/care givers will demonstrate understanding of plan of care, disease process/condition, diagnostic tests and medications  Outcome: Progressing     Problem: Skin Integrity  Goal: Skin integrity is maintained or improved  Outcome: Progressing     Problem: Fall Risk  Goal: Patient will remain free from falls  Outcome: Progressing

## 2021-10-24 NOTE — PROGRESS NOTES
"\"Covid 19 surge in effect\"    Report received from day RN Pt is AAO x 4.  Pt reports no pain .POC discussed to reposition every couple of hours.All needs met at this time.  Bed in low position.  Call light within reach.  Rounding in place.   "

## 2021-10-24 NOTE — CARE PLAN
The patient is Stable - Low risk of patient condition declining or worsening    Shift Goals  Clinical Goals: pressure injury management and repositioning the patient  Patient Goals: pt will rest  Family Goals: No family present    Progress made toward(s) clinical / shift goals:positioning rendered, dressing changed to sacral pressure injury. Fall precaution observed, kept bed in lowest position and call bell within reach.    Patient is not progressing towards the following goals:

## 2021-10-25 PROCEDURE — 99224 PR SUBSEQUENT OBSERVATION CARE,LEVEL I: CPT | Performed by: HOSPITALIST

## 2021-10-25 PROCEDURE — 700101 HCHG RX REV CODE 250: Performed by: HOSPITALIST

## 2021-10-25 PROCEDURE — A9270 NON-COVERED ITEM OR SERVICE: HCPCS | Performed by: INTERNAL MEDICINE

## 2021-10-25 PROCEDURE — 97602 WOUND(S) CARE NON-SELECTIVE: CPT

## 2021-10-25 PROCEDURE — 700102 HCHG RX REV CODE 250 W/ 637 OVERRIDE(OP): Performed by: INTERNAL MEDICINE

## 2021-10-25 PROCEDURE — 700102 HCHG RX REV CODE 250 W/ 637 OVERRIDE(OP): Performed by: HOSPITALIST

## 2021-10-25 PROCEDURE — A9270 NON-COVERED ITEM OR SERVICE: HCPCS | Performed by: FAMILY MEDICINE

## 2021-10-25 PROCEDURE — 94760 N-INVAS EAR/PLS OXIMETRY 1: CPT

## 2021-10-25 PROCEDURE — 700102 HCHG RX REV CODE 250 W/ 637 OVERRIDE(OP): Performed by: FAMILY MEDICINE

## 2021-10-25 PROCEDURE — G0378 HOSPITAL OBSERVATION PER HR: HCPCS

## 2021-10-25 PROCEDURE — A9270 NON-COVERED ITEM OR SERVICE: HCPCS | Performed by: HOSPITALIST

## 2021-10-25 RX ORDER — MIDODRINE HYDROCHLORIDE 5 MG/1
5 TABLET ORAL
Status: DISCONTINUED | OUTPATIENT
Start: 2021-10-25 | End: 2021-10-26

## 2021-10-25 RX ORDER — SODIUM HYPOCHLORITE 1.25 MG/ML
SOLUTION TOPICAL 2 TIMES DAILY
Status: DISCONTINUED | OUTPATIENT
Start: 2021-10-25 | End: 2021-10-25

## 2021-10-25 RX ORDER — SODIUM HYPOCHLORITE 1.25 MG/ML
SOLUTION TOPICAL 2 TIMES DAILY
Status: DISCONTINUED | OUTPATIENT
Start: 2021-10-25 | End: 2021-11-01 | Stop reason: HOSPADM

## 2021-10-25 RX ADMIN — NYSTATIN: 100000 POWDER TOPICAL at 14:10

## 2021-10-25 RX ADMIN — RIVAROXABAN 20 MG: 20 TABLET, FILM COATED ORAL at 18:00

## 2021-10-25 RX ADMIN — MIDODRINE HYDROCHLORIDE 5 MG: 5 TABLET ORAL at 17:30

## 2021-10-25 RX ADMIN — SODIUM HYPOCHLORITE 10 ML: 1.25 SOLUTION TOPICAL at 14:08

## 2021-10-25 RX ADMIN — SODIUM HYPOCHLORITE 1 ML: 1.25 SOLUTION TOPICAL at 22:23

## 2021-10-25 RX ADMIN — NYSTATIN: 100000 POWDER TOPICAL at 06:25

## 2021-10-25 RX ADMIN — MIDODRINE HYDROCHLORIDE 10 MG: 5 TABLET ORAL at 08:58

## 2021-10-25 RX ADMIN — SENNOSIDES AND DOCUSATE SODIUM 2 TABLET: 50; 8.6 TABLET ORAL at 18:00

## 2021-10-25 RX ADMIN — MIDODRINE HYDROCHLORIDE 5 MG: 5 TABLET ORAL at 11:10

## 2021-10-25 RX ADMIN — SENNOSIDES AND DOCUSATE SODIUM 2 TABLET: 50; 8.6 TABLET ORAL at 06:22

## 2021-10-25 ASSESSMENT — ENCOUNTER SYMPTOMS
PALPITATIONS: 0
MEMORY LOSS: 0
DIARRHEA: 0
CONSTIPATION: 0
DOUBLE VISION: 0
DEPRESSION: 0
PHOTOPHOBIA: 0
SENSORY CHANGE: 1
LOSS OF CONSCIOUSNESS: 0
POLYDIPSIA: 0
BRUISES/BLEEDS EASILY: 0
FEVER: 0
SPUTUM PRODUCTION: 0
WHEEZING: 0
EYE REDNESS: 0
VOMITING: 0
HEARTBURN: 0
CLAUDICATION: 0
HEADACHES: 0
FOCAL WEAKNESS: 1
EYES NEGATIVE: 1
CARDIOVASCULAR NEGATIVE: 1
COUGH: 0
NECK PAIN: 0
RESPIRATORY NEGATIVE: 1
SHORTNESS OF BREATH: 0
PSYCHIATRIC NEGATIVE: 1
NERVOUS/ANXIOUS: 0
GASTROINTESTINAL NEGATIVE: 1
SINUS PAIN: 0
EYE DISCHARGE: 0
WEIGHT LOSS: 0
WEAKNESS: 1

## 2021-10-25 ASSESSMENT — LIFESTYLE VARIABLES: SUBSTANCE_ABUSE: 0

## 2021-10-25 ASSESSMENT — VISUAL ACUITY: OU: 1

## 2021-10-25 NOTE — PROGRESS NOTES
One podous boot delivered to patient. [facility] contacted. Awaiting availability of a second boot.

## 2021-10-25 NOTE — PROGRESS NOTES
"\"Covid 19 surge in effect\"    Report received from day RN Pt is AAO x 4.  Pt reports no pain.POC discussed. All needs met at this time.Bed in low position.Call light within reach.Rounding in place.   "
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
## 2021-10-26 PROCEDURE — 700102 HCHG RX REV CODE 250 W/ 637 OVERRIDE(OP): Performed by: STUDENT IN AN ORGANIZED HEALTH CARE EDUCATION/TRAINING PROGRAM

## 2021-10-26 PROCEDURE — 700102 HCHG RX REV CODE 250 W/ 637 OVERRIDE(OP): Performed by: FAMILY MEDICINE

## 2021-10-26 PROCEDURE — A9270 NON-COVERED ITEM OR SERVICE: HCPCS | Performed by: HOSPITALIST

## 2021-10-26 PROCEDURE — A9270 NON-COVERED ITEM OR SERVICE: HCPCS | Performed by: FAMILY MEDICINE

## 2021-10-26 PROCEDURE — G0378 HOSPITAL OBSERVATION PER HR: HCPCS

## 2021-10-26 PROCEDURE — 700102 HCHG RX REV CODE 250 W/ 637 OVERRIDE(OP): Performed by: HOSPITALIST

## 2021-10-26 PROCEDURE — A9270 NON-COVERED ITEM OR SERVICE: HCPCS | Performed by: STUDENT IN AN ORGANIZED HEALTH CARE EDUCATION/TRAINING PROGRAM

## 2021-10-26 PROCEDURE — 99225 PR SUBSEQUENT OBSERVATION CARE,LEVEL II: CPT | Performed by: STUDENT IN AN ORGANIZED HEALTH CARE EDUCATION/TRAINING PROGRAM

## 2021-10-26 RX ORDER — MIDODRINE HYDROCHLORIDE 5 MG/1
10 TABLET ORAL
Status: DISCONTINUED | OUTPATIENT
Start: 2021-10-26 | End: 2021-10-30

## 2021-10-26 RX ADMIN — SENNOSIDES AND DOCUSATE SODIUM 2 TABLET: 50; 8.6 TABLET ORAL at 08:32

## 2021-10-26 RX ADMIN — SODIUM HYPOCHLORITE 1 ML: 1.25 SOLUTION TOPICAL at 22:56

## 2021-10-26 RX ADMIN — SODIUM HYPOCHLORITE 10 ML: 1.25 SOLUTION TOPICAL at 12:42

## 2021-10-26 RX ADMIN — MIDODRINE HYDROCHLORIDE 10 MG: 5 TABLET ORAL at 12:42

## 2021-10-26 RX ADMIN — MIDODRINE HYDROCHLORIDE 5 MG: 5 TABLET ORAL at 08:32

## 2021-10-26 ASSESSMENT — PAIN DESCRIPTION - PAIN TYPE
TYPE: ACUTE PAIN
TYPE: ACUTE PAIN

## 2021-10-26 ASSESSMENT — ENCOUNTER SYMPTOMS
COUGH: 0
CHILLS: 0
WEAKNESS: 1
BACK PAIN: 1
HEADACHES: 0
NAUSEA: 0
VOMITING: 0
EYES NEGATIVE: 1
ABDOMINAL PAIN: 0
SHORTNESS OF BREATH: 0
FEVER: 0

## 2021-10-26 NOTE — DIETARY
Nutrition Services: Dietary staff requested that RD stop sending Arginaid QID because pt is not drinking it. Pt's recorded PO intake is % of all meals since 10/21/21. Pt is currently on a regular diet with double proteins. He has an order for Boost Plus but is not receiving this. Based on current estimated kcal and protein needs compared to kcal and protein provided by diet. Current diet order should meet needs and encourage healing. Wound team is following at least weekly. RD will follow weekly or PRN.

## 2021-10-26 NOTE — PROGRESS NOTES
Hospitalist on call notified about pt's BP 75/29. Patient asymptomatic. Rechecked after midodrine, BP 91/56. Patient declines inserting IV and IV fluids.

## 2021-10-26 NOTE — DISCHARGE PLANNING
Anticipated Discharge Disposition: SNF    Action: DPA re-faxed referrals to [place] SNFs. LSW/DPA to follow up on referrals.    Barriers to Discharge: Medicaid, medical needs    Plan: LSW to follow up on referrals.

## 2021-10-26 NOTE — PROGRESS NOTES
Received report from night shift RN. Patient A&Ox4, denies pain, N/V, SOB at this time. Patient noted to have low BP, MD notified. Q2 turns in place. Safety precautions in place. Call light/ belongings in reach.     COVID-19 surge in effect.

## 2021-10-27 PROBLEM — E87.20 METABOLIC ACIDOSIS: Status: RESOLVED | Noted: 2021-10-02 | Resolved: 2021-10-27

## 2021-10-27 PROCEDURE — 700102 HCHG RX REV CODE 250 W/ 637 OVERRIDE(OP): Performed by: INTERNAL MEDICINE

## 2021-10-27 PROCEDURE — G0378 HOSPITAL OBSERVATION PER HR: HCPCS

## 2021-10-27 PROCEDURE — A9270 NON-COVERED ITEM OR SERVICE: HCPCS | Performed by: FAMILY MEDICINE

## 2021-10-27 PROCEDURE — 700102 HCHG RX REV CODE 250 W/ 637 OVERRIDE(OP): Performed by: FAMILY MEDICINE

## 2021-10-27 PROCEDURE — A9270 NON-COVERED ITEM OR SERVICE: HCPCS | Performed by: INTERNAL MEDICINE

## 2021-10-27 PROCEDURE — 99225 PR SUBSEQUENT OBSERVATION CARE,LEVEL II: CPT | Performed by: STUDENT IN AN ORGANIZED HEALTH CARE EDUCATION/TRAINING PROGRAM

## 2021-10-27 PROCEDURE — A9270 NON-COVERED ITEM OR SERVICE: HCPCS | Performed by: STUDENT IN AN ORGANIZED HEALTH CARE EDUCATION/TRAINING PROGRAM

## 2021-10-27 PROCEDURE — 700102 HCHG RX REV CODE 250 W/ 637 OVERRIDE(OP): Performed by: STUDENT IN AN ORGANIZED HEALTH CARE EDUCATION/TRAINING PROGRAM

## 2021-10-27 RX ADMIN — MIDODRINE HYDROCHLORIDE 10 MG: 5 TABLET ORAL at 17:14

## 2021-10-27 RX ADMIN — SODIUM HYPOCHLORITE 1 ML: 1.25 SOLUTION TOPICAL at 22:07

## 2021-10-27 RX ADMIN — SENNOSIDES AND DOCUSATE SODIUM 2 TABLET: 50; 8.6 TABLET ORAL at 06:10

## 2021-10-27 RX ADMIN — SENNOSIDES AND DOCUSATE SODIUM 2 TABLET: 50; 8.6 TABLET ORAL at 17:14

## 2021-10-27 RX ADMIN — MIDODRINE HYDROCHLORIDE 10 MG: 5 TABLET ORAL at 07:37

## 2021-10-27 RX ADMIN — RIVAROXABAN 20 MG: 20 TABLET, FILM COATED ORAL at 17:14

## 2021-10-27 RX ADMIN — MIDODRINE HYDROCHLORIDE 10 MG: 5 TABLET ORAL at 12:27

## 2021-10-27 RX ADMIN — SODIUM HYPOCHLORITE 1 ML: 1.25 SOLUTION TOPICAL at 12:28

## 2021-10-27 ASSESSMENT — PAIN DESCRIPTION - PAIN TYPE
TYPE: ACUTE PAIN
TYPE: ACUTE PAIN
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
## 2021-10-27 NOTE — CARE PLAN
The patient is Stable - Low risk of patient condition declining or worsening    Shift Goals  Clinical Goals: Q2 turns, skin integrity   Patient Goals: pt will rest  Family Goals: No family present    Progress made toward(s) clinical / shift goals:    Problem: Fall Risk  Goal: Patient will remain free from falls  Outcome: Progressing     Problem: Skin Integrity  Goal: Skin integrity is maintained or improved  Outcome: Progressing     Problem: Knowledge Deficit - Standard  Goal: Patient and family/care givers will demonstrate understanding of plan of care, disease process/condition, diagnostic tests and medications  Outcome: Progressing       Patient is not progressing towards the following goals:

## 2021-10-27 NOTE — PROGRESS NOTES
Pt AOx4, sitting up in bed, eating breakfast. Safety precautions in place. Hourly rounding in place.    COVID-19 surge in effect.
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
## 2021-10-28 PROCEDURE — A9270 NON-COVERED ITEM OR SERVICE: HCPCS | Performed by: STUDENT IN AN ORGANIZED HEALTH CARE EDUCATION/TRAINING PROGRAM

## 2021-10-28 PROCEDURE — 99224 PR SUBSEQUENT OBSERVATION CARE,LEVEL I: CPT | Performed by: STUDENT IN AN ORGANIZED HEALTH CARE EDUCATION/TRAINING PROGRAM

## 2021-10-28 PROCEDURE — 700102 HCHG RX REV CODE 250 W/ 637 OVERRIDE(OP): Performed by: STUDENT IN AN ORGANIZED HEALTH CARE EDUCATION/TRAINING PROGRAM

## 2021-10-28 PROCEDURE — A9270 NON-COVERED ITEM OR SERVICE: HCPCS | Performed by: INTERNAL MEDICINE

## 2021-10-28 PROCEDURE — 700102 HCHG RX REV CODE 250 W/ 637 OVERRIDE(OP): Performed by: INTERNAL MEDICINE

## 2021-10-28 PROCEDURE — G0378 HOSPITAL OBSERVATION PER HR: HCPCS

## 2021-10-28 PROCEDURE — A9270 NON-COVERED ITEM OR SERVICE: HCPCS | Performed by: FAMILY MEDICINE

## 2021-10-28 PROCEDURE — 700102 HCHG RX REV CODE 250 W/ 637 OVERRIDE(OP): Performed by: FAMILY MEDICINE

## 2021-10-28 RX ADMIN — SODIUM HYPOCHLORITE 1 ML: 1.25 SOLUTION TOPICAL at 10:41

## 2021-10-28 RX ADMIN — MIDODRINE HYDROCHLORIDE 10 MG: 5 TABLET ORAL at 17:04

## 2021-10-28 RX ADMIN — SENNOSIDES AND DOCUSATE SODIUM 2 TABLET: 50; 8.6 TABLET ORAL at 06:17

## 2021-10-28 RX ADMIN — SODIUM HYPOCHLORITE 1 ML: 1.25 SOLUTION TOPICAL at 21:48

## 2021-10-28 RX ADMIN — RIVAROXABAN 20 MG: 20 TABLET, FILM COATED ORAL at 17:05

## 2021-10-28 RX ADMIN — SENNOSIDES AND DOCUSATE SODIUM 2 TABLET: 50; 8.6 TABLET ORAL at 17:04

## 2021-10-28 RX ADMIN — MIDODRINE HYDROCHLORIDE 10 MG: 5 TABLET ORAL at 10:42

## 2021-10-28 RX ADMIN — MIDODRINE HYDROCHLORIDE 10 MG: 5 TABLET ORAL at 07:41

## 2021-10-28 ASSESSMENT — PAIN DESCRIPTION - PAIN TYPE: TYPE: ACUTE PAIN

## 2021-10-28 NOTE — CARE PLAN
The patient is Stable - Low risk of patient condition declining or worsening    Shift Goals  Clinical Goals: wound change  Patient Goals: pt sarina rest  Family Goals: No family present    Progress made toward(s) clinical / shift goals:  Pt sacrum dressing was done    Patient is not progressing towards the following goals:      Problem: Skin Integrity  Goal: Skin integrity is maintained or improved  Outcome: Progressing     Problem: Fall Risk  Goal: Patient will remain free from falls  Outcome: Progressing     Problem: Infection - Standard  Goal: Patient will remain free from infection  Outcome: Progressing
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
## 2021-10-28 NOTE — DISCHARGE PLANNING
Anticipated Discharge Disposition: SNF     Action/Information: SW received in report that pt is to discharge [facility], but they will not have bed available until Monday. DENI called [facility] and spoke with Bria. Per Bria, she will know definitively if pt is accepted tomorrow.      Barriers: SNF acceptance and bed availability      Plan: HCM will continue to collaborate with pt/family, medical care team, and outpatient providers for ongoing discharge planning support and collaboration.

## 2021-10-28 NOTE — PROGRESS NOTES
Assumed care of patient. Bedside report received from night shift RN. Discussed plan of care with patient. Call light is within reach and fall precautions are in place. All needs met at this time. Hourly rounding in place.     COVID-19 surge in effect.

## 2021-10-28 NOTE — PROGRESS NOTES
"\"Covid 19 surge in effect\"    Report received from day RN Pt is AAO x 4.  Pt reports no pain.POC discussed to change R sacrum dressing and q2 turns. Pt verbalized understanding.  All needs met at this time.Bed in low position.Call light within reach.Rounding in place.   "

## 2021-10-29 PROCEDURE — A9270 NON-COVERED ITEM OR SERVICE: HCPCS | Performed by: INTERNAL MEDICINE

## 2021-10-29 PROCEDURE — 700102 HCHG RX REV CODE 250 W/ 637 OVERRIDE(OP): Performed by: STUDENT IN AN ORGANIZED HEALTH CARE EDUCATION/TRAINING PROGRAM

## 2021-10-29 PROCEDURE — 99224 PR SUBSEQUENT OBSERVATION CARE,LEVEL I: CPT | Performed by: STUDENT IN AN ORGANIZED HEALTH CARE EDUCATION/TRAINING PROGRAM

## 2021-10-29 PROCEDURE — A9270 NON-COVERED ITEM OR SERVICE: HCPCS | Performed by: STUDENT IN AN ORGANIZED HEALTH CARE EDUCATION/TRAINING PROGRAM

## 2021-10-29 PROCEDURE — G0378 HOSPITAL OBSERVATION PER HR: HCPCS

## 2021-10-29 PROCEDURE — A9270 NON-COVERED ITEM OR SERVICE: HCPCS | Performed by: FAMILY MEDICINE

## 2021-10-29 PROCEDURE — 700102 HCHG RX REV CODE 250 W/ 637 OVERRIDE(OP): Performed by: INTERNAL MEDICINE

## 2021-10-29 PROCEDURE — 700102 HCHG RX REV CODE 250 W/ 637 OVERRIDE(OP): Performed by: FAMILY MEDICINE

## 2021-10-29 RX ADMIN — RIVAROXABAN 20 MG: 20 TABLET, FILM COATED ORAL at 17:27

## 2021-10-29 RX ADMIN — SODIUM HYPOCHLORITE 1 ML: 1.25 SOLUTION TOPICAL at 10:23

## 2021-10-29 RX ADMIN — MIDODRINE HYDROCHLORIDE 10 MG: 5 TABLET ORAL at 11:48

## 2021-10-29 RX ADMIN — MIDODRINE HYDROCHLORIDE 10 MG: 5 TABLET ORAL at 17:27

## 2021-10-29 RX ADMIN — MIDODRINE HYDROCHLORIDE 10 MG: 5 TABLET ORAL at 08:17

## 2021-10-29 RX ADMIN — SODIUM HYPOCHLORITE 1 ML: 1.25 SOLUTION TOPICAL at 20:19

## 2021-10-29 RX ADMIN — SENNOSIDES AND DOCUSATE SODIUM 2 TABLET: 50; 8.6 TABLET ORAL at 17:27

## 2021-10-29 RX ADMIN — SENNOSIDES AND DOCUSATE SODIUM 2 TABLET: 50; 8.6 TABLET ORAL at 06:06

## 2021-10-29 ASSESSMENT — PAIN DESCRIPTION - PAIN TYPE: TYPE: ACUTE PAIN

## 2021-10-29 NOTE — PROGRESS NOTES
Received report from night shift RN. Patient A&Ox4, denies N/V, SOB, pain at this time. POC discussed. Call light, belongings in reach. Bed in lowest, locked position, safety precautions in place.     COVID-19 surge in effect.

## 2021-10-29 NOTE — CARE PLAN
The patient is Stable - Low risk of patient condition declining or worsening    Shift Goals  Clinical Goals: bowel program, dressing changes  Patient Goals: rest, reposition  Family Goals: No family present    All goals met.    Problem: Knowledge Deficit - Standard  Goal: Patient and family/care givers will demonstrate understanding of plan of care, disease process/condition, diagnostic tests and medications  Outcome: Progressing   Discussed plan of care and medications with patient; patient verbalizes understanding.     Problem: Skin Integrity  Goal: Skin integrity is maintained or improved  Outcome: Progressing   Patient is on a low air loss mattress with q2 turns in place. Dressing care complete.     Problem: Communication  Goal: The ability to communicate needs accurately and effectively will improve  Outcome: Progressing   Patient is able to communicate needs effectively.

## 2021-10-29 NOTE — DISCHARGE PLANNING
Anticipated Discharge Disposition: [facility]    Action: LSW called Bria at [facility] ([phone]). Per Bria, she did not know pt was at [facility]. Per Bria, she will have to recheck availability. Bria requested this LSW call her back in a couple hours.    1056: LSW notified by MYRIAM Ly that pt's dtr would like an update.    1130: LSW called pt's dtr Giselle at [phone] to provide update. LSW left .    Barriers to Discharge: placement    Plan: LSW to follow up with Bria    1148: LSW received call back from Giselle. LSW provided update and addressed concerns. LSW provided Giselle with number for [facility].    1215: LSW received call from Giselle. LSW addressed further concerns regarding care givers. LSW emailed Giselle the care giver list to [email or web address].
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
## 2021-10-29 NOTE — CARE PLAN
The patient is Stable - Low risk of patient condition declining or worsening    Shift Goals  Clinical Goals: Wound care, [id number]  Patient Goals: rest and reposition  Family Goals: No family present    Progress made toward(s) clinical / shift goals:  Wound care performed. Turns offered every 2 hours.   Problem: Skin Integrity  Goal: Skin integrity is maintained or improved  Outcome: Progressing  Note: Q2 turns offered to patient to offload wounds.      Problem: Urinary Elimination  Goal: Establish and maintain regular urinary output  Outcome: Progressing  Note: Urostomy bag changed.      Problem: Bowel Elimination  Goal: Establish and maintain regular bowel function  Outcome: Progressing

## 2021-10-29 NOTE — CARE PLAN
The patient is Stable - Low risk of patient condition declining or worsening    Shift Goals  Clinical Goals: wound care and q2 turns  Patient Goals: rest and reposition  Family Goals: No family present    Progress made toward(s) clinical / shift goals:  Pt wound dressing was changed. Pt had a bed bath. All needs met.    Patient is not progressing towards the following goals:        Problem: Skin Integrity  Goal: Skin integrity is maintained or improved  Outcome: Progressing  Pt had a bed bath,full line changed     Problem: Fall Risk  Goal: Patient will remain free from falls  Outcome: Progressing     Problem: Communication  Goal: The ability to communicate needs accurately and effectively will improve  Outcome: Progressing

## 2021-10-30 PROCEDURE — 94760 N-INVAS EAR/PLS OXIMETRY 1: CPT

## 2021-10-30 PROCEDURE — A9270 NON-COVERED ITEM OR SERVICE: HCPCS | Performed by: FAMILY MEDICINE

## 2021-10-30 PROCEDURE — 99224 PR SUBSEQUENT OBSERVATION CARE,LEVEL I: CPT | Performed by: STUDENT IN AN ORGANIZED HEALTH CARE EDUCATION/TRAINING PROGRAM

## 2021-10-30 PROCEDURE — G0378 HOSPITAL OBSERVATION PER HR: HCPCS

## 2021-10-30 PROCEDURE — A9270 NON-COVERED ITEM OR SERVICE: HCPCS | Performed by: STUDENT IN AN ORGANIZED HEALTH CARE EDUCATION/TRAINING PROGRAM

## 2021-10-30 PROCEDURE — 700102 HCHG RX REV CODE 250 W/ 637 OVERRIDE(OP): Performed by: FAMILY MEDICINE

## 2021-10-30 PROCEDURE — A9270 NON-COVERED ITEM OR SERVICE: HCPCS | Performed by: INTERNAL MEDICINE

## 2021-10-30 PROCEDURE — 700102 HCHG RX REV CODE 250 W/ 637 OVERRIDE(OP): Performed by: INTERNAL MEDICINE

## 2021-10-30 PROCEDURE — 700102 HCHG RX REV CODE 250 W/ 637 OVERRIDE(OP): Performed by: STUDENT IN AN ORGANIZED HEALTH CARE EDUCATION/TRAINING PROGRAM

## 2021-10-30 RX ORDER — MIDODRINE HYDROCHLORIDE 5 MG/1
10 TABLET ORAL 2 TIMES DAILY
Status: DISCONTINUED | OUTPATIENT
Start: 2021-10-30 | End: 2021-11-01 | Stop reason: HOSPADM

## 2021-10-30 RX ORDER — MIDODRINE HYDROCHLORIDE 5 MG/1
5 TABLET ORAL
Status: DISCONTINUED | OUTPATIENT
Start: 2021-10-30 | End: 2021-10-30

## 2021-10-30 RX ADMIN — MIDODRINE HYDROCHLORIDE 10 MG: 5 TABLET ORAL at 10:10

## 2021-10-30 RX ADMIN — MIDODRINE HYDROCHLORIDE 10 MG: 5 TABLET ORAL at 17:22

## 2021-10-30 RX ADMIN — RIVAROXABAN 20 MG: 20 TABLET, FILM COATED ORAL at 17:22

## 2021-10-30 RX ADMIN — SODIUM HYPOCHLORITE 1 ML: 1.25 SOLUTION TOPICAL at 22:00

## 2021-10-30 RX ADMIN — SENNOSIDES AND DOCUSATE SODIUM 2 TABLET: 50; 8.6 TABLET ORAL at 17:22

## 2021-10-30 RX ADMIN — SENNOSIDES AND DOCUSATE SODIUM 2 TABLET: 50; 8.6 TABLET ORAL at 05:30

## 2021-10-30 RX ADMIN — SODIUM HYPOCHLORITE 1 ML: 1.25 SOLUTION TOPICAL at 09:28

## 2021-10-30 ASSESSMENT — PAIN DESCRIPTION - PAIN TYPE
TYPE: ACUTE PAIN
TYPE: ACUTE PAIN

## 2021-10-30 NOTE — PROGRESS NOTES
Received report from night shift RN. Patient A&Ox4, denies pain, N/V, SOB at this time. Vitals reviewed. Dressing change performed. Bed in lowest, locked position. Call light, belongings in reach.     COVID-19 surge in effect.

## 2021-10-30 NOTE — CARE PLAN
The patient is Stable - Low risk of patient condition declining or worsening    Shift Goals  Clinical Goals: wound care, q2 turns, prevent falls  Patient Goals: sleep comfortably  Family Goals: No family present    Progress made toward(s) clinical / shift goals:        Problem: Knowledge Deficit - Standard  Goal: Patient and family/care givers will demonstrate understanding of plan of care, disease process/condition, diagnostic tests and medications  Outcome: Progressing     Problem: Skin Integrity  Goal: Skin integrity is maintained or improved  Outcome: Progressing     Problem: Fall Risk  Goal: Patient will remain free from falls  Outcome: Progressing     Problem: Psychosocial  Goal: Patient's level of anxiety will decrease  Outcome: Progressing       Patient is not progressing towards the following goals:
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
## 2021-10-30 NOTE — PROGRESS NOTES
Pt is awake in bed. VSS. No pain or n/v at this time. Dressings on R ischium and sacrum changed. other dressings are clean, dry, and intact. Pt is requesting to rest. Fall precautions in place.

## 2021-10-31 PROCEDURE — 700102 HCHG RX REV CODE 250 W/ 637 OVERRIDE(OP): Performed by: STUDENT IN AN ORGANIZED HEALTH CARE EDUCATION/TRAINING PROGRAM

## 2021-10-31 PROCEDURE — 700102 HCHG RX REV CODE 250 W/ 637 OVERRIDE(OP): Performed by: INTERNAL MEDICINE

## 2021-10-31 PROCEDURE — A9270 NON-COVERED ITEM OR SERVICE: HCPCS | Performed by: FAMILY MEDICINE

## 2021-10-31 PROCEDURE — A9270 NON-COVERED ITEM OR SERVICE: HCPCS | Performed by: INTERNAL MEDICINE

## 2021-10-31 PROCEDURE — A9270 NON-COVERED ITEM OR SERVICE: HCPCS | Performed by: STUDENT IN AN ORGANIZED HEALTH CARE EDUCATION/TRAINING PROGRAM

## 2021-10-31 PROCEDURE — 99224 PR SUBSEQUENT OBSERVATION CARE,LEVEL I: CPT | Performed by: STUDENT IN AN ORGANIZED HEALTH CARE EDUCATION/TRAINING PROGRAM

## 2021-10-31 PROCEDURE — 700102 HCHG RX REV CODE 250 W/ 637 OVERRIDE(OP): Performed by: FAMILY MEDICINE

## 2021-10-31 PROCEDURE — G0378 HOSPITAL OBSERVATION PER HR: HCPCS

## 2021-10-31 RX ADMIN — SENNOSIDES AND DOCUSATE SODIUM 2 TABLET: 50; 8.6 TABLET ORAL at 05:47

## 2021-10-31 RX ADMIN — MIDODRINE HYDROCHLORIDE 10 MG: 5 TABLET ORAL at 05:47

## 2021-10-31 RX ADMIN — MIDODRINE HYDROCHLORIDE 10 MG: 5 TABLET ORAL at 17:41

## 2021-10-31 RX ADMIN — RIVAROXABAN 20 MG: 20 TABLET, FILM COATED ORAL at 17:42

## 2021-10-31 RX ADMIN — SENNOSIDES AND DOCUSATE SODIUM 2 TABLET: 50; 8.6 TABLET ORAL at 17:42

## 2021-10-31 ASSESSMENT — PAIN DESCRIPTION - PAIN TYPE: TYPE: ACUTE PAIN

## 2021-10-31 NOTE — CARE PLAN
The patient is Stable - Low risk of patient condition declining or worsening    Shift Goals  Clinical Goals: Wound care, q2 turns  Patient Goals: sleep comfortably  Family Goals: No family present    Progress made toward(s) clinical / shift goals:  Q2 turns in place. Wound care performed.   Problem: Knowledge Deficit - Standard  Goal: Patient and family/care givers will demonstrate understanding of plan of care, disease process/condition, diagnostic tests and medications  Outcome: Progressing     Problem: Skin Integrity  Goal: Skin integrity is maintained or improved  Outcome: Progressing     Problem: Fall Risk  Goal: Patient will remain free from falls  Outcome: Progressing     Problem: Psychosocial  Goal: Patient's level of anxiety will decrease  Outcome: Progressing  Goal: Patient's ability to verbalize feelings about condition will improve  Outcome: Progressing  Goal: Patient's ability to re-evaluate and adapt role responsibilities will improve  Outcome: Progressing  Goal: Patient and family will demonstrate ability to cope with life altering diagnosis and/or procedure  Outcome: Progressing  Goal: Spiritual and cultural needs incorporated into hospitalization  Outcome: Progressing     Problem: Communication  Goal: The ability to communicate needs accurately and effectively will improve  Outcome: Progressing     Problem: Discharge Barriers/Planning  Goal: Patient's continuum of care needs are met  Outcome: Progressing     Problem: Hemodynamics  Goal: Patient's hemodynamics, fluid balance and neurologic status will be stable or improve  Outcome: Progressing     Problem: Respiratory  Goal: Patient will achieve/maintain optimum respiratory ventilation and gas exchange  Outcome: Progressing     Problem: Fluid Volume  Goal: Fluid volume balance will be maintained  Outcome: Progressing     Problem: Dysphagia  Goal: Dysphagia will improve  Outcome: Progressing     Problem: Risk for Aspiration  Goal: Patient's risk for  aspiration will be absent or decrease  Outcome: Progressing     Problem: Nutrition  Goal: Patient's nutritional and fluid intake will be adequate or improve  Outcome: Progressing  Goal: Enteral nutrition will be maintained or improve  Outcome: Progressing  Goal: Enteral nutrition will be maintained or improve  Outcome: Progressing     Problem: Urinary Elimination  Goal: Establish and maintain regular urinary output  Outcome: Progressing     Problem: Bowel Elimination  Goal: Establish and maintain regular bowel function  Outcome: Progressing     Problem: Gastrointestinal Irritability  Goal: Nausea and vomiting will be absent or improve  Outcome: Progressing  Goal: Diarrhea will be absent or improved  Outcome: Progressing     Problem: Mobility  Goal: Patient's capacity to carry out activities will improve  Outcome: Progressing     Problem: Self Care  Goal: Patient will have the ability to perform ADLs independently or with assistance (bathe, groom, dress, toilet and feed)  Outcome: Progressing     Problem: Infection - Standard  Goal: Patient will remain free from infection  Outcome: Progressing     Problem: Wound/ / Incision Healing  Goal: Patient's wound/surgical incision will decrease in size and heals properly  Outcome: Progressing
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
## 2021-10-31 NOTE — CARE PLAN
The patient is Watcher - Medium risk of patient condition declining or worsening    Shift Goals  Clinical Goals: wound care  Patient Goals: dressing change  Family Goals: no family present    Progress made toward(s) clinical / shift goals:    Problem: Knowledge Deficit - Standard  Goal: Patient and family/care givers will demonstrate understanding of plan of care, disease process/condition, diagnostic tests and medications  10/31/2021 1405 by Kim Salazar R.N.  Outcome: Progressing  10/31/2021 1050 by Kim Salazar R.N.  Outcome: Progressing     Problem: Skin Integrity  Goal: Skin integrity is maintained or improved  10/31/2021 1405 by Kim Salazar R.N.  Outcome: Progressing  10/31/2021 1050 by Kim Salazar R.N.  Outcome: Progressing     Problem: Fall Risk  Goal: Patient will remain free from falls  10/31/2021 1405 by Kim Salazar R.N.  Outcome: Progressing  10/31/2021 1050 by Kim Salazar R.N.  Outcome: Progressing     Problem: Psychosocial  Goal: Patient's level of anxiety will decrease  10/31/2021 1405 by Kim Salazar R.N.  Outcome: Progressing  10/31/2021 1050 by Kim Salazar R.N.  Outcome: Progressing  Goal: Patient's ability to verbalize feelings about condition will improve  10/31/2021 1405 by Kim Salazar R.N.  Outcome: Progressing  10/31/2021 1050 by Kim Salazar R.N.  Outcome: Progressing  Goal: Patient's ability to re-evaluate and adapt role responsibilities will improve  10/31/2021 1405 by Kim Salazar R.N.  Outcome: Progressing  10/31/2021 1050 by Kim Saalzar R.N.  Outcome: Progressing  Goal: Patient and family will demonstrate ability to cope with life altering diagnosis and/or procedure  10/31/2021 1405 by Kim Salazar R.N.  Outcome: Progressing  10/31/2021 1050 by Kim Salazar R.N.  Outcome: Progressing  Goal: Spiritual and cultural needs incorporated into hospitalization  10/31/2021 1405 by Kim Salazar R.N.  Outcome: Progressing  10/31/2021 1050 by  Kim Salazar R.N.  Outcome: Progressing     Problem: Communication  Goal: The ability to communicate needs accurately and effectively will improve  10/31/2021 1405 by Kim Salazar R.N.  Outcome: Progressing  10/31/2021 1050 by Kim Salazar R.N.  Outcome: Progressing     Problem: Discharge Barriers/Planning  Goal: Patient's continuum of care needs are met  10/31/2021 1405 by Kim Salazar R.N.  Outcome: Progressing  10/31/2021 1050 by Kim Salazar R.N.  Outcome: Progressing     Problem: Hemodynamics  Goal: Patient's hemodynamics, fluid balance and neurologic status will be stable or improve  10/31/2021 1405 by Kim Salazar R.N.  Outcome: Progressing  10/31/2021 1050 by Kim Salazar R.N.  Outcome: Progressing     Problem: Respiratory  Goal: Patient will achieve/maintain optimum respiratory ventilation and gas exchange  10/31/2021 1405 by Kim Salazar R.N.  Outcome: Progressing  10/31/2021 1050 by Kim Salazar R.N.  Outcome: Progressing     Problem: Chest Tube Management  Goal: Complications related to chest tube will be avoided or minimized  10/31/2021 1405 by Kim Salazar R.N.  Outcome: Progressing  10/31/2021 1050 by Kim Salazar R.N.  Outcome: Progressing     Problem: Fluid Volume  Goal: Fluid volume balance will be maintained  10/31/2021 1405 by Kim Salazar R.N.  Outcome: Progressing  10/31/2021 1050 by Kim Salazar R.N.  Outcome: Progressing     Problem: Mechanical Ventilation  Goal: Safe management of artificial airway and ventilation  10/31/2021 1405 by Kim Salazar R.N.  Outcome: Progressing  10/31/2021 1050 by Kim Salazar R.N.  Outcome: Progressing  Goal: Successful weaning off mechanical ventilator, spontaneously maintains adequate gas exchange  10/31/2021 1405 by Kim Salazar R.N.  Outcome: Progressing  10/31/2021 1050 by Kim Salazar R.N.  Outcome: Progressing  Goal: Patient will be able to express needs and understand communication  10/31/2021 1405 by  Kim Salazar R.N.  Outcome: Progressing  10/31/2021 1050 by Kim Salazar R.N.  Outcome: Progressing     Problem: Dysphagia  Goal: Dysphagia will improve  10/31/2021 1405 by Kim Salazar R.N.  Outcome: Progressing  10/31/2021 1050 by Kim Salazar R.N.  Outcome: Progressing     Problem: Risk for Aspiration  Goal: Patient's risk for aspiration will be absent or decrease  10/31/2021 1405 by Kim Salazar R.N.  Outcome: Progressing  10/31/2021 1050 by Kim Salazar R.N.  Outcome: Progressing     Problem: Nutrition  Goal: Patient's nutritional and fluid intake will be adequate or improve  10/31/2021 1405 by Kim Salazar R.N.  Outcome: Progressing  10/31/2021 1050 by Kim Salazar R.N.  Outcome: Progressing  Goal: Enteral nutrition will be maintained or improve  10/31/2021 1405 by Kim Salazar R.N.  Outcome: Progressing  10/31/2021 1050 by Kim Salazar R.N.  Outcome: Progressing  Goal: Enteral nutrition will be maintained or improve  10/31/2021 1405 by Kim Salazar R.N.  Outcome: Progressing  10/31/2021 1050 by Kim Salazar R.N.  Outcome: Progressing     Problem: Urinary Elimination  Goal: Establish and maintain regular urinary output  10/31/2021 1405 by Kim Salazar R.N.  Outcome: Progressing  10/31/2021 1050 by Kim Salazar R.N.  Outcome: Progressing     Problem: Bowel Elimination  Goal: Establish and maintain regular bowel function  10/31/2021 1405 by Kim Salazar R.N.  Outcome: Progressing  10/31/2021 1050 by Kim Salazar R.N.  Outcome: Progressing     Problem: Gastrointestinal Irritability  Goal: Nausea and vomiting will be absent or improve  10/31/2021 1405 by Kim Salazar R.N.  Outcome: Progressing  10/31/2021 1050 by Kim Salazar R.N.  Outcome: Progressing  Goal: Diarrhea will be absent or improved  10/31/2021 1405 by Kim Salazar R.N.  Outcome: Progressing  10/31/2021 1050 by Kim Salazar R.N.  Outcome: Progressing     Problem: Rectal Tube  Goal: Fecal  output will be contained and skin will remain free from irritation  10/31/2021 1405 by Kim Salazar R.N.  Outcome: Progressing  10/31/2021 1050 by Kim Salazar R.N.  Outcome: Progressing     Problem: Mobility  Goal: Patient's capacity to carry out activities will improve  10/31/2021 1405 by Kim Salazar R.N.  Outcome: Progressing  10/31/2021 1050 by Kim Salazar R.N.  Outcome: Progressing     Problem: Self Care  Goal: Patient will have the ability to perform ADLs independently or with assistance (bathe, groom, dress, toilet and feed)  10/31/2021 1405 by Kim Salazar R.N.  Outcome: Progressing  10/31/2021 1050 by Kim Salazar R.N.  Outcome: Progressing     Problem: Infection - Standard  Goal: Patient will remain free from infection  10/31/2021 1405 by Kim Salazar R.N.  Outcome: Progressing  10/31/2021 1050 by Kim Salazar R.N.  Outcome: Progressing     Problem: Wound/ / Incision Healing  Goal: Patient's wound/surgical incision will decrease in size and heals properly  10/31/2021 1405 by Kim Salazar R.N.  Outcome: Progressing  10/31/2021 1050 by Kim Salazar R.N.  Outcome: Progressing       Patient is not progressing towards the following goals:

## 2021-10-31 NOTE — DISCHARGE PLANNING
Anticipated Discharge Disposition: [facility]    Action: LSW met with pt at bedside. Pt is agreeable to go to [facility]. Pt wants to confirm that [facility] will be able to provide the care that he needs including bowel care, wound care, air pressure bed, adequate staff to assist with turns and meals. Pt stated this LSW or [facility] could call his dtr Giselle.    Barriers to Discharge: placement    Plan: LSW to follow up with [facility] tomorrow morning for bed.

## 2021-10-31 NOTE — PROGRESS NOTES
Patient AOX4, denies pain and nausea.  Patient turned ~Q2 hours, refuses at times.    This RN digitally disimpacted patient, medium-brown stool, patient tolerated well.    Sacrum area dressings x3 changed per MD order. Patient tolerated well.    Patient voiced concern about Monday's discharge to SNF.   notified and consulted with patient.

## 2021-11-01 VITALS
WEIGHT: 165 LBS | SYSTOLIC BLOOD PRESSURE: 132 MMHG | HEART RATE: 100 BPM | RESPIRATION RATE: 18 BRPM | BODY MASS INDEX: 21.17 KG/M2 | HEIGHT: 74 IN | DIASTOLIC BLOOD PRESSURE: 86 MMHG | TEMPERATURE: 97.7 F | OXYGEN SATURATION: 96 %

## 2021-11-01 PROCEDURE — G0378 HOSPITAL OBSERVATION PER HR: HCPCS

## 2021-11-01 PROCEDURE — 99217 PR OBSERVATION CARE DISCHARGE: CPT | Performed by: STUDENT IN AN ORGANIZED HEALTH CARE EDUCATION/TRAINING PROGRAM

## 2021-11-01 PROCEDURE — 700102 HCHG RX REV CODE 250 W/ 637 OVERRIDE(OP): Performed by: FAMILY MEDICINE

## 2021-11-01 PROCEDURE — 700102 HCHG RX REV CODE 250 W/ 637 OVERRIDE(OP): Performed by: STUDENT IN AN ORGANIZED HEALTH CARE EDUCATION/TRAINING PROGRAM

## 2021-11-01 PROCEDURE — A9270 NON-COVERED ITEM OR SERVICE: HCPCS | Performed by: STUDENT IN AN ORGANIZED HEALTH CARE EDUCATION/TRAINING PROGRAM

## 2021-11-01 PROCEDURE — A9270 NON-COVERED ITEM OR SERVICE: HCPCS | Performed by: FAMILY MEDICINE

## 2021-11-01 RX ORDER — AMOXICILLIN 250 MG
2 CAPSULE ORAL 2 TIMES DAILY
Qty: 30 TABLET | Refills: 0 | Status: SHIPPED
Start: 2021-11-01 | End: 2022-01-05

## 2021-11-01 RX ORDER — ACETAMINOPHEN 325 MG/1
650 TABLET ORAL EVERY 6 HOURS PRN
Qty: 30 TABLET | Refills: 0 | Status: SHIPPED
Start: 2021-11-01 | End: 2022-01-05

## 2021-11-01 RX ORDER — BISACODYL 10 MG
10 SUPPOSITORY, RECTAL RECTAL
Refills: 0 | Status: SHIPPED
Start: 2021-11-01 | End: 2022-01-05

## 2021-11-01 RX ORDER — MIDODRINE HYDROCHLORIDE 10 MG/1
10 TABLET ORAL 2 TIMES DAILY
Qty: 60 TABLET | Status: ON HOLD
Start: 2021-11-01 | End: 2021-12-20 | Stop reason: SDUPTHER

## 2021-11-01 RX ORDER — SODIUM HYPOCHLORITE 1.25 MG/ML
10 SOLUTION TOPICAL 2 TIMES DAILY
Status: SHIPPED
Start: 2021-11-01 | End: 2022-01-05

## 2021-11-01 RX ORDER — POLYETHYLENE GLYCOL 3350 17 G/17G
17 POWDER, FOR SOLUTION ORAL
Refills: 3 | Status: SHIPPED
Start: 2021-11-01 | End: 2022-01-05

## 2021-11-01 RX ADMIN — SENNOSIDES AND DOCUSATE SODIUM 2 TABLET: 50; 8.6 TABLET ORAL at 05:18

## 2021-11-01 RX ADMIN — MIDODRINE HYDROCHLORIDE 10 MG: 5 TABLET ORAL at 05:18

## 2021-11-01 NOTE — DISCHARGE INSTRUCTIONS
Pressure Injury       A pressure injury is damage to the skin and underlying tissue that results from pressure being applied to an area of the body. It often affects people who must spend a long time in a bed or chair because of a medical condition.  Pressure injuries usually occur:  · Over bony parts of the body, such as the tailbone, shoulders, elbows, hips, heels, spine, ankles, and back of the head.  · Under medical devices that make contact with the body, such as respiratory equipment, stockings, tubes, and splints.  Pressure injuries start as reddened areas on the skin and can lead to pain and an open wound.  What are the causes?  This condition is caused by frequent or constant pressure to an area of the body. Decreased blood flow to the skin can eventually cause the skin tissue to die and break down, causing a wound.  What increases the risk?  You are more likely to develop this condition if you:  · Are in the hospital or an extended care facility.  · Are bedridden or in a wheelchair.  · Have an injury or disease that keeps you from:  ? Moving normally.  ? Feeling pain or pressure.  · Have a condition that:  ? Makes you sleepy or less alert.  ? Causes poor blood flow.  · Need to wear a medical device.  · Have poor control of your bladder or bowel functions (incontinence).  · Have poor nutrition (malnutrition).  If you are at risk for pressure injuries, your health care provider may recommend certain types of mattresses, mattress covers, pillows, cushions, or boots to help prevent them. These may include products filled with air, foam, gel, or sand.  What are the signs or symptoms?  Symptoms of this condition depend on the severity of the injury. Symptoms may include:  · Red or dark areas of the skin.  · Pain, warmth, or a change of skin texture.  · Blisters.  · An open wound.  How is this diagnosed?  This condition is diagnosed with a medical history and physical exam. You may also have tests, such  as:  · Blood tests.  · Imaging tests.  · Blood flow tests.  Your pressure injury will be staged based on its severity. Staging is based on:  · The depth of the tissue injury, including whether there is exposure of muscle, bone, or tendon.  · The cause of the pressure injury.  How is this treated?  This condition may be treated by:  · Relieving or redistributing pressure on your skin. This includes:  ? Frequently changing your position.  ? Avoiding positions that caused the wound or that can make the wound worse.  ? Using specific bed mattresses, chair cushions, or protective boots.  ? Moving medical devices from an area of pressure, or placing padding between the skin and the device.  ? Using foams, creams, or powders to prevent rubbing (friction) on the skin.  · Keeping your skin clean and dry. This may include using a skin cleanser or skin barrier as told by your health care provider.  · Cleaning your injury and removing any dead tissue from the wound (debridement).  · Placing a bandage (dressing) over your injury.  · Using medicines for pain or to prevent or treat infection.  Surgery may be needed if other treatments are not working or if your injury is very deep.  Follow these instructions at home:  Wound care  · Follow instructions from your health care provider about how to take care of your wound. Make sure you:  ? Wash your hands with soap and water before and after you change your bandage (dressing). If soap and water are not available, use hand .  ? Change your dressing as told by your health care provider.   · Check your wound every day for signs of infection. Have a caregiver do this for you if you are not able. Check for:  ? Redness, swelling, or increased pain.  ? More fluid or blood.  ? Warmth.  ? Pus or a bad smell.  Skin care  · Keep your skin clean and dry. Gently pat your skin dry.  · Do not rub or massage your skin.  · You or a caregiver should check your skin every day for any changes  in color or any new blisters or sores (ulcers).  Medicines  · Take over-the-counter and prescription medicines only as told by your health care provider.  · If you were prescribed an antibiotic medicine, take or apply it as told by your health care provider. Do not stop using the antibiotic even if your condition improves.  Reducing and redistributing pressure  · Do not lie or sit in one position for a long time. Move or change position every 1-2 hours, or as told by your health care provider.  · Use pillows or cushions to reduce pressure. Ask your health care provider to recommend cushions or pads for you.  General instructions       · Eat a healthy diet that includes lots of protein.  · Drink enough fluid to keep your urine pale yellow.  · Be as active as you can every day. Ask your health care provider to suggest safe exercises or activities.  · Do not abuse drugs or alcohol.  · Do not use any products that contain nicotine or tobacco, such as cigarettes, e-cigarettes, and chewing tobacco. If you need help quitting, ask your health care provider.  · Keep all follow-up visits as told by your health care provider. This is important.  Contact a health care provider if:  · You have:  ? A fever or chills.  ? Pain that is not helped by medicine.  ? Any changes in skin color.  ? New blisters or sores.  ? Pus or a bad smell coming from your wound.  ? Redness, swelling, or pain around your wound.  ? More fluid or blood coming from your wound.  · Your wound does not improve after 1-2 weeks of treatment.  Summary  · A pressure injury is damage to the skin and underlying tissue that results from pressure being applied to an area of the body.  · Do not lie or sit in one position for a long time. Your health care provider may advise you to move or change position every 1-2 hours.  · Follow instructions from your health care provider about how to take care of your wound.  · Keep all follow-up visits as told by your health care  provider. This is important.  This information is not intended to replace advice given to you by your health care provider. Make sure you discuss any questions you have with your health care provider.  Document Released: 12/18/2006 Document Revised: 07/17/2019 Document Reviewed: 07/17/2019  [employer] Patient Education © 2020 [employer]     Discharge Instructions    Discharged to other by medical transportation with self. Discharged via ambulance, hospital escort: Yes.  Special equipment needed: Not Applicable    Be sure to schedule a follow-up appointment with your primary care doctor or any specialists as instructed.     Discharge Plan:        I understand that a diet low in cholesterol, fat, and sodium is recommended for good health. Unless I have been given specific instructions below for another diet, I accept this instruction as my diet prescription.   Other diet: Regular    Special Instructions: None    · Is patient discharged on Warfarin / Coumadin?   No     Depression / Suicide Risk    As you are discharged from this [facility] facility, it is important to learn how to keep safe from harming yourself.    Recognize the warning signs:  · Abrupt changes in personality, positive or negative- including increase in energy   · Giving away possessions  · Change in eating patterns- significant weight changes-  positive or negative  · Change in sleeping patterns- unable to sleep or sleeping all the time   · Unwillingness or inability to communicate  · Depression  · Unusual sadness, discouragement and loneliness  · Talk of wanting to die  · Neglect of personal appearance   · Rebelliousness- reckless behavior  · Withdrawal from people/activities they love  · Confusion- inability to concentrate     If you or a loved one observes any of these behaviors or has concerns about self-harm, here's what you can do:  · Talk about it- your feelings and reasons for harming yourself  · Remove any means that you might use to  hurt yourself (examples: pills, rope, extension cords, firearm)  · Get professional help from the community (Mental Health, Substance Abuse, psychological counseling)  · Do not be alone:Call your Safe Contact- someone whom you trust who will be there for you.  · Call your local CRISIS HOTLINE 833-8667 or 019-163-8705  · Call your local Children's Mobile Crisis Response Team [place] (447) 602-0176 or www.[employer]  · Call the toll free National Suicide Prevention Hotlines   · National Suicide Prevention Lifeline 394-627-FVKL (4185)  · National Hope Line Network 800-SUICIDE (467-3037)

## 2021-11-01 NOTE — CARE PLAN
The patient is Stable - Low risk of patient condition declining or worsening    Shift Goals  Clinical Goals: Wound care and Q2 turns  Patient Goals: Sleep  Family Goals: no family present    Progress made toward(s) clinical / shift goals:  Wound care done, dressings changed per order. Pt denies needs other than rest. Pt is able to rest comfortably     Patient is not progressing towards the following goals: n/a          Problem: Knowledge Deficit - Standard  Goal: Patient and family/care givers will demonstrate understanding of plan of care, disease process/condition, diagnostic tests and medications  Outcome: Progressing     Problem: Skin Integrity  Goal: Skin integrity is maintained or improved  Outcome: Progressing     Problem: Fall Risk  Goal: Patient will remain free from falls  Outcome: Progressing     Problem: Fluid Volume  Goal: Fluid volume balance will be maintained  Outcome: Progressing     Problem: Mobility  Goal: Patient's capacity to carry out activities will improve  Outcome: Progressing

## 2021-11-01 NOTE — CARE PLAN
The patient is Watcher - Medium risk of patient condition declining or worsening    Shift Goals  Clinical Goals: wound cre  Patient Goals: rest  Family Goals: no family present    Progress made toward(s) clinical / shift goals:    Problem: Knowledge Deficit - Standard  Goal: Patient and family/care givers will demonstrate understanding of plan of care, disease process/condition, diagnostic tests and medications  Outcome: Progressing     Problem: Skin Integrity  Goal: Skin integrity is maintained or improved  Outcome: Progressing     Problem: Fall Risk  Goal: Patient will remain free from falls  Outcome: Progressing     Problem: Psychosocial  Goal: Patient's level of anxiety will decrease  Outcome: Progressing  Goal: Patient's ability to verbalize feelings about condition will improve  Outcome: Progressing  Goal: Patient's ability to re-evaluate and adapt role responsibilities will improve  Outcome: Progressing  Goal: Patient and family will demonstrate ability to cope with life altering diagnosis and/or procedure  Outcome: Progressing  Goal: Spiritual and cultural needs incorporated into hospitalization  Outcome: Progressing     Problem: Communication  Goal: The ability to communicate needs accurately and effectively will improve  Outcome: Progressing     Problem: Discharge Barriers/Planning  Goal: Patient's continuum of care needs are met  Outcome: Progressing     Problem: Hemodynamics  Goal: Patient's hemodynamics, fluid balance and neurologic status will be stable or improve  Outcome: Progressing     Problem: Respiratory  Goal: Patient will achieve/maintain optimum respiratory ventilation and gas exchange  Outcome: Progressing     Problem: Chest Tube Management  Goal: Complications related to chest tube will be avoided or minimized  Outcome: Progressing     Problem: Fluid Volume  Goal: Fluid volume balance will be maintained  Outcome: Progressing     Problem: Mechanical Ventilation  Goal: Safe management of  artificial airway and ventilation  Outcome: Progressing  Goal: Successful weaning off mechanical ventilator, spontaneously maintains adequate gas exchange  Outcome: Progressing  Goal: Patient will be able to express needs and understand communication  Outcome: Progressing     Problem: Dysphagia  Goal: Dysphagia will improve  Outcome: Progressing     Problem: Risk for Aspiration  Goal: Patient's risk for aspiration will be absent or decrease  Outcome: Progressing     Problem: Nutrition  Goal: Patient's nutritional and fluid intake will be adequate or improve  Outcome: Progressing  Goal: Enteral nutrition will be maintained or improve  Outcome: Progressing  Goal: Enteral nutrition will be maintained or improve  Outcome: Progressing     Problem: Urinary Elimination  Goal: Establish and maintain regular urinary output  Outcome: Progressing     Problem: Bowel Elimination  Goal: Establish and maintain regular bowel function  Outcome: Progressing     Problem: Gastrointestinal Irritability  Goal: Nausea and vomiting will be absent or improve  Outcome: Progressing  Goal: Diarrhea will be absent or improved  Outcome: Progressing     Problem: Rectal Tube  Goal: Fecal output will be contained and skin will remain free from irritation  Outcome: Progressing     Problem: Mobility  Goal: Patient's capacity to carry out activities will improve  Outcome: Progressing     Problem: Self Care  Goal: Patient will have the ability to perform ADLs independently or with assistance (bathe, groom, dress, toilet and feed)  Outcome: Progressing     Problem: Infection - Standard  Goal: Patient will remain free from infection  Outcome: Progressing     Problem: Wound/ / Incision Healing  Goal: Patient's wound/surgical incision will decrease in size and heals properly  Outcome: Progressing    Patient AOX4, denies nausea and pain.  Patient does needs assist with eating much of the time but is also independent.  Dressings to be changed this am by  staff and also daily disimpaction to be completed.    Patient scheduled to discharge to SNF but has concerns.   and MD at bedside this am.    Will continue to monitor.       Patient is not progressing towards the following goals:

## 2021-11-01 NOTE — DISCHARGE PLANNING
Anticipated Discharge Disposition: [facility]    Action: LSW called Bria at [facility] ([phone]). Bria requested transport be set up for 1600.    LSW faxed transport forms to [facility] requested [facility] 1600.    Barriers to Discharge: None    Plan: LSW to speak to pt about transfer and complete transfer packet.    0925: LSW met with pt at bedside. Pt is requesting a written care plan from [facility] before he transfers. LSW escalated to Nayeli Estrada. LSW to follow up with Nayeli GAN for next steps.     1035: Per Nayeli GAN, Abigail OLIVERA will be onsite and able to help.    1140: LSW staffed pt request with Abigail. Per Abigail, call Sheryl, feder[place], and see if Sheryl can call pt. Per Abigail, if pt is still refusing, Abigail will talk with pt.     1205: LSW called Sheryl at [phone] and left  requesting call back.    1213: LSW received call from Sheryl. Per hSeryl, they cannot provide a written care plan. Per Sheryl, they develop the plan based on MD orders from here and assessments completed there. Per Sheryl, this LSW can provide her number to pt for him to call.    1240: LSW informed that pt will need assistance calling. LSW called Sheryl and requested Sheryl call pt. LSW provide pt number ([phone]) to Sheryl. Per Sheryl, she will call pt and give this LSW a call back.    1250: LSW received VM from Sheryl. Per Sheryl, she left VM with pt.    1400: LSW met with pt at bedside. LSW notified pt that [facility] cannot provide written care plan at this time. LSW provided pt with Sheryl's number.    1440: CM Manager Abigail met with pt and addressed concerns. Pt signed cobra. MD signed cobra. Per MD, pt is not discharging with narcotics. LSW completed cobra and transfer packet. LSW provided packet to bedside MYRIAM Alvarez.

## 2021-11-01 NOTE — DISCHARGE PLANNING
LSW Manager notified that pt expressed concern about discharge to [facility] this afternoon.  Transport arranged for 1600.      LSW Manager met with pt at bedside to address concerns.  Pt states he is concerned about the facilities ability to provide timely and appropriate care to him in his condition.  Pt states he feels the wound care team here at [facility] has truly made progress with his wounds and he is scared that without the wound teams expertise and consistent follow up, his wounds may get worse.  Pt also concerned about the lack of bed rails on the SNF beds and the type of mattress he will be provided at [facility] in order to prevent further wounds.  Pt did have phone conversation with Sheryl from [facility] in order to try and address these concerns.      LSW Manager validated pt's fears and concerns and informed pt that even though he is no longer requiring acute level of care and needs to transfer to the next level of care, that advocating for himself is always encouraged.  LSW Manager did offer to provide the phone number to the state [facility] for nursing homes, should he find himself in a situation where he feels he needs further attention to his care.  Pt requested LSW contact pt's daughter, Giselle ([phone]) to provide contact information for [facility].  LSW Manager contacted Giselle and provided phone number [phone] for [facility] should they need it in the future.      Pt ultimately agreeable to transfer and signed COBRA.  Pt aware his transport is scheduled for 1600 today.  COBRA provided to unit LS.

## 2021-11-01 NOTE — PROGRESS NOTES
COVID 19 surge in effect     1930: Received report from  Day shift RN. Pt is laying in bed, A+OX4 , showing no signs of distress. Pt c/o 0/10 pain. Dressings changed per order. VSS. Pt educated on POC and denies further needs at this time. Call light and belongings at bedside and within reach. All questions answered at this time.  Rounding in place

## 2021-11-01 NOTE — PROGRESS NOTES
Reviewed DC Instructions with patient, all questions answered.    Called report to [facility].    Patient discharged to [facility] [address] @ 1607 hours.
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
## 2021-11-01 NOTE — DISCHARGE PLANNING
DC Transport Scheduled    Received request at: 0905    Transport Company Scheduled:   Spoke with Allyssa at [facility] to schedule transport.  [facility] Trip #: [id number]    Scheduled Date: 11/01/2021  Scheduled Time: 1600    Destination: [facility]    Notified care team of scheduled transport via [employer].

## 2021-11-12 ENCOUNTER — HOSPITAL ENCOUNTER (OUTPATIENT)
Facility: MEDICAL CENTER | Age: 61
End: 2021-12-20
Attending: EMERGENCY MEDICINE | Admitting: INTERNAL MEDICINE
Payer: MEDICAID

## 2021-11-12 ENCOUNTER — APPOINTMENT (OUTPATIENT)
Dept: RADIOLOGY | Facility: MEDICAL CENTER | Age: 61
End: 2021-11-12
Attending: EMERGENCY MEDICINE
Payer: MEDICAID

## 2021-11-12 DIAGNOSIS — N39.0 URINARY TRACT INFECTION WITHOUT HEMATURIA, SITE UNSPECIFIED: ICD-10-CM

## 2021-11-12 DIAGNOSIS — M79.2 NEUROPATHIC PAIN: ICD-10-CM

## 2021-11-12 DIAGNOSIS — I48.0 PAROXYSMAL ATRIAL FIBRILLATION (HCC): ICD-10-CM

## 2021-11-12 DIAGNOSIS — Z02.9 PROBLEM RELATED TO DISCHARGE PLANNING: ICD-10-CM

## 2021-11-12 DIAGNOSIS — I48.0 PAF (PAROXYSMAL ATRIAL FIBRILLATION) (HCC): ICD-10-CM

## 2021-11-12 DIAGNOSIS — L89.314 STAGE IV PRESSURE ULCER OF RIGHT BUTTOCK (HCC): ICD-10-CM

## 2021-11-12 DIAGNOSIS — F41.9 ANXIETY: ICD-10-CM

## 2021-11-12 DIAGNOSIS — L89.323 DECUBITUS ULCER OF LEFT ISCHIUM, STAGE 3 (HCC): ICD-10-CM

## 2021-11-12 DIAGNOSIS — M62.838 MUSCLE SPASM: ICD-10-CM

## 2021-11-12 DIAGNOSIS — K59.00 CONSTIPATION, UNSPECIFIED CONSTIPATION TYPE: ICD-10-CM

## 2021-11-12 DIAGNOSIS — D72.829 LEUKOCYTOSIS, UNSPECIFIED TYPE: ICD-10-CM

## 2021-11-12 DIAGNOSIS — L97.419 CHRONIC HEEL ULCER, RIGHT, WITH UNSPECIFIED SEVERITY (HCC): ICD-10-CM

## 2021-11-12 DIAGNOSIS — M46.28 OSTEOMYELITIS OF SACROILIAC REGION (HCC): ICD-10-CM

## 2021-11-12 DIAGNOSIS — I95.9 HYPOTENSION, UNSPECIFIED HYPOTENSION TYPE: ICD-10-CM

## 2021-11-12 DIAGNOSIS — G82.50 QUADRIPLEGIA (HCC): ICD-10-CM

## 2021-11-12 DIAGNOSIS — L97.429 CHRONIC HEEL ULCER, LEFT, WITH UNSPECIFIED SEVERITY (HCC): ICD-10-CM

## 2021-11-12 DIAGNOSIS — N31.9 NEUROGENIC BLADDER: Chronic | ICD-10-CM

## 2021-11-12 DIAGNOSIS — L89.319 DECUBITUS ULCER OF RIGHT ISCHIAL AREA, UNSPECIFIED ULCER STAGE: ICD-10-CM

## 2021-11-12 DIAGNOSIS — K56.7 ILEUS (HCC): ICD-10-CM

## 2021-11-12 LAB
ALBUMIN SERPL BCP-MCNC: 3.9 G/DL (ref 3.2–4.9)
ALBUMIN/GLOB SERPL: 1 G/DL
ALP SERPL-CCNC: 66 U/L (ref 30–99)
ALT SERPL-CCNC: 18 U/L (ref 2–50)
AMORPH CRY #/AREA URNS HPF: PRESENT /HPF
ANION GAP SERPL CALC-SCNC: 13 MMOL/L (ref 7–16)
APPEARANCE UR: ABNORMAL
AST SERPL-CCNC: 22 U/L (ref 12–45)
BACTERIA #/AREA URNS HPF: ABNORMAL /HPF
BASOPHILS # BLD AUTO: 0.6 % (ref 0–1.8)
BASOPHILS # BLD: 0.06 K/UL (ref 0–0.12)
BILIRUB SERPL-MCNC: 0.4 MG/DL (ref 0.1–1.5)
BILIRUB UR QL STRIP.AUTO: NEGATIVE
BUN SERPL-MCNC: 18 MG/DL (ref 8–22)
CALCIUM SERPL-MCNC: 9.3 MG/DL (ref 8.4–10.2)
CHLORIDE SERPL-SCNC: 106 MMOL/L (ref 96–112)
CO2 SERPL-SCNC: 19 MMOL/L (ref 20–33)
COLOR UR: YELLOW
CREAT SERPL-MCNC: 0.38 MG/DL (ref 0.5–1.4)
EOSINOPHIL # BLD AUTO: 0.66 K/UL (ref 0–0.51)
EOSINOPHIL NFR BLD: 7 % (ref 0–6.9)
EPI CELLS #/AREA URNS HPF: ABNORMAL /HPF
ERYTHROCYTE [DISTWIDTH] IN BLOOD BY AUTOMATED COUNT: 46.7 FL (ref 35.9–50)
GLOBULIN SER CALC-MCNC: 3.8 G/DL (ref 1.9–3.5)
GLUCOSE SERPL-MCNC: 94 MG/DL (ref 65–99)
GLUCOSE UR STRIP.AUTO-MCNC: NEGATIVE MG/DL
HCT VFR BLD AUTO: 44 % (ref 42–52)
HGB BLD-MCNC: 13.9 G/DL (ref 14–18)
HYALINE CASTS #/AREA URNS LPF: ABNORMAL /LPF
IMM GRANULOCYTES # BLD AUTO: 0.05 K/UL (ref 0–0.11)
IMM GRANULOCYTES NFR BLD AUTO: 0.5 % (ref 0–0.9)
KETONES UR STRIP.AUTO-MCNC: NEGATIVE MG/DL
LEUKOCYTE ESTERASE UR QL STRIP.AUTO: ABNORMAL
LYMPHOCYTES # BLD AUTO: 1.44 K/UL (ref 1–4.8)
LYMPHOCYTES NFR BLD: 15.2 % (ref 22–41)
MCH RBC QN AUTO: 27.3 PG (ref 27–33)
MCHC RBC AUTO-ENTMCNC: 31.6 G/DL (ref 33.7–35.3)
MCV RBC AUTO: 86.3 FL (ref 81.4–97.8)
MICRO URNS: ABNORMAL
MONOCYTES # BLD AUTO: 0.6 K/UL (ref 0–0.85)
MONOCYTES NFR BLD AUTO: 6.3 % (ref 0–13.4)
NEUTROPHILS # BLD AUTO: 6.66 K/UL (ref 1.82–7.42)
NEUTROPHILS NFR BLD: 70.4 % (ref 44–72)
NITRITE UR QL STRIP.AUTO: NEGATIVE
NRBC # BLD AUTO: 0 K/UL
NRBC BLD-RTO: 0 /100 WBC
PH UR STRIP.AUTO: 8.5 [PH] (ref 5–8)
PLATELET # BLD AUTO: 370 K/UL (ref 164–446)
PMV BLD AUTO: 10 FL (ref 9–12.9)
POTASSIUM SERPL-SCNC: 4.4 MMOL/L (ref 3.6–5.5)
PROT SERPL-MCNC: 7.7 G/DL (ref 6–8.2)
PROT UR QL STRIP: NEGATIVE MG/DL
RBC # BLD AUTO: 5.1 M/UL (ref 4.7–6.1)
RBC # URNS HPF: ABNORMAL /HPF
RBC UR QL AUTO: ABNORMAL
SODIUM SERPL-SCNC: 138 MMOL/L (ref 135–145)
SP GR UR STRIP.AUTO: 1.01
TRIPLE PHOS CRYSTAL  1767: ABNORMAL /HPF
WBC # BLD AUTO: 9.5 K/UL (ref 4.8–10.8)
WBC #/AREA URNS HPF: ABNORMAL /HPF

## 2021-11-12 PROCEDURE — 99285 EMERGENCY DEPT VISIT HI MDM: CPT

## 2021-11-12 PROCEDURE — G0378 HOSPITAL OBSERVATION PER HR: HCPCS

## 2021-11-12 PROCEDURE — 74022 RADEX COMPL AQT ABD SERIES: CPT

## 2021-11-12 PROCEDURE — 99220 PR INITIAL OBSERVATION CARE,LEVL III: CPT | Performed by: HOSPITALIST

## 2021-11-12 PROCEDURE — 700105 HCHG RX REV CODE 258: Performed by: EMERGENCY MEDICINE

## 2021-11-12 PROCEDURE — 80053 COMPREHEN METABOLIC PANEL: CPT

## 2021-11-12 PROCEDURE — 85025 COMPLETE CBC W/AUTO DIFF WBC: CPT

## 2021-11-12 PROCEDURE — 96365 THER/PROPH/DIAG IV INF INIT: CPT

## 2021-11-12 PROCEDURE — 36415 COLL VENOUS BLD VENIPUNCTURE: CPT

## 2021-11-12 PROCEDURE — 700111 HCHG RX REV CODE 636 W/ 250 OVERRIDE (IP): Performed by: EMERGENCY MEDICINE

## 2021-11-12 PROCEDURE — 81001 URINALYSIS AUTO W/SCOPE: CPT

## 2021-11-12 RX ORDER — PROMETHAZINE HYDROCHLORIDE 25 MG/1
12.5-25 SUPPOSITORY RECTAL EVERY 4 HOURS PRN
Status: DISCONTINUED | OUTPATIENT
Start: 2021-11-12 | End: 2021-12-20 | Stop reason: HOSPADM

## 2021-11-12 RX ORDER — POLYETHYLENE GLYCOL 3350 17 G/17G
1 POWDER, FOR SOLUTION ORAL
Status: DISCONTINUED | OUTPATIENT
Start: 2021-11-12 | End: 2021-12-20 | Stop reason: HOSPADM

## 2021-11-12 RX ORDER — MULTIVITAMIN
1 TABLET ORAL DAILY
Status: DISCONTINUED | OUTPATIENT
Start: 2021-11-13 | End: 2021-11-13

## 2021-11-12 RX ORDER — MIDODRINE HYDROCHLORIDE 5 MG/1
10 TABLET ORAL 2 TIMES DAILY
Status: DISCONTINUED | OUTPATIENT
Start: 2021-11-13 | End: 2021-12-20 | Stop reason: HOSPADM

## 2021-11-12 RX ORDER — PROMETHAZINE HYDROCHLORIDE 25 MG/1
12.5-25 TABLET ORAL EVERY 4 HOURS PRN
Status: DISCONTINUED | OUTPATIENT
Start: 2021-11-12 | End: 2021-12-20 | Stop reason: HOSPADM

## 2021-11-12 RX ORDER — ONDANSETRON 4 MG/1
4 TABLET, ORALLY DISINTEGRATING ORAL EVERY 4 HOURS PRN
Status: DISCONTINUED | OUTPATIENT
Start: 2021-11-12 | End: 2021-12-20 | Stop reason: HOSPADM

## 2021-11-12 RX ORDER — ACETAMINOPHEN 325 MG/1
650 TABLET ORAL EVERY 6 HOURS PRN
Status: DISCONTINUED | OUTPATIENT
Start: 2021-11-12 | End: 2021-12-20 | Stop reason: HOSPADM

## 2021-11-12 RX ORDER — BISACODYL 10 MG
10 SUPPOSITORY, RECTAL RECTAL
Status: DISCONTINUED | OUTPATIENT
Start: 2021-11-12 | End: 2021-12-20 | Stop reason: HOSPADM

## 2021-11-12 RX ORDER — ONDANSETRON 2 MG/ML
4 INJECTION INTRAMUSCULAR; INTRAVENOUS EVERY 4 HOURS PRN
Status: DISCONTINUED | OUTPATIENT
Start: 2021-11-12 | End: 2021-12-20 | Stop reason: HOSPADM

## 2021-11-12 RX ORDER — PROCHLORPERAZINE EDISYLATE 5 MG/ML
5-10 INJECTION INTRAMUSCULAR; INTRAVENOUS EVERY 4 HOURS PRN
Status: DISCONTINUED | OUTPATIENT
Start: 2021-11-12 | End: 2021-12-20 | Stop reason: HOSPADM

## 2021-11-12 RX ORDER — AMOXICILLIN 250 MG
2 CAPSULE ORAL 2 TIMES DAILY
Status: DISCONTINUED | OUTPATIENT
Start: 2021-11-13 | End: 2021-12-20 | Stop reason: HOSPADM

## 2021-11-12 RX ADMIN — MEROPENEM 500 MG: 500 INJECTION, POWDER, FOR SOLUTION INTRAVENOUS at 23:09

## 2021-11-12 ASSESSMENT — ENCOUNTER SYMPTOMS
HEADACHES: 0
DEPRESSION: 0
ROS SKIN COMMENTS: POSITIVE FOR WOUND
SORE THROAT: 0
NAUSEA: 0
DOUBLE VISION: 0
INSOMNIA: 0
BRUISES/BLEEDS EASILY: 0
SHORTNESS OF BREATH: 0
VOMITING: 0
ABDOMINAL PAIN: 1
CONSTIPATION: 1
BLURRED VISION: 0
COUGH: 0
DIZZINESS: 0
ABDOMINAL PAIN: 0
FEVER: 0
CHILLS: 0
WEAKNESS: 0
MYALGIAS: 0
NECK PAIN: 0
PALPITATIONS: 0

## 2021-11-12 ASSESSMENT — FIBROSIS 4 INDEX: FIB4 SCORE: 0.77

## 2021-11-13 LAB
ANION GAP SERPL CALC-SCNC: 13 MMOL/L (ref 7–16)
BUN SERPL-MCNC: 18 MG/DL (ref 8–22)
CALCIUM SERPL-MCNC: 9 MG/DL (ref 8.4–10.2)
CHLORIDE SERPL-SCNC: 107 MMOL/L (ref 96–112)
CO2 SERPL-SCNC: 19 MMOL/L (ref 20–33)
CREAT SERPL-MCNC: 0.41 MG/DL (ref 0.5–1.4)
ERYTHROCYTE [DISTWIDTH] IN BLOOD BY AUTOMATED COUNT: 48.8 FL (ref 35.9–50)
GLUCOSE SERPL-MCNC: 77 MG/DL (ref 65–99)
HCT VFR BLD AUTO: 44.3 % (ref 42–52)
HGB BLD-MCNC: 13.5 G/DL (ref 14–18)
MCH RBC QN AUTO: 26.9 PG (ref 27–33)
MCHC RBC AUTO-ENTMCNC: 30.5 G/DL (ref 33.7–35.3)
MCV RBC AUTO: 88.2 FL (ref 81.4–97.8)
PLATELET # BLD AUTO: 315 K/UL (ref 164–446)
PMV BLD AUTO: 10.7 FL (ref 9–12.9)
POTASSIUM SERPL-SCNC: 4.4 MMOL/L (ref 3.6–5.5)
RBC # BLD AUTO: 5.02 M/UL (ref 4.7–6.1)
SODIUM SERPL-SCNC: 139 MMOL/L (ref 135–145)
WBC # BLD AUTO: 8.6 K/UL (ref 4.8–10.8)

## 2021-11-13 PROCEDURE — 99225 PR SUBSEQUENT OBSERVATION CARE,LEVEL II: CPT | Performed by: INTERNAL MEDICINE

## 2021-11-13 PROCEDURE — A9270 NON-COVERED ITEM OR SERVICE: HCPCS | Performed by: HOSPITALIST

## 2021-11-13 PROCEDURE — 97602 WOUND(S) CARE NON-SELECTIVE: CPT

## 2021-11-13 PROCEDURE — 80048 BASIC METABOLIC PNL TOTAL CA: CPT

## 2021-11-13 PROCEDURE — U0005 INFEC AGEN DETEC AMPLI PROBE: HCPCS

## 2021-11-13 PROCEDURE — 302111 WAFER OST 2.25IN N IMG RD 2 PC (BARRIER): Performed by: INTERNAL MEDICINE

## 2021-11-13 PROCEDURE — G0378 HOSPITAL OBSERVATION PER HR: HCPCS

## 2021-11-13 PROCEDURE — 36415 COLL VENOUS BLD VENIPUNCTURE: CPT

## 2021-11-13 PROCEDURE — 700102 HCHG RX REV CODE 250 W/ 637 OVERRIDE(OP): Performed by: INTERNAL MEDICINE

## 2021-11-13 PROCEDURE — 85027 COMPLETE CBC AUTOMATED: CPT

## 2021-11-13 PROCEDURE — 700102 HCHG RX REV CODE 250 W/ 637 OVERRIDE(OP): Performed by: HOSPITALIST

## 2021-11-13 PROCEDURE — 302098 PASTE RING (FLAT): Performed by: INTERNAL MEDICINE

## 2021-11-13 PROCEDURE — A9270 NON-COVERED ITEM OR SERVICE: HCPCS | Performed by: INTERNAL MEDICINE

## 2021-11-13 PROCEDURE — U0003 INFECTIOUS AGENT DETECTION BY NUCLEIC ACID (DNA OR RNA); SEVERE ACUTE RESPIRATORY SYNDROME CORONAVIRUS 2 (SARS-COV-2) (CORONAVIRUS DISEASE [COVID-19]), AMPLIFIED PROBE TECHNIQUE, MAKING USE OF HIGH THROUGHPUT TECHNOLOGIES AS DESCRIBED BY CMS-2020-01-R: HCPCS

## 2021-11-13 RX ORDER — SODIUM HYPOCHLORITE 1.25 MG/ML
SOLUTION TOPICAL 2 TIMES DAILY
Status: DISCONTINUED | OUTPATIENT
Start: 2021-11-13 | End: 2021-12-20 | Stop reason: HOSPADM

## 2021-11-13 RX ORDER — NYSTATIN 100000 [USP'U]/G
POWDER TOPICAL 2 TIMES DAILY
Status: COMPLETED | OUTPATIENT
Start: 2021-11-13 | End: 2021-11-27

## 2021-11-13 RX ADMIN — RIVAROXABAN 20 MG: 20 TABLET, FILM COATED ORAL at 17:27

## 2021-11-13 RX ADMIN — SENNOSIDES AND DOCUSATE SODIUM 2 TABLET: 50; 8.6 TABLET ORAL at 17:27

## 2021-11-13 RX ADMIN — NYSTATIN: 100000 POWDER TOPICAL at 17:27

## 2021-11-13 RX ADMIN — MIDODRINE HYDROCHLORIDE 10 MG: 5 TABLET ORAL at 17:27

## 2021-11-13 RX ADMIN — MIDODRINE HYDROCHLORIDE 10 MG: 5 TABLET ORAL at 06:24

## 2021-11-13 RX ADMIN — SENNOSIDES AND DOCUSATE SODIUM 2 TABLET: 50; 8.6 TABLET ORAL at 06:24

## 2021-11-13 RX ADMIN — THERA TABS 1 TABLET: TAB at 13:06

## 2021-11-13 ASSESSMENT — COGNITIVE AND FUNCTIONAL STATUS - GENERAL
MOVING FROM LYING ON BACK TO SITTING ON SIDE OF FLAT BED: UNABLE
PERSONAL GROOMING: A LITTLE
STANDING UP FROM CHAIR USING ARMS: TOTAL
DAILY ACTIVITIY SCORE: 11
TURNING FROM BACK TO SIDE WHILE IN FLAT BAD: A LOT
SUGGESTED CMS G CODE MODIFIER DAILY ACTIVITY: CL
TOILETING: A LOT
DRESSING REGULAR LOWER BODY CLOTHING: TOTAL
SUGGESTED CMS G CODE MODIFIER MOBILITY: CM
DRESSING REGULAR UPPER BODY CLOTHING: A LOT
HELP NEEDED FOR BATHING: TOTAL
MOBILITY SCORE: 7
EATING MEALS: A LOT
CLIMB 3 TO 5 STEPS WITH RAILING: TOTAL
MOVING TO AND FROM BED TO CHAIR: UNABLE
WALKING IN HOSPITAL ROOM: TOTAL

## 2021-11-13 ASSESSMENT — CHA2DS2 SCORE
HYPERTENSION: NO
CHA2DS2 VASC SCORE: 0
DIABETES: NO
PRIOR STROKE OR TIA OR THROMBOEMBOLISM: NO
SEX: MALE
AGE 65 TO 74: NO
AGE 75 OR GREATER: NO
VASCULAR DISEASE: NO
CHF OR LEFT VENTRICULAR DYSFUNCTION: NO

## 2021-11-13 ASSESSMENT — PAIN DESCRIPTION - PAIN TYPE
TYPE: ACUTE PAIN
TYPE: ACUTE PAIN
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
## 2021-11-13 NOTE — PROGRESS NOTES
0115: Pt brought up to  the floor via gurney. Pt is A+ox4, VSS. Pt presents with wounds on sacrum, right ischeum, left toe, and right lateral calf upon admission. Urostomy in place, draining below bladder level. POC discussed, pt educated on turning,  Q2 turns in place. Pt denies pain and denies needs at this time. Wound pictures taken. Med rec complete. Call light in reach, bed locked in lowest position. Rounding in place.     COVID 19 surge in effect

## 2021-11-13 NOTE — ED TRIAGE NOTES
"Pt BIB EMS from [facility] with c/o    Chief Complaint   Patient presents with   • Constipation     pt is quadriplegic- was on bowel program with manual evacuation while here; moved to [facility], only rec'd laxatives; no BM x 11 days   • Open Wound     pt has Stage III decubitus on coccyx and right ischeum- progressed from Stage II while at [facility]       BP (!) 87/67   Pulse 89   Temp 37.1 °C (98.8 °F) (Temporal)   Resp 16   Ht 1.905 m (6' 3\")   Wt 77.1 kg (170 lb)   SpO2 95%   BMI 21.25 kg/m²   "

## 2021-11-13 NOTE — ASSESSMENT & PLAN NOTE
-Multiple decubitus ulcers present on admission they are deep but does not seem to be infected.  -Wound care consulted, wounds improving.  -Frequent rotation, air mattress.  -Patient is paraplegic.  Continue wound care with pressure prevention precautions.    12/19: Pending placement.
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
## 2021-11-13 NOTE — DISCHARGE PLANNING
Received Choice form at 1450  Agency/Facility Name: [facility]  Referral sent per Choice form @ 1456     Received Choice form at 1440  Agency/Facility Name: [facility]  Referral sent per Choice form @ 1503

## 2021-11-13 NOTE — CARE PLAN
The patient is Stable - Low risk of patient condition declining or worsening    Shift Goals  Clinical Goals: Wound care and Q2 turns   Patient Goals: Rest and Q2 turns     Progress made toward(s) clinical / shift goals:  Q2 turns in place, POC discussed.     Patient is not progressing towards the following goals: Pts wounds have no improved from last admission. Pts wounds have progressed.       Problem: Skin Integrity  Goal: Skin integrity is maintained or improved  11/13/2021 0708 by Marva Santiago R.N.  Outcome: Not Progressing  11/13/2021 0708 by Marva Santiago R.N.  Outcome: Progressing       Problem: Knowledge Deficit - Standard  Goal: Patient and family/care givers will demonstrate understanding of plan of care, disease process/condition, diagnostic tests and medications  11/13/2021 0708 by [email or web address].  Outcome: Progressing  11/13/2021 0708 by Marva Santiago R.N.  Outcome: Progressing     Problem: Fall Risk  Goal: Patient will remain free from falls  11/13/2021 0708 by Marva Santiago R.N.  Outcome: Progressing  11/13/2021 0708 by Marva Santiago R.N.  Outcome: Progressing
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
## 2021-11-13 NOTE — ASSESSMENT & PLAN NOTE
-Patient coming with ileus. Likely secondary to neurogenic bowel in setting of spinal cord injury. He needs daily bowel care with digital disimpaction.  -Continue manual daily bowel care.  Placement pending SNF capable daily manual disimpaction.
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
## 2021-11-13 NOTE — ASSESSMENT & PLAN NOTE
-Patient has urostomy.  -Given 1 dose of meropenem in the ED, urine cultures did show ESBL Klebsiella on 11/14.  Likely colonization as patient currently has no symptoms and the continued use of broad-spectrum antibiotics will continue to contribute to resistant bacteria.  -Discussed with infectious disease, Dr. Krishnan, who agrees antibiotics not indicated at this time

## 2021-11-14 LAB
SARS-COV-2 RNA RESP QL NAA+PROBE: NOTDETECTED
SPECIMEN SOURCE: NORMAL

## 2021-11-14 PROCEDURE — 99225 PR SUBSEQUENT OBSERVATION CARE,LEVEL II: CPT | Performed by: INTERNAL MEDICINE

## 2021-11-14 PROCEDURE — 700101 HCHG RX REV CODE 250: Performed by: INTERNAL MEDICINE

## 2021-11-14 PROCEDURE — 700102 HCHG RX REV CODE 250 W/ 637 OVERRIDE(OP): Performed by: HOSPITALIST

## 2021-11-14 PROCEDURE — A9270 NON-COVERED ITEM OR SERVICE: HCPCS | Performed by: HOSPITALIST

## 2021-11-14 PROCEDURE — G0378 HOSPITAL OBSERVATION PER HR: HCPCS

## 2021-11-14 PROCEDURE — A9270 NON-COVERED ITEM OR SERVICE: HCPCS | Performed by: INTERNAL MEDICINE

## 2021-11-14 PROCEDURE — 700102 HCHG RX REV CODE 250 W/ 637 OVERRIDE(OP): Performed by: INTERNAL MEDICINE

## 2021-11-14 RX ADMIN — SENNOSIDES AND DOCUSATE SODIUM 2 TABLET: 50; 8.6 TABLET ORAL at 05:35

## 2021-11-14 RX ADMIN — MIDODRINE HYDROCHLORIDE 10 MG: 5 TABLET ORAL at 05:35

## 2021-11-14 RX ADMIN — THERA TABS 1 TABLET: TAB at 05:35

## 2021-11-14 RX ADMIN — MIDODRINE HYDROCHLORIDE 10 MG: 5 TABLET ORAL at 17:38

## 2021-11-14 RX ADMIN — RIVAROXABAN 20 MG: 20 TABLET, FILM COATED ORAL at 17:39

## 2021-11-14 RX ADMIN — SENNOSIDES AND DOCUSATE SODIUM 2 TABLET: 50; 8.6 TABLET ORAL at 17:38

## 2021-11-14 RX ADMIN — NYSTATIN: 100000 POWDER TOPICAL at 05:37

## 2021-11-14 RX ADMIN — SODIUM HYPOCHLORITE 1 ML: 1.25 SOLUTION TOPICAL at 05:37

## 2021-11-14 RX ADMIN — NYSTATIN: 100000 POWDER TOPICAL at 17:39

## 2021-11-14 ASSESSMENT — PAIN DESCRIPTION - PAIN TYPE
TYPE: ACUTE PAIN
TYPE: ACUTE PAIN

## 2021-11-14 NOTE — DISCHARGE PLANNING
Agency/Facility Name: [facility]  Spoke To: Nayeli  Outcome: No answer. Left message.    LSW informed

## 2021-11-14 NOTE — PROGRESS NOTES
Received bedside report. Assumed pt care. Assessment and chart check complete. Pt is [id number], no signs of distress, denies nausea/vomiting and pain at this moment.   Repositioning every 2 hours. Urostomy bag in place. Dressing CDI, float boots in place.  Fall precautions in place, treaded socks on pt, bed in low position. Call light within reach. Educated pt to call if needing anything.

## 2021-11-14 NOTE — DISCHARGE PLANNING
Agency/Facility Name: [facility]  Spoke To: Demar  Outcome: No answer. Left message.      LSW informed

## 2021-11-14 NOTE — CARE PLAN
The patient is Stable - Low risk of patient condition declining or worsening    Shift Goals  Clinical Goals: Free from falls and injury  Patient Goals: Rest and sleep    Progress made toward(s) clinical / shift goals:    Problem: Knowledge Deficit - Standard  Goal: Patient and family/care givers will demonstrate understanding of plan of care, disease process/condition, diagnostic tests and medications  Outcome: Progressing  Note: Patient updated on the plan of care. Encouraged to voice feelings and to ask questions. Answered all questions and concerns. Agrees with the plan of care.      Problem: Fall Risk  Goal: Patient will remain free from falls  Outcome: Progressing  Note: Safety precautions in place. Bed alarm ON. Will continue to monitor patient.        Patient is not progressing towards the following goals:

## 2021-11-14 NOTE — DISCHARGE PLANNING
Agency/Facility Name: [facility]  Spoke To: Bria  Outcome: Per Bria, needs to make sure they can do bowel stimulation from DON. DON is in on 11/15      LSW informed

## 2021-11-14 NOTE — CARE PLAN
The patient is Stable - Low risk of patient condition declining or worsening    Shift Goals  Clinical Goals: wound care, Q2 turns, bowel stimulation  Patient Goals: comfort and rest    Progress made toward(s) clinical / shift goals: Bowel stimulation completed, positive cárdenas movement. Repositioned every 2 hours.    Patient is not progressing towards the following goals:      Problem: Skin Integrity  Goal: Skin integrity is maintained or improved  Outcome: Progressing     Problem: Fall Risk  Goal: Patient will remain free from falls  Outcome: Progressing     Problem: Bowel Elimination  Goal: Establish and maintain regular bowel function  Outcome: Progressing

## 2021-11-14 NOTE — PROGRESS NOTES
COVID 19 surge in effect.     Received report from night shift RN, assumed care of pt. [id number]. VSS. Pt denies pain or nausea at this time. Updated on plan of care for the day and answered any questions. Safety precautions in place. Pt educated to call for assistance.

## 2021-11-14 NOTE — CARE PLAN
The patient is Stable - Low risk of patient condition declining or worsening    Shift Goals  Clinical Goals: remain free of falls or injury, comfort  Patient Goals: comfort, rest     Progress made toward(s) clinical / shift goals:  Safety precautions in place to prevent falls or injury, pt resting this shift.     Patient is not progressing towards the following goals:
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
## 2021-11-14 NOTE — PROGRESS NOTES
Received bedside patient report from MYRIAM Mari. Patient resting comfortably in bed, no complaints at this time. Safety precautions in place. Will continue to monitor.

## 2021-11-15 ENCOUNTER — HOSPITAL ENCOUNTER (INPATIENT)
Facility: REHABILITATION | Age: 61
End: 2021-11-15
Attending: PHYSICAL MEDICINE & REHABILITATION | Admitting: PHYSICAL MEDICINE & REHABILITATION
Payer: MEDICAID

## 2021-11-15 PROCEDURE — G0378 HOSPITAL OBSERVATION PER HR: HCPCS

## 2021-11-15 PROCEDURE — 700102 HCHG RX REV CODE 250 W/ 637 OVERRIDE(OP): Performed by: HOSPITALIST

## 2021-11-15 PROCEDURE — A9270 NON-COVERED ITEM OR SERVICE: HCPCS | Performed by: INTERNAL MEDICINE

## 2021-11-15 PROCEDURE — 700102 HCHG RX REV CODE 250 W/ 637 OVERRIDE(OP): Performed by: INTERNAL MEDICINE

## 2021-11-15 PROCEDURE — A9270 NON-COVERED ITEM OR SERVICE: HCPCS | Performed by: HOSPITALIST

## 2021-11-15 PROCEDURE — 99225 PR SUBSEQUENT OBSERVATION CARE,LEVEL II: CPT | Performed by: INTERNAL MEDICINE

## 2021-11-15 RX ADMIN — SODIUM HYPOCHLORITE 1 ML: 1.25 SOLUTION TOPICAL at 18:06

## 2021-11-15 RX ADMIN — NYSTATIN: 100000 POWDER TOPICAL at 17:23

## 2021-11-15 RX ADMIN — MIDODRINE HYDROCHLORIDE 10 MG: 5 TABLET ORAL at 17:22

## 2021-11-15 RX ADMIN — SENNOSIDES AND DOCUSATE SODIUM 2 TABLET: 50; 8.6 TABLET ORAL at 17:22

## 2021-11-15 RX ADMIN — THERA TABS 1 TABLET: TAB at 06:03

## 2021-11-15 RX ADMIN — SENNOSIDES AND DOCUSATE SODIUM 2 TABLET: 50; 8.6 TABLET ORAL at 06:03

## 2021-11-15 RX ADMIN — NYSTATIN: 100000 POWDER TOPICAL at 06:03

## 2021-11-15 RX ADMIN — RIVAROXABAN 20 MG: 20 TABLET, FILM COATED ORAL at 17:22

## 2021-11-15 RX ADMIN — MIDODRINE HYDROCHLORIDE 10 MG: 5 TABLET ORAL at 06:03

## 2021-11-15 RX ADMIN — SODIUM HYPOCHLORITE 1 ML: 1.25 SOLUTION TOPICAL at 06:03

## 2021-11-15 ASSESSMENT — PAIN DESCRIPTION - PAIN TYPE
TYPE: ACUTE PAIN
TYPE: ACUTE PAIN

## 2021-11-15 NOTE — DISCHARGE PLANNING
ANTICIPATED DISCHARGE DISPOSITION: SNF    ACTION: Patient discussed in IDT rounds. Patient initially accepted at [facility] however MD has concerns of their ability to provide bowel regimen.     BARRIERS TO DISCHARGE: Acceptance to an SNF that can provide bowel regimen.    PLAN: Follow up on referrals and ensure facilities are able to provide bowel regimen.

## 2021-11-15 NOTE — THERAPY
Therapy Not Indicated    Patient Name: Stevie Phoenix  Age:  61 y.o., Sex:  male  Medical Record #: [id number]  Today's Date: 11/15/2021    Discussed missed therapy with RN       11/15/21 0883   Interdisciplinary Plan of Care Collaboration   IDT Collaboration with  Nursing   Patient Position at End of Therapy In Bed   Collaboration Comments PT order received; Pt is known to therapy team from prior admission (see previous note). Pt would not benefit from ongoing acute PT skilled services while in hospital setting. Pt is dependent for all functional mobility such as, bed mobility, transfers, and ADL's. Per patient he has a electric w/c at home and is able to use it Inpd with joystick use. If patient does not have dependent assist at home, pt would benefit from placement/group home setting for 24/7 care. Patient will not be actively followed for physical therapy services at this time, however may be seen if requested by physician for 1 more visit within 30 days to address any discharge or equipment needs

## 2021-11-15 NOTE — DISCHARGE PLANNING
"Anticipated Discharge Disposition: SNF- [facility]     Action: Prior to admit pt staying at [facility]. [facility] provided a \"Facility Initiated Discharge or Transfer Notice\" for immediate care that cannot be provided at the facility and needs cannot be met by the facility. Note stating \"Unable to meet the need for manual disimpaction\"    Note from weekend SW stating that [facility] has accepted and DON to review today 11/15.   Ondina SCALES to f/u with [facility].     Barriers to Discharge: SNF acceptance, SNF able to accommodate manual disimpaction     Plan: Await update from YARITZA Burrell to continue to follow for d/c needs     Addendum 1230  ADE[facility]YOW informed by Ondina SCALES that [facility] has reviewed and their facility has declined due to not being able to provide the bowel regimen that pt requires.         Care Transition Team Assessment    Information Source  Orientation Level: Oriented X4  Information Given By: Other (Comments)  Who is responsible for making decisions for patient? : Patient    Elopement Risk  Legal Hold: No  Ambulatory or Self Mobile in Wheelchair: No-Not an Elopement Risk  Disoriented: No  Psychiatric Symptoms: None  History of Wandering: No  Elopement this Admit: No  Vocalizing Wanting to Leave: No  Displays Behaviors, Body Language Wanting to Leave: No-Not at Risk for Elopement  Elopement Risk: Not at Risk for Elopement    Interdisciplinary Discharge Planning  Patient or legal guardian wants to designate a caregiver: No    Discharge Preparedness  What is your plan after discharge?: Skilled nursing facility  What are your discharge supports?: Child,Sibling  Prior Functional Level: Needs Assist with Activities of Daily Living,Needs Assist with Medication Management  Difficulity with ADLs: Bathing,Brushing teeth,Dressing,Eating,Toileting,Walking  Difficulity with IADLs: Cooking,Driving,Laundry,Managing medication,Shopping,Using the telephone or computer    Functional Assesment  Prior Functional Level: " Needs Assist with Activities of Daily Living,Needs Assist with Medication Management    Finances  Financial Barriers to Discharge: No  Prescription Coverage: Yes    Vision / Hearing Impairment  Right Eye Vision: Wears Glasses,Impaired  Left Eye Vision: Wears Glasses,Impaired    Advance Directive  Advance Directive?: DPOA for Health Care    Domestic Abuse  Have you ever been the victim of abuse or violence?: No  Physical Abuse or Sexual Abuse: No  Verbal Abuse or Emotional Abuse: No  Possible Abuse/Neglect Reported to:: Not Applicable    Psychological Assessment  History of Substance Abuse: None  History of Psychiatric Problems: No    Discharge Risks or Barriers  Discharge risks or barriers?: Lives alone, no community support  Patient risk factors: Lack of outside supports    Anticipated Discharge Information  Discharge Disposition: D/T to SNF with Medicare cert in anticipation of skilled care (03)  Discharge Address: [facility]

## 2021-11-15 NOTE — PROGRESS NOTES
COVID 19 surge in effect     1930: Received report from Day shift RN. Pt is laying in bed, A+OX4 , showing no signs of distress. Pt denies the need for pain medication. VSS. Q2 turns in place. POC discussed. Fall precautions in place.  Call light and belongings at bedside and within reach. All questions answered at this time. Rounding in place.

## 2021-11-15 NOTE — CARE PLAN
The patient is Stable - Low risk of patient condition declining or worsening    Shift Goals  Clinical Goals: wound care, Q2 turning  Patient Goals: rest and Q2 turns     Progress made toward(s) clinical / shift goals: Repositioning every 2 hours. Wound care done per order.     Patient is not progressing towards the following goals:      Problem: Skin Integrity  Goal: Skin integrity is maintained or improved  Outcome: Progressing     Problem: Fall Risk  Goal: Patient will remain free from falls  Outcome: Progressing   Call light within reach. Bed in lowest position.  Problem: Bowel Elimination  Goal: Establish and maintain regular bowel function  Outcome: Progressing
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
## 2021-11-15 NOTE — DISCHARGE PLANNING
Agency/Facility Name: [facility]  Spoke To: Angeline  Outcome: Pt declined. Unable to provide care with bowel regimen due to staffing.    CM informed

## 2021-11-15 NOTE — CARE PLAN
The patient is Stable - Low risk of patient condition declining or worsening    Shift Goals  Clinical Goals: reposition patient every 2 hours, wound care  Patient Goals: rest and Q2 turns     Progress made toward(s) clinical / shift goals:  Pt is being turned every 2 hours, POC discussed, wound care done per order.     Patient is not progressing towards the following goals: n/s      Problem: Knowledge Deficit - Standard  Goal: Patient and family/care givers will demonstrate understanding of plan of care, disease process/condition, diagnostic tests and medications  Outcome: Progressing     Problem: Skin Integrity  Goal: Skin integrity is maintained or improved  Outcome: Progressing     Problem: Fall Risk  Goal: Patient will remain free from falls  Outcome: Progressing     Problem: Bowel Elimination  Goal: Establish and maintain regular bowel function  Outcome: Progressing

## 2021-11-15 NOTE — DISCHARGE PLANNING
[facility] Transitional Care Coordination    Referral from:  Dr. Van    Insurance Provider on Facesheet: [employer]AR[employer] FFS    Potential Rehab Diagnosis: Debility    Chart review indicates patient may have on going medical management and may have therapy needs to possibly meet inpatient rehab facility criteria with the goal of returning to community.    D/C support: TBD     Physiatry consultation forwarded per protocol.     Debility.  From SNF. Physiatry to consult.  Waiting on additional information to determine appropriateness for acute inpatient rehabilitation. Will continue to follow.      Thank you for the referral.

## 2021-11-15 NOTE — THERAPY
Occupational Therapy  Contact Note    Patient Name: Stevie Phoenix  Age:  61 y.o., Sex:  male  Medical Record #: [id number]  Today's Date: 11/15/2021    OT yokasta order received and acknowledged, chart reviewed. Pt is familiar to therapy team from previous admission (10/2-11/1). He was admitted here from [facility] for wound care, is a paraplegic x 11 yrs, receives dependent care with all ADLs at baseline, uses power wheelchair with joystick controls. He did have caregiver assistance when he was at home, though now unable to determine availability of caregivers. He would benefit from SNF placement or LTAC/group home setting if unable to receive consistent caregiver help at home. Patient will not be actively followed for occupational therapy services at this time, however may be seen if requested by physician for 1 more visit within 30 days to address any discharge or equipment needs.     Tami Hester, OT  [id number]

## 2021-11-16 PROCEDURE — 99225 PR SUBSEQUENT OBSERVATION CARE,LEVEL II: CPT | Performed by: STUDENT IN AN ORGANIZED HEALTH CARE EDUCATION/TRAINING PROGRAM

## 2021-11-16 PROCEDURE — A9270 NON-COVERED ITEM OR SERVICE: HCPCS | Performed by: INTERNAL MEDICINE

## 2021-11-16 PROCEDURE — 700102 HCHG RX REV CODE 250 W/ 637 OVERRIDE(OP): Performed by: HOSPITALIST

## 2021-11-16 PROCEDURE — 700102 HCHG RX REV CODE 250 W/ 637 OVERRIDE(OP): Performed by: INTERNAL MEDICINE

## 2021-11-16 PROCEDURE — G0378 HOSPITAL OBSERVATION PER HR: HCPCS

## 2021-11-16 PROCEDURE — A9270 NON-COVERED ITEM OR SERVICE: HCPCS | Performed by: HOSPITALIST

## 2021-11-16 RX ADMIN — RIVAROXABAN 20 MG: 20 TABLET, FILM COATED ORAL at 17:36

## 2021-11-16 RX ADMIN — SODIUM HYPOCHLORITE 1 ML: 1.25 SOLUTION TOPICAL at 05:53

## 2021-11-16 RX ADMIN — NYSTATIN: 100000 POWDER TOPICAL at 17:37

## 2021-11-16 RX ADMIN — NYSTATIN: 100000 POWDER TOPICAL at 05:54

## 2021-11-16 RX ADMIN — SODIUM HYPOCHLORITE 1 ML: 1.25 SOLUTION TOPICAL at 17:37

## 2021-11-16 RX ADMIN — SENNOSIDES AND DOCUSATE SODIUM 2 TABLET: 50; 8.6 TABLET ORAL at 05:54

## 2021-11-16 RX ADMIN — SENNOSIDES AND DOCUSATE SODIUM 2 TABLET: 50; 8.6 TABLET ORAL at 17:36

## 2021-11-16 RX ADMIN — THERA TABS 1 TABLET: TAB at 05:54

## 2021-11-16 RX ADMIN — MIDODRINE HYDROCHLORIDE 10 MG: 5 TABLET ORAL at 05:54

## 2021-11-16 RX ADMIN — MIDODRINE HYDROCHLORIDE 10 MG: 5 TABLET ORAL at 17:36

## 2021-11-16 ASSESSMENT — ENCOUNTER SYMPTOMS
HEADACHES: 0
VOMITING: 0
BLURRED VISION: 0
INSOMNIA: 0
MYALGIAS: 0
SHORTNESS OF BREATH: 0
DEPRESSION: 0
DIZZINESS: 0
ABDOMINAL PAIN: 0
CHILLS: 0
DOUBLE VISION: 0
CONSTIPATION: 1
FEVER: 0
COUGH: 0
NAUSEA: 0

## 2021-11-16 ASSESSMENT — PAIN DESCRIPTION - PAIN TYPE
TYPE: ACUTE PAIN
TYPE: ACUTE PAIN

## 2021-11-16 NOTE — CARE PLAN
The patient is Stable - Low risk of patient condition declining or worsening    Shift Goals  Clinical Goals: Q2 turns and wound care   Patient Goals: Rest and Q2 turns    Progress made toward(s) clinical / shift goals:  Pt educated on POC and has remained free from falls    Patient is not progressing towards the following goals: Pts wounds have remained the same.       Problem: Skin Integrity  Goal: Skin integrity is maintained or improved  Outcome: Not Progressing  Plan to advance goal:  Q2 turns and wound care per order.     Problem: Knowledge Deficit - Standard  Goal: Patient and family/care givers will demonstrate understanding of plan of care, disease process/condition, diagnostic tests and medications  Outcome: Progressing     Problem: Fall Risk  Goal: Patient will remain free from falls  Outcome: Progressing     Problem: Bowel Elimination  Goal: Establish and maintain regular bowel function  Outcome: Progressing

## 2021-11-16 NOTE — PROGRESS NOTES
COVID 19 surge in effect     1905: Received report from Day shift RN. Pt is laying in bed, A+OX4 , showing no signs of distress. Pt denies pain. VSS. Urostomy in place draining below bladder level. Dressings CDI, heel float boots in place.  Q2 turns in place, fall precautions in place. POC discussed. Snack and water provided.  Call light and belongings at bedside and within reach. All questions answered at this time.  Rounding in place.
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
## 2021-11-16 NOTE — DISCHARGE PLANNING
Agency/Facility Name: [facility]  Outcome: Pt declined. Lack of DC resources and support.    CM informed

## 2021-11-16 NOTE — PROGRESS NOTES
Received bedside report. Assumed pt care. Assessment and chart check complete. Pt is AA0X4, no signs of distress, denies nausea/vomiting and pain at this moment. Repositioning every 2 hours. Urostomy bag in place. Dressing CDI, float boots in place. Dressing change per order. Fall precautions in place, treaded socks on pt, bed in low position. Call light within reach. Educated pt to call if needing anything.

## 2021-11-16 NOTE — DISCHARGE PLANNING
Agency/Facility Name: [facility]  Spoke To: Sheryl  Outcome: Inquiring if any of the 4 can do daily bowel regimen. Awaiting call back      CM informed

## 2021-11-16 NOTE — DISCHARGE PLANNING
Stevie is lacking D/C resources/support.  [facility] will no longer follow.  Please reach out to myself @ [phone] with any questions. Physiatry to consult to contribute to POC.  I do apologize for the delay.

## 2021-11-16 NOTE — ASSESSMENT & PLAN NOTE
Secondary to spinal cord injury. Failed multiple medications per patient.    Despite these recommendations, patient wishes to stay with his current bowel regimen.   Continue with daily manual disimpaction.

## 2021-11-16 NOTE — CARE PLAN
The patient is Stable - Low risk of patient condition declining or worsening    Shift Goals  Clinical Goals: Q2 turns and bowel regimen  Patient Goals: Rest and Q2 turns    Progress made toward(s) clinical / shift goals:  Daily bowel regimen completed.               Repositioning every 2 hours.    Patient is not progressing towards the following goals:      Problem: Skin Integrity  Goal: Skin integrity is maintained or improved  Outcome: Progressing     Problem: Fall Risk  Goal: Patient will remain free from falls  Outcome: Progressing     Problem: Bowel Elimination  Goal: Establish and maintain regular bowel function  Outcome: Progressing
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
## 2021-11-16 NOTE — DISCHARGE PLANNING
Agency/Facility Name: [facility]  Spoke To: Bria  Outcome: Pt declined. Needs to confirm with Sarah since pt is RTA.      CM informed

## 2021-11-17 PROCEDURE — 99225 PR SUBSEQUENT OBSERVATION CARE,LEVEL II: CPT | Performed by: STUDENT IN AN ORGANIZED HEALTH CARE EDUCATION/TRAINING PROGRAM

## 2021-11-17 PROCEDURE — A9270 NON-COVERED ITEM OR SERVICE: HCPCS | Performed by: INTERNAL MEDICINE

## 2021-11-17 PROCEDURE — 700102 HCHG RX REV CODE 250 W/ 637 OVERRIDE(OP): Performed by: INTERNAL MEDICINE

## 2021-11-17 PROCEDURE — A9270 NON-COVERED ITEM OR SERVICE: HCPCS | Performed by: HOSPITALIST

## 2021-11-17 PROCEDURE — G0378 HOSPITAL OBSERVATION PER HR: HCPCS

## 2021-11-17 PROCEDURE — 700102 HCHG RX REV CODE 250 W/ 637 OVERRIDE(OP): Performed by: HOSPITALIST

## 2021-11-17 RX ADMIN — NYSTATIN: 100000 POWDER TOPICAL at 18:01

## 2021-11-17 RX ADMIN — RIVAROXABAN 20 MG: 20 TABLET, FILM COATED ORAL at 17:58

## 2021-11-17 RX ADMIN — SODIUM HYPOCHLORITE 1 ML: 1.25 SOLUTION TOPICAL at 18:14

## 2021-11-17 RX ADMIN — MIDODRINE HYDROCHLORIDE 10 MG: 5 TABLET ORAL at 17:58

## 2021-11-17 RX ADMIN — MIDODRINE HYDROCHLORIDE 10 MG: 5 TABLET ORAL at 06:20

## 2021-11-17 RX ADMIN — THERA TABS 1 TABLET: TAB at 06:20

## 2021-11-17 RX ADMIN — NYSTATIN: 100000 POWDER TOPICAL at 06:20

## 2021-11-17 RX ADMIN — SENNOSIDES AND DOCUSATE SODIUM 2 TABLET: 50; 8.6 TABLET ORAL at 17:58

## 2021-11-17 RX ADMIN — SODIUM HYPOCHLORITE 20 ML: 1.25 SOLUTION TOPICAL at 06:21

## 2021-11-17 RX ADMIN — SENNOSIDES AND DOCUSATE SODIUM 2 TABLET: 50; 8.6 TABLET ORAL at 06:20

## 2021-11-17 ASSESSMENT — ENCOUNTER SYMPTOMS
ABDOMINAL PAIN: 0
CHILLS: 0
COUGH: 0
NAUSEA: 0
SHORTNESS OF BREATH: 0
BLURRED VISION: 0
DOUBLE VISION: 0
VOMITING: 0
FEVER: 0
INSOMNIA: 0
DIZZINESS: 0
HEADACHES: 0
DEPRESSION: 0
MYALGIAS: 0
CONSTIPATION: 1

## 2021-11-17 ASSESSMENT — PAIN DESCRIPTION - PAIN TYPE
TYPE: ACUTE PAIN
TYPE: ACUTE PAIN
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
## 2021-11-17 NOTE — PROGRESS NOTES
Received bedside report. Assumed pt care. Assessment and chart check complete. Pt is AA0X4, no signs of distress, denies nausea/vomiting and pain at this moment. Repositioning every 2 hours. Urostomy bag in place. Dressing CDI, float boots in place. Dressing change per order. IVF removed infiltrated. Fall precautions in place, treaded socks on pt, bed in low position. Call light within reach. Educated pt to call if needing anything.

## 2021-11-17 NOTE — CARE PLAN
The patient is Stable - Low risk of patient condition declining or worsening    Shift Goals  Clinical Goals: Pt skin integrity will improve  Patient Goals: Rest and Q2 turns    Progress made toward(s) clinical / shift goals:  Q2 turns in place. Wound care to be completed this am. Pt is on low air loss bed.     Patient is not progressing towards the following goals: n/a

## 2021-11-17 NOTE — PROGRESS NOTES
Assumed care of pt at 1915. Received report from Melody MIGUEL. Pt resting in bed watching tv. No complaints of pain at this time. Urostomy in place draining yellow urine. Q2 turns in place. Heel float boots on. Dressing CDI. Discussed plan to change dressing in am with med pass. Pt verbalized agreement. No other needs at this time. Call light in reach, bed in lowest position. COVID 19 surge in effect.

## 2021-11-17 NOTE — DISCHARGE PLANNING
Anticipated Discharge Disposition: SNF vs Home with caregiver assistance     Action: LSW messaged HCM manager, Carolina Estrada. LSW requesting assistance with pt's d/c plan. Medicaid SW with [employer] has not been successful with caregiver services for pt. LSW requesting further suggestions or resources that pt can be connected to.     Barriers to Discharge: SNF acceptance, SNF able to accommodate manual disimpaction     Plan: Continue to work on d/c plan, LSW to continue to follow for d/c needs
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
## 2021-11-17 NOTE — DISCHARGE PLANNING
ANTICIPATED DISCHARGE DISPOSITION: SNF    ACTION: Chart reviewed. DPA is pending confirmation of [employer] declination from [employer].      BARRIERS TO DISCHARGE: Acceptance to SNF secondary to exceeding care needs/daily bowel regimen    PLAN: Pending feedback from [employer].

## 2021-11-18 PROCEDURE — G0378 HOSPITAL OBSERVATION PER HR: HCPCS

## 2021-11-18 PROCEDURE — 700102 HCHG RX REV CODE 250 W/ 637 OVERRIDE(OP): Performed by: HOSPITALIST

## 2021-11-18 PROCEDURE — A9270 NON-COVERED ITEM OR SERVICE: HCPCS | Performed by: INTERNAL MEDICINE

## 2021-11-18 PROCEDURE — 302103 DOWN DRAIN ADAPTER: Performed by: STUDENT IN AN ORGANIZED HEALTH CARE EDUCATION/TRAINING PROGRAM

## 2021-11-18 PROCEDURE — 700102 HCHG RX REV CODE 250 W/ 637 OVERRIDE(OP): Performed by: INTERNAL MEDICINE

## 2021-11-18 PROCEDURE — 99225 PR SUBSEQUENT OBSERVATION CARE,LEVEL II: CPT | Performed by: STUDENT IN AN ORGANIZED HEALTH CARE EDUCATION/TRAINING PROGRAM

## 2021-11-18 PROCEDURE — A9270 NON-COVERED ITEM OR SERVICE: HCPCS | Performed by: HOSPITALIST

## 2021-11-18 RX ADMIN — THERA TABS 1 TABLET: TAB at 06:10

## 2021-11-18 RX ADMIN — NYSTATIN: 100000 POWDER TOPICAL at 06:10

## 2021-11-18 RX ADMIN — MIDODRINE HYDROCHLORIDE 10 MG: 5 TABLET ORAL at 06:10

## 2021-11-18 RX ADMIN — SODIUM HYPOCHLORITE 10 ML: 1.25 SOLUTION TOPICAL at 17:40

## 2021-11-18 RX ADMIN — MIDODRINE HYDROCHLORIDE 10 MG: 5 TABLET ORAL at 17:17

## 2021-11-18 RX ADMIN — RIVAROXABAN 20 MG: 20 TABLET, FILM COATED ORAL at 17:17

## 2021-11-18 RX ADMIN — SODIUM HYPOCHLORITE 1 ML: 1.25 SOLUTION TOPICAL at 06:10

## 2021-11-18 RX ADMIN — NYSTATIN: 100000 POWDER TOPICAL at 17:41

## 2021-11-18 RX ADMIN — SENNOSIDES AND DOCUSATE SODIUM 2 TABLET: 50; 8.6 TABLET ORAL at 06:10

## 2021-11-18 RX ADMIN — SENNOSIDES AND DOCUSATE SODIUM 2 TABLET: 50; 8.6 TABLET ORAL at 17:17

## 2021-11-18 ASSESSMENT — ENCOUNTER SYMPTOMS
DIZZINESS: 0
MYALGIAS: 0
INSOMNIA: 0
NAUSEA: 0
ABDOMINAL PAIN: 0
DOUBLE VISION: 0
VOMITING: 0
CHILLS: 0
HEADACHES: 0
COUGH: 0
DEPRESSION: 0
FEVER: 0
CONSTIPATION: 1
BLURRED VISION: 0
SHORTNESS OF BREATH: 0

## 2021-11-18 ASSESSMENT — PAIN DESCRIPTION - PAIN TYPE: TYPE: ACUTE PAIN

## 2021-11-18 NOTE — PROGRESS NOTES
COVID 19 surge in effect     1930: Received report from Day shift RN. Pt is laying in bed, A+OX4 , showing no signs of distress. Pt denies the need for pain medication. Q2 turns in place. VSS.  Fall precautions in place, POC discussed. Call light and belongings at bedside and within reach. All questions answered at this time. Rounding in place.

## 2021-11-18 NOTE — CARE PLAN
The patient is Stable - Low risk of patient condition declining or worsening    Shift Goals  Clinical Goals: Wound care and Q2 turns   Patient Goals: Rest and Q2 turns     Progress made toward(s) clinical / shift goals:  Pt is being turned Q2. Pt provided snacks and water. POC discussed.     Patient is not progressing towards the following goals: Pts wounds should little signs of improvement.      Problem: Skin Integrity  Goal: Skin integrity is maintained or improved  Outcome: Not Progressing       Problem: Knowledge Deficit - Standard  Goal: Patient and family/care givers will demonstrate understanding of plan of care, disease process/condition, diagnostic tests and medications  Outcome: Progressing     Problem: Bowel Elimination  Goal: Establish and maintain regular bowel function  Outcome: Progressing

## 2021-11-18 NOTE — PROGRESS NOTES
COVID-19 surge in effect  Received report from night shift RN. Pt is awake and alert. No signs of distress. Pt denies any nausea. Pt denies any numbness or tingling. Pt denies any pain at this time. Pt has dressings in place. fall precautions in place. Q2 turns in place. Bed in lowest and locked position. Call light within reach.

## 2021-11-18 NOTE — DISCHARGE PLANNING
ANTICIPATED DISCHARGE DISPOSITION: SNF vs Home with caregiver.    ACTION: Chart reviewed. LSW has escalated to [facility] [employer] [facility] for assistance with discharge disposition.    BARRIERS TO DISCHARGE: SNF acceptance due to bowel regimen.     PLAN: Continue SNF referrals with Leadership assistance.

## 2021-11-19 PROCEDURE — G0378 HOSPITAL OBSERVATION PER HR: HCPCS

## 2021-11-19 PROCEDURE — 99225 PR SUBSEQUENT OBSERVATION CARE,LEVEL II: CPT | Performed by: STUDENT IN AN ORGANIZED HEALTH CARE EDUCATION/TRAINING PROGRAM

## 2021-11-19 PROCEDURE — A9270 NON-COVERED ITEM OR SERVICE: HCPCS | Performed by: HOSPITALIST

## 2021-11-19 PROCEDURE — 700102 HCHG RX REV CODE 250 W/ 637 OVERRIDE(OP): Performed by: HOSPITALIST

## 2021-11-19 PROCEDURE — 700102 HCHG RX REV CODE 250 W/ 637 OVERRIDE(OP): Performed by: INTERNAL MEDICINE

## 2021-11-19 PROCEDURE — A9270 NON-COVERED ITEM OR SERVICE: HCPCS | Performed by: INTERNAL MEDICINE

## 2021-11-19 RX ADMIN — THERA TABS 1 TABLET: TAB at 06:21

## 2021-11-19 RX ADMIN — MIDODRINE HYDROCHLORIDE 10 MG: 5 TABLET ORAL at 06:21

## 2021-11-19 RX ADMIN — SENNOSIDES AND DOCUSATE SODIUM 2 TABLET: 50; 8.6 TABLET ORAL at 06:21

## 2021-11-19 RX ADMIN — NYSTATIN: 100000 POWDER TOPICAL at 17:31

## 2021-11-19 RX ADMIN — NYSTATIN: 100000 POWDER TOPICAL at 06:21

## 2021-11-19 RX ADMIN — SENNOSIDES AND DOCUSATE SODIUM 2 TABLET: 50; 8.6 TABLET ORAL at 18:00

## 2021-11-19 RX ADMIN — RIVAROXABAN 20 MG: 20 TABLET, FILM COATED ORAL at 17:30

## 2021-11-19 RX ADMIN — SODIUM HYPOCHLORITE 5 ML: 1.25 SOLUTION TOPICAL at 18:00

## 2021-11-19 RX ADMIN — SODIUM HYPOCHLORITE 1 ML: 1.25 SOLUTION TOPICAL at 06:21

## 2021-11-19 RX ADMIN — MIDODRINE HYDROCHLORIDE 10 MG: 5 TABLET ORAL at 17:30

## 2021-11-19 ASSESSMENT — ENCOUNTER SYMPTOMS
MYALGIAS: 0
BLURRED VISION: 0
DIZZINESS: 0
ABDOMINAL PAIN: 0
INSOMNIA: 0
FEVER: 0
COUGH: 0
CONSTIPATION: 1
NAUSEA: 0
VOMITING: 0
DEPRESSION: 0
HEADACHES: 0
CHILLS: 0
DOUBLE VISION: 0
SHORTNESS OF BREATH: 0

## 2021-11-19 ASSESSMENT — PAIN DESCRIPTION - PAIN TYPE: TYPE: ACUTE PAIN

## 2021-11-19 NOTE — CARE PLAN
The patient is Watcher - Medium risk of patient condition declining or worsening    Shift Goals  Clinical Goals: wound care, q2 turns  Patient Goals: Rest and Q2 turns     Progress made toward(s) clinical / shift goals:  pt turned q2 hours, wound care performed during shift.    Patient is not progressing towards the following goals:

## 2021-11-19 NOTE — PROGRESS NOTES
COVID 19 surge in effect     1855: Received report from Day shift RN. Pt is laying in bed, A+OX4 , showing no signs of distress. Pt denies the need for pain medication. POC discussed, fall precautions in place.  Q2 turns in place. Call light and belongings at bedside and within reach. All questions answered at this time. Rounding in place.

## 2021-11-19 NOTE — CARE PLAN
The patient is Stable - Low risk of patient condition declining or worsening    Shift Goals  Clinical Goals: Disimpaction, Q2hr turns,dressing change  Patient Goals: rest    Progress made toward(s) clinical / shift goals:    Problem: Knowledge Deficit - Standard  Goal: Patient and family/care givers will demonstrate understanding of plan of care, disease process/condition, diagnostic tests and medications  Outcome: Progressing     Problem: Skin Integrity  Goal: Skin integrity is maintained or improved  Outcome: Progressing     Problem: Fall Risk  Goal: Patient will remain free from falls  Outcome: Progressing     Problem: Bowel Elimination  Goal: Establish and maintain regular bowel function  Outcome: Progressing    Patient AOX4, denies pain and nausea.  Patient requesting disimpacted b/w  hours.  Q 2 hour turns, one assist.    Dressings to be changed later part of shift.  BLE in soft boots, multiple dressings.    Ostomy intact, draining clear yellow urine.

## 2021-11-19 NOTE — DISCHARGE PLANNING
Anticipated Discharge Disposition: SNF     Action: HCM manager, Abigail requested LSW sent out SNF referrals to [facility] for continuity of care. LSW has faxed SNF CHOICE for [place] SNF's to Ondina SCALES.     Barriers to Discharge: SNF acceptance, SNF able to accommodate manual disimpaction     Plan: Continue to work on d/c plan, LSW to continue to follow for d/c needs
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
## 2021-11-19 NOTE — CARE PLAN
The patient is Stable - Low risk of patient condition declining or worsening    Shift Goals  Clinical Goals: Q2 turns and wound care   Patient Goals: Rest     Progress made toward(s) clinical / shift goals:  Pt has been turned Q2 hours and is resting comfortably in between turns.     Patient is not progressing towards the following goals: n/a      Problem: Knowledge Deficit - Standard  Goal: Patient and family/care givers will demonstrate understanding of plan of care, disease process/condition, diagnostic tests and medications  Outcome: Progressing     Problem: Skin Integrity  Goal: Skin integrity is maintained or improved  Outcome: Progressing     Problem: Fall Risk  Goal: Patient will remain free from falls  Outcome: Progressing     Problem: Bowel Elimination  Goal: Establish and maintain regular bowel function  Outcome: Progressing

## 2021-11-20 PROCEDURE — G0378 HOSPITAL OBSERVATION PER HR: HCPCS

## 2021-11-20 PROCEDURE — A9270 NON-COVERED ITEM OR SERVICE: HCPCS | Performed by: HOSPITALIST

## 2021-11-20 PROCEDURE — 94760 N-INVAS EAR/PLS OXIMETRY 1: CPT

## 2021-11-20 PROCEDURE — 99225 PR SUBSEQUENT OBSERVATION CARE,LEVEL II: CPT | Performed by: STUDENT IN AN ORGANIZED HEALTH CARE EDUCATION/TRAINING PROGRAM

## 2021-11-20 PROCEDURE — 700102 HCHG RX REV CODE 250 W/ 637 OVERRIDE(OP): Performed by: HOSPITALIST

## 2021-11-20 PROCEDURE — 700102 HCHG RX REV CODE 250 W/ 637 OVERRIDE(OP): Performed by: INTERNAL MEDICINE

## 2021-11-20 PROCEDURE — A9270 NON-COVERED ITEM OR SERVICE: HCPCS | Performed by: INTERNAL MEDICINE

## 2021-11-20 RX ADMIN — SENNOSIDES AND DOCUSATE SODIUM 2 TABLET: 50; 8.6 TABLET ORAL at 06:00

## 2021-11-20 RX ADMIN — SODIUM HYPOCHLORITE 5 ML: 1.25 SOLUTION TOPICAL at 11:25

## 2021-11-20 RX ADMIN — SENNOSIDES AND DOCUSATE SODIUM 2 TABLET: 50; 8.6 TABLET ORAL at 17:07

## 2021-11-20 RX ADMIN — RIVAROXABAN 20 MG: 20 TABLET, FILM COATED ORAL at 17:07

## 2021-11-20 RX ADMIN — MIDODRINE HYDROCHLORIDE 10 MG: 5 TABLET ORAL at 17:07

## 2021-11-20 RX ADMIN — MIDODRINE HYDROCHLORIDE 10 MG: 5 TABLET ORAL at 06:00

## 2021-11-20 RX ADMIN — NYSTATIN: 100000 POWDER TOPICAL at 17:07

## 2021-11-20 RX ADMIN — THERA TABS 1 TABLET: TAB at 07:30

## 2021-11-20 RX ADMIN — NYSTATIN: 100000 POWDER TOPICAL at 11:25

## 2021-11-20 ASSESSMENT — ENCOUNTER SYMPTOMS
DEPRESSION: 0
INSOMNIA: 0
VOMITING: 0
FEVER: 0
NAUSEA: 0
CHILLS: 0
DOUBLE VISION: 0
MYALGIAS: 0
HEADACHES: 0
DIZZINESS: 0
CONSTIPATION: 1
BLURRED VISION: 0
ABDOMINAL PAIN: 0
COUGH: 0
SHORTNESS OF BREATH: 0

## 2021-11-20 ASSESSMENT — PAIN DESCRIPTION - PAIN TYPE: TYPE: ACUTE PAIN

## 2021-11-20 NOTE — CARE PLAN
The patient is Stable - Low risk of patient condition declining or worsening    Shift Goals  Clinical Goals: Healing bed sores  Patient Goals: skin intact    Progress made toward(s) clinical / shift goals:    Problem: Knowledge Deficit - Standard  Goal: Patient and family/care givers will demonstrate understanding of plan of care, disease process/condition, diagnostic tests and medications  Outcome: Progressing     Problem: Skin Integrity  Goal: Skin integrity is maintained or improved  Outcome: Progressing     Problem: Fall Risk  Goal: Patient will remain free from falls  Outcome: Progressing     Problem: Bowel Elimination  Goal: Establish and maintain regular bowel function  Outcome: Progressing     Problem: Psychosocial  Goal: Patient's level of anxiety will decrease  Outcome: Progressing  Goal: Patient's ability to verbalize feelings about condition will improve  Outcome: Progressing  Goal: Patient's ability to re-evaluate and adapt role responsibilities will improve  Outcome: Progressing  Goal: Patient and family will demonstrate ability to cope with life altering diagnosis and/or procedure  Outcome: Progressing  Goal: Spiritual and cultural needs incorporated into hospitalization  Outcome: Progressing     Problem: Communication  Goal: The ability to communicate needs accurately and effectively will improve  Outcome: Progressing     Problem: Discharge Barriers/Planning  Goal: Patient's continuum of care needs are met  Outcome: Progressing     Problem: Hemodynamics  Goal: Patient's hemodynamics, fluid balance and neurologic status will be stable or improve  Outcome: Progressing     Problem: Respiratory  Goal: Patient will achieve/maintain optimum respiratory ventilation and gas exchange  Outcome: Progressing     Problem: Mechanical Ventilation  Goal: Patient will be able to express needs and understand communication  Outcome: Progressing     Problem: Nutrition  Goal: Patient's nutritional and fluid intake will  be adequate or improve  Outcome: Progressing     Problem: Urinary Elimination  Goal: Establish and maintain regular urinary output  Outcome: Progressing     Problem: Mobility  Goal: Patient's capacity to carry out activities will improve  Outcome: Progressing     Problem: Self Care  Goal: Patient will have the ability to perform ADLs independently or with assistance (bathe, groom, dress, toilet and feed)  Outcome: Progressing     Problem: Infection - Standard  Goal: Patient will remain free from infection  Outcome: Progressing     Problem: Wound/ / Incision Healing  Goal: Patient's wound/surgical incision will decrease in size and heals properly  Outcome: Progressing    Patient AOX4, denies nausea and pain.  Urostomy output adequate, slightly hazy but able to see through.  Dressing changes to coccyx/sacrum area to be done this am with CNA help.  Disimpaction prior to dressing changes.    BLE dressings intact, bilateral cushion boots on.  Turn Q 2 hours.    Patient needs help with meal set up but is able to feed self.

## 2021-11-20 NOTE — CARE PLAN
The patient is Stable - Low risk of patient condition declining or worsening    Shift Goals  Clinical Goals: Rest  Patient Goals: Rest    Progress made toward(s) clinical / shift goals:  Progressing     Patient is not progressing towards the following goals:N/A    Met with patient at bedside to discuss plan of care. Patient is awake and alert. Patient is not expressing needs at this time. Will continue to monitor.

## 2021-11-20 NOTE — PROGRESS NOTES
Received report from Kim.Patient is not expressing any needs at the moment. Safety precautions in place. Assumed care of patient.

## 2021-11-20 NOTE — WOUND TEAM
This wound RN in to see patient for follow up, per patient, dressings were just changed and he would prefer wound team follow up on Monday for all of his wounds. Patient reports no concerns regarding his wounds, bedside nursing changing per orders, per patient and bedside staff wounds are stable at this time. Wound team to follow up on Sacrum, Right IT, Left heel, and Right calf early next week per patient request. Please call with questions or concerns.

## 2021-11-21 PROCEDURE — A9270 NON-COVERED ITEM OR SERVICE: HCPCS | Performed by: HOSPITALIST

## 2021-11-21 PROCEDURE — A9270 NON-COVERED ITEM OR SERVICE: HCPCS | Performed by: INTERNAL MEDICINE

## 2021-11-21 PROCEDURE — 700102 HCHG RX REV CODE 250 W/ 637 OVERRIDE(OP): Performed by: INTERNAL MEDICINE

## 2021-11-21 PROCEDURE — G0378 HOSPITAL OBSERVATION PER HR: HCPCS

## 2021-11-21 PROCEDURE — 700102 HCHG RX REV CODE 250 W/ 637 OVERRIDE(OP): Performed by: HOSPITALIST

## 2021-11-21 PROCEDURE — 99225 PR SUBSEQUENT OBSERVATION CARE,LEVEL II: CPT | Performed by: STUDENT IN AN ORGANIZED HEALTH CARE EDUCATION/TRAINING PROGRAM

## 2021-11-21 RX ADMIN — MIDODRINE HYDROCHLORIDE 10 MG: 5 TABLET ORAL at 17:59

## 2021-11-21 RX ADMIN — SENNOSIDES AND DOCUSATE SODIUM 2 TABLET: 50; 8.6 TABLET ORAL at 06:52

## 2021-11-21 RX ADMIN — NYSTATIN 1 APPLICATION: 100000 POWDER TOPICAL at 06:52

## 2021-11-21 RX ADMIN — SODIUM HYPOCHLORITE 5 ML: 1.25 SOLUTION TOPICAL at 17:59

## 2021-11-21 RX ADMIN — NYSTATIN: 100000 POWDER TOPICAL at 18:11

## 2021-11-21 RX ADMIN — MIDODRINE HYDROCHLORIDE 10 MG: 5 TABLET ORAL at 06:52

## 2021-11-21 RX ADMIN — RIVAROXABAN 20 MG: 20 TABLET, FILM COATED ORAL at 17:59

## 2021-11-21 RX ADMIN — THERA TABS 1 TABLET: TAB at 06:52

## 2021-11-21 RX ADMIN — SODIUM HYPOCHLORITE 1 ML: 1.25 SOLUTION TOPICAL at 06:57

## 2021-11-21 RX ADMIN — SENNOSIDES AND DOCUSATE SODIUM 2 TABLET: 50; 8.6 TABLET ORAL at 17:59

## 2021-11-21 ASSESSMENT — ENCOUNTER SYMPTOMS
CONSTIPATION: 1
DIZZINESS: 0
MYALGIAS: 0
NAUSEA: 0
CHILLS: 0
BLURRED VISION: 0
PALPITATIONS: 0
VOMITING: 0
DOUBLE VISION: 0
COUGH: 0
DEPRESSION: 0
SHORTNESS OF BREATH: 0
FEVER: 0
HEADACHES: 0
INSOMNIA: 0

## 2021-11-21 ASSESSMENT — PAIN DESCRIPTION - PAIN TYPE
TYPE: ACUTE PAIN
TYPE: ACUTE PAIN

## 2021-11-21 NOTE — CARE PLAN
The patient is Watcher - Medium risk of patient condition declining or worsening    Shift Goals  Clinical Goals: Monitor wounds and change dressing  Patient Goals: Sleep and rest  Family Goals: No family present    Progress made toward(s) clinical / shift goals:  yes    Patient is not progressing towards the following goals:

## 2021-11-21 NOTE — CARE PLAN
The patient is Stable - Low risk of patient condition declining or worsening    Shift Goals  Clinical Goals: dressing changes, comfort, safety   Patient Goals: comfort, safety   Family Goals: No family present    Progress made toward(s) clinical / shift goals:  Safety precautions in place and dressing changes scheduled for this shift.     Patient is not progressing towards the following goals:
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
## 2021-11-21 NOTE — PROGRESS NOTES
COVID 19 surge in effect.     Received report from night shift RN, assumed care of pt. [email or web address]. VSS. Pt denies pain or nausea at this time. Updated on plan of care for the day and answered any questions. Safety precautions in place and pt educated to call for assistance.
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
## 2021-11-22 LAB
ALBUMIN SERPL BCP-MCNC: 3.8 G/DL (ref 3.2–4.9)
ALBUMIN/GLOB SERPL: 1.2 G/DL
ALP SERPL-CCNC: 68 U/L (ref 30–99)
ALT SERPL-CCNC: 19 U/L (ref 2–50)
ANION GAP SERPL CALC-SCNC: 13 MMOL/L (ref 7–16)
AST SERPL-CCNC: 18 U/L (ref 12–45)
BASOPHILS # BLD AUTO: 0.9 % (ref 0–1.8)
BASOPHILS # BLD: 0.06 K/UL (ref 0–0.12)
BILIRUB SERPL-MCNC: <0.2 MG/DL (ref 0.1–1.5)
BUN SERPL-MCNC: 18 MG/DL (ref 8–22)
CALCIUM SERPL-MCNC: 9.3 MG/DL (ref 8.4–10.2)
CHLORIDE SERPL-SCNC: 105 MMOL/L (ref 96–112)
CO2 SERPL-SCNC: 21 MMOL/L (ref 20–33)
CREAT SERPL-MCNC: 0.55 MG/DL (ref 0.5–1.4)
EOSINOPHIL # BLD AUTO: 0.47 K/UL (ref 0–0.51)
EOSINOPHIL NFR BLD: 6.8 % (ref 0–6.9)
ERYTHROCYTE [DISTWIDTH] IN BLOOD BY AUTOMATED COUNT: 48.7 FL (ref 35.9–50)
GLOBULIN SER CALC-MCNC: 3.3 G/DL (ref 1.9–3.5)
GLUCOSE SERPL-MCNC: 120 MG/DL (ref 65–99)
HCT VFR BLD AUTO: 42.5 % (ref 42–52)
HGB BLD-MCNC: 13.1 G/DL (ref 14–18)
IMM GRANULOCYTES # BLD AUTO: 0.04 K/UL (ref 0–0.11)
IMM GRANULOCYTES NFR BLD AUTO: 0.6 % (ref 0–0.9)
LYMPHOCYTES # BLD AUTO: 1.29 K/UL (ref 1–4.8)
LYMPHOCYTES NFR BLD: 18.7 % (ref 22–41)
MAGNESIUM SERPL-MCNC: 2 MG/DL (ref 1.5–2.5)
MCH RBC QN AUTO: 27.1 PG (ref 27–33)
MCHC RBC AUTO-ENTMCNC: 30.8 G/DL (ref 33.7–35.3)
MCV RBC AUTO: 88 FL (ref 81.4–97.8)
MONOCYTES # BLD AUTO: 0.57 K/UL (ref 0–0.85)
MONOCYTES NFR BLD AUTO: 8.3 % (ref 0–13.4)
NEUTROPHILS # BLD AUTO: 4.47 K/UL (ref 1.82–7.42)
NEUTROPHILS NFR BLD: 64.7 % (ref 44–72)
NRBC # BLD AUTO: 0 K/UL
NRBC BLD-RTO: 0 /100 WBC
PLATELET # BLD AUTO: 268 K/UL (ref 164–446)
PMV BLD AUTO: 10.8 FL (ref 9–12.9)
POTASSIUM SERPL-SCNC: 4.3 MMOL/L (ref 3.6–5.5)
PROT SERPL-MCNC: 7.1 G/DL (ref 6–8.2)
RBC # BLD AUTO: 4.83 M/UL (ref 4.7–6.1)
SODIUM SERPL-SCNC: 139 MMOL/L (ref 135–145)
WBC # BLD AUTO: 6.9 K/UL (ref 4.8–10.8)

## 2021-11-22 PROCEDURE — 99225 PR SUBSEQUENT OBSERVATION CARE,LEVEL II: CPT | Performed by: STUDENT IN AN ORGANIZED HEALTH CARE EDUCATION/TRAINING PROGRAM

## 2021-11-22 PROCEDURE — 700102 HCHG RX REV CODE 250 W/ 637 OVERRIDE(OP): Performed by: HOSPITALIST

## 2021-11-22 PROCEDURE — 83735 ASSAY OF MAGNESIUM: CPT

## 2021-11-22 PROCEDURE — 36415 COLL VENOUS BLD VENIPUNCTURE: CPT

## 2021-11-22 PROCEDURE — A9270 NON-COVERED ITEM OR SERVICE: HCPCS | Performed by: INTERNAL MEDICINE

## 2021-11-22 PROCEDURE — 85025 COMPLETE CBC W/AUTO DIFF WBC: CPT

## 2021-11-22 PROCEDURE — 94760 N-INVAS EAR/PLS OXIMETRY 1: CPT

## 2021-11-22 PROCEDURE — G0378 HOSPITAL OBSERVATION PER HR: HCPCS

## 2021-11-22 PROCEDURE — 700102 HCHG RX REV CODE 250 W/ 637 OVERRIDE(OP): Performed by: INTERNAL MEDICINE

## 2021-11-22 PROCEDURE — 80053 COMPREHEN METABOLIC PANEL: CPT

## 2021-11-22 PROCEDURE — A9270 NON-COVERED ITEM OR SERVICE: HCPCS | Performed by: HOSPITALIST

## 2021-11-22 RX ADMIN — SODIUM HYPOCHLORITE 1 ML: 1.25 SOLUTION TOPICAL at 05:10

## 2021-11-22 RX ADMIN — MIDODRINE HYDROCHLORIDE 10 MG: 5 TABLET ORAL at 05:36

## 2021-11-22 RX ADMIN — SENNOSIDES AND DOCUSATE SODIUM 2 TABLET: 50; 8.6 TABLET ORAL at 17:55

## 2021-11-22 RX ADMIN — THERA TABS 1 TABLET: TAB at 05:36

## 2021-11-22 RX ADMIN — RIVAROXABAN 20 MG: 20 TABLET, FILM COATED ORAL at 17:55

## 2021-11-22 RX ADMIN — NYSTATIN 1 APPLICATION: 100000 POWDER TOPICAL at 05:39

## 2021-11-22 RX ADMIN — SENNOSIDES AND DOCUSATE SODIUM 2 TABLET: 50; 8.6 TABLET ORAL at 05:36

## 2021-11-22 RX ADMIN — MIDODRINE HYDROCHLORIDE 10 MG: 5 TABLET ORAL at 17:55

## 2021-11-22 RX ADMIN — NYSTATIN: 100000 POWDER TOPICAL at 17:57

## 2021-11-22 ASSESSMENT — ENCOUNTER SYMPTOMS
COUGH: 0
BLURRED VISION: 0
DOUBLE VISION: 0
NAUSEA: 0
CHILLS: 0
CONSTIPATION: 1
HEADACHES: 0
INSOMNIA: 0
VOMITING: 0
MYALGIAS: 0
SHORTNESS OF BREATH: 0
DIZZINESS: 0
FEVER: 0
PALPITATIONS: 0
DEPRESSION: 0

## 2021-11-22 ASSESSMENT — PAIN DESCRIPTION - PAIN TYPE: TYPE: ACUTE PAIN

## 2021-11-22 NOTE — CARE PLAN
The patient is Watcher - Medium risk of patient condition declining or worsening    Shift Goals  Clinical Goals: Remain free of falls, comfort, dressing change  Patient Goals: Comfort and safety  Family Goals: No family present    Progress made toward(s) clinical / shift goals:  yes    Patient is not progressing towards the following goals:

## 2021-11-22 NOTE — PROGRESS NOTES
COVID 19 surge in effect.     Received report from night shift RN, assumed care of pt. [email or web address]. VSS. Pt denies pain or nausea at this time. Updated on plan of care and answered any questions. Safety precautions in place, pt educated to call for assistance.

## 2021-11-22 NOTE — DISCHARGE PLANNING
ANTICIPATED DISCHARGE DISPOSITION: SNF vs. Home with CG services.    ACTION: Chart reviewed. Patient discussed in IDT Rounds. Still pending outcome from out of area SNF's.     BARRIERS TO DISCHARGE: Accepting SNF, CG services.    PLAN: F/U with brother who was said to be building space for patient that would be done around Thanksgiving. Follow up with dtr regarding Medicare application. Continue to follow for SNF approval.
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
## 2021-11-23 PROCEDURE — 700102 HCHG RX REV CODE 250 W/ 637 OVERRIDE(OP): Performed by: INTERNAL MEDICINE

## 2021-11-23 PROCEDURE — A9270 NON-COVERED ITEM OR SERVICE: HCPCS | Performed by: INTERNAL MEDICINE

## 2021-11-23 PROCEDURE — 700102 HCHG RX REV CODE 250 W/ 637 OVERRIDE(OP): Performed by: HOSPITALIST

## 2021-11-23 PROCEDURE — G0378 HOSPITAL OBSERVATION PER HR: HCPCS

## 2021-11-23 PROCEDURE — 99225 PR SUBSEQUENT OBSERVATION CARE,LEVEL II: CPT | Performed by: INTERNAL MEDICINE

## 2021-11-23 PROCEDURE — A9270 NON-COVERED ITEM OR SERVICE: HCPCS | Performed by: HOSPITALIST

## 2021-11-23 RX ADMIN — SENNOSIDES AND DOCUSATE SODIUM 2 TABLET: 50; 8.6 TABLET ORAL at 05:23

## 2021-11-23 RX ADMIN — THERA TABS 1 TABLET: TAB at 05:23

## 2021-11-23 RX ADMIN — NYSTATIN: 100000 POWDER TOPICAL at 17:06

## 2021-11-23 RX ADMIN — RIVAROXABAN 20 MG: 20 TABLET, FILM COATED ORAL at 17:06

## 2021-11-23 RX ADMIN — NYSTATIN: 100000 POWDER TOPICAL at 05:29

## 2021-11-23 RX ADMIN — SENNOSIDES AND DOCUSATE SODIUM 2 TABLET: 50; 8.6 TABLET ORAL at 17:06

## 2021-11-23 RX ADMIN — MIDODRINE HYDROCHLORIDE 10 MG: 5 TABLET ORAL at 17:06

## 2021-11-23 RX ADMIN — SODIUM HYPOCHLORITE 5 ML: 1.25 SOLUTION TOPICAL at 09:29

## 2021-11-23 RX ADMIN — MIDODRINE HYDROCHLORIDE 10 MG: 5 TABLET ORAL at 05:23

## 2021-11-23 ASSESSMENT — ENCOUNTER SYMPTOMS
BLURRED VISION: 0
CONSTIPATION: 1
HEADACHES: 0
PALPITATIONS: 0
NAUSEA: 0
FEVER: 0
VOMITING: 0
INSOMNIA: 0
MYALGIAS: 0
SHORTNESS OF BREATH: 0
COUGH: 0
CHILLS: 0
DIZZINESS: 0
DEPRESSION: 0
DOUBLE VISION: 0

## 2021-11-23 ASSESSMENT — PAIN DESCRIPTION - PAIN TYPE
TYPE: ACUTE PAIN

## 2021-11-23 NOTE — CARE PLAN
Problem: Knowledge Deficit - Standard  Goal: Patient and family/care givers will demonstrate understanding of plan of care, disease process/condition, diagnostic tests and medications  Outcome: Progressing     Problem: Skin Integrity  Goal: Skin integrity is maintained or improved  Outcome: Not Met     Problem: Fall Risk  Goal: Patient will remain free from falls  Outcome: Progressing     Problem: Bowel Elimination  Goal: Establish and maintain regular bowel function  Outcome: Progressing   The patient is Stable - Low risk of patient condition declining or worsening    Shift Goals  Clinical Goals: comfort, free of pain, [id number], wound care  Patient Goals: comfort, rest  Family Goals: no family    Progress made toward(s) clinical / shift goals:  wound care complete, digital disimpaction complete, q2h turns     Patient is not progressing towards the following goals:      Problem: Skin Integrity  Goal: Skin integrity is maintained or improved  Outcome: Not Met

## 2021-11-23 NOTE — CARE PLAN
The patient is Stable - Low risk of patient condition declining or worsening    Shift Goals  Clinical Goals: remain free of falls or injury, comfort   Patient Goals: comfort, rest   Family Goals: No family present    Progress made toward(s) clinical / shift goals:  Safety and comfort measures in place. Pt has no other needs this shift.     Patient is not progressing towards the following goals:

## 2021-11-23 NOTE — DISCHARGE PLANNING
ANTICIPATED DISCHARGE DISPOSITION: SNF vs. Home with CG services.    ACTION: CM call to patient's daughter Giselle to follow-up on CG services, Medicare application, F/U with [employer] and progress on brother's build out for personal living space that was anticipated to be completed by Chad. Giselle states she has been unable to following up [employer] for Medicare application but states she will ask her  to call next week due to the upcoming holiday. She has spoken with Kenna regarding need for CG services and it was recommended to reach out to [employer] [employer] [employer] as some Nursing students provide CG services. She also states there has been no movement on waiting list for CG services and states that some CG require Physician documentation for services. CM requested who that may be as we may be able to assist in providing needed documentation. Lastly, Giselle stated that patient's brother continues to work on the construction of his private room with another 2 week ETA.      BARRIERS TO DISCHARGE: SNF acceptance due to bowel regimen, family unable to find CG services once construction completed.    PLAN: Continue to follow up with daughter on progress with Medicare, patient room construction and CG services.
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
## 2021-11-23 NOTE — PROGRESS NOTES
Report from MYRIAM Sierra.     Pt is paraplegic, A&Ox4, urostomy in RLQ, admit for constipation and possible UTI.     Universal fall and safety precautions in place, call light within reach and pt demonstrated accurate use, bed locked and in lowest. Care assumed.     **1935: Assessment completed, POC discussed, pt has no current needs or questions.

## 2021-11-23 NOTE — CARE PLAN
The patient is Stable - Low risk of patient condition declining or worsening    Shift Goals  Clinical Goals: comfort, free of falls/injury  Patient Goals: comfort, rest,   Family Goals: no family present    Progress made toward(s) clinical / shift goals:  No falls/injury, comfort and safety measures in place, no other needs.     Patient is not progressing towards the following goals: n/a  Problem: Knowledge Deficit - Standard  Goal: Patient and family/care givers will demonstrate understanding of plan of care, disease process/condition, diagnostic tests and medications  Outcome: Progressing     Problem: Skin Integrity  Goal: Skin integrity is maintained or improved  Outcome: Progressing     Problem: Fall Risk  Goal: Patient will remain free from falls  Outcome: Progressing     Problem: Bowel Elimination  Goal: Establish and maintain regular bowel function  Outcome: Progressing     Problem: Psychosocial  Goal: Patient's level of anxiety will decrease  Outcome: Progressing     Problem: Communication  Goal: The ability to communicate needs accurately and effectively will improve  Outcome: Progressing

## 2021-11-23 NOTE — DISCHARGE PLANNING
Received Choice form at 0755  Agency/Facility Name: [facility]  Referral sent per Choice form @ 0755      CM informed

## 2021-11-23 NOTE — PROGRESS NOTES
Report to MYRIAM Manjarrez.     Pt is paraplegic, A&Ox4, urostomy in RLQ, admit for constipation and possible UTI.     Pt has NOT had am Dakins. Needed to be brought up by NAM and I had already turned in phone and wasn't able to get that med before change of shift.  Re-timed for 0800. Pt had nystatin powder in groin area.     Care transferred.

## 2021-11-23 NOTE — PROGRESS NOTES
Patient alert and oriented x 4, denies pain  Paraplegic, numbness nipple line down   Wound care dressing changes complete, urostomy changed  Offloading boots in place, Q2H turns  Digital disimpaction completed with moderate output of brown stool   Safety precautions in place  covid surge charting

## 2021-11-24 PROCEDURE — G0378 HOSPITAL OBSERVATION PER HR: HCPCS

## 2021-11-24 PROCEDURE — A9270 NON-COVERED ITEM OR SERVICE: HCPCS | Performed by: HOSPITALIST

## 2021-11-24 PROCEDURE — 700102 HCHG RX REV CODE 250 W/ 637 OVERRIDE(OP): Performed by: HOSPITALIST

## 2021-11-24 PROCEDURE — 700102 HCHG RX REV CODE 250 W/ 637 OVERRIDE(OP): Performed by: INTERNAL MEDICINE

## 2021-11-24 PROCEDURE — 99224 PR SUBSEQUENT OBSERVATION CARE,LEVEL I: CPT | Performed by: INTERNAL MEDICINE

## 2021-11-24 PROCEDURE — A9270 NON-COVERED ITEM OR SERVICE: HCPCS | Performed by: INTERNAL MEDICINE

## 2021-11-24 RX ADMIN — SENNOSIDES AND DOCUSATE SODIUM 2 TABLET: 50; 8.6 TABLET ORAL at 17:30

## 2021-11-24 RX ADMIN — RIVAROXABAN 20 MG: 20 TABLET, FILM COATED ORAL at 17:30

## 2021-11-24 RX ADMIN — THERA TABS 1 TABLET: TAB at 05:00

## 2021-11-24 RX ADMIN — NYSTATIN: 100000 POWDER TOPICAL at 17:31

## 2021-11-24 RX ADMIN — SODIUM HYPOCHLORITE 1 ML: 1.25 SOLUTION TOPICAL at 05:00

## 2021-11-24 RX ADMIN — SODIUM HYPOCHLORITE 1 ML: 1.25 SOLUTION TOPICAL at 17:31

## 2021-11-24 RX ADMIN — NYSTATIN: 100000 POWDER TOPICAL at 04:58

## 2021-11-24 RX ADMIN — MIDODRINE HYDROCHLORIDE 10 MG: 5 TABLET ORAL at 05:00

## 2021-11-24 RX ADMIN — SENNOSIDES AND DOCUSATE SODIUM 2 TABLET: 50; 8.6 TABLET ORAL at 05:00

## 2021-11-24 RX ADMIN — MIDODRINE HYDROCHLORIDE 10 MG: 5 TABLET ORAL at 17:30

## 2021-11-24 ASSESSMENT — ENCOUNTER SYMPTOMS
SHORTNESS OF BREATH: 0
DEPRESSION: 0
CHILLS: 0
CONSTIPATION: 0
PALPITATIONS: 0
INSOMNIA: 0
DIZZINESS: 0
VOMITING: 0
FEVER: 0
COUGH: 0
BLURRED VISION: 0
NAUSEA: 0
MYALGIAS: 0
DOUBLE VISION: 0
HEADACHES: 0

## 2021-11-24 ASSESSMENT — PAIN DESCRIPTION - PAIN TYPE
TYPE: ACUTE PAIN;CHRONIC PAIN
TYPE: ACUTE PAIN

## 2021-11-24 NOTE — CARE PLAN
The patient is Stable - Low risk of patient condition declining or worsening    Shift Goals  Clinical Goals: remain free from injury   Patient Goals: comfort, rest  Family Goals: no family    Progress made toward(s) clinical / shift goals:    Problem: Knowledge Deficit - Standard  Goal: Patient and family/care givers will demonstrate understanding of plan of care, disease process/condition, diagnostic tests and medications  Outcome: Progressing  Note: Reviewed plan of care with pt, pt verbalized understanding, no questions at this time.      Problem: Skin Integrity  Goal: Skin integrity is maintained or improved  Outcome: Progressing     Problem: Fall Risk  Goal: Patient will remain free from falls  Outcome: Progressing     Problem: Infection - Standard  Goal: Patient will remain free from infection  Outcome: Progressing       Patient is not progressing towards the following goals:

## 2021-11-24 NOTE — PROGRESS NOTES
Received bedside report. Assumed pt care. Assessment and chart check complete. Pt is AA0X4, no signs of distress, denies nausea/vomiting and pain at this moment. Repositioning every 2 hours. Urostomy bag in place. Dressing CDI, float boots in place. Dressing change per order. Bowel regimen complete.  Fall precautions in place, treaded socks on pt, bed in low position. Call light within reach. Educated pt to call if needing anything.

## 2021-11-24 NOTE — PROGRESS NOTES
Report received from Day RN, assumed care of pt.   POC and medications reviewed with pt. Pt verbalized understanding.   AOx4  Denies pain, SOB, or dizziness at this time.   Safety measures in place.

## 2021-11-24 NOTE — CARE PLAN
The patient is Stable - Low risk of patient condition declining or worsening    Shift Goals  Clinical Goals: prevent falls, wound care  Patient Goals: comfort rest  Family Goals: no family    Progress made toward(s) clinical / shift goals:   Wound care complete, digital disimpaction complete. Repositioning every 2 hours.       Patient is not progressing towards the following goals:      Problem: Skin Integrity  Goal: Skin integrity is maintained or improved  Outcome: Progressing     Problem: Fall Risk  Goal: Patient will remain free from falls  Outcome: Progressing   Bed alarm on, call light within the reach.  Problem: Bowel Elimination  Goal: Establish and maintain regular bowel function  Outcome: Progressing     Problem: Urinary Elimination  Goal: Establish and maintain regular urinary output  Outcome: Progressing
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
## 2021-11-25 PROCEDURE — A9270 NON-COVERED ITEM OR SERVICE: HCPCS | Performed by: INTERNAL MEDICINE

## 2021-11-25 PROCEDURE — A9270 NON-COVERED ITEM OR SERVICE: HCPCS | Performed by: HOSPITALIST

## 2021-11-25 PROCEDURE — 700102 HCHG RX REV CODE 250 W/ 637 OVERRIDE(OP): Performed by: INTERNAL MEDICINE

## 2021-11-25 PROCEDURE — 700102 HCHG RX REV CODE 250 W/ 637 OVERRIDE(OP): Performed by: HOSPITALIST

## 2021-11-25 PROCEDURE — 700101 HCHG RX REV CODE 250: Performed by: INTERNAL MEDICINE

## 2021-11-25 PROCEDURE — 99224 PR SUBSEQUENT OBSERVATION CARE,LEVEL I: CPT | Performed by: INTERNAL MEDICINE

## 2021-11-25 PROCEDURE — G0378 HOSPITAL OBSERVATION PER HR: HCPCS

## 2021-11-25 RX ADMIN — SODIUM HYPOCHLORITE 1 ML: 1.25 SOLUTION TOPICAL at 17:46

## 2021-11-25 RX ADMIN — MIDODRINE HYDROCHLORIDE 10 MG: 5 TABLET ORAL at 06:15

## 2021-11-25 RX ADMIN — RIVAROXABAN 20 MG: 20 TABLET, FILM COATED ORAL at 17:38

## 2021-11-25 RX ADMIN — SENNOSIDES AND DOCUSATE SODIUM 2 TABLET: 50; 8.6 TABLET ORAL at 06:15

## 2021-11-25 RX ADMIN — NYSTATIN: 100000 POWDER TOPICAL at 17:46

## 2021-11-25 RX ADMIN — THERA TABS 1 TABLET: TAB at 06:15

## 2021-11-25 RX ADMIN — SODIUM HYPOCHLORITE 1 ML: 1.25 SOLUTION TOPICAL at 06:00

## 2021-11-25 RX ADMIN — MIDODRINE HYDROCHLORIDE 10 MG: 5 TABLET ORAL at 17:38

## 2021-11-25 RX ADMIN — NYSTATIN: 100000 POWDER TOPICAL at 06:15

## 2021-11-25 RX ADMIN — SENNOSIDES AND DOCUSATE SODIUM 2 TABLET: 50; 8.6 TABLET ORAL at 17:38

## 2021-11-25 ASSESSMENT — PAIN DESCRIPTION - PAIN TYPE
TYPE: ACUTE PAIN
TYPE: ACUTE PAIN

## 2021-11-25 ASSESSMENT — ENCOUNTER SYMPTOMS
SHORTNESS OF BREATH: 0
DEPRESSION: 0
MYALGIAS: 0
DOUBLE VISION: 0
INSOMNIA: 0
CHILLS: 0
NAUSEA: 0
DIZZINESS: 0
HEADACHES: 0
VOMITING: 0
PALPITATIONS: 0
CONSTIPATION: 0
FEVER: 0
BLURRED VISION: 0
COUGH: 0
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
## 2021-11-25 NOTE — CARE PLAN
The patient is Stable - Low risk of patient condition declining or worsening    Shift Goals  Clinical Goals: Pressure ulcer management  Patient Goals: Sleep and comfort  Family Goals: no family    Progress made toward(s) clinical / shift goals: Q 2 hour turns in place/ offered to patient.      Problem: Urinary Elimination  Goal: Establish and maintain regular urinary output  Outcome: Progressing  Note: Urostomy in place.        Patient is not progressing towards the following goals:n/a

## 2021-11-25 NOTE — PROGRESS NOTES
Received report from night shift RN. Patient A&Ox4, denies pain, N/V, SOB at this time. Urostomy in place. Q 2 hour turn/repositioning in place. Heel float boots in place. Call light, belongings in reach. Safety precautions in place.

## 2021-11-25 NOTE — CARE PLAN
The patient is Stable - Low risk of patient condition declining or worsening    Shift Goals  Clinical Goals: Remain free from injury and wound care  Patient Goals: Sleep and comfort  Family Goals: no family    Progress made toward(s) clinical / shift goals:  Patient has remained free from injury and falls.     Patient is not progressing towards the following goals:      Problem: Knowledge Deficit - Standard  Goal: Patient and family/care givers will demonstrate understanding of plan of care, disease process/condition, diagnostic tests and medications  Outcome: Progressing     Problem: Skin Integrity  Goal: Skin integrity is maintained or improved  Outcome: Progressing

## 2021-11-26 PROCEDURE — 700102 HCHG RX REV CODE 250 W/ 637 OVERRIDE(OP): Performed by: INTERNAL MEDICINE

## 2021-11-26 PROCEDURE — 99224 PR SUBSEQUENT OBSERVATION CARE,LEVEL I: CPT | Performed by: INTERNAL MEDICINE

## 2021-11-26 PROCEDURE — A9270 NON-COVERED ITEM OR SERVICE: HCPCS | Performed by: INTERNAL MEDICINE

## 2021-11-26 PROCEDURE — G0378 HOSPITAL OBSERVATION PER HR: HCPCS

## 2021-11-26 PROCEDURE — A9270 NON-COVERED ITEM OR SERVICE: HCPCS | Performed by: HOSPITALIST

## 2021-11-26 PROCEDURE — 700102 HCHG RX REV CODE 250 W/ 637 OVERRIDE(OP): Performed by: HOSPITALIST

## 2021-11-26 RX ADMIN — RIVAROXABAN 20 MG: 20 TABLET, FILM COATED ORAL at 18:00

## 2021-11-26 RX ADMIN — SODIUM HYPOCHLORITE 1 ML: 1.25 SOLUTION TOPICAL at 18:00

## 2021-11-26 RX ADMIN — MIDODRINE HYDROCHLORIDE 10 MG: 5 TABLET ORAL at 06:07

## 2021-11-26 RX ADMIN — MIDODRINE HYDROCHLORIDE 10 MG: 5 TABLET ORAL at 18:00

## 2021-11-26 RX ADMIN — NYSTATIN 1 APPLICATION: 100000 POWDER TOPICAL at 06:00

## 2021-11-26 RX ADMIN — SENNOSIDES AND DOCUSATE SODIUM 2 TABLET: 50; 8.6 TABLET ORAL at 18:00

## 2021-11-26 RX ADMIN — NYSTATIN 1 APPLICATION: 100000 POWDER TOPICAL at 18:00

## 2021-11-26 RX ADMIN — SODIUM HYPOCHLORITE 1 ML: 1.25 SOLUTION TOPICAL at 06:00

## 2021-11-26 RX ADMIN — THERA TABS 1 TABLET: TAB at 06:07

## 2021-11-26 RX ADMIN — SENNOSIDES AND DOCUSATE SODIUM 2 TABLET: 50; 8.6 TABLET ORAL at 06:07

## 2021-11-26 ASSESSMENT — ENCOUNTER SYMPTOMS
NAUSEA: 0
CONSTIPATION: 0
DEPRESSION: 0
COUGH: 0
BLURRED VISION: 0
VOMITING: 0
INSOMNIA: 0
SHORTNESS OF BREATH: 0
DIZZINESS: 0
PALPITATIONS: 0
MYALGIAS: 0
FEVER: 0
HEADACHES: 0
DOUBLE VISION: 0
CHILLS: 0

## 2021-11-26 ASSESSMENT — PAIN DESCRIPTION - PAIN TYPE
TYPE: ACUTE PAIN
TYPE: ACUTE PAIN

## 2021-11-26 NOTE — DISCHARGE PLANNING
ANTICIPATED DISCHARGE DISPOSITION: SNF vs. Home with CG services.     ACTION: Chart reviewed, this is a holiday weekend and offices closed to follow up on referrals.     BARRIERS TO DISCHARGE: SNF acceptance due to bowel regimen, family unable to find CG services once construction completed.     PLAN: Continue to follow up on referrals and with daughter on progress with Medicare, patient room construction and CG services.

## 2021-11-26 NOTE — CARE PLAN
The patient is Stable - Low risk of patient condition declining or worsening    Shift Goals  Clinical Goals: wound management  Patient Goals: Sleep   Family Goals: No family present    Progress made toward(s) clinical / shift goals: Wound dressing as ordered.On low air loss mattress.Positioning rendered.

## 2021-11-26 NOTE — PROGRESS NOTES
Received patient from Night shift RN . Patient is awake and alert.No sign of distress. Patient denies any nausea or pian.  Fall precaution in placed, kept bed in lowest position and call light within reach.  Covid 19 surge in effect

## 2021-11-26 NOTE — PROGRESS NOTES
Manually digging done, able to remove moderate amount of brownish stool.   Patient refused for wound  dressing change as of the moment. To follow up....
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
## 2021-11-26 NOTE — CARE PLAN
The patient is Stable - Low risk of patient condition declining or worsening    Shift Goals  Clinical Goals: Manage pressure ulcers  Patient Goals: Sleep   Family Goals: No family present    Progress made toward(s) clinical / shift goals:  yes    Patient is not progressing towards the following goals:

## 2021-11-27 PROCEDURE — G0378 HOSPITAL OBSERVATION PER HR: HCPCS

## 2021-11-27 PROCEDURE — 700102 HCHG RX REV CODE 250 W/ 637 OVERRIDE(OP): Performed by: INTERNAL MEDICINE

## 2021-11-27 PROCEDURE — A9270 NON-COVERED ITEM OR SERVICE: HCPCS | Performed by: HOSPITALIST

## 2021-11-27 PROCEDURE — 99224 PR SUBSEQUENT OBSERVATION CARE,LEVEL I: CPT | Performed by: INTERNAL MEDICINE

## 2021-11-27 PROCEDURE — 700102 HCHG RX REV CODE 250 W/ 637 OVERRIDE(OP): Performed by: HOSPITALIST

## 2021-11-27 PROCEDURE — A9270 NON-COVERED ITEM OR SERVICE: HCPCS | Performed by: INTERNAL MEDICINE

## 2021-11-27 RX ADMIN — MIDODRINE HYDROCHLORIDE 10 MG: 5 TABLET ORAL at 06:19

## 2021-11-27 RX ADMIN — SENNOSIDES AND DOCUSATE SODIUM 2 TABLET: 50; 8.6 TABLET ORAL at 06:19

## 2021-11-27 RX ADMIN — SODIUM HYPOCHLORITE 1 ML: 1.25 SOLUTION TOPICAL at 06:20

## 2021-11-27 RX ADMIN — SODIUM HYPOCHLORITE 1 ML: 1.25 SOLUTION TOPICAL at 17:06

## 2021-11-27 RX ADMIN — NYSTATIN: 100000 POWDER TOPICAL at 06:20

## 2021-11-27 RX ADMIN — THERA TABS 1 TABLET: TAB at 06:19

## 2021-11-27 RX ADMIN — SENNOSIDES AND DOCUSATE SODIUM 2 TABLET: 50; 8.6 TABLET ORAL at 17:05

## 2021-11-27 RX ADMIN — MIDODRINE HYDROCHLORIDE 10 MG: 5 TABLET ORAL at 17:05

## 2021-11-27 RX ADMIN — RIVAROXABAN 20 MG: 20 TABLET, FILM COATED ORAL at 17:05

## 2021-11-27 ASSESSMENT — ENCOUNTER SYMPTOMS
PALPITATIONS: 0
CONSTIPATION: 0
MYALGIAS: 0
CHILLS: 0
DOUBLE VISION: 0
INSOMNIA: 0
HEADACHES: 0
DEPRESSION: 0
FEVER: 0
BLURRED VISION: 0
NAUSEA: 0
SHORTNESS OF BREATH: 0
DIZZINESS: 0
COUGH: 0
VOMITING: 0

## 2021-11-27 ASSESSMENT — PAIN DESCRIPTION - PAIN TYPE
TYPE: ACUTE PAIN
TYPE: ACUTE PAIN

## 2021-11-27 NOTE — PROGRESS NOTES
Received report from Brenda.  Patient is not expressing any needs at the moment. Safety precautions in place. Assumed care of patient.

## 2021-11-27 NOTE — CARE PLAN
The patient is Stable - Low risk of patient condition declining or worsening    Shift Goals  Clinical Goals: Wound management, sleep  Patient Goals: Sleep   Family Goals: No family present    Progress made toward(s) clinical / shift goals:  Progressing    Patient is not progressing towards the following goals:N/A    Met with patient at bedside to discuss plan of care. Patient is awake and alert. No expressed needs at this time. Will continue to monitor.
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
## 2021-11-27 NOTE — PROGRESS NOTES
Received patient from Night shift RN . Patient is awake and alert.No sign of distress. Patient denies any nausea or pain. Fall precaution in placed, kept bed in lowest position and call light within reach.

## 2021-11-28 PROCEDURE — 700102 HCHG RX REV CODE 250 W/ 637 OVERRIDE(OP): Performed by: INTERNAL MEDICINE

## 2021-11-28 PROCEDURE — 700102 HCHG RX REV CODE 250 W/ 637 OVERRIDE(OP): Performed by: HOSPITALIST

## 2021-11-28 PROCEDURE — G0378 HOSPITAL OBSERVATION PER HR: HCPCS

## 2021-11-28 PROCEDURE — A9270 NON-COVERED ITEM OR SERVICE: HCPCS | Performed by: INTERNAL MEDICINE

## 2021-11-28 PROCEDURE — 99224 PR SUBSEQUENT OBSERVATION CARE,LEVEL I: CPT | Performed by: INTERNAL MEDICINE

## 2021-11-28 PROCEDURE — 94760 N-INVAS EAR/PLS OXIMETRY 1: CPT

## 2021-11-28 PROCEDURE — A9270 NON-COVERED ITEM OR SERVICE: HCPCS | Performed by: HOSPITALIST

## 2021-11-28 RX ADMIN — THERA TABS 1 TABLET: TAB at 05:52

## 2021-11-28 RX ADMIN — SODIUM HYPOCHLORITE 1 ML: 1.25 SOLUTION TOPICAL at 05:53

## 2021-11-28 RX ADMIN — SENNOSIDES AND DOCUSATE SODIUM 2 TABLET: 50; 8.6 TABLET ORAL at 17:56

## 2021-11-28 RX ADMIN — RIVAROXABAN 20 MG: 20 TABLET, FILM COATED ORAL at 17:56

## 2021-11-28 RX ADMIN — MIDODRINE HYDROCHLORIDE 10 MG: 5 TABLET ORAL at 05:52

## 2021-11-28 RX ADMIN — SODIUM HYPOCHLORITE 1 ML: 1.25 SOLUTION TOPICAL at 17:57

## 2021-11-28 RX ADMIN — MIDODRINE HYDROCHLORIDE 10 MG: 5 TABLET ORAL at 17:56

## 2021-11-28 RX ADMIN — SENNOSIDES AND DOCUSATE SODIUM 2 TABLET: 50; 8.6 TABLET ORAL at 05:52

## 2021-11-28 ASSESSMENT — ENCOUNTER SYMPTOMS
FEVER: 0
DIZZINESS: 0
INSOMNIA: 0
VOMITING: 0
HEADACHES: 0
COUGH: 0
CONSTIPATION: 0
MYALGIAS: 0
BLURRED VISION: 0
DEPRESSION: 0
CHILLS: 0
DOUBLE VISION: 0
SHORTNESS OF BREATH: 0
NAUSEA: 0
PALPITATIONS: 0

## 2021-11-28 ASSESSMENT — PAIN DESCRIPTION - PAIN TYPE
TYPE: ACUTE PAIN
TYPE: ACUTE PAIN
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
## 2021-11-28 NOTE — CARE PLAN
The patient is Stable - Low risk of patient condition declining or worsening    Shift Goals  Clinical Goals: Wound management and reposition  Patient Goals: Sleep and rest  Family Goals: No family present    Progress made toward(s) clinical / shift goals:  yes    Patient is not progressing towards the following goals:

## 2021-11-28 NOTE — CARE PLAN
The patient is Watcher - Medium risk of patient condition declining or worsening    Shift Goals  Clinical Goals: patient will be repositioned every two hours and free of new skin injury, dressing change, digital disimpaction      Progress made toward(s) clinical / shift goals:  progress   Dressing change done, digitally disimpacted extracted medium size stool, turns done

## 2021-11-29 PROCEDURE — A9270 NON-COVERED ITEM OR SERVICE: HCPCS | Performed by: HOSPITALIST

## 2021-11-29 PROCEDURE — G0378 HOSPITAL OBSERVATION PER HR: HCPCS

## 2021-11-29 PROCEDURE — 99224 PR SUBSEQUENT OBSERVATION CARE,LEVEL I: CPT | Performed by: INTERNAL MEDICINE

## 2021-11-29 PROCEDURE — 700102 HCHG RX REV CODE 250 W/ 637 OVERRIDE(OP): Performed by: HOSPITALIST

## 2021-11-29 PROCEDURE — 700102 HCHG RX REV CODE 250 W/ 637 OVERRIDE(OP): Performed by: INTERNAL MEDICINE

## 2021-11-29 PROCEDURE — A9270 NON-COVERED ITEM OR SERVICE: HCPCS | Performed by: INTERNAL MEDICINE

## 2021-11-29 RX ADMIN — SENNOSIDES AND DOCUSATE SODIUM 2 TABLET: 50; 8.6 TABLET ORAL at 18:32

## 2021-11-29 RX ADMIN — RIVAROXABAN 20 MG: 20 TABLET, FILM COATED ORAL at 18:32

## 2021-11-29 RX ADMIN — SODIUM HYPOCHLORITE 10 ML: 1.25 SOLUTION TOPICAL at 18:32

## 2021-11-29 RX ADMIN — MIDODRINE HYDROCHLORIDE 10 MG: 5 TABLET ORAL at 06:15

## 2021-11-29 RX ADMIN — THERA TABS 1 TABLET: TAB at 06:15

## 2021-11-29 RX ADMIN — SENNOSIDES AND DOCUSATE SODIUM 2 TABLET: 50; 8.6 TABLET ORAL at 06:15

## 2021-11-29 RX ADMIN — SODIUM HYPOCHLORITE 1 ML: 1.25 SOLUTION TOPICAL at 06:16

## 2021-11-29 RX ADMIN — MIDODRINE HYDROCHLORIDE 10 MG: 5 TABLET ORAL at 18:32

## 2021-11-29 ASSESSMENT — ENCOUNTER SYMPTOMS
DEPRESSION: 0
CONSTIPATION: 0
COUGH: 0
INSOMNIA: 0
DIZZINESS: 0
PALPITATIONS: 0
CHILLS: 0
NAUSEA: 0
HEADACHES: 0
VOMITING: 0
SHORTNESS OF BREATH: 0
DOUBLE VISION: 0
FEVER: 0
BLURRED VISION: 0
MYALGIAS: 0

## 2021-11-29 ASSESSMENT — PAIN DESCRIPTION - PAIN TYPE
TYPE: ACUTE PAIN
TYPE: ACUTE PAIN

## 2021-11-29 ASSESSMENT — PATIENT HEALTH QUESTIONNAIRE - PHQ9
SUM OF ALL RESPONSES TO PHQ9 QUESTIONS 1 AND 2: 0
1. LITTLE INTEREST OR PLEASURE IN DOING THINGS: NOT AT ALL
2. FEELING DOWN, DEPRESSED, IRRITABLE, OR HOPELESS: NOT AT ALL

## 2021-11-29 NOTE — DISCHARGE PLANNING
Agency/Facility Name: [facility]  Spoke To: Thao  Outcome: No answer. Left message    Agency/Facility Name: [facility]  Spoke To: Tyrone  Outcome: No answer. Left message    Agency/Facility Name: [facility]   Spoke To: nobody  Outcome: No answer. Left message        CM informed
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
## 2021-11-29 NOTE — DISCHARGE PLANNING
Agency/Facility Name: [facility]   Spoke To: Thao  Outcome: Pt declined. No DARBY      CM informed

## 2021-11-29 NOTE — CARE PLAN
The patient is Stable - Low risk of patient condition declining or worsening    Shift Goals  Clinical Goals: Reposition every 2 hours, monitor and change dressings.  Patient Goals: Sleep  Family Goals: No family present    Progress made toward(s) clinical / shift goals:  yes    Patient is not progressing towards the following goals:

## 2021-11-29 NOTE — PROGRESS NOTES
Hands off report given by MYRIAM Oliver. Digitally remove patients stool per protocol. Remove a medium, soft brown stool on the patient with CNA.

## 2021-11-29 NOTE — DISCHARGE SUMMARY
ANTICIPATED DISCHARGE DISPOSITION: SNF vs. Home with CG services.     ACTION: Chart reviewed, no acute changes noted. Patient has not yet been accepted to any facility at this time due to daily bowel regimen.     BARRIERS TO DISCHARGE: SNF acceptance due to bowel regimen, family unable to find CG services once new home construction at [facility] completed.     PLAN: Continue to follow up on referrals and with daughter on progress with Medicare, patient room construction and CG services.
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
## 2021-11-30 PROCEDURE — 700102 HCHG RX REV CODE 250 W/ 637 OVERRIDE(OP): Performed by: HOSPITALIST

## 2021-11-30 PROCEDURE — A9270 NON-COVERED ITEM OR SERVICE: HCPCS | Performed by: HOSPITALIST

## 2021-11-30 PROCEDURE — G0378 HOSPITAL OBSERVATION PER HR: HCPCS

## 2021-11-30 PROCEDURE — 97602 WOUND(S) CARE NON-SELECTIVE: CPT

## 2021-11-30 PROCEDURE — 99225 PR SUBSEQUENT OBSERVATION CARE,LEVEL II: CPT | Performed by: INTERNAL MEDICINE

## 2021-11-30 PROCEDURE — 700102 HCHG RX REV CODE 250 W/ 637 OVERRIDE(OP): Performed by: INTERNAL MEDICINE

## 2021-11-30 PROCEDURE — A9270 NON-COVERED ITEM OR SERVICE: HCPCS | Performed by: INTERNAL MEDICINE

## 2021-11-30 RX ADMIN — SENNOSIDES AND DOCUSATE SODIUM 2 TABLET: 50; 8.6 TABLET ORAL at 18:21

## 2021-11-30 RX ADMIN — MIDODRINE HYDROCHLORIDE 10 MG: 5 TABLET ORAL at 18:21

## 2021-11-30 RX ADMIN — RIVAROXABAN 20 MG: 20 TABLET, FILM COATED ORAL at 18:21

## 2021-11-30 RX ADMIN — SODIUM HYPOCHLORITE 1 ML: 1.25 SOLUTION TOPICAL at 06:01

## 2021-11-30 RX ADMIN — THERA TABS 1 TABLET: TAB at 06:01

## 2021-11-30 RX ADMIN — SENNOSIDES AND DOCUSATE SODIUM 2 TABLET: 50; 8.6 TABLET ORAL at 06:01

## 2021-11-30 RX ADMIN — MIDODRINE HYDROCHLORIDE 10 MG: 5 TABLET ORAL at 06:01

## 2021-11-30 ASSESSMENT — ENCOUNTER SYMPTOMS
DIZZINESS: 0
SORE THROAT: 0
HEADACHES: 0
CONSTIPATION: 0
HEARTBURN: 0
DEPRESSION: 0
FEVER: 0
DIARRHEA: 0
COUGH: 0
CLAUDICATION: 0
SENSORY CHANGE: 0
VOMITING: 0
ABDOMINAL PAIN: 0
PHOTOPHOBIA: 0
MYALGIAS: 0
INSOMNIA: 0
BLURRED VISION: 0
WEAKNESS: 0
SPEECH CHANGE: 0
NERVOUS/ANXIOUS: 0
CHILLS: 0
SHORTNESS OF BREATH: 0

## 2021-11-30 ASSESSMENT — PATIENT HEALTH QUESTIONNAIRE - PHQ9
SUM OF ALL RESPONSES TO PHQ9 QUESTIONS 1 AND 2: 0
1. LITTLE INTEREST OR PLEASURE IN DOING THINGS: NOT AT ALL

## 2021-11-30 ASSESSMENT — PAIN DESCRIPTION - PAIN TYPE
TYPE: ACUTE PAIN
TYPE: ACUTE PAIN
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
## 2021-11-30 NOTE — PROGRESS NOTES
Received bedside report from Loree MIGUEL, pt care assumed. VSS, pt assessment complete. Pt A&Ox4, no pain at this time. POC discussed with pt and verbalizes no questions. Pt denies any additional needs at this time. Bed locked and in lowest position, bed alarm refused. Pt educated on fall risk and verbalized understanding, call light within reach, hourly rounding initiated.

## 2021-12-01 PROCEDURE — 99225 PR SUBSEQUENT OBSERVATION CARE,LEVEL II: CPT | Performed by: INTERNAL MEDICINE

## 2021-12-01 PROCEDURE — A9270 NON-COVERED ITEM OR SERVICE: HCPCS | Performed by: INTERNAL MEDICINE

## 2021-12-01 PROCEDURE — 700102 HCHG RX REV CODE 250 W/ 637 OVERRIDE(OP): Performed by: INTERNAL MEDICINE

## 2021-12-01 PROCEDURE — G0378 HOSPITAL OBSERVATION PER HR: HCPCS

## 2021-12-01 PROCEDURE — 700102 HCHG RX REV CODE 250 W/ 637 OVERRIDE(OP): Performed by: HOSPITALIST

## 2021-12-01 PROCEDURE — A9270 NON-COVERED ITEM OR SERVICE: HCPCS | Performed by: HOSPITALIST

## 2021-12-01 RX ADMIN — RIVAROXABAN 20 MG: 20 TABLET, FILM COATED ORAL at 17:41

## 2021-12-01 RX ADMIN — SENNOSIDES AND DOCUSATE SODIUM 2 TABLET: 50; 8.6 TABLET ORAL at 17:41

## 2021-12-01 RX ADMIN — SODIUM HYPOCHLORITE 10 ML: 1.25 SOLUTION TOPICAL at 17:41

## 2021-12-01 RX ADMIN — MIDODRINE HYDROCHLORIDE 10 MG: 5 TABLET ORAL at 17:41

## 2021-12-01 RX ADMIN — SODIUM HYPOCHLORITE 1 ML: 1.25 SOLUTION TOPICAL at 06:00

## 2021-12-01 RX ADMIN — SENNOSIDES AND DOCUSATE SODIUM 2 TABLET: 50; 8.6 TABLET ORAL at 06:07

## 2021-12-01 RX ADMIN — THERA TABS 1 TABLET: TAB at 06:07

## 2021-12-01 RX ADMIN — MIDODRINE HYDROCHLORIDE 10 MG: 5 TABLET ORAL at 06:07

## 2021-12-01 ASSESSMENT — ENCOUNTER SYMPTOMS
CONSTIPATION: 0
DEPRESSION: 0
MYALGIAS: 0
PHOTOPHOBIA: 0
CLAUDICATION: 0
DIARRHEA: 0
DIZZINESS: 0
ABDOMINAL PAIN: 0
HEADACHES: 0
CHILLS: 0
VOMITING: 0
SPEECH CHANGE: 0
HEARTBURN: 0
INSOMNIA: 0
SHORTNESS OF BREATH: 0
COUGH: 0
BLURRED VISION: 0
NERVOUS/ANXIOUS: 0
FEVER: 0
WEAKNESS: 0
SENSORY CHANGE: 0
SORE THROAT: 0

## 2021-12-01 ASSESSMENT — PAIN DESCRIPTION - PAIN TYPE
TYPE: ACUTE PAIN
TYPE: ACUTE PAIN

## 2021-12-01 NOTE — CARE PLAN
The patient is Stable - Low risk of patient condition declining or worsening    Shift Goals  Clinical Goals: Bowel protocol; q2H repositioning  Patient Goals: rest   Family Goals: family present    Progress made toward(s) clinical / shift goals:  Digitally remove patient stool per bowel regimen. Removed a medium, soft brown stool. Patient was reposition every 4 hours instead of two per patient request.      Problem: Skin Integrity  Goal: Skin integrity is maintained or improved  Outcome: Progressing     Problem: Communication  Goal: The ability to communicate needs accurately and effectively will improve  Outcome: Progressing

## 2021-12-01 NOTE — DISCHARGE PLANNING
ANTICIPATED DISCHARGE DISPOSITION: SNF vs Home with CG assistance    ACTION: Patient discussed in IDT rounds. No acute changes noted. DPA continues to follow up with SNF referrals. Patient is medically cleared.     BARRIERS TO DISCHARGE: SNF acceptance d/t manual daily bowel regimen. Lack of CG for safe discharge home and incompletion on home construction.    PLAN: Continue to follow-up with family and pending referrals.

## 2021-12-01 NOTE — PROGRESS NOTES
Received report from MYRIAM Chew.  Assumed Pt. Care  Pt. A&Ox4  No sign of cardiac and respiratory distress.  Pain is 0/10.  Assisted to set up his breakfast tray. Patient is eating breakfast.  Bed at lowest position.  Call light and personal belonging within reach.   Encourage to call for assistance.

## 2021-12-01 NOTE — CARE PLAN
Problem: Knowledge Deficit - Standard  Goal: Patient and family/care givers will demonstrate understanding of plan of care, disease process/condition, diagnostic tests and medications  Outcome: Progressing     Problem: Skin Integrity  Goal: Skin integrity is maintained or improved  Outcome: Progressing     Problem: Fall Risk  Goal: Patient will remain free from falls  Outcome: Progressing     Problem: Bowel Elimination  Goal: Establish and maintain regular bowel function  Outcome: Progressing     Problem: Psychosocial  Goal: Patient's level of anxiety will decrease  Outcome: Progressing     Problem: Communication  Goal: The ability to communicate needs accurately and effectively will improve  Outcome: Progressing   The patient is Stable - Low risk of patient condition declining or worsening    Shift Goals  Clinical Goals: bowel protocol Q2 turns   Patient Goals: rest comfortably   Family Goals: No family present    Progress made toward(s) clinical / shift goals:  all goals     Patient is not progressing towards the following goals:
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
## 2021-12-02 PROCEDURE — 700102 HCHG RX REV CODE 250 W/ 637 OVERRIDE(OP): Performed by: HOSPITALIST

## 2021-12-02 PROCEDURE — A9270 NON-COVERED ITEM OR SERVICE: HCPCS | Performed by: HOSPITALIST

## 2021-12-02 PROCEDURE — A9270 NON-COVERED ITEM OR SERVICE: HCPCS | Performed by: INTERNAL MEDICINE

## 2021-12-02 PROCEDURE — G0378 HOSPITAL OBSERVATION PER HR: HCPCS

## 2021-12-02 PROCEDURE — 700102 HCHG RX REV CODE 250 W/ 637 OVERRIDE(OP): Performed by: INTERNAL MEDICINE

## 2021-12-02 PROCEDURE — 94760 N-INVAS EAR/PLS OXIMETRY 1: CPT

## 2021-12-02 PROCEDURE — 99225 PR SUBSEQUENT OBSERVATION CARE,LEVEL II: CPT | Performed by: INTERNAL MEDICINE

## 2021-12-02 RX ADMIN — SENNOSIDES AND DOCUSATE SODIUM 2 TABLET: 50; 8.6 TABLET ORAL at 06:08

## 2021-12-02 RX ADMIN — SODIUM HYPOCHLORITE 1 ML: 1.25 SOLUTION TOPICAL at 06:08

## 2021-12-02 RX ADMIN — THERA TABS 1 TABLET: TAB at 06:07

## 2021-12-02 RX ADMIN — SODIUM HYPOCHLORITE 30 ML: 1.25 SOLUTION TOPICAL at 17:15

## 2021-12-02 RX ADMIN — MIDODRINE HYDROCHLORIDE 10 MG: 5 TABLET ORAL at 06:08

## 2021-12-02 RX ADMIN — MIDODRINE HYDROCHLORIDE 10 MG: 5 TABLET ORAL at 17:14

## 2021-12-02 RX ADMIN — RIVAROXABAN 20 MG: 20 TABLET, FILM COATED ORAL at 17:14

## 2021-12-02 RX ADMIN — SENNOSIDES AND DOCUSATE SODIUM 2 TABLET: 50; 8.6 TABLET ORAL at 17:14

## 2021-12-02 ASSESSMENT — ENCOUNTER SYMPTOMS
DIZZINESS: 0
CLAUDICATION: 0
HEARTBURN: 0
CHILLS: 0
CONSTIPATION: 0
ABDOMINAL PAIN: 0
NERVOUS/ANXIOUS: 0
COUGH: 0
DEPRESSION: 0
SHORTNESS OF BREATH: 0
SPEECH CHANGE: 0
BLURRED VISION: 0
PHOTOPHOBIA: 0
SORE THROAT: 0
MYALGIAS: 0
VOMITING: 0
SENSORY CHANGE: 0
FEVER: 0
HEADACHES: 0
WEAKNESS: 0
INSOMNIA: 0
DIARRHEA: 0

## 2021-12-02 NOTE — PROGRESS NOTES
Pt refused Q2 hour turns. Pt requested turns at 2200,0400,and 0600. Pt advised of risk. Pt refuses pt turned at 2200,0400,and 0600
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
## 2021-12-02 NOTE — CARE PLAN
The patient is Stable - Low risk of patient condition declining or worsening    Shift Goals  Clinical Goals: Bowel regimen; Urostomy care; wound care  Patient Goals: rest comfortably  Family Goals: No family present    Progress made toward(s) clinical / shift goals:  Patient was manually disimpacted and patient had a large, soft, brown stool.    Problem: Skin Integrity  Goal: Skin integrity is maintained or improved  Outcome: Progressing  Notes: Dressing change was done for the patient.

## 2021-12-02 NOTE — PROGRESS NOTES
Received report from night shift RN. Pt is awake and alert. No signs of distress. Pt denies any nausea. Pt denies any pain at this time. Urostomy in place. Dressings in place.  fall precautions in place. Bed in lowest and locked position. Call light within reach. Pt educated to call when in need of assistance. Pt verbalized understanding.

## 2021-12-02 NOTE — CARE PLAN
Problem: Knowledge Deficit - Standard  Goal: Patient and family/care givers will demonstrate understanding of plan of care, disease process/condition, diagnostic tests and medications  Outcome: Progressing     Problem: Skin Integrity  Goal: Skin integrity is maintained or improved  Outcome: Progressing     Problem: Bowel Elimination  Goal: Establish and maintain regular bowel function  Outcome: Progressing     Problem: Psychosocial  Goal: Patient's level of anxiety will decrease  Outcome: Progressing     Problem: Communication  Goal: The ability to communicate needs accurately and effectively will improve  Outcome: Progressing   The patient is Stable - Low risk of patient condition declining or worsening    Shift Goals  Clinical Goals: Q 2 hour turns and skin intergrity   Patient Goals: rest comfortably   Family Goals: No family present    Progress made toward(s) clinical / shift goals:  all goals     Patient is not progressing towards the following goals:

## 2021-12-03 PROCEDURE — 700102 HCHG RX REV CODE 250 W/ 637 OVERRIDE(OP): Performed by: INTERNAL MEDICINE

## 2021-12-03 PROCEDURE — 99225 PR SUBSEQUENT OBSERVATION CARE,LEVEL II: CPT | Performed by: INTERNAL MEDICINE

## 2021-12-03 PROCEDURE — A9270 NON-COVERED ITEM OR SERVICE: HCPCS | Performed by: HOSPITALIST

## 2021-12-03 PROCEDURE — 700102 HCHG RX REV CODE 250 W/ 637 OVERRIDE(OP): Performed by: HOSPITALIST

## 2021-12-03 PROCEDURE — A9270 NON-COVERED ITEM OR SERVICE: HCPCS | Performed by: INTERNAL MEDICINE

## 2021-12-03 PROCEDURE — G0378 HOSPITAL OBSERVATION PER HR: HCPCS

## 2021-12-03 RX ORDER — NYSTATIN 100000 [USP'U]/G
POWDER TOPICAL 2 TIMES DAILY
Status: DISCONTINUED | OUTPATIENT
Start: 2021-12-03 | End: 2021-12-20 | Stop reason: HOSPADM

## 2021-12-03 RX ADMIN — RIVAROXABAN 20 MG: 20 TABLET, FILM COATED ORAL at 17:31

## 2021-12-03 RX ADMIN — SODIUM HYPOCHLORITE 1 ML: 1.25 SOLUTION TOPICAL at 05:44

## 2021-12-03 RX ADMIN — MIDODRINE HYDROCHLORIDE 10 MG: 5 TABLET ORAL at 17:31

## 2021-12-03 RX ADMIN — SODIUM HYPOCHLORITE 1 ML: 1.25 SOLUTION TOPICAL at 17:31

## 2021-12-03 RX ADMIN — THERA TABS 1 TABLET: TAB at 05:44

## 2021-12-03 RX ADMIN — SENNOSIDES AND DOCUSATE SODIUM 2 TABLET: 50; 8.6 TABLET ORAL at 05:44

## 2021-12-03 RX ADMIN — SENNOSIDES AND DOCUSATE SODIUM 2 TABLET: 50; 8.6 TABLET ORAL at 17:31

## 2021-12-03 RX ADMIN — MIDODRINE HYDROCHLORIDE 10 MG: 5 TABLET ORAL at 05:44

## 2021-12-03 RX ADMIN — NYSTATIN: 100000 POWDER TOPICAL at 12:49

## 2021-12-03 RX ADMIN — NYSTATIN: 100000 POWDER TOPICAL at 17:32

## 2021-12-03 ASSESSMENT — PATIENT HEALTH QUESTIONNAIRE - PHQ9
SUM OF ALL RESPONSES TO PHQ9 QUESTIONS 1 AND 2: 0
2. FEELING DOWN, DEPRESSED, IRRITABLE, OR HOPELESS: NOT AT ALL
1. LITTLE INTEREST OR PLEASURE IN DOING THINGS: NOT AT ALL

## 2021-12-03 ASSESSMENT — ENCOUNTER SYMPTOMS
CONSTIPATION: 0
FEVER: 0
DIARRHEA: 0
SORE THROAT: 0
HEADACHES: 0
DIZZINESS: 0
VOMITING: 0
NERVOUS/ANXIOUS: 0
WEAKNESS: 0
INSOMNIA: 0
PHOTOPHOBIA: 0
SPEECH CHANGE: 0
MYALGIAS: 0
CHILLS: 0
ABDOMINAL PAIN: 0
SHORTNESS OF BREATH: 0
DEPRESSION: 0
COUGH: 0
SENSORY CHANGE: 0
HEARTBURN: 0
BLURRED VISION: 0
CLAUDICATION: 0

## 2021-12-03 ASSESSMENT — PAIN DESCRIPTION - PAIN TYPE
TYPE: ACUTE PAIN
TYPE: ACUTE PAIN;CHRONIC PAIN
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
## 2021-12-03 NOTE — CARE PLAN
The patient is Watcher - Medium risk of patient condition declining or worsening    Shift Goals  Clinical Goals: maintain skin integrity, reposition, bowel regime, dressing change     Progress made toward(s) clinical / shift goals:  Progressing, digitally disimpacted at 1000, dressing changed at 1600, q2 turns, no evidence of new skin injury, patient tolerated well

## 2021-12-03 NOTE — CARE PLAN
Problem: Knowledge Deficit - Standard  Goal: Patient and family/care givers will demonstrate understanding of plan of care, disease process/condition, diagnostic tests and medications  Outcome: Progressing     Problem: Skin Integrity  Goal: Skin integrity is maintained or improved  Outcome: Progressing     Problem: Fall Risk  Goal: Patient will remain free from falls  Outcome: Progressing     Problem: Bowel Elimination  Goal: Establish and maintain regular bowel function  Outcome: Progressing     Problem: Psychosocial  Goal: Patient's level of anxiety will decrease  Outcome: Progressing     Problem: Communication  Goal: The ability to communicate needs accurately and effectively will improve  Outcome: Progressing     Problem: Communication  Goal: The ability to communicate needs accurately and effectively will improve  Outcome: Progressing     Problem: Hemodynamics  Goal: Patient's hemodynamics, fluid balance and neurologic status will be stable or improve  Outcome: Progressing   The patient is Stable - Low risk of patient condition declining or worsening    Shift Goals  Clinical Goals: maintain skin intergrity   Patient Goals: rest comfortably   Family Goals: No family present    Progress made toward(s) clinical / shift goals:  all goals     Patient is not progressing towards the following goals:

## 2021-12-04 PROCEDURE — G0378 HOSPITAL OBSERVATION PER HR: HCPCS

## 2021-12-04 PROCEDURE — 99225 PR SUBSEQUENT OBSERVATION CARE,LEVEL II: CPT | Performed by: HOSPITALIST

## 2021-12-04 PROCEDURE — A9270 NON-COVERED ITEM OR SERVICE: HCPCS | Performed by: HOSPITALIST

## 2021-12-04 PROCEDURE — 700102 HCHG RX REV CODE 250 W/ 637 OVERRIDE(OP): Performed by: INTERNAL MEDICINE

## 2021-12-04 PROCEDURE — A9270 NON-COVERED ITEM OR SERVICE: HCPCS | Performed by: INTERNAL MEDICINE

## 2021-12-04 PROCEDURE — 700102 HCHG RX REV CODE 250 W/ 637 OVERRIDE(OP): Performed by: HOSPITALIST

## 2021-12-04 RX ADMIN — SODIUM HYPOCHLORITE 10 ML: 1.25 SOLUTION TOPICAL at 06:14

## 2021-12-04 RX ADMIN — SODIUM HYPOCHLORITE 10 ML: 1.25 SOLUTION TOPICAL at 17:42

## 2021-12-04 RX ADMIN — NYSTATIN: 100000 POWDER TOPICAL at 06:14

## 2021-12-04 RX ADMIN — MIDODRINE HYDROCHLORIDE 10 MG: 5 TABLET ORAL at 06:12

## 2021-12-04 RX ADMIN — SENNOSIDES AND DOCUSATE SODIUM 2 TABLET: 50; 8.6 TABLET ORAL at 06:12

## 2021-12-04 RX ADMIN — SENNOSIDES AND DOCUSATE SODIUM 2 TABLET: 50; 8.6 TABLET ORAL at 18:00

## 2021-12-04 RX ADMIN — THERA TABS 1 TABLET: TAB at 06:12

## 2021-12-04 RX ADMIN — RIVAROXABAN 20 MG: 20 TABLET, FILM COATED ORAL at 18:00

## 2021-12-04 RX ADMIN — NYSTATIN: 100000 POWDER TOPICAL at 17:41

## 2021-12-04 RX ADMIN — MIDODRINE HYDROCHLORIDE 10 MG: 5 TABLET ORAL at 17:41

## 2021-12-04 ASSESSMENT — ENCOUNTER SYMPTOMS
PHOTOPHOBIA: 0
CLAUDICATION: 0
SORE THROAT: 0
HEADACHES: 0
CHILLS: 0
INSOMNIA: 0
ABDOMINAL PAIN: 0
DEPRESSION: 0
VOMITING: 0
NERVOUS/ANXIOUS: 0
SHORTNESS OF BREATH: 0
WEAKNESS: 0
COUGH: 0
SPEECH CHANGE: 0
CONSTIPATION: 0
HEARTBURN: 0
MYALGIAS: 0
DIZZINESS: 0
DIARRHEA: 0
BLURRED VISION: 0
FEVER: 0
SENSORY CHANGE: 0
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
## 2021-12-04 NOTE — PROGRESS NOTES
Assumed care of patient at 1900.  Patient is alert and oriented, resting in bed and using cell phone.  Plan of care fiscussed with patient.  Patient denies needs and denies pain.  Urostomy to gravity bag.  Call light remains in reach. Hourly rounding continues.

## 2021-12-04 NOTE — CARE PLAN
Problem: Skin Integrity  Goal: Skin integrity is maintained or improved  Outcome: Progressing  Note: Patient remains on a low air loss bed and Q2H turning continues.     Problem: Fall Risk  Goal: Patient will remain free from falls  Outcome: Progressing  Note: Bed remains in low and locked position.  Side rails up.  Call light remains in reach of patient and patient uses appropriately.  Patient does not make attempts to get up out of bed and remains oriented.   The patient is Stable - Low risk of patient condition declining or worsening    Shift Goals  Clinical Goals: Patient will be able to sleep tonight  Patient Goals: Patient will remains pain free  Family Goals: No family present    Progress made toward(s) clinical / shift goals:  Patient is sleeping this shift.

## 2021-12-04 NOTE — CARE PLAN
The patient is Stable - Low risk of patient condition declining or worsening    Shift Goals  Clinical Goals: placement  Patient Goals: placement  Family Goals: No family present    Progress made toward(s) clinical / shift goals:    Problem: Knowledge Deficit - Standard  Goal: Patient and family/care givers will demonstrate understanding of plan of care, disease process/condition, diagnostic tests and medications  Outcome: Progressing     Problem: Skin Integrity  Goal: Skin integrity is maintained or improved  Outcome: Progressing     Problem: Fall Risk  Goal: Patient will remain free from falls  Outcome: Progressing     Problem: Bowel Elimination  Goal: Establish and maintain regular bowel function  Outcome: Progressing     Problem: Psychosocial  Goal: Patient's level of anxiety will decrease  Outcome: Progressing     Problem: Communication  Goal: The ability to communicate needs accurately and effectively will improve  Outcome: Progressing         Problem: Discharge Barriers/Planning  Goal: Patient's continuum of care needs are met  Outcome: Not Progressing    Patient Aox4, denies pain and nausea.  Paraplegic with dressing x2 to coccyx/buttocks area CDI.  Multiple dressing to BLE, all intact, moon boots on.    Urostomy intact draining hazy output.    Patient scheduled for manual disimpaction this am, daily.    Patient waiting on placement and hiring of new caregiver.

## 2021-12-05 PROCEDURE — 94760 N-INVAS EAR/PLS OXIMETRY 1: CPT

## 2021-12-05 PROCEDURE — A9270 NON-COVERED ITEM OR SERVICE: HCPCS | Performed by: HOSPITALIST

## 2021-12-05 PROCEDURE — A9270 NON-COVERED ITEM OR SERVICE: HCPCS | Performed by: INTERNAL MEDICINE

## 2021-12-05 PROCEDURE — 700102 HCHG RX REV CODE 250 W/ 637 OVERRIDE(OP): Performed by: HOSPITALIST

## 2021-12-05 PROCEDURE — 700102 HCHG RX REV CODE 250 W/ 637 OVERRIDE(OP): Performed by: INTERNAL MEDICINE

## 2021-12-05 PROCEDURE — 99225 PR SUBSEQUENT OBSERVATION CARE,LEVEL II: CPT | Performed by: HOSPITALIST

## 2021-12-05 PROCEDURE — G0378 HOSPITAL OBSERVATION PER HR: HCPCS

## 2021-12-05 RX ADMIN — MIDODRINE HYDROCHLORIDE 10 MG: 5 TABLET ORAL at 06:19

## 2021-12-05 RX ADMIN — RIVAROXABAN 20 MG: 20 TABLET, FILM COATED ORAL at 17:27

## 2021-12-05 RX ADMIN — MIDODRINE HYDROCHLORIDE 10 MG: 5 TABLET ORAL at 17:27

## 2021-12-05 RX ADMIN — SENNOSIDES AND DOCUSATE SODIUM 2 TABLET: 50; 8.6 TABLET ORAL at 06:19

## 2021-12-05 RX ADMIN — NYSTATIN: 100000 POWDER TOPICAL at 06:20

## 2021-12-05 RX ADMIN — SENNOSIDES AND DOCUSATE SODIUM 2 TABLET: 50; 8.6 TABLET ORAL at 17:27

## 2021-12-05 RX ADMIN — NYSTATIN: 100000 POWDER TOPICAL at 17:29

## 2021-12-05 RX ADMIN — THERA TABS 1 TABLET: TAB at 06:19

## 2021-12-05 RX ADMIN — SODIUM HYPOCHLORITE 10 ML: 1.25 SOLUTION TOPICAL at 17:30

## 2021-12-05 RX ADMIN — SODIUM HYPOCHLORITE 10 ML: 1.25 SOLUTION TOPICAL at 06:21

## 2021-12-05 ASSESSMENT — ENCOUNTER SYMPTOMS
VOMITING: 0
PHOTOPHOBIA: 0
DIZZINESS: 0
BLURRED VISION: 0
CLAUDICATION: 0
DIARRHEA: 0
COUGH: 0
HEARTBURN: 0
WEAKNESS: 0
FEVER: 0
ABDOMINAL PAIN: 0
CHILLS: 0
DEPRESSION: 0
HEADACHES: 0
SENSORY CHANGE: 0
CONSTIPATION: 0
SORE THROAT: 0
MYALGIAS: 0
SPEECH CHANGE: 0
INSOMNIA: 0
NERVOUS/ANXIOUS: 0
SHORTNESS OF BREATH: 0

## 2021-12-05 ASSESSMENT — LIFESTYLE VARIABLES
HAVE YOU EVER FELT YOU SHOULD CUT DOWN ON YOUR DRINKING: NO
HOW MANY TIMES IN THE PAST YEAR HAVE YOU HAD 5 OR MORE DRINKS IN A DAY: 0
EVER HAD A DRINK FIRST THING IN THE MORNING TO STEADY YOUR NERVES TO GET RID OF A HANGOVER: NO
CONSUMPTION TOTAL: NEGATIVE
AVERAGE NUMBER OF DAYS PER WEEK YOU HAVE A DRINK CONTAINING ALCOHOL: 0
TOTAL SCORE: 0
HAVE PEOPLE ANNOYED YOU BY CRITICIZING YOUR DRINKING: NO
ON A TYPICAL DAY WHEN YOU DRINK ALCOHOL HOW MANY DRINKS DO YOU HAVE: 0
EVER FELT BAD OR GUILTY ABOUT YOUR DRINKING: NO
ALCOHOL_USE: NO
TOTAL SCORE: 0
TOTAL SCORE: 0

## 2021-12-05 ASSESSMENT — PAIN DESCRIPTION - PAIN TYPE
TYPE: ACUTE PAIN
TYPE: ACUTE PAIN

## 2021-12-05 NOTE — PROGRESS NOTES
Assumed care of patient at 1900.  Patient is alert and oriented, pleasant and cooperative.  Patient is in bed watching videos on his phone.  Patient denies needs at this time.  Urostomy to gravity bag.  Patient has his bag of drinking water in bed within reach.  Call light also in reach.  Plans for turning and plan of care discussed with patient and CNAs.  Hourly rounding continues.
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
## 2021-12-05 NOTE — CARE PLAN
Problem: Communication  Goal: The ability to communicate needs accurately and effectively will improve  Outcome: Progressing  Note: Patient encourage to voice concerns.       Problem: Urinary Elimination  Goal: Establish and maintain regular urinary output  Outcome: Progressing  Note: Urostomy continues to put out adequate amounts urine into gravity bag.   The patient is Stable - Low risk of patient condition declining or worsening    Shift Goals  Clinical Goals: sleep  Patient Goals: sleep  Family Goals: No family present    Progress made toward(s) clinical / shift goals:  Patient is resting quietly in bed and appears to be sleeping.

## 2021-12-05 NOTE — PROGRESS NOTES
1030- Manually disimpacted patient per bowel regimen for the patient. Patient had a medium, soft, brown stool. Replace bed sheets and repositioned the patient with nursing student.

## 2021-12-05 NOTE — PROGRESS NOTES
Received report from MYRIAM Moreno.  Assumed Pt. Care  Pt. A&Ox4  No sign of cardiac and respiratory distress.  Pain is 0/10  Pt. Is comfortable in bed eating breakfast.  Bed at lowest position.  Call light and personal belonging within reach.   Encourage to call for assistance.

## 2021-12-05 NOTE — CARE PLAN
The patient is Stable - Low risk of patient condition declining or worsening    Shift Goals  Clinical Goals: Bowel regimen; q2 turns  Patient Goals: Comfort  Family Goals: No family present    Progress made toward(s) clinical / shift goals:  Bowel regimen has been done to the patient. Digital impaction is done and patient had a medium, brown, soft stools.  Patient is turned every 2 hours.      Problem: Communication  Goal: The ability to communicate needs accurately and effectively will improve  Outcome: Progressing     Problem: Skin Integrity  Goal: Skin integrity is maintained or improved  Outcome: Progressing

## 2021-12-06 LAB
ANION GAP SERPL CALC-SCNC: 13 MMOL/L (ref 7–16)
BUN SERPL-MCNC: 20 MG/DL (ref 8–22)
CALCIUM SERPL-MCNC: 9.2 MG/DL (ref 8.4–10.2)
CHLORIDE SERPL-SCNC: 108 MMOL/L (ref 96–112)
CO2 SERPL-SCNC: 20 MMOL/L (ref 20–33)
CREAT SERPL-MCNC: 0.47 MG/DL (ref 0.5–1.4)
ERYTHROCYTE [DISTWIDTH] IN BLOOD BY AUTOMATED COUNT: 48.4 FL (ref 35.9–50)
GLUCOSE SERPL-MCNC: 81 MG/DL (ref 65–99)
HCT VFR BLD AUTO: 43.5 % (ref 42–52)
HGB BLD-MCNC: 13.3 G/DL (ref 14–18)
MAGNESIUM SERPL-MCNC: 2.2 MG/DL (ref 1.5–2.5)
MCH RBC QN AUTO: 26.7 PG (ref 27–33)
MCHC RBC AUTO-ENTMCNC: 30.6 G/DL (ref 33.7–35.3)
MCV RBC AUTO: 87.3 FL (ref 81.4–97.8)
PHOSPHATE SERPL-MCNC: 4.5 MG/DL (ref 2.5–4.5)
PLATELET # BLD AUTO: 325 K/UL (ref 164–446)
PMV BLD AUTO: 10.3 FL (ref 9–12.9)
POTASSIUM SERPL-SCNC: 4.3 MMOL/L (ref 3.6–5.5)
RBC # BLD AUTO: 4.98 M/UL (ref 4.7–6.1)
SODIUM SERPL-SCNC: 141 MMOL/L (ref 135–145)
WBC # BLD AUTO: 6.2 K/UL (ref 4.8–10.8)

## 2021-12-06 PROCEDURE — 80048 BASIC METABOLIC PNL TOTAL CA: CPT

## 2021-12-06 PROCEDURE — A9270 NON-COVERED ITEM OR SERVICE: HCPCS | Performed by: HOSPITALIST

## 2021-12-06 PROCEDURE — G0378 HOSPITAL OBSERVATION PER HR: HCPCS

## 2021-12-06 PROCEDURE — 36415 COLL VENOUS BLD VENIPUNCTURE: CPT

## 2021-12-06 PROCEDURE — A9270 NON-COVERED ITEM OR SERVICE: HCPCS | Performed by: INTERNAL MEDICINE

## 2021-12-06 PROCEDURE — 700102 HCHG RX REV CODE 250 W/ 637 OVERRIDE(OP): Performed by: HOSPITALIST

## 2021-12-06 PROCEDURE — 85027 COMPLETE CBC AUTOMATED: CPT

## 2021-12-06 PROCEDURE — 302098 PASTE RING (FLAT): Performed by: INTERNAL MEDICINE

## 2021-12-06 PROCEDURE — 99225 PR SUBSEQUENT OBSERVATION CARE,LEVEL II: CPT | Performed by: INTERNAL MEDICINE

## 2021-12-06 PROCEDURE — 700102 HCHG RX REV CODE 250 W/ 637 OVERRIDE(OP): Performed by: INTERNAL MEDICINE

## 2021-12-06 PROCEDURE — 84100 ASSAY OF PHOSPHORUS: CPT

## 2021-12-06 PROCEDURE — 83735 ASSAY OF MAGNESIUM: CPT

## 2021-12-06 RX ADMIN — SODIUM HYPOCHLORITE 10 ML: 1.25 SOLUTION TOPICAL at 18:12

## 2021-12-06 RX ADMIN — THERA TABS 1 TABLET: TAB at 05:56

## 2021-12-06 RX ADMIN — RIVAROXABAN 20 MG: 20 TABLET, FILM COATED ORAL at 18:12

## 2021-12-06 RX ADMIN — NYSTATIN: 100000 POWDER TOPICAL at 18:12

## 2021-12-06 RX ADMIN — MIDODRINE HYDROCHLORIDE 10 MG: 5 TABLET ORAL at 05:56

## 2021-12-06 RX ADMIN — SODIUM HYPOCHLORITE 1 ML: 1.25 SOLUTION TOPICAL at 05:55

## 2021-12-06 RX ADMIN — NYSTATIN: 100000 POWDER TOPICAL at 05:55

## 2021-12-06 RX ADMIN — MIDODRINE HYDROCHLORIDE 10 MG: 5 TABLET ORAL at 18:11

## 2021-12-06 RX ADMIN — SENNOSIDES AND DOCUSATE SODIUM 2 TABLET: 50; 8.6 TABLET ORAL at 18:11

## 2021-12-06 RX ADMIN — SENNOSIDES AND DOCUSATE SODIUM 2 TABLET: 50; 8.6 TABLET ORAL at 05:57

## 2021-12-06 ASSESSMENT — ENCOUNTER SYMPTOMS
HEARTBURN: 0
NERVOUS/ANXIOUS: 0
ABDOMINAL PAIN: 0
DEPRESSION: 0
FEVER: 0
SPEECH CHANGE: 0
BLURRED VISION: 0
DIZZINESS: 0
WEAKNESS: 0
HEADACHES: 0
INSOMNIA: 0
DIARRHEA: 0
CONSTIPATION: 0
COUGH: 0
SORE THROAT: 0
CHILLS: 0
VOMITING: 0
SENSORY CHANGE: 0
SHORTNESS OF BREATH: 0
CLAUDICATION: 0
PHOTOPHOBIA: 0
MYALGIAS: 0

## 2021-12-06 ASSESSMENT — PAIN DESCRIPTION - PAIN TYPE
TYPE: ACUTE PAIN
TYPE: ACUTE PAIN

## 2021-12-06 NOTE — DISCHARGE PLANNING
Anticipated Discharge Disposition:   SNF or home with caregivers    Action:     At this time, patient is medically cleared to discharge. Patient has not been accepted to any SNFs and patient's family member, brother, is working on building patient separate area in home for care.     RN MELBA will continue to follow LSW to updates.     Barriers to Discharge:   Placement   Medicaid insurance     Plan:   Hospital care management will continue to assist with discharge planning needs.

## 2021-12-06 NOTE — PROGRESS NOTES
Stevie has been enrolled in the Critical access hospital remote patient monitoring proof of concept program in which biometric data will be collected concurrently during the hospital stay and upon discharge and transfer for approximately 30 days.  Consent has been obtained for this project.  The monitoring device must be REMOVED for MRIs, but may be worn during all other times of patient care.  If there are questions regarding this program please contact the [facility] [place] ([facility]) leadership who will be happy to answer your questions.  Information about the program has been provided to the patient and is available from the [facility] nursing coordinators.  During the initial phase of this  project, we will be determining how to best use the data to optimize each patient's care and we will NOT be monitoring the data in real-time, so there will not be notifications of abnormalities, and this will not replace or change your standard care for the patient.   A referral for this monitoring program has been placed under the supervision of Dr. Jabari Sneed, Medical Director of the [facility] [place] to enable to collection of data in the patient's electronic medical record.

## 2021-12-06 NOTE — PROGRESS NOTES
1930: Received report from Day shift RN. Pt is laying in bed, A+OX4 , showing no signs of distress. Pt c/o 0/10 pain. VSS. POC discussed. Call light and belongings at bedside and within reach. All questions answered at this time. Rounding in place

## 2021-12-06 NOTE — DISCHARGE PLANNING
Anticipated Discharge Disposition: SNF vs Home with Caregivers     Action: LSW met with pt at bedside to see if there are any updates on his room being completed and caregivers. Pt states that his room is still not completed and he has not been able to find a caregiver yet. His Medicaid SW has closed his case and is no longer assisting with finding a caregiver. Pt only receives $800 a month and stated he cannot afford caregivers out of pocket.     LSW has reached out to supervisor, Franki and provided the update above. LSW requesting assistance with pt's d/c plan and further caregiver resources available for pt.     Barriers to Discharge: SNF acceptance, SNF able to accommodate manual disimpaction, lack of support at home, unable to access a caregiver     Plan: Await updates from pending SNF referrals, LSW to attempt to continue to assist with caregivers
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
## 2021-12-06 NOTE — CARE PLAN
The patient is Stable - Low risk of patient condition declining or worsening    Shift Goals  Clinical Goals: Q2 turns and wound care   Patient Goals: Comfort and rest   Family Goals: No family present    Progress made toward(s) clinical / shift goals:  Turns in place, pt educated on POC. Pt has remained free from falls and verbalizes importance of turning and wound care.     Patient is not progressing towards the following goals:   N/a      Problem: Knowledge Deficit - Standard  Goal: Patient and family/care givers will demonstrate understanding of plan of care, disease process/condition, diagnostic tests and medications  Outcome: Progressing     Problem: Skin Integrity  Goal: Skin integrity is maintained or improved  Outcome: Progressing     Problem: Fall Risk  Goal: Patient will remain free from falls  Outcome: Progressing     Problem: Bowel Elimination  Goal: Establish and maintain regular bowel function  Outcome: Progressing     Problem: Psychosocial  Goal: Patient's level of anxiety will decrease  Outcome: Progressing  Goal: Patient's ability to verbalize feelings about condition will improve  Outcome: Progressing     Problem: Communication  Goal: The ability to communicate needs accurately and effectively will improve  Outcome: Progressing

## 2021-12-06 NOTE — PROGRESS NOTES
Received report from MYRIAM Durham.  Assumed Pt. Care  Pt. A&Ox4  No sign of cardiac and respiratory distress.  Pain is 0/10. Help set up patients breakfast tray.  Pt. Is in bed eating breakfast.  Bed at lowest position.  Call light and personal belonging within reach.   Encourage to call for assistance.

## 2021-12-07 PROCEDURE — A9270 NON-COVERED ITEM OR SERVICE: HCPCS | Performed by: INTERNAL MEDICINE

## 2021-12-07 PROCEDURE — G0378 HOSPITAL OBSERVATION PER HR: HCPCS

## 2021-12-07 PROCEDURE — 99224 PR SUBSEQUENT OBSERVATION CARE,LEVEL I: CPT | Performed by: INTERNAL MEDICINE

## 2021-12-07 PROCEDURE — 700102 HCHG RX REV CODE 250 W/ 637 OVERRIDE(OP): Performed by: INTERNAL MEDICINE

## 2021-12-07 PROCEDURE — 700102 HCHG RX REV CODE 250 W/ 637 OVERRIDE(OP): Performed by: HOSPITALIST

## 2021-12-07 PROCEDURE — A9270 NON-COVERED ITEM OR SERVICE: HCPCS | Performed by: HOSPITALIST

## 2021-12-07 RX ADMIN — SODIUM HYPOCHLORITE 10 ML: 1.25 SOLUTION TOPICAL at 17:23

## 2021-12-07 RX ADMIN — SODIUM HYPOCHLORITE 1 ML: 1.25 SOLUTION TOPICAL at 05:46

## 2021-12-07 RX ADMIN — MIDODRINE HYDROCHLORIDE 10 MG: 5 TABLET ORAL at 17:23

## 2021-12-07 RX ADMIN — RIVAROXABAN 20 MG: 20 TABLET, FILM COATED ORAL at 17:23

## 2021-12-07 RX ADMIN — MIDODRINE HYDROCHLORIDE 10 MG: 5 TABLET ORAL at 05:44

## 2021-12-07 RX ADMIN — THERA TABS 1 TABLET: TAB at 05:44

## 2021-12-07 RX ADMIN — SENNOSIDES AND DOCUSATE SODIUM 2 TABLET: 50; 8.6 TABLET ORAL at 05:44

## 2021-12-07 RX ADMIN — SENNOSIDES AND DOCUSATE SODIUM 2 TABLET: 50; 8.6 TABLET ORAL at 17:23

## 2021-12-07 RX ADMIN — NYSTATIN: 100000 POWDER TOPICAL at 17:24

## 2021-12-07 RX ADMIN — NYSTATIN: 100000 POWDER TOPICAL at 05:48

## 2021-12-07 ASSESSMENT — PAIN DESCRIPTION - PAIN TYPE
TYPE: ACUTE PAIN
TYPE: ACUTE PAIN

## 2021-12-07 ASSESSMENT — ENCOUNTER SYMPTOMS
SPEECH CHANGE: 0
CLAUDICATION: 0
ABDOMINAL PAIN: 0
INSOMNIA: 0
DIARRHEA: 0
COUGH: 0
FEVER: 0
CONSTIPATION: 0
HEARTBURN: 0
HEADACHES: 0
VOMITING: 0
DEPRESSION: 0
SORE THROAT: 0
SHORTNESS OF BREATH: 0
NERVOUS/ANXIOUS: 0
MYALGIAS: 0
BLURRED VISION: 0
DIZZINESS: 0
PHOTOPHOBIA: 0
CHILLS: 0
SENSORY CHANGE: 0
WEAKNESS: 0

## 2021-12-07 ASSESSMENT — PATIENT HEALTH QUESTIONNAIRE - PHQ9
2. FEELING DOWN, DEPRESSED, IRRITABLE, OR HOPELESS: NOT AT ALL
1. LITTLE INTEREST OR PLEASURE IN DOING THINGS: NOT AT ALL
SUM OF ALL RESPONSES TO PHQ9 QUESTIONS 1 AND 2: 0

## 2021-12-07 NOTE — PROGRESS NOTES
Received report from MYRIAM Durham.  Assumed Pt. Care  Pt. A&Ox4  No sign of cardiac and respiratory distress.  Pain is 0/10.  Pt. Is comfortable in bed listening to music.  Bed at lowest position.  Call light and personal belonging within reach.  Encourage to call for assistance.

## 2021-12-07 NOTE — DISCHARGE PLANNING
Agency/Facility Name: [facility]  Outcome: Phone rang with no answer    Agency/Facility Name: [facility]   Outcome: Left Vmail regarding referral     Agency/Facility Name: [facility]   Outcome: Left Vmail regarding referral     Agency/Facility Name: [facility]  Outcome: Left Vmail regarding referral     Agency/Facility Name: [facility]   Outcome: Left Vmail regarding referral

## 2021-12-07 NOTE — PROGRESS NOTES
1900: Received report from Day shift RN. Pt is laying in bed, A+OX4 , showing no signs of distress. Pt denies pain and denies the need for pain medication. VSS. POC discussed. Fall precautions in place. Urostomy bag hanging below bladder level and flowing.  Call light and belongings at bedside and within reach. All questions answered at this time. Rounding in place

## 2021-12-07 NOTE — CARE PLAN
The patient is Stable - Low risk of patient condition declining or worsening    Shift Goals  Clinical Goals: Q2 turns and wound care  Patient Goals: wound care and rest   Family Goals: No family present    Progress made toward(s) clinical / shift goals:  POC discussed with pt. Pt educated on wound care needs with morning medication administration. Fall precautions in place     Patient is not progressing towards the following goals: n/a      Problem: Knowledge Deficit - Standard  Goal: Patient and family/care givers will demonstrate understanding of plan of care, disease process/condition, diagnostic tests and medications  Outcome: Progressing     Problem: Skin Integrity  Goal: Skin integrity is maintained or improved  Outcome: Progressing     Problem: Fall Risk  Goal: Patient will remain free from falls  Outcome: Progressing     Problem: Psychosocial  Goal: Patient's level of anxiety will decrease  Outcome: Progressing  Goal: Patient's ability to verbalize feelings about condition will improve  Outcome: Progressing     Problem: Communication  Goal: The ability to communicate needs accurately and effectively will improve  Outcome: Progressing
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
## 2021-12-07 NOTE — CARE PLAN
The patient is Stable - Low risk of patient condition declining or worsening    Shift Goals  Clinical Goals: Bowel regimen; wound care;[id number]  Patient Goals: Comfort and Rest  Family Goals: No family present    Progress made toward(s) clinical / shift goals:  Bowel regimen was done with the patient. Manually disimpacted patient and got a large, brown, and soft stools. Wound care was done at 1630 and q2 turns was also done this shift.      Problem: Skin Integrity  Goal: Skin integrity is maintained or improved  Outcome: Progressing     Problem: Nutrition  Goal: Patient's nutritional and fluid intake will be adequate or improve  Outcome: Progressing

## 2021-12-08 PROCEDURE — 700102 HCHG RX REV CODE 250 W/ 637 OVERRIDE(OP): Performed by: HOSPITALIST

## 2021-12-08 PROCEDURE — A9270 NON-COVERED ITEM OR SERVICE: HCPCS | Performed by: INTERNAL MEDICINE

## 2021-12-08 PROCEDURE — 99224 PR SUBSEQUENT OBSERVATION CARE,LEVEL I: CPT | Performed by: INTERNAL MEDICINE

## 2021-12-08 PROCEDURE — 700102 HCHG RX REV CODE 250 W/ 637 OVERRIDE(OP): Performed by: INTERNAL MEDICINE

## 2021-12-08 PROCEDURE — 97602 WOUND(S) CARE NON-SELECTIVE: CPT

## 2021-12-08 PROCEDURE — G0378 HOSPITAL OBSERVATION PER HR: HCPCS

## 2021-12-08 PROCEDURE — A9270 NON-COVERED ITEM OR SERVICE: HCPCS | Performed by: HOSPITALIST

## 2021-12-08 RX ADMIN — SENNOSIDES AND DOCUSATE SODIUM 2 TABLET: 50; 8.6 TABLET ORAL at 06:09

## 2021-12-08 RX ADMIN — NYSTATIN: 100000 POWDER TOPICAL at 17:37

## 2021-12-08 RX ADMIN — RIVAROXABAN 20 MG: 20 TABLET, FILM COATED ORAL at 17:37

## 2021-12-08 RX ADMIN — SODIUM HYPOCHLORITE 1 ML: 1.25 SOLUTION TOPICAL at 06:08

## 2021-12-08 RX ADMIN — SODIUM HYPOCHLORITE 15 ML: 1.25 SOLUTION TOPICAL at 14:54

## 2021-12-08 RX ADMIN — THERA TABS 1 TABLET: TAB at 06:09

## 2021-12-08 RX ADMIN — SENNOSIDES AND DOCUSATE SODIUM 2 TABLET: 50; 8.6 TABLET ORAL at 17:37

## 2021-12-08 RX ADMIN — NYSTATIN: 100000 POWDER TOPICAL at 06:00

## 2021-12-08 RX ADMIN — MIDODRINE HYDROCHLORIDE 10 MG: 5 TABLET ORAL at 06:09

## 2021-12-08 RX ADMIN — MIDODRINE HYDROCHLORIDE 10 MG: 5 TABLET ORAL at 17:37

## 2021-12-08 ASSESSMENT — ENCOUNTER SYMPTOMS
MYALGIAS: 0
DEPRESSION: 0
SORE THROAT: 0
BLURRED VISION: 0
DIARRHEA: 0
CLAUDICATION: 0
INSOMNIA: 0
SENSORY CHANGE: 0
FEVER: 0
DIZZINESS: 0
NERVOUS/ANXIOUS: 0
VOMITING: 0
WEAKNESS: 0
CONSTIPATION: 0
COUGH: 0
ABDOMINAL PAIN: 0
HEADACHES: 0
HEARTBURN: 0
SPEECH CHANGE: 0
PHOTOPHOBIA: 0
SHORTNESS OF BREATH: 0
CHILLS: 0

## 2021-12-08 ASSESSMENT — PAIN DESCRIPTION - PAIN TYPE
TYPE: ACUTE PAIN
TYPE: ACUTE PAIN

## 2021-12-08 NOTE — DISCHARGE PLANNING
Agency/Facility Name: [facility]  Outcome: Left Vmail regarding referral     Agency/Facility Name: [facility]  Outcome: Left Vmail regarding referral     Agency/Facility Name: [facility]   Spoke To:    Outcome: Left message with      Agency/Facility Name: [facility]   Outcome: Left Vmail regarding referral     Agency/Facility Name: [facility]  Outcome: Unable to leave message number just rang

## 2021-12-08 NOTE — CARE PLAN
The patient is Stable - Low risk of patient condition declining or worsening    Shift Goals  Clinical Goals: Bowel regimen; wound care  Patient Goals: Rest  Family Goals: No family present    Progress made toward(s) clinical / shift goals:  Bowel regimen was done this shift. Wound care was also done at the end of the shift.         Problem: Skin Integrity  Goal: Skin integrity is maintained or improved  Outcome: Progressing      Patient

## 2021-12-08 NOTE — PROGRESS NOTES
Received report from MYRIAM Roe.  Assumed Pt. Care  Pt. A&Ox4  No sign of cardiac and respiratory distress.  Pain is 0/10.  Help set up patient breakfast tray.  Pt. Is comfortable in bed eating breakfast.  Bed at lowest position.  Call light and personal belonging within reach.   Encourage to call for assistance.
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
## 2021-12-09 PROCEDURE — A9270 NON-COVERED ITEM OR SERVICE: HCPCS | Performed by: HOSPITALIST

## 2021-12-09 PROCEDURE — A9270 NON-COVERED ITEM OR SERVICE: HCPCS | Performed by: INTERNAL MEDICINE

## 2021-12-09 PROCEDURE — 99224 PR SUBSEQUENT OBSERVATION CARE,LEVEL I: CPT | Performed by: INTERNAL MEDICINE

## 2021-12-09 PROCEDURE — 700102 HCHG RX REV CODE 250 W/ 637 OVERRIDE(OP): Performed by: INTERNAL MEDICINE

## 2021-12-09 PROCEDURE — 700102 HCHG RX REV CODE 250 W/ 637 OVERRIDE(OP): Performed by: HOSPITALIST

## 2021-12-09 PROCEDURE — G0378 HOSPITAL OBSERVATION PER HR: HCPCS

## 2021-12-09 PROCEDURE — 700101 HCHG RX REV CODE 250: Performed by: INTERNAL MEDICINE

## 2021-12-09 RX ADMIN — SENNOSIDES AND DOCUSATE SODIUM 2 TABLET: 50; 8.6 TABLET ORAL at 05:58

## 2021-12-09 RX ADMIN — RIVAROXABAN 20 MG: 20 TABLET, FILM COATED ORAL at 18:36

## 2021-12-09 RX ADMIN — NYSTATIN: 100000 POWDER TOPICAL at 18:36

## 2021-12-09 RX ADMIN — NYSTATIN: 100000 POWDER TOPICAL at 05:58

## 2021-12-09 RX ADMIN — SODIUM HYPOCHLORITE 1 ML: 1.25 SOLUTION TOPICAL at 18:36

## 2021-12-09 RX ADMIN — SODIUM HYPOCHLORITE 1 ML: 1.25 SOLUTION TOPICAL at 05:58

## 2021-12-09 RX ADMIN — MIDODRINE HYDROCHLORIDE 10 MG: 5 TABLET ORAL at 18:36

## 2021-12-09 RX ADMIN — MIDODRINE HYDROCHLORIDE 10 MG: 5 TABLET ORAL at 05:58

## 2021-12-09 RX ADMIN — SENNOSIDES AND DOCUSATE SODIUM 2 TABLET: 50; 8.6 TABLET ORAL at 18:36

## 2021-12-09 RX ADMIN — THERA TABS 1 TABLET: TAB at 05:58

## 2021-12-09 ASSESSMENT — ENCOUNTER SYMPTOMS
DIZZINESS: 0
FEVER: 0
DIARRHEA: 0
CLAUDICATION: 0
CONSTIPATION: 0
BLURRED VISION: 0
PHOTOPHOBIA: 0
SORE THROAT: 0
ABDOMINAL PAIN: 0
HEARTBURN: 0
HEADACHES: 0
MYALGIAS: 0
SPEECH CHANGE: 0
DEPRESSION: 0
INSOMNIA: 0
NERVOUS/ANXIOUS: 0
COUGH: 0
CHILLS: 0
SHORTNESS OF BREATH: 0
VOMITING: 0
SENSORY CHANGE: 0
WEAKNESS: 0
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
## 2021-12-09 NOTE — DISCHARGE PLANNING
Anticipated Discharge Disposition: SNF vs Home with Caregivers     Action: LSW staffed pt with [facility] manager, Abigail Mcelroy. Pt discussed during long length of stay meeting for d/c planning.     Arabella SCALES has been f/u with pending SNF referrals remaining and has not received any responses.     Barriers to Discharge: SNF acceptance, SNF able to accommodate manual disimpaction, lack of support at home, unable to access a caregiver     Plan: Await further instruction from [facility] managerAbigail, Await updates from pending SNF referrals, LSW to attempt to continue to assist with caregivers

## 2021-12-09 NOTE — CARE PLAN
The patient is Stable - Low risk of patient condition declining or worsening    Shift Goals  Clinical Goals: Resst and sleep  Patient Goals: Rest and sleep  Family Goals: No family present    Progress made toward(s) clinical / shift goals:    Problem: Knowledge Deficit - Standard  Goal: Patient and family/care givers will demonstrate understanding of plan of care, disease process/condition, diagnostic tests and medications  Outcome: Progressing  Note: Patient updated on the plan of care. Encouraged to voice feelings and to ask questions. Answered all questions and concerns. Agrees with the plan of care.      Problem: Fall Risk  Goal: Patient will remain free from falls  Outcome: Progressing  Note: Safety precautions in place. Bed alarm ON. Will continue to monitor patient.        Patient is not progressing towards the following goals:

## 2021-12-09 NOTE — PROGRESS NOTES
Received bedside patient report from MYRIAM Ralph. Patient resting comfortably in bed, no complaints at this time. Safety precautions in place. Will continue to monitor.

## 2021-12-09 NOTE — CARE PLAN
The patient is Watcher - Medium risk of patient condition declining or worsening    Shift Goals  Clinical Goals: digital disimpaction, dressing change, reposition every 2 h  Progress made toward(s) clinical / shift goals:  Progressing, digital disimpaction done, q2 turns done, dressing change completed
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
## 2021-12-09 NOTE — DISCHARGE PLANNING
Anticipated Discharge Disposition:   SNF or home with caregivers    Action:   Chart review complete.     Per chart review, patient has not been accepted to any SNFs yet. Calls made to [facility] SNF's yesterday for follow up.     LSW and leadership working on finding solutions for at home caregiver in the event that no SNFs accept patient.     RN CM will continue to follow and assist as needed.     Barriers to Discharge:   SNF acceptance    Plan:   Hospital care management will continue to assist with discharge planning needs.

## 2021-12-09 NOTE — DISCHARGE PLANNING
Agency/Facility Name: [facility]   Outcome: DPA called new number [phone] with no answer    Agency/Facility Name: [facility]   Outcome: Left Vmail regarding referral       Agency/Facility Name: [facility]  Outcome: Left Vmail regarding referral     Agency/Facility Name: [facility]   Outcome: Left Vmail regarding     Agency/Facility Name: [facility]   Outcome: Phone rang with no answer unable to leave vmail

## 2021-12-09 NOTE — CARE PLAN
The patient is Stable - Low risk of patient condition declining or worsening    Shift Goals  Clinical Goals: Bowel regimen; wound care  Patient Goals: Rest  Family Goals: No family present    Progress made toward(s) clinical / shift goals:  Bowel regimen was done and dressing change was done by the wound care team.       Problem: Skin Integrity  Goal: Skin integrity is maintained or improved  Outcome: Progressing   Notes: Patient has been refusing repositioning every 2 hours this shift. He has been repositioned every four hours.    Problem: Communication  Goal: The ability to communicate needs accurately and effectively will improve  Outcome: Progressing

## 2021-12-10 PROCEDURE — G0378 HOSPITAL OBSERVATION PER HR: HCPCS

## 2021-12-10 PROCEDURE — A9270 NON-COVERED ITEM OR SERVICE: HCPCS | Performed by: HOSPITALIST

## 2021-12-10 PROCEDURE — 99224 PR SUBSEQUENT OBSERVATION CARE,LEVEL I: CPT | Performed by: INTERNAL MEDICINE

## 2021-12-10 PROCEDURE — A9270 NON-COVERED ITEM OR SERVICE: HCPCS | Performed by: INTERNAL MEDICINE

## 2021-12-10 PROCEDURE — 700102 HCHG RX REV CODE 250 W/ 637 OVERRIDE(OP): Performed by: HOSPITALIST

## 2021-12-10 PROCEDURE — 700102 HCHG RX REV CODE 250 W/ 637 OVERRIDE(OP): Performed by: INTERNAL MEDICINE

## 2021-12-10 RX ADMIN — NYSTATIN: 100000 POWDER TOPICAL at 18:16

## 2021-12-10 RX ADMIN — SODIUM HYPOCHLORITE 1 ML: 1.25 SOLUTION TOPICAL at 18:16

## 2021-12-10 RX ADMIN — MIDODRINE HYDROCHLORIDE 10 MG: 5 TABLET ORAL at 06:43

## 2021-12-10 RX ADMIN — SENNOSIDES AND DOCUSATE SODIUM 2 TABLET: 50; 8.6 TABLET ORAL at 06:44

## 2021-12-10 RX ADMIN — SODIUM HYPOCHLORITE 1 ML: 1.25 SOLUTION TOPICAL at 06:44

## 2021-12-10 RX ADMIN — MIDODRINE HYDROCHLORIDE 10 MG: 5 TABLET ORAL at 18:16

## 2021-12-10 RX ADMIN — NYSTATIN: 100000 POWDER TOPICAL at 06:44

## 2021-12-10 RX ADMIN — RIVAROXABAN 20 MG: 20 TABLET, FILM COATED ORAL at 18:15

## 2021-12-10 RX ADMIN — SENNOSIDES AND DOCUSATE SODIUM 2 TABLET: 50; 8.6 TABLET ORAL at 18:15

## 2021-12-10 RX ADMIN — THERA TABS 1 TABLET: TAB at 06:44

## 2021-12-10 ASSESSMENT — ENCOUNTER SYMPTOMS
ABDOMINAL PAIN: 0
MYALGIAS: 0
COUGH: 0
DIZZINESS: 0
NERVOUS/ANXIOUS: 0
HEADACHES: 0
INSOMNIA: 0
SENSORY CHANGE: 0
VOMITING: 0
FEVER: 0
SPEECH CHANGE: 0
CHILLS: 0
DIARRHEA: 0
BLURRED VISION: 0
CONSTIPATION: 0
SHORTNESS OF BREATH: 0
HEARTBURN: 0
CLAUDICATION: 0
DEPRESSION: 0
WEAKNESS: 0
SORE THROAT: 0
PHOTOPHOBIA: 0

## 2021-12-10 ASSESSMENT — PAIN DESCRIPTION - PAIN TYPE
TYPE: ACUTE PAIN
TYPE: ACUTE PAIN

## 2021-12-10 ASSESSMENT — PATIENT HEALTH QUESTIONNAIRE - PHQ9
1. LITTLE INTEREST OR PLEASURE IN DOING THINGS: NOT AT ALL
SUM OF ALL RESPONSES TO PHQ9 QUESTIONS 1 AND 2: 0
2. FEELING DOWN, DEPRESSED, IRRITABLE, OR HOPELESS: NOT AT ALL

## 2021-12-10 NOTE — CARE PLAN
Problem: Skin Integrity  Goal: Skin integrity is maintained or improved  Outcome: Progressing   Wound care orders in place and dressing change scheduled for 0600. Dressing c/d/I. Heel float boots in place and dressing in place to wound to L heel. Pt notified this RN that he only wants to be turned at 2200, 0200, and 0600. This RN educated patient that Q2 turns recommended to prevent skin breakdown but patient wants to rest and not be turned. Pt on Max air ETS bed.  Problem: Fall Risk  Goal: Patient will remain free from falls  Outcome: Progressing  Call light and bedside table within reach. Pt's bed is elevated and pt does not want lowered. All 4 side rails up since patient is on max air ETS bed.    The patient is Stable - Low risk of patient condition declining or worsening    Shift Goals  Clinical Goals: stable VS and rest  Patient Goals: rest, requested only to be turned at 2200, 0200, and 0600  Family Goals: No family present    Progress made toward(s) clinical / shift goals:  Stable VS and rest    Patient is not progressing towards the following goals:

## 2021-12-10 NOTE — CARE PLAN
The patient is Watcher - Medium risk of patient condition declining or worsening    Shift Goals  Clinical Goals: digital disimpactipon, q2 turns, dressing change, skin integrity will improve or remain the same   Family Goals: No family present    Progress made toward(s) clinical / shift goals:  Progressing, dressing changed, bowel regime completed
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
## 2021-12-11 PROCEDURE — 99224 PR SUBSEQUENT OBSERVATION CARE,LEVEL I: CPT | Performed by: INTERNAL MEDICINE

## 2021-12-11 PROCEDURE — A9270 NON-COVERED ITEM OR SERVICE: HCPCS | Performed by: INTERNAL MEDICINE

## 2021-12-11 PROCEDURE — 700102 HCHG RX REV CODE 250 W/ 637 OVERRIDE(OP): Performed by: INTERNAL MEDICINE

## 2021-12-11 PROCEDURE — A9270 NON-COVERED ITEM OR SERVICE: HCPCS | Performed by: HOSPITALIST

## 2021-12-11 PROCEDURE — 700102 HCHG RX REV CODE 250 W/ 637 OVERRIDE(OP): Performed by: HOSPITALIST

## 2021-12-11 PROCEDURE — G0378 HOSPITAL OBSERVATION PER HR: HCPCS

## 2021-12-11 RX ADMIN — SENNOSIDES AND DOCUSATE SODIUM 2 TABLET: 50; 8.6 TABLET ORAL at 17:58

## 2021-12-11 RX ADMIN — MIDODRINE HYDROCHLORIDE 10 MG: 5 TABLET ORAL at 17:58

## 2021-12-11 RX ADMIN — NYSTATIN: 100000 POWDER TOPICAL at 18:00

## 2021-12-11 RX ADMIN — NYSTATIN: 100000 POWDER TOPICAL at 05:18

## 2021-12-11 RX ADMIN — SODIUM HYPOCHLORITE 1 ML: 1.25 SOLUTION TOPICAL at 05:19

## 2021-12-11 RX ADMIN — THERA TABS 1 TABLET: TAB at 05:18

## 2021-12-11 RX ADMIN — SODIUM HYPOCHLORITE 1 ML: 1.25 SOLUTION TOPICAL at 17:58

## 2021-12-11 RX ADMIN — SENNOSIDES AND DOCUSATE SODIUM 2 TABLET: 50; 8.6 TABLET ORAL at 05:18

## 2021-12-11 RX ADMIN — RIVAROXABAN 20 MG: 20 TABLET, FILM COATED ORAL at 17:58

## 2021-12-11 RX ADMIN — MIDODRINE HYDROCHLORIDE 10 MG: 5 TABLET ORAL at 05:18

## 2021-12-11 ASSESSMENT — ENCOUNTER SYMPTOMS
HEARTBURN: 0
BLURRED VISION: 0
MYALGIAS: 0
COUGH: 0
FEVER: 0
CHILLS: 0
DIZZINESS: 0
VOMITING: 0
CONSTIPATION: 0
DEPRESSION: 0
PHOTOPHOBIA: 0
SHORTNESS OF BREATH: 0
SPEECH CHANGE: 0
SENSORY CHANGE: 0
SORE THROAT: 0
DIARRHEA: 0
ABDOMINAL PAIN: 0
CLAUDICATION: 0
HEADACHES: 0
INSOMNIA: 0
NERVOUS/ANXIOUS: 0
WEAKNESS: 0

## 2021-12-11 ASSESSMENT — PAIN DESCRIPTION - PAIN TYPE: TYPE: ACUTE PAIN

## 2021-12-11 NOTE — CARE PLAN
Problem: Knowledge Deficit - Standard  Goal: Patient and family/care givers will demonstrate understanding of plan of care, disease process/condition, diagnostic tests and medications  Outcome: Progressing     Problem: Skin Integrity  Goal: Skin integrity is maintained or improved  Outcome: Progressing     Problem: Fall Risk  Goal: Patient will remain free from falls  Outcome: Progressing     Problem: Bowel Elimination  Goal: Establish and maintain regular bowel function  Outcome: Progressing     Problem: Psychosocial  Goal: Patient's level of anxiety will decrease  Outcome: Progressing     Problem: Communication  Goal: The ability to communicate needs accurately and effectively will improve  Outcome: Progressing     Problem: Discharge Barriers/Planning  Goal: Patient's continuum of care needs are met  Outcome: Progressing   The patient is Stable - Low risk of patient condition declining or worsening    Shift Goals  Clinical Goals: maintain skin intergrity   Patient Goals: rest comfortably   Family Goals: No family present    Progress made toward(s) clinical / shift goals:  all goals     Patient is not progressing towards the following goals:
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
## 2021-12-11 NOTE — CARE PLAN
The patient is Watcher - Medium risk of patient condition declining or worsening    Shift Goals  Clinical Goals: q2 turn, dressing change, digistal disimpaction  Patient Goals: rest comfortably   Family Goals: No family present    Progress made toward(s) clinical / shift goals:  progressing

## 2021-12-11 NOTE — PROGRESS NOTES
Patient took biointelissense sticker device off. Patient state irritates his skin. Skin red, inflamed where sticker was placed.

## 2021-12-12 PROCEDURE — A9270 NON-COVERED ITEM OR SERVICE: HCPCS | Performed by: HOSPITALIST

## 2021-12-12 PROCEDURE — 700102 HCHG RX REV CODE 250 W/ 637 OVERRIDE(OP): Performed by: HOSPITALIST

## 2021-12-12 PROCEDURE — A9270 NON-COVERED ITEM OR SERVICE: HCPCS | Performed by: INTERNAL MEDICINE

## 2021-12-12 PROCEDURE — G0378 HOSPITAL OBSERVATION PER HR: HCPCS

## 2021-12-12 PROCEDURE — 700102 HCHG RX REV CODE 250 W/ 637 OVERRIDE(OP): Performed by: INTERNAL MEDICINE

## 2021-12-12 PROCEDURE — 99224 PR SUBSEQUENT OBSERVATION CARE,LEVEL I: CPT | Performed by: INTERNAL MEDICINE

## 2021-12-12 RX ADMIN — SENNOSIDES AND DOCUSATE SODIUM 2 TABLET: 50; 8.6 TABLET ORAL at 06:09

## 2021-12-12 RX ADMIN — THERA TABS 1 TABLET: TAB at 06:09

## 2021-12-12 RX ADMIN — MIDODRINE HYDROCHLORIDE 10 MG: 5 TABLET ORAL at 06:10

## 2021-12-12 RX ADMIN — MIDODRINE HYDROCHLORIDE 10 MG: 5 TABLET ORAL at 17:26

## 2021-12-12 RX ADMIN — SENNOSIDES AND DOCUSATE SODIUM 2 TABLET: 50; 8.6 TABLET ORAL at 17:25

## 2021-12-12 RX ADMIN — SODIUM HYPOCHLORITE 1 ML: 1.25 SOLUTION TOPICAL at 06:09

## 2021-12-12 RX ADMIN — NYSTATIN: 100000 POWDER TOPICAL at 06:09

## 2021-12-12 RX ADMIN — NYSTATIN 1 APPLICATION: 100000 POWDER TOPICAL at 17:26

## 2021-12-12 RX ADMIN — SODIUM HYPOCHLORITE 1 ML: 1.25 SOLUTION TOPICAL at 18:00

## 2021-12-12 RX ADMIN — RIVAROXABAN 20 MG: 20 TABLET, FILM COATED ORAL at 17:26

## 2021-12-12 ASSESSMENT — ENCOUNTER SYMPTOMS
WEAKNESS: 0
HEARTBURN: 0
VOMITING: 0
MYALGIAS: 0
CONSTIPATION: 0
COUGH: 0
SENSORY CHANGE: 0
CHILLS: 0
ABDOMINAL PAIN: 0
PHOTOPHOBIA: 0
BLURRED VISION: 0
DIZZINESS: 0
DIARRHEA: 0
HEADACHES: 0
FEVER: 0
DEPRESSION: 0
SPEECH CHANGE: 0
CLAUDICATION: 0
SORE THROAT: 0
NERVOUS/ANXIOUS: 0
INSOMNIA: 0
SHORTNESS OF BREATH: 0

## 2021-12-12 ASSESSMENT — PAIN DESCRIPTION - PAIN TYPE: TYPE: ACUTE PAIN

## 2021-12-12 NOTE — PROGRESS NOTES
Received bedside patient report from MYRIAM Johnson. Patient resting comfortably in bed, no complaints at this time. Safety precautions in place. Will continue to monitor.

## 2021-12-12 NOTE — CARE PLAN
The patient is Stable - Low risk of patient condition declining or worsening    Shift Goals  Clinical Goals: Free from falls and injury  Patient Goals: Rest and sleep  Family Goals: No family present    Progress made toward(s) clinical / shift goals:    Problem: Knowledge Deficit - Standard  Goal: Patient and family/care givers will demonstrate understanding of plan of care, disease process/condition, diagnostic tests and medications  Outcome: Progressing  Note: Patient updated on the plan of care. Encouraged to voice feelings and to ask questions. Answered all questions and concerns. Agrees with the plan of care.      Problem: Fall Risk  Goal: Patient will remain free from falls  Outcome: Progressing  Note: Safety precautions in place. Bed alarm ON. Will continue to monitor patient.        Patient is not progressing towards the following goals:
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
## 2021-12-12 NOTE — PROGRESS NOTES
Received patient from Night shift RN . Patient is awake and alert.No sign of distress. Patient denies any nausea.Denies any pain. Fall precaution in placed, kept bed in lowest position and call light within reach.

## 2021-12-12 NOTE — CARE PLAN
The patient is Stable - Low risk of patient condition declining or worsening    Shift Goals  Clinical Goals: free from falls  Patient Goals: Rest and sleep  Family Goals: No family present    Progress made toward(s) clinical / shift goals: Patient is free from falls. Fall precaution observed, kept bed in lowest position and call bell within reach

## 2021-12-13 PROCEDURE — 770001 HCHG ROOM/CARE - MED/SURG/GYN PRIV*

## 2021-12-13 PROCEDURE — A9270 NON-COVERED ITEM OR SERVICE: HCPCS | Performed by: HOSPITALIST

## 2021-12-13 PROCEDURE — 99231 SBSQ HOSP IP/OBS SF/LOW 25: CPT | Performed by: INTERNAL MEDICINE

## 2021-12-13 PROCEDURE — 94760 N-INVAS EAR/PLS OXIMETRY 1: CPT

## 2021-12-13 PROCEDURE — 700102 HCHG RX REV CODE 250 W/ 637 OVERRIDE(OP): Performed by: INTERNAL MEDICINE

## 2021-12-13 PROCEDURE — A9270 NON-COVERED ITEM OR SERVICE: HCPCS | Performed by: INTERNAL MEDICINE

## 2021-12-13 PROCEDURE — 700102 HCHG RX REV CODE 250 W/ 637 OVERRIDE(OP): Performed by: HOSPITALIST

## 2021-12-13 PROCEDURE — G0378 HOSPITAL OBSERVATION PER HR: HCPCS

## 2021-12-13 RX ADMIN — MIDODRINE HYDROCHLORIDE 10 MG: 5 TABLET ORAL at 17:36

## 2021-12-13 RX ADMIN — THERA TABS 1 TABLET: TAB at 06:06

## 2021-12-13 RX ADMIN — SODIUM HYPOCHLORITE 1 ML: 1.25 SOLUTION TOPICAL at 17:37

## 2021-12-13 RX ADMIN — RIVAROXABAN 20 MG: 20 TABLET, FILM COATED ORAL at 17:37

## 2021-12-13 RX ADMIN — SENNOSIDES AND DOCUSATE SODIUM 2 TABLET: 50; 8.6 TABLET ORAL at 06:06

## 2021-12-13 RX ADMIN — NYSTATIN: 100000 POWDER TOPICAL at 06:06

## 2021-12-13 RX ADMIN — NYSTATIN 1 APPLICATION: 100000 POWDER TOPICAL at 17:37

## 2021-12-13 RX ADMIN — SENNOSIDES AND DOCUSATE SODIUM 2 TABLET: 50; 8.6 TABLET ORAL at 17:36

## 2021-12-13 RX ADMIN — SODIUM HYPOCHLORITE 1 ML: 1.25 SOLUTION TOPICAL at 06:06

## 2021-12-13 RX ADMIN — MIDODRINE HYDROCHLORIDE 10 MG: 5 TABLET ORAL at 06:06

## 2021-12-13 ASSESSMENT — ENCOUNTER SYMPTOMS
BLURRED VISION: 0
FEVER: 0
SENSORY CHANGE: 0
VOMITING: 0
DIZZINESS: 0
HEADACHES: 0
DIARRHEA: 0
CHILLS: 0
SHORTNESS OF BREATH: 0
ABDOMINAL PAIN: 0
INSOMNIA: 0
DEPRESSION: 0
WEAKNESS: 0
CONSTIPATION: 0
COUGH: 0
SORE THROAT: 0
NERVOUS/ANXIOUS: 0
CLAUDICATION: 0
HEARTBURN: 0
MYALGIAS: 0
PHOTOPHOBIA: 0
SPEECH CHANGE: 0

## 2021-12-13 ASSESSMENT — PAIN DESCRIPTION - PAIN TYPE: TYPE: ACUTE PAIN

## 2021-12-13 NOTE — DISCHARGE PLANNING
Anticipated Discharge Disposition: SNF      Action: Discussed pt during IDT rounds. Pts plan to DC to SNF. No accepting SNFs at this time. Pending SNFs in [place].     Barriers to Discharge: Accepting SNF     Plan: Case management to follow up with pending facilities.

## 2021-12-13 NOTE — CARE PLAN
The patient is Stable - Low risk of patient condition declining or worsening    Shift Goals  Clinical Goals: free from falls  Patient Goals: Rest and sleep  Family Goals: No family present    Progress made toward(s) clinical / shift goals:    Problem: Knowledge Deficit - Standard  Goal: Patient and family/care givers will demonstrate understanding of plan of care, disease process/condition, diagnostic tests and medications  Outcome: Progressing  Note: Patient updated on the plan of care. Encouraged to voice feelings and to ask questions. Answered all questions and concerns. Agrees with the plan of care.        Patient is not progressing towards the following goals:
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
## 2021-12-14 PROCEDURE — A9270 NON-COVERED ITEM OR SERVICE: HCPCS | Performed by: INTERNAL MEDICINE

## 2021-12-14 PROCEDURE — 99224 PR SUBSEQUENT OBSERVATION CARE,LEVEL I: CPT | Performed by: INTERNAL MEDICINE

## 2021-12-14 PROCEDURE — A9270 NON-COVERED ITEM OR SERVICE: HCPCS | Performed by: HOSPITALIST

## 2021-12-14 PROCEDURE — G0378 HOSPITAL OBSERVATION PER HR: HCPCS

## 2021-12-14 PROCEDURE — 700102 HCHG RX REV CODE 250 W/ 637 OVERRIDE(OP): Performed by: INTERNAL MEDICINE

## 2021-12-14 PROCEDURE — 700102 HCHG RX REV CODE 250 W/ 637 OVERRIDE(OP): Performed by: HOSPITALIST

## 2021-12-14 PROCEDURE — 94760 N-INVAS EAR/PLS OXIMETRY 1: CPT

## 2021-12-14 RX ADMIN — SODIUM HYPOCHLORITE 1 ML: 1.25 SOLUTION TOPICAL at 17:38

## 2021-12-14 RX ADMIN — MIDODRINE HYDROCHLORIDE 10 MG: 5 TABLET ORAL at 06:12

## 2021-12-14 RX ADMIN — THERA TABS 1 TABLET: TAB at 06:12

## 2021-12-14 RX ADMIN — RIVAROXABAN 20 MG: 20 TABLET, FILM COATED ORAL at 17:37

## 2021-12-14 RX ADMIN — SODIUM HYPOCHLORITE 1 ML: 1.25 SOLUTION TOPICAL at 06:11

## 2021-12-14 RX ADMIN — SENNOSIDES AND DOCUSATE SODIUM 2 TABLET: 50; 8.6 TABLET ORAL at 17:37

## 2021-12-14 RX ADMIN — MIDODRINE HYDROCHLORIDE 10 MG: 5 TABLET ORAL at 17:37

## 2021-12-14 RX ADMIN — NYSTATIN: 100000 POWDER TOPICAL at 06:11

## 2021-12-14 RX ADMIN — SENNOSIDES AND DOCUSATE SODIUM 2 TABLET: 50; 8.6 TABLET ORAL at 06:12

## 2021-12-14 RX ADMIN — NYSTATIN 1 APPLICATION: 100000 POWDER TOPICAL at 17:38

## 2021-12-14 ASSESSMENT — ENCOUNTER SYMPTOMS
INSOMNIA: 0
VOMITING: 0
SPEECH CHANGE: 0
CONSTIPATION: 0
ABDOMINAL PAIN: 0
SORE THROAT: 0
FEVER: 0
WEAKNESS: 0
SHORTNESS OF BREATH: 0
NERVOUS/ANXIOUS: 0
HEARTBURN: 0
DIARRHEA: 0
DEPRESSION: 0
SENSORY CHANGE: 0
COUGH: 0
MYALGIAS: 0
PHOTOPHOBIA: 0
CLAUDICATION: 0
CHILLS: 0
BLURRED VISION: 0
DIZZINESS: 0
HEADACHES: 0

## 2021-12-14 ASSESSMENT — PAIN DESCRIPTION - PAIN TYPE: TYPE: ACUTE PAIN

## 2021-12-14 NOTE — PROGRESS NOTES
Received bedside patient report from MYRIAM Gutierrez. Patient resting comfortably in bed, no complaints at this time. Safety precautions in place. Will continue to monitor.

## 2021-12-14 NOTE — DISCHARGE PLANNING
Anticipated Discharge Disposition: SNF vs Home     Action: LSW messaged [facility] manager, Abigail Filippobethanie to f/u with updates from long length of stay meetings for d/c planning.     Barriers to Discharge: SNF acceptance, SNF able to accommodate manual disimpaction, lack of support at home, unable to access a caregiver     Plan: Await further instruction from [facility] managerAbigail, Await updates from pending SNF referrals, LSW to attempt to continue to assist with caregivers

## 2021-12-14 NOTE — CARE PLAN
The patient is Stable - Low risk of patient condition declining or worsening    Shift Goals  Clinical Goals: free from falls  Patient Goals: Rest and sleep  Family Goals: No family present    Progress made toward(s) clinical / shift goals:    Problem: Knowledge Deficit - Standard  Goal: Patient and family/care givers will demonstrate understanding of plan of care, disease process/condition, diagnostic tests and medications  Outcome: Progressing  Note: Patient updated on the plan of care. Encouraged to voice feelings and to ask questions. Answered all questions and concerns. Agrees with the plan of care.      Problem: Fall Risk  Goal: Patient will remain free from falls  Outcome: Progressing  Note: Safety precautions in place. Bed alarm ON. Will continue to monitor patient.      Problem: Skin Integrity  Goal: Skin integrity is maintained or improved  Outcome: Progressing  Note: Sacral wounds healing. Wound dressing change scheduled for 0600 this morning.       Patient is not progressing towards the following goals:

## 2021-12-14 NOTE — PROGRESS NOTES
Received patient from Night shift RN . Patient is awake and alert.Breakfast served. No sign of distress. Patient denies any nausea or pain. Fall precaution in placed, kept bed in lowest position and call light within reach.

## 2021-12-14 NOTE — DISCHARGE PLANNING
Anticipated Discharge Disposition: Home with Private Caregivers     Action: LSW met with pt at bedside. LSW informed pt that SNF referrals have been placed to all local SNF's and even expanded to [place], [place]. SNF's have declined due to his insurance- Medicaid and care exceeding capacity- unable to accommodate bowel regimen. LSW informed pt that at this time our team has exhausted options for placement and unfortunately being in the acute hospital setting while he continues to work on caregivers is not an option. LSW explained to pt that he has no medical need to be in the hospital setting and Medicaid is not paying for his stay. LSW explained that pt will receive a large hospital bill. LSW suggested pt called family to discuss going home and hiring private caregivers while they continue to work with Medicaid for assistance with caregivers as outpatient.     Pt stated he would be calling his daughter to discuss.     LSW provided update to [facility] manager, Abigail Mcelroy.     Barriers to Discharge: SNF acceptance, SNF able to accommodate manual disimpaction, lack of support at home, unable to access a caregiver     Plan: LSW to continue to work on d/c plan, LSW to continue to work with [facility] leadership

## 2021-12-14 NOTE — CARE PLAN
The patient is Stable - Low risk of patient condition declining or worsening    Shift Goals  Clinical Goals: free from falls  Patient Goals: Rest and sleep  Family Goals: No family present    Progress made toward(s) clinical / shift goals: patient remained free from falls. Fall precaution observed. Bed in lowest position, bed alarm on, call bell within reach    Patient is not progressing towards the following goals:

## 2021-12-14 NOTE — DISCHARGE PLANNING
Patient rolled back to observation/outpatient status per attending MD determination Armin Cook M.D.  and UR committee MD secondary review Russ Nelson M.D. Condition 44 complete.
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
## 2021-12-15 ENCOUNTER — HOME HEALTH ADMISSION (OUTPATIENT)
Dept: HOME HEALTH SERVICES | Facility: HOME HEALTHCARE | Age: 61
End: 2021-12-15
Payer: MEDICAID

## 2021-12-15 PROCEDURE — A9270 NON-COVERED ITEM OR SERVICE: HCPCS | Performed by: INTERNAL MEDICINE

## 2021-12-15 PROCEDURE — 700102 HCHG RX REV CODE 250 W/ 637 OVERRIDE(OP): Performed by: INTERNAL MEDICINE

## 2021-12-15 PROCEDURE — A9270 NON-COVERED ITEM OR SERVICE: HCPCS | Performed by: HOSPITALIST

## 2021-12-15 PROCEDURE — 99224 PR SUBSEQUENT OBSERVATION CARE,LEVEL I: CPT | Performed by: INTERNAL MEDICINE

## 2021-12-15 PROCEDURE — 700102 HCHG RX REV CODE 250 W/ 637 OVERRIDE(OP): Performed by: HOSPITALIST

## 2021-12-15 PROCEDURE — G0378 HOSPITAL OBSERVATION PER HR: HCPCS

## 2021-12-15 RX ADMIN — SODIUM HYPOCHLORITE 1 ML: 1.25 SOLUTION TOPICAL at 17:42

## 2021-12-15 RX ADMIN — RIVAROXABAN 20 MG: 20 TABLET, FILM COATED ORAL at 17:42

## 2021-12-15 RX ADMIN — SENNOSIDES AND DOCUSATE SODIUM 2 TABLET: 50; 8.6 TABLET ORAL at 06:27

## 2021-12-15 RX ADMIN — THERA TABS 1 TABLET: TAB at 06:27

## 2021-12-15 RX ADMIN — MIDODRINE HYDROCHLORIDE 10 MG: 5 TABLET ORAL at 17:41

## 2021-12-15 RX ADMIN — SODIUM HYPOCHLORITE 1 ML: 1.25 SOLUTION TOPICAL at 06:28

## 2021-12-15 RX ADMIN — MIDODRINE HYDROCHLORIDE 10 MG: 5 TABLET ORAL at 06:27

## 2021-12-15 RX ADMIN — NYSTATIN: 100000 POWDER TOPICAL at 06:28

## 2021-12-15 RX ADMIN — NYSTATIN: 100000 POWDER TOPICAL at 17:42

## 2021-12-15 RX ADMIN — SENNOSIDES AND DOCUSATE SODIUM 2 TABLET: 50; 8.6 TABLET ORAL at 17:42

## 2021-12-15 ASSESSMENT — ENCOUNTER SYMPTOMS
ABDOMINAL PAIN: 0
SORE THROAT: 0
DIARRHEA: 0
HEADACHES: 0
SENSORY CHANGE: 0
INSOMNIA: 0
CLAUDICATION: 0
CHILLS: 0
WEAKNESS: 0
BLURRED VISION: 0
CONSTIPATION: 0
SHORTNESS OF BREATH: 0
PHOTOPHOBIA: 0
NERVOUS/ANXIOUS: 0
SPEECH CHANGE: 0
COUGH: 0
FEVER: 0
DEPRESSION: 0
DIZZINESS: 0
MYALGIAS: 0
VOMITING: 0
HEARTBURN: 0

## 2021-12-15 ASSESSMENT — PAIN DESCRIPTION - PAIN TYPE: TYPE: ACUTE PAIN

## 2021-12-15 NOTE — CARE PLAN
The patient is Stable - Low risk of patient condition declining or worsening    Shift Goals  Clinical Goals: free from falls and wound care  Patient Goals: Rest and sleep  Family Goals: No family present    Progress made toward(s) clinical / shift goals:    Problem: Knowledge Deficit - Standard  Goal: Patient and family/care givers will demonstrate understanding of plan of care, disease process/condition, diagnostic tests and medications  Outcome: Progressing  Note: Patient updated on the plan of care. Encouraged to voice feelings and to ask questions. Answered all questions and concerns. Agrees with the plan of care.      Problem: Fall Risk  Goal: Patient will remain free from falls  Outcome: Progressing  Note: Safety precautions in place. Bed alarm ON. Will continue to monitor patient.        Patient is not progressing towards the following goals:

## 2021-12-15 NOTE — DISCHARGE PLANNING
Per Sonia Bravo's office patient has never been seen by this provider. Patient will need to be set up for an appointment to establish with a provider and attend that appointment before HH can bring onto service. Please set patient up with an appointment and provide HH with the name of the provider, date and time patient will be seen. We will also need the address he will DC too. Notes state he will go home with daughter. Need to verify the address on face sheet is correct. Referral is also under review by our nursing supervisors to make sure patient is appropriate for HH.    Per nursing supervisors it appears patient also has wound care so we will need the referral updated to include this. There is also question of patient needing daily bowel care. Per notes looks like SNF has refused patient because they are not staffed to provide this care. HH will also be unable to provide this for patient as well. Teams message sent to Arabella for clarification.       Thank you

## 2021-12-15 NOTE — DISCHARGE PLANNING
Received Choice form at [phone]  Agency/Facility Name: [facility]  Referral sent per Choice form @ [phone]
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
## 2021-12-15 NOTE — PROGRESS NOTES
Received report from night shift RN, assumed care of pt. BEBA Dillon. Pt denies pain or nausea this morning. Updated on plan of care and will reach out to case management per pt request. Safety precautions in place, pt educated to call for assistance.

## 2021-12-15 NOTE — CARE PLAN
The patient is Stable - Low risk of patient condition declining or worsening    Shift Goals  Clinical Goals: free from falls and wound care  Patient Goals: Rest and sleep  Family Goals: No family present    Progress made toward(s) clinical / shift goals: Patient is free from falls throughout the shift. Fall precaution observed, kept bed in lowest position and  call bell within reach.    Patient is not progressing towards the following goals:

## 2021-12-15 NOTE — DISCHARGE PLANNING
"Anticipated Discharge Disposition: Home with Family Assistance vs Private Caregivers     Action: Pt discussed during morning rounds. Per Dr. Joe pt has talked to daughter about going home.     LSW met with pt to discuss d/c plan and conversation with family. Pt stated that he has talked to his daughter and between her and her  they plan to care for pt. However, daughter will need to go part time and is interested in applying through Medicaid to become pt's caregiver and get compensated for caregiving. LSW again informed pt that the goal is for him to d/c home while pt and family continue to work on caregivers as outpatient. Pt stated that as of right now he does not have what he needs. His daughter is still trying to figure out going part time and applying for caregiver program. Pt stated \"If I get a big bill because Medicaid is no longer paying, then I get a big bill. I don't have any other options.\"     Pt was going to call Medicaid SW, Jackie to discuss caregivers.     LSW provided update to [facility] manager, Abigail Mcelroy via Teams.     Barriers to Discharge: SNF acceptance, SNF able to accommodate manual disimpaction, lack of support at home, unable to access a caregiver     Plan: LSW to continue to work on d/c plan, LSW to continue to work with [facility] leadership    Addendum 1139  LSW requested Dr. Joe place  order and F2F for HH.    Addendum 6518  LSW faxed  CHOICE to [facility]  as pt has been on service with them in the past for continuity of care.     Addendum 4551  LSW informed by bedside RN that pt would like to talk to LSW.   LSW met with pt at bedside and informed LSW that he was able to get ahold of Medicaid SW, Jackie and she will be assisting his daughter apply for the caregiver program. However, informed him that it would be a couple of weeks. Once Medicaid DENI finds a caregiver pt will receive 35 hours a week.     Pt stated that his daughter and family are working to get his belongings to her " [place] in [place]. LSW discussed with pt that wound care through HH may be an issue due to the area and his insurance. LSW explained that if appointments with the wound clinic are necessary then that would be the only option for pt to ensure he is receiving wound care. Pt expressed frustration due to not wanting to drive into town every couple of days.      [facility] does not service out in [place], LSW anticipates for [facility] to decline.      Due to pt's insurance Medicaid LSW anticipates HH to be difficult to access.    LSW provided update to [facility] manager, Abigail.

## 2021-12-16 ENCOUNTER — PATIENT OUTREACH (OUTPATIENT)
Dept: HEALTH INFORMATION MANAGEMENT | Facility: OTHER | Age: 61
End: 2021-12-16

## 2021-12-16 PROCEDURE — 700102 HCHG RX REV CODE 250 W/ 637 OVERRIDE(OP): Performed by: INTERNAL MEDICINE

## 2021-12-16 PROCEDURE — G0378 HOSPITAL OBSERVATION PER HR: HCPCS

## 2021-12-16 PROCEDURE — A9270 NON-COVERED ITEM OR SERVICE: HCPCS | Performed by: INTERNAL MEDICINE

## 2021-12-16 PROCEDURE — 302104 KIT,UROSTOMY 2-PIECE 2 1/4": Performed by: INTERNAL MEDICINE

## 2021-12-16 PROCEDURE — 700102 HCHG RX REV CODE 250 W/ 637 OVERRIDE(OP): Performed by: HOSPITALIST

## 2021-12-16 PROCEDURE — 700101 HCHG RX REV CODE 250: Performed by: INTERNAL MEDICINE

## 2021-12-16 PROCEDURE — 99224 PR SUBSEQUENT OBSERVATION CARE,LEVEL I: CPT | Performed by: INTERNAL MEDICINE

## 2021-12-16 PROCEDURE — A9270 NON-COVERED ITEM OR SERVICE: HCPCS | Performed by: HOSPITALIST

## 2021-12-16 RX ADMIN — SODIUM HYPOCHLORITE 1 ML: 1.25 SOLUTION TOPICAL at 17:18

## 2021-12-16 RX ADMIN — MIDODRINE HYDROCHLORIDE 10 MG: 5 TABLET ORAL at 17:18

## 2021-12-16 RX ADMIN — NYSTATIN: 100000 POWDER TOPICAL at 05:51

## 2021-12-16 RX ADMIN — MIDODRINE HYDROCHLORIDE 10 MG: 5 TABLET ORAL at 05:51

## 2021-12-16 RX ADMIN — THERA TABS 1 TABLET: TAB at 05:51

## 2021-12-16 RX ADMIN — RIVAROXABAN 20 MG: 20 TABLET, FILM COATED ORAL at 17:18

## 2021-12-16 RX ADMIN — NYSTATIN: 100000 POWDER TOPICAL at 17:18

## 2021-12-16 RX ADMIN — SENNOSIDES AND DOCUSATE SODIUM 2 TABLET: 50; 8.6 TABLET ORAL at 05:51

## 2021-12-16 RX ADMIN — SODIUM HYPOCHLORITE 1 ML: 1.25 SOLUTION TOPICAL at 05:51

## 2021-12-16 RX ADMIN — SENNOSIDES AND DOCUSATE SODIUM 2 TABLET: 50; 8.6 TABLET ORAL at 17:18

## 2021-12-16 ASSESSMENT — ENCOUNTER SYMPTOMS
COUGH: 0
SPEECH CHANGE: 0
DIZZINESS: 0
INSOMNIA: 0
HEADACHES: 0
BLURRED VISION: 0
VOMITING: 0
FEVER: 0
SHORTNESS OF BREATH: 0
HEARTBURN: 0
DIARRHEA: 0
CONSTIPATION: 0
SORE THROAT: 0
DEPRESSION: 0
MYALGIAS: 0
NERVOUS/ANXIOUS: 0
PHOTOPHOBIA: 0
ABDOMINAL PAIN: 0
CLAUDICATION: 0
CHILLS: 0
SENSORY CHANGE: 0
WEAKNESS: 0

## 2021-12-16 ASSESSMENT — PAIN DESCRIPTION - PAIN TYPE
TYPE: ACUTE PAIN
TYPE: ACUTE PAIN

## 2021-12-16 NOTE — DISCHARGE PLANNING
Anticipated Discharge Disposition: Home with dtr    Action: LSW confirmed with Donna with [facility] that [facility] does NOT service [place]. LSW notified supervisor Franki OWENS requested this LSW reach out to pt's dtr Giselle for a time frame of when they would be ready for pt to come to her home.     Barriers to Discharge: care giving support    Plan: LSW to call pt's dtr Giselle.    1354: LSW called Giselle at [phone] to discuss when pt may be able to return to her home. LSW left  with call back number.    1401: LSW received VM from Giselle. Per Giselle, she would like pt to transfer to her house Mond/Tuesday next week. LSW notified Franki SHERWOOD

## 2021-12-16 NOTE — CARE PLAN
The patient is Stable - Low risk of patient condition declining or worsening    Shift Goals  Clinical Goals: remain free of falls or injury, wound care   Patient Goals: rest, comfort  Family Goals: No family present    Progress made toward(s) clinical / shift goals:  Safety precautions in place, will change wound dressings this shift. Progressing on other goals.   Problem: Knowledge Deficit - Standard  Goal: Patient and family/care givers will demonstrate understanding of plan of care, disease process/condition, diagnostic tests and medications  Outcome: Progressing     Problem: Skin Integrity  Goal: Skin integrity is maintained or improved  Outcome: Progressing     Problem: Fall Risk  Goal: Patient will remain free from falls  Outcome: Progressing     Problem: Bowel Elimination  Goal: Establish and maintain regular bowel function  Outcome: Progressing       Patient is not progressing towards the following goals:

## 2021-12-16 NOTE — CARE PLAN
Problem: Knowledge Deficit - Standard  Goal: Patient and family/care givers will demonstrate understanding of plan of care, disease process/condition, diagnostic tests and medications  Outcome: Progressing     Problem: Skin Integrity  Goal: Skin integrity is maintained or improved  Outcome: Progressing     Problem: Fall Risk  Goal: Patient will remain free from falls  Outcome: Progressing     Problem: Bowel Elimination  Goal: Establish and maintain regular bowel function  Outcome: Progressing     Problem: Psychosocial  Goal: Patient's level of anxiety will decrease  Outcome: Progressing     Problem: Communication  Goal: The ability to communicate needs accurately and effectively will improve  Outcome: Progressing   The patient is Stable - Low risk of patient condition declining or worsening    Shift Goals  Clinical Goals: wound care   Patient Goals: rest comfortably   Family Goals: No family present    Progress made toward(s) clinical / shift goals:      Patient is not progressing towards the following goals:

## 2021-12-16 NOTE — DISCHARGE PLANNING
ATTN: Case Management  RE: Referral for Home Health    Reason for referral denial: Mirza Gallagher patient will be residing in [place] with family. Unfortunately, [facility] does not service that area.                Unfortunately, we are not able to accept this referral for the reason listed above. If further clarity is needed, our Transitional Care Specialists are available to discuss any barriers to service at [phone].      We look forward to collaborating with you in the future,  [facility] Team   Picato Counseling:  I discussed with the patient the risks of Picato including but not limited to erythema, scaling, itching, weeping, crusting, and pain.

## 2021-12-16 NOTE — PROGRESS NOTES
Received report from night shift RN, assumed care of pt. [id number]. VSS. Pt denies pain or nausea this morning. Updated on plan of care for the day. Safety precautions in place, pt educated to call for assistance.

## 2021-12-17 PROCEDURE — G0378 HOSPITAL OBSERVATION PER HR: HCPCS

## 2021-12-17 PROCEDURE — A9270 NON-COVERED ITEM OR SERVICE: HCPCS | Performed by: INTERNAL MEDICINE

## 2021-12-17 PROCEDURE — 700102 HCHG RX REV CODE 250 W/ 637 OVERRIDE(OP): Performed by: INTERNAL MEDICINE

## 2021-12-17 PROCEDURE — 99224 PR SUBSEQUENT OBSERVATION CARE,LEVEL I: CPT | Performed by: INTERNAL MEDICINE

## 2021-12-17 PROCEDURE — A9270 NON-COVERED ITEM OR SERVICE: HCPCS | Performed by: HOSPITALIST

## 2021-12-17 PROCEDURE — 700102 HCHG RX REV CODE 250 W/ 637 OVERRIDE(OP): Performed by: HOSPITALIST

## 2021-12-17 PROCEDURE — 302098 PASTE RING (FLAT): Performed by: INTERNAL MEDICINE

## 2021-12-17 PROCEDURE — 700101 HCHG RX REV CODE 250: Performed by: INTERNAL MEDICINE

## 2021-12-17 RX ADMIN — SENNOSIDES AND DOCUSATE SODIUM 2 TABLET: 50; 8.6 TABLET ORAL at 17:28

## 2021-12-17 RX ADMIN — MIDODRINE HYDROCHLORIDE 10 MG: 5 TABLET ORAL at 05:52

## 2021-12-17 RX ADMIN — SODIUM HYPOCHLORITE 1 ML: 1.25 SOLUTION TOPICAL at 05:53

## 2021-12-17 RX ADMIN — RIVAROXABAN 20 MG: 20 TABLET, FILM COATED ORAL at 17:27

## 2021-12-17 RX ADMIN — SODIUM HYPOCHLORITE 1 ML: 1.25 SOLUTION TOPICAL at 14:30

## 2021-12-17 RX ADMIN — MIDODRINE HYDROCHLORIDE 10 MG: 5 TABLET ORAL at 17:27

## 2021-12-17 RX ADMIN — NYSTATIN: 100000 POWDER TOPICAL at 05:52

## 2021-12-17 RX ADMIN — NYSTATIN: 100000 POWDER TOPICAL at 17:27

## 2021-12-17 RX ADMIN — SENNOSIDES AND DOCUSATE SODIUM 2 TABLET: 50; 8.6 TABLET ORAL at 05:52

## 2021-12-17 RX ADMIN — THERA TABS 1 TABLET: TAB at 05:52

## 2021-12-17 ASSESSMENT — ENCOUNTER SYMPTOMS
HEARTBURN: 0
CLAUDICATION: 0
DIARRHEA: 0
BLURRED VISION: 0
WEAKNESS: 0
INSOMNIA: 0
MYALGIAS: 0
DIZZINESS: 0
NERVOUS/ANXIOUS: 0
PHOTOPHOBIA: 0
FEVER: 0
SHORTNESS OF BREATH: 0
SORE THROAT: 0
SENSORY CHANGE: 0
CHILLS: 0
DEPRESSION: 0
VOMITING: 0
CONSTIPATION: 0
ABDOMINAL PAIN: 0
SPEECH CHANGE: 0
COUGH: 0
HEADACHES: 0

## 2021-12-17 ASSESSMENT — PAIN DESCRIPTION - PAIN TYPE: TYPE: ACUTE PAIN

## 2021-12-17 NOTE — CARE PLAN
Problem: Knowledge Deficit - Standard  Goal: Patient and family/care givers will demonstrate understanding of plan of care, disease process/condition, diagnostic tests and medications  Outcome: Progressing     Problem: Skin Integrity  Goal: Skin integrity is maintained or improved  Outcome: Progressing     Problem: Fall Risk  Goal: Patient will remain free from falls  Outcome: Progressing     Problem: Bowel Elimination  Goal: Establish and maintain regular bowel function  Outcome: Progressing     Problem: Psychosocial  Goal: Patient's level of anxiety will decrease  Outcome: Progressing     Problem: Communication  Goal: The ability to communicate needs accurately and effectively will improve  Outcome: Progressing     Problem: Respiratory  Goal: Patient will achieve/maintain optimum respiratory ventilation and gas exchange  Outcome: Progressing   The patient is Stable - Low risk of patient condition declining or worsening    Shift Goals  Clinical Goals: wound care pt comfort   Patient Goals: pt comfort   Family Goals: No family present    Progress made toward(s) clinical / shift goals:      Patient is not progressing towards the following goals:

## 2021-12-17 NOTE — PROGRESS NOTES
This RN educated and demonstrated to family, with family participation on how to 1)disimpact patient 2) dressing changes 3) change entire urostomy appliance.  Son in law at bedside, all questions answered.  Patient tolerated well.
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
## 2021-12-17 NOTE — CARE PLAN
The patient is Stable - Low risk of patient condition declining or worsening    Shift Goals  Clinical Goals: wound care, placement  Patient Goals: comfort  Family Goals: No family present    Progress made toward(s) clinical / shift goals      Problem: Knowledge Deficit - Standard  Goal: Patient and family/care givers will demonstrate understanding of plan of care, disease process/condition, diagnostic tests and medications  Outcome: Progressing     Problem: Skin Integrity  Goal: Skin integrity is maintained or improved  Outcome: Progressing     Problem: Fall Risk  Goal: Patient will remain free from falls  Outcome: Progressing     Problem: Bowel Elimination  Goal: Establish and maintain regular bowel function  Outcome: Progressing     Problem: Psychosocial  Goal: Patient's level of anxiety will decrease  Outcome: Progressing  Goal: Patient's ability to verbalize feelings about condition will improve  Outcome: Progressing  Goal: Patient's ability to re-evaluate and adapt role responsibilities will improve  Outcome: Progressing  Goal: Patient and family will demonstrate ability to cope with life altering diagnosis and/or procedure  Outcome: Progressing  Goal: Spiritual and cultural needs incorporated into hospitalization  Outcome: Progressing     Patient AOX4, denies nausea and pain.  Patient turned Q 2 hours, assists caregivers but also refuses at times.    Patient voiced concerns about family education today.  Daughter and son-in-law to visit today at 1330 hours, this RN to educate family on ostomy care, disimpaction and changing dressings.

## 2021-12-17 NOTE — CARE PLAN
The patient is Stable - Low risk of patient condition declining or worsening    Shift Goals  Clinical Goals: wound care, comfort  Patient Goals: rest, comfort   Family Goals: No family present    Progress made toward(s) clinical / shift goals:  Pt has received wound care this shift, denies further needs at this time. Progressing on other goals.   Problem: Knowledge Deficit - Standard  Goal: Patient and family/care givers will demonstrate understanding of plan of care, disease process/condition, diagnostic tests and medications  Outcome: Progressing     Problem: Skin Integrity  Goal: Skin integrity is maintained or improved  Outcome: Progressing     Problem: Fall Risk  Goal: Patient will remain free from falls  Outcome: Progressing     Problem: Bowel Elimination  Goal: Establish and maintain regular bowel function  Outcome: Progressing     Problem: Communication  Goal: The ability to communicate needs accurately and effectively will improve  Outcome: Progressing       Patient is not progressing towards the following goals:

## 2021-12-17 NOTE — DISCHARGE PLANNING
Anticipated Discharge Disposition: Home with family      Action: Discussed pt during morning rounds. Per Naz CHEEMA pt can DC home with daughter when she is ready Monday or Tuesday. Pt will need outpt wound set up, Naz CHEEMA asking for referral to be place to start setup.     Barriers to Discharge: Awaiting Care giver to be ready to take pt, Out pt Wound set up     Plan: Awaiting referral for out pt wound to complete setup

## 2021-12-18 PROCEDURE — A9270 NON-COVERED ITEM OR SERVICE: HCPCS | Performed by: HOSPITALIST

## 2021-12-18 PROCEDURE — G0378 HOSPITAL OBSERVATION PER HR: HCPCS

## 2021-12-18 PROCEDURE — 99224 PR SUBSEQUENT OBSERVATION CARE,LEVEL I: CPT | Performed by: INTERNAL MEDICINE

## 2021-12-18 PROCEDURE — A9270 NON-COVERED ITEM OR SERVICE: HCPCS | Performed by: INTERNAL MEDICINE

## 2021-12-18 PROCEDURE — 700102 HCHG RX REV CODE 250 W/ 637 OVERRIDE(OP): Performed by: INTERNAL MEDICINE

## 2021-12-18 PROCEDURE — 700102 HCHG RX REV CODE 250 W/ 637 OVERRIDE(OP): Performed by: HOSPITALIST

## 2021-12-18 RX ADMIN — NYSTATIN: 100000 POWDER TOPICAL at 17:50

## 2021-12-18 RX ADMIN — MIDODRINE HYDROCHLORIDE 10 MG: 5 TABLET ORAL at 17:50

## 2021-12-18 RX ADMIN — SENNOSIDES AND DOCUSATE SODIUM 2 TABLET: 50; 8.6 TABLET ORAL at 06:19

## 2021-12-18 RX ADMIN — MIDODRINE HYDROCHLORIDE 10 MG: 5 TABLET ORAL at 06:19

## 2021-12-18 RX ADMIN — NYSTATIN: 100000 POWDER TOPICAL at 06:19

## 2021-12-18 RX ADMIN — SENNOSIDES AND DOCUSATE SODIUM 2 TABLET: 50; 8.6 TABLET ORAL at 17:50

## 2021-12-18 RX ADMIN — SODIUM HYPOCHLORITE 1 ML: 1.25 SOLUTION TOPICAL at 13:29

## 2021-12-18 RX ADMIN — SODIUM HYPOCHLORITE 1 ML: 1.25 SOLUTION TOPICAL at 06:18

## 2021-12-18 RX ADMIN — RIVAROXABAN 20 MG: 20 TABLET, FILM COATED ORAL at 17:50

## 2021-12-18 RX ADMIN — THERA TABS 1 TABLET: TAB at 06:19

## 2021-12-18 ASSESSMENT — ENCOUNTER SYMPTOMS
SORE THROAT: 0
CONSTIPATION: 0
PHOTOPHOBIA: 0
COUGH: 0
DEPRESSION: 0
VOMITING: 0
MYALGIAS: 0
DIARRHEA: 0
HEADACHES: 0
DIZZINESS: 0
WEAKNESS: 0
FEVER: 0
BLURRED VISION: 0
SHORTNESS OF BREATH: 0
INSOMNIA: 0
CLAUDICATION: 0
ABDOMINAL PAIN: 0
SPEECH CHANGE: 0
SENSORY CHANGE: 0
HEARTBURN: 0
CHILLS: 0
NERVOUS/ANXIOUS: 0

## 2021-12-18 ASSESSMENT — PAIN DESCRIPTION - PAIN TYPE: TYPE: ACUTE PAIN
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
## 2021-12-18 NOTE — PROGRESS NOTES
2000-Received report from [place] MIGUEL. Assumed pt care. Assessment and chart check complete. Pt is [id number], no signs of distress, denies nausea/vomiting . Repositioning as schedule. Urostomy bag in place. Dressing CDI, float boots in place. Dressing change per order. Fall precautions in place, treaded socks on pt, bed in low position. Call light within reach. Educated pt to call if needing anything. Hourly rounding.

## 2021-12-18 NOTE — CARE PLAN
The patient is Stable - Low risk of patient condition declining or worsening    Shift Goals  Clinical Goals: wound care, disimpaction daily  Patient Goals: rest  Family Goals: No family present    Progress made toward(s) clinical / shift goals:    Problem: Knowledge Deficit - Standard  Goal: Patient and family/care givers will demonstrate understanding of plan of care, disease process/condition, diagnostic tests and medications  Outcome: Progressing     Problem: Skin Integrity  Goal: Skin integrity is maintained or improved  Outcome: Progressing     Problem: Fall Risk  Goal: Patient will remain free from falls  Outcome: Progressing     Problem: Bowel Elimination  Goal: Establish and maintain regular bowel function  Outcome: Progressing     Problem: Psychosocial  Goal: Patient's level of anxiety will decrease  Outcome: Progressing  Goal: Patient's ability to verbalize feelings about condition will improve  Outcome: Progressing  Goal: Patient's ability to re-evaluate and adapt role responsibilities will improve  Outcome: Progressing  Goal: Patient and family will demonstrate ability to cope with life altering diagnosis and/or procedure  Outcome: Progressing  Goal: Spiritual and cultural needs incorporated into hospitalization  Outcome: Progressing    Patient AOX4, denies nausea and pain.  On airbed, turned Q2 hours, tolerates well.  Patient scheduled for daily disimpaction and all dressings changed today.    Ostomy is draining clear yellow urine, entire appliance changed yesterday by this RN.    Patient requesting extra supplies for Mon/Tues discharge home.
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
## 2021-12-18 NOTE — CARE PLAN
The patient is Stable - Low risk of patient condition declining or worsening    Shift Goals  Clinical Goals: wound care,placement  Patient Goals: rest and comfort  Family Goals: No family present    Progress made toward(s) clinical / shift goals:  Wound care complete. Repositioned every 2200,200 and 600. Fall precaution in place.    Patient is not progressing towards the following goals:      Problem: Skin Integrity  Goal: Skin integrity is maintained or improved  Outcome: Progressing     Problem: Fall Risk  Goal: Patient will remain free from falls  Outcome: Progressing     Problem: Discharge Barriers/Planning  Goal: Patient's continuum of care needs are met  Outcome: Progressing

## 2021-12-19 PROCEDURE — G0378 HOSPITAL OBSERVATION PER HR: HCPCS

## 2021-12-19 PROCEDURE — 700102 HCHG RX REV CODE 250 W/ 637 OVERRIDE(OP): Performed by: INTERNAL MEDICINE

## 2021-12-19 PROCEDURE — 99224 PR SUBSEQUENT OBSERVATION CARE,LEVEL I: CPT | Performed by: INTERNAL MEDICINE

## 2021-12-19 PROCEDURE — 700102 HCHG RX REV CODE 250 W/ 637 OVERRIDE(OP): Performed by: HOSPITALIST

## 2021-12-19 PROCEDURE — A9270 NON-COVERED ITEM OR SERVICE: HCPCS | Performed by: INTERNAL MEDICINE

## 2021-12-19 PROCEDURE — A9270 NON-COVERED ITEM OR SERVICE: HCPCS | Performed by: HOSPITALIST

## 2021-12-19 RX ADMIN — SENNOSIDES AND DOCUSATE SODIUM 2 TABLET: 50; 8.6 TABLET ORAL at 17:07

## 2021-12-19 RX ADMIN — NYSTATIN: 100000 POWDER TOPICAL at 06:19

## 2021-12-19 RX ADMIN — MIDODRINE HYDROCHLORIDE 10 MG: 5 TABLET ORAL at 17:08

## 2021-12-19 RX ADMIN — RIVAROXABAN 20 MG: 20 TABLET, FILM COATED ORAL at 17:07

## 2021-12-19 RX ADMIN — MIDODRINE HYDROCHLORIDE 10 MG: 5 TABLET ORAL at 06:18

## 2021-12-19 RX ADMIN — THERA TABS 1 TABLET: TAB at 06:19

## 2021-12-19 RX ADMIN — NYSTATIN: 100000 POWDER TOPICAL at 17:08

## 2021-12-19 RX ADMIN — SODIUM HYPOCHLORITE 1 ML: 1.25 SOLUTION TOPICAL at 14:07

## 2021-12-19 RX ADMIN — SENNOSIDES AND DOCUSATE SODIUM 2 TABLET: 50; 8.6 TABLET ORAL at 06:19

## 2021-12-19 RX ADMIN — SODIUM HYPOCHLORITE 1 ML: 1.25 SOLUTION TOPICAL at 06:19

## 2021-12-19 ASSESSMENT — ENCOUNTER SYMPTOMS
CONSTIPATION: 0
FEVER: 0
VOMITING: 0
HEARTBURN: 0
WEAKNESS: 0
CLAUDICATION: 0
SORE THROAT: 0
COUGH: 0
SPEECH CHANGE: 0
PHOTOPHOBIA: 0
ABDOMINAL PAIN: 0
NERVOUS/ANXIOUS: 0
SENSORY CHANGE: 0
SHORTNESS OF BREATH: 0
DEPRESSION: 0
MYALGIAS: 0
CHILLS: 0
DIARRHEA: 0
BLURRED VISION: 0
INSOMNIA: 0
DIZZINESS: 0
HEADACHES: 0

## 2021-12-19 ASSESSMENT — PAIN DESCRIPTION - PAIN TYPE: TYPE: ACUTE PAIN

## 2021-12-19 NOTE — PROGRESS NOTES
Received report from MYRIAM Alvarez. Safety precautions in place. Bed locked and low. Offered assist. Call light and belongings within reach.

## 2021-12-19 NOTE — CARE PLAN
The patient is Stable - Low risk of patient condition declining or worsening    Shift Goals  Clinical Goals: Wound care, g0mbils, disempaction  Patient Goals: Rest  Family Goals: No family present    Progress made toward(s) clinical / shift goals:    Problem: Knowledge Deficit - Standard  Goal: Patient and family/care givers will demonstrate understanding of plan of care, disease process/condition, diagnostic tests and medications  Outcome: Progressing     Problem: Skin Integrity  Goal: Skin integrity is maintained or improved  Outcome: Progressing     Problem: Fall Risk  Goal: Patient will remain free from falls  Outcome: Progressing     Problem: Bowel Elimination  Goal: Establish and maintain regular bowel function  Outcome: Progressing       Patient is not progressing towards the following goals:

## 2021-12-19 NOTE — CARE PLAN
The patient is Stable - Low risk of patient condition declining or worsening    Shift Goals  Clinical Goals: wound care, daily disimpaction  Patient Goals: comfort  Family Goals: No family present    Progress made toward(s) clinical / shift goals:      Problem: Knowledge Deficit - Standard  Goal: Patient and family/care givers will demonstrate understanding of plan of care, disease process/condition, diagnostic tests and medications  Outcome: Progressing     Problem: Skin Integrity  Goal: Skin integrity is maintained or improved  Outcome: Progressing     Problem: Fall Risk  Goal: Patient will remain free from falls  Outcome: Progressing     Problem: Bowel Elimination  Goal: Establish and maintain regular bowel function  Outcome: Progressing     Problem: Psychosocial  Goal: Patient's level of anxiety will decrease  Outcome: Progressing  Goal: Patient's ability to verbalize feelings about condition will improve  Outcome: Progressing  Goal: Patient's ability to re-evaluate and adapt role responsibilities will improve  Outcome: Progressing  Goal: Patient and family will demonstrate ability to cope with life altering diagnosis and/or procedure  Outcome: Progressing  Goal: Spiritual and cultural needs incorporated into hospitalization  Outcome: Progressing     Problem: Discharge Barriers/Planning  Goal: Patient's continuum of care needs are met  Outcome: Progressing     Patient AOX4, denies nausea and pain.  Turned Q 2 hours, tolerates well.    POC today: disimpaction, dressing changes.    Plan for patient to discharge home with family Monday or Tuesday.

## 2021-12-19 NOTE — DISCHARGE PLANNING
Anticipated Discharge Disposition:   Home via [facility] with family and outpatient wound appt    Action:   Chart review complete.     0830: RN CM asked MD for outpatient wound referral order. MD to place order.     Anticipate patient to discharge to home tomorrow with outpatient wound appt and ride home via [facility].     1355: RN CM called the wound clinic. Wound clinic closed and will open Monday, 12/20, at 0730.     RN CM to call to make appointment tomorrow.     Barriers to Discharge:   Wound appt   Transport home confirmation     Plan:   Hospital care management will continue to assist with discharge planning needs.
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
## 2021-12-19 NOTE — PROGRESS NOTES
Patient's daily disimpaction done by this RN. Medium, brown, patient tolerated well.    Dressings changed per order, no issues.

## 2021-12-20 VITALS
HEIGHT: 75 IN | DIASTOLIC BLOOD PRESSURE: 85 MMHG | OXYGEN SATURATION: 92 % | SYSTOLIC BLOOD PRESSURE: 127 MMHG | HEART RATE: 85 BPM | BODY MASS INDEX: 21.14 KG/M2 | TEMPERATURE: 97.6 F | RESPIRATION RATE: 16 BRPM | WEIGHT: 170 LBS

## 2021-12-20 PROCEDURE — 700102 HCHG RX REV CODE 250 W/ 637 OVERRIDE(OP): Performed by: HOSPITALIST

## 2021-12-20 PROCEDURE — A9270 NON-COVERED ITEM OR SERVICE: HCPCS | Performed by: HOSPITALIST

## 2021-12-20 PROCEDURE — A9270 NON-COVERED ITEM OR SERVICE: HCPCS | Performed by: INTERNAL MEDICINE

## 2021-12-20 PROCEDURE — 99217 PR OBSERVATION CARE DISCHARGE: CPT | Performed by: INTERNAL MEDICINE

## 2021-12-20 PROCEDURE — 700102 HCHG RX REV CODE 250 W/ 637 OVERRIDE(OP): Performed by: INTERNAL MEDICINE

## 2021-12-20 PROCEDURE — G0378 HOSPITAL OBSERVATION PER HR: HCPCS

## 2021-12-20 RX ORDER — NYSTATIN 100000 [USP'U]/G
1 POWDER TOPICAL 2 TIMES DAILY
Qty: 15 G | Refills: 0 | Status: SHIPPED | OUTPATIENT
Start: 2021-12-20 | End: 2022-01-05

## 2021-12-20 RX ORDER — MIDODRINE HYDROCHLORIDE 10 MG/1
10 TABLET ORAL 2 TIMES DAILY
Qty: 60 TABLET | Refills: 0 | Status: SHIPPED | OUTPATIENT
Start: 2021-12-20 | End: 2021-12-20 | Stop reason: SDUPTHER

## 2021-12-20 RX ORDER — MIDODRINE HYDROCHLORIDE 10 MG/1
10 TABLET ORAL 2 TIMES DAILY
Qty: 60 TABLET | Refills: 0 | Status: SHIPPED | OUTPATIENT
Start: 2021-12-20 | End: 2023-02-21

## 2021-12-20 RX ADMIN — SODIUM HYPOCHLORITE 1 ML: 1.25 SOLUTION TOPICAL at 06:25

## 2021-12-20 RX ADMIN — NYSTATIN: 100000 POWDER TOPICAL at 06:25

## 2021-12-20 RX ADMIN — SENNOSIDES AND DOCUSATE SODIUM 2 TABLET: 50; 8.6 TABLET ORAL at 06:24

## 2021-12-20 RX ADMIN — MIDODRINE HYDROCHLORIDE 10 MG: 5 TABLET ORAL at 06:24

## 2021-12-20 RX ADMIN — THERA TABS 1 TABLET: TAB at 06:25

## 2021-12-20 ASSESSMENT — PAIN DESCRIPTION - PAIN TYPE: TYPE: ACUTE PAIN

## 2021-12-20 NOTE — CARE PLAN
Problem: Knowledge Deficit - Standard  Goal: Patient and family/care givers will demonstrate understanding of plan of care, disease process/condition, diagnostic tests and medications  Outcome: Progressing     Problem: Skin Integrity  Goal: Skin integrity is maintained or improved  Outcome: Progressing     Problem: Bowel Elimination  Goal: Establish and maintain regular bowel function  Outcome: Progressing     Problem: Psychosocial  Goal: Patient's level of anxiety will decrease  Outcome: Progressing   The patient is Stable - Low risk of patient condition declining or worsening    Shift Goals  Clinical Goals: wound care   Patient Goals: rest comfortably   Family Goals: No family present    Progress made toward(s) clinical / shift goals:  all goals     Patient is not progressing towards the following goals:

## 2021-12-20 NOTE — CARE PLAN
The patient is Watcher - Medium risk of patient condition declining or worsening    Shift Goals  Clinical Goals: dressing change, bowel regime, q2 turns  Patient Goals: rest comfortably   Family Goals: No family present    Progress made toward(s) clinical / shift goals:  progressing    Patient is not progressing towards the following goals:      Problem: Knowledge Deficit - Standard  Goal: Patient and family/care givers will demonstrate understanding of plan of care, disease process/condition, diagnostic tests and medications  Outcome: Not Progressing     Problem: Psychosocial  Goal: Patient's level of anxiety will decrease  Outcome: Not Progressing  Goal: Patient's ability to verbalize feelings about condition will improve  Outcome: Not Progressing  Goal: Patient's ability to re-evaluate and adapt role responsibilities will improve  Outcome: Not Progressing  Goal: Patient and family will demonstrate ability to cope with life altering diagnosis and/or procedure  Outcome: Not Progressing  Goal: Spiritual and cultural needs incorporated into hospitalization  Outcome: Not Progressing     Problem: Communication  Goal: The ability to communicate needs accurately and effectively will improve  Outcome: Not Progressing     Goal: Enteral nutrition will be maintained or improve  Outcome: Not Progressing  Goal: Enteral nutrition will be maintained or improve  Outcome: Not Progressing     Problem: Urinary Elimination  Goal: Establish and maintain regular urinary output  Outcome: Not Progressing     Problem: Gastrointestinal Irritability  Goal: Nausea and vomiting will be absent or improve  Outcome: Not Progressing  Goal: Diarrhea will be absent or improved  Outcome: Not Progressing

## 2021-12-20 NOTE — DISCHARGE PLANNING
Anticipated discharge disposition: home with family and out patient wound care    Action: made phone call to wound clinical to schedule appointment, left VM requesting call back.     Addendum @5224  Made phone call to wound clinic to schedule appointment, pt is scheduled for Tuesday 12/21 at 1300 at [facility]. Info will be available on AVS.     Addendum @ 5114  Notified by YARITZA Childs that pts daughter requested [phone] transport time for pt to [place] home.   Transport request faxed to cory.     Barriers to discharge:  transportation     Plan: care coordination will follow up for transport time.

## 2021-12-20 NOTE — PROGRESS NOTES
"Pt refused all turns. pt educated and states \" I Just want to sleep tonight with out being bothered\"   "
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
## 2021-12-20 NOTE — DISCHARGE PLANNING
DC Transport Scheduled    Received request at: 1005    Transport Company Scheduled:  [employer]  Spoke with Sandhya at [facility] to schedule transport Mohinder  [facility] Trip #: [id number]    Scheduled Date: 12/20/21  Scheduled Time: 1330    Destination:Home  [address]    Notified care team of scheduled transport via Voalte.     If there are any changes needed to the DC transportation scheduled, please contact [facility] at ext. [phone] between the hours of 6445-1284 Mon-Fri. If outside those hours, contact the ED Case Manager at ext. [phone].

## 2021-12-20 NOTE — PROGRESS NOTES
Patient on room air, A&Ox4, appears anxious, moving upper body side to side. Dressing change done. Digital disimpaction done. Detailed discharge instruction provided. Patient states understanding. MD contacted for medication order refill.

## 2021-12-20 NOTE — DISCHARGE PLANNING
Anticipated Discharge Disposition: Daughter- [facility] ([address], [place]) with Family Assistance and Outpatient Wound Clinic     Action: Pt discussed during morning rounds and per Dr. Joe pt upset because he is supposed to go home today and case management has not talked to him.     LSW met with pt to discuss d/c plan. LSW explained to pt that our team was not sure if everything was in place for him to d/c home today. LSW explained to pt that we coordinate transportation when we know everything is in place for d/c. Pt stated that his daughter and son in law are at home waiting for him today and also available tomorrow. Pt does not know his daughters address. LSW to call daughter for address.     LSW also explained to pt that HH services were not able to get arranged for wound care. Pt has Medicaid FFS and there are no HH agencies contracted with pt's insurance. LSW explained to pt that our only option is outpatient wound clinic and that our team is looking into [facility] to see if they can accommodate his wounds. Pt aware that if [facility] is unable to accommodate wound care, coming to [place] to the [facility] would be his only option.     Pt stated that his son law came in on Friday and received education from RN's for wound care. Pt stated that he feels confident with what they were taught. If f/u for wound care is available then that would be great.     Barriers to Discharge: None     Plan: LSW to reach out to daughter for address, RNCM to coordinate [facility] transport home     Addendum [id number]  LSW called pt's daughter, Giselle ([phone]) to gather address that pt will be going to. No answer. LSW left a voicemail with name and number for call back.     Addendum 8095  LSW received a call back from Giselle and was provided address that pt will be transported to- [address], [place]     Giselle has requested transportation be coordinated between [phone]. LSW has  notified Cindy RAMIREZ.     LSW informed Giselle that our team is working on setting up a wound clinic appointment ([facility]). LSW was empathetic towards the fact that they will have to drive in from [place] as a last resort. However, f/u is better than no f/u. Giselle has concerns about the day and time of the wound clinic appointment due to requiring assistance with getting pt in his chair. LSW explained that the first appointment can be difficult due to the wound clinics availability. Once they attend their first appointment they will be able to work with the staff to request specific days and times. LSW suggested they called the wound clinic as soon as possible if the appointment provided on the d/c summary will not work with their schedule, to reschedule for a different time and day.     LSW ensured Giselle that LSW would call back with confirmed transport time.     Addendum 0924  LSW met with pt at bedside to provide update. Pt aware that transportation requested for [id number] per daughters request. LSW also informed that [facility] unable to accomodate wound care and appointment being setup with the [facility]. LSW explained to pt as well that if the appointment does not work with his family's schedule they can call the clinic and reschedule.     Addendum 1102  LSW called HCM manager, Abigail Mcelroy and provided update.     Addendum 1255  LSW informed by Stevie with [facility] that [facility] transport confirmed for 1330.   LSW called pt's daughter, Giselle and provided update.   LSW met with pt at bedside to provide update.     Bedside RNElizabeth included in message from [employer] with transport time.

## 2021-12-20 NOTE — DISCHARGE INSTRUCTIONS
Discharge Instructions    Discharged to home by car with relative. Discharged via ambulance, hospital escort: Yes.  Special equipment needed: Not Applicable    Be sure to schedule a follow-up appointment with your primary care doctor or any specialists as instructed.     Discharge Plan:   Diet Plan: Discussed  Activity Level: Discussed  Confirmed Follow up Appointment: Patient to Call and Schedule Appointment  Confirmed Symptoms Management: Discussed  Medication Reconciliation Updated: Yes  Influenza Vaccine Indication: Not indicated: Previously immunized this influenza season and > 8 years of age    I understand that a diet low in cholesterol, fat, and sodium is recommended for good health. Unless I have been given specific instructions below for another diet, I accept this instruction as my diet prescription.   Other diet: Home diet    Special Instructions: None    · Is patient discharged on Warfarin / Coumadin?   No     Depression / Suicide Risk    As you are discharged from this [facility] facility, it is important to learn how to keep safe from harming yourself.    Recognize the warning signs:  · Abrupt changes in personality, positive or negative- including increase in energy   · Giving away possessions  · Change in eating patterns- significant weight changes-  positive or negative  · Change in sleeping patterns- unable to sleep or sleeping all the time   · Unwillingness or inability to communicate  · Depression  · Unusual sadness, discouragement and loneliness  · Talk of wanting to die  · Neglect of personal appearance   · Rebelliousness- reckless behavior  · Withdrawal from people/activities they love  · Confusion- inability to concentrate     If you or a loved one observes any of these behaviors or has concerns about self-harm, here's what you can do:  · Talk about it- your feelings and reasons for harming yourself  · Remove any means that you might use to hurt yourself (examples: pills, rope, extension  cords, firearm)  · Get professional help from the community (Mental Health, Substance Abuse, psychological counseling)  · Do not be alone:Call your Safe Contact- someone whom you trust who will be there for you.  · Call your local CRISIS HOTLINE 675-4256 or 522-801-4123  · Call your local Children's Mobile Crisis Response Team [place] (216) 187-0135 or www.[employer]  · Call the toll free National Suicide Prevention Hotlines   · National Suicide Prevention Lifeline 353-867-MTFD (9326)  · National Hope Line Network 800-SUICIDE (270-5298)       Universal Safety Interventions

## 2022-01-05 ENCOUNTER — HOSPITAL ENCOUNTER (INPATIENT)
Facility: MEDICAL CENTER | Age: 62
LOS: 4 days | DRG: 871 | End: 2022-01-09
Attending: EMERGENCY MEDICINE | Admitting: HOSPITALIST
Payer: MEDICAID

## 2022-01-05 ENCOUNTER — APPOINTMENT (OUTPATIENT)
Dept: RADIOLOGY | Facility: MEDICAL CENTER | Age: 62
DRG: 871 | End: 2022-01-05
Attending: EMERGENCY MEDICINE
Payer: MEDICAID

## 2022-01-05 DIAGNOSIS — A41.9 SEPSIS, DUE TO UNSPECIFIED ORGANISM, UNSPECIFIED WHETHER ACUTE ORGAN DYSFUNCTION PRESENT (HCC): ICD-10-CM

## 2022-01-05 DIAGNOSIS — N39.0 ACUTE UTI: ICD-10-CM

## 2022-01-05 DIAGNOSIS — K59.2 NEUROGENIC BOWEL: Chronic | ICD-10-CM

## 2022-01-05 PROBLEM — E87.1 HYPONATREMIA: Status: ACTIVE | Noted: 2022-01-05

## 2022-01-05 PROBLEM — N30.00 ACUTE CYSTITIS WITHOUT HEMATURIA: Status: ACTIVE | Noted: 2022-01-05

## 2022-01-05 LAB
ALBUMIN SERPL BCP-MCNC: 3.6 G/DL (ref 3.2–4.9)
ALBUMIN/GLOB SERPL: 0.9 G/DL
ALP SERPL-CCNC: 73 U/L (ref 30–99)
ALT SERPL-CCNC: 19 U/L (ref 2–50)
ANION GAP SERPL CALC-SCNC: 16 MMOL/L (ref 7–16)
APPEARANCE UR: ABNORMAL
AST SERPL-CCNC: 20 U/L (ref 12–45)
BACTERIA #/AREA URNS HPF: ABNORMAL /HPF
BASOPHILS # BLD AUTO: 0.4 % (ref 0–1.8)
BASOPHILS # BLD: 0.1 K/UL (ref 0–0.12)
BILIRUB SERPL-MCNC: 1.2 MG/DL (ref 0.1–1.5)
BILIRUB UR QL STRIP.AUTO: NEGATIVE
BUN SERPL-MCNC: 18 MG/DL (ref 8–22)
CALCIUM SERPL-MCNC: 9.3 MG/DL (ref 8.4–10.2)
CHLORIDE SERPL-SCNC: 99 MMOL/L (ref 96–112)
CO2 SERPL-SCNC: 19 MMOL/L (ref 20–33)
COLOR UR: YELLOW
CREAT SERPL-MCNC: 0.55 MG/DL (ref 0.5–1.4)
EKG IMPRESSION: NORMAL
EOSINOPHIL # BLD AUTO: 0.08 K/UL (ref 0–0.51)
EOSINOPHIL NFR BLD: 0.4 % (ref 0–6.9)
EPI CELLS #/AREA URNS HPF: ABNORMAL /HPF
ERYTHROCYTE [DISTWIDTH] IN BLOOD BY AUTOMATED COUNT: 45.9 FL (ref 35.9–50)
GLOBULIN SER CALC-MCNC: 4 G/DL (ref 1.9–3.5)
GLUCOSE SERPL-MCNC: 92 MG/DL (ref 65–99)
GLUCOSE UR STRIP.AUTO-MCNC: NEGATIVE MG/DL
HCT VFR BLD AUTO: 43.2 % (ref 42–52)
HGB BLD-MCNC: 13.9 G/DL (ref 14–18)
IMM GRANULOCYTES # BLD AUTO: 0.1 K/UL (ref 0–0.11)
IMM GRANULOCYTES NFR BLD AUTO: 0.4 % (ref 0–0.9)
KETONES UR STRIP.AUTO-MCNC: 15 MG/DL
LACTATE BLD-SCNC: 1.4 MMOL/L (ref 0.5–2)
LEUKOCYTE ESTERASE UR QL STRIP.AUTO: ABNORMAL
LYMPHOCYTES # BLD AUTO: 0.94 K/UL (ref 1–4.8)
LYMPHOCYTES NFR BLD: 4.2 % (ref 22–41)
MCH RBC QN AUTO: 27.5 PG (ref 27–33)
MCHC RBC AUTO-ENTMCNC: 32.2 G/DL (ref 33.7–35.3)
MCV RBC AUTO: 85.4 FL (ref 81.4–97.8)
MICRO URNS: ABNORMAL
MONOCYTES # BLD AUTO: 0.77 K/UL (ref 0–0.85)
MONOCYTES NFR BLD AUTO: 3.4 % (ref 0–13.4)
NEUTROPHILS # BLD AUTO: 20.48 K/UL (ref 1.82–7.42)
NEUTROPHILS NFR BLD: 91.2 % (ref 44–72)
NITRITE UR QL STRIP.AUTO: POSITIVE
NRBC # BLD AUTO: 0 K/UL
NRBC BLD-RTO: 0 /100 WBC
PH UR STRIP.AUTO: >=9 [PH] (ref 5–8)
PLATELET # BLD AUTO: 383 K/UL (ref 164–446)
PMV BLD AUTO: 10 FL (ref 9–12.9)
POTASSIUM SERPL-SCNC: 3.5 MMOL/L (ref 3.6–5.5)
PROT SERPL-MCNC: 7.6 G/DL (ref 6–8.2)
PROT UR QL STRIP: NEGATIVE MG/DL
RBC # BLD AUTO: 5.06 M/UL (ref 4.7–6.1)
RBC # URNS HPF: ABNORMAL /HPF
RBC UR QL AUTO: ABNORMAL
SARS-COV+SARS-COV-2 AG RESP QL IA.RAPID: NOTDETECTED
SODIUM SERPL-SCNC: 134 MMOL/L (ref 135–145)
SP GR UR STRIP.AUTO: 1.01
SPECIMEN SOURCE: NORMAL
TRIPLE PHOS CRYSTAL  1767: ABNORMAL /HPF
UNIDENT CRYS URNS QL MICRO: ABNORMAL /HPF
WBC # BLD AUTO: 22.5 K/UL (ref 4.8–10.8)
WBC #/AREA URNS HPF: ABNORMAL /HPF

## 2022-01-05 PROCEDURE — 700105 HCHG RX REV CODE 258

## 2022-01-05 PROCEDURE — 96365 THER/PROPH/DIAG IV INF INIT: CPT

## 2022-01-05 PROCEDURE — 93005 ELECTROCARDIOGRAM TRACING: CPT | Performed by: EMERGENCY MEDICINE

## 2022-01-05 PROCEDURE — 87086 URINE CULTURE/COLONY COUNT: CPT

## 2022-01-05 PROCEDURE — 74177 CT ABD & PELVIS W/CONTRAST: CPT

## 2022-01-05 PROCEDURE — 700102 HCHG RX REV CODE 250 W/ 637 OVERRIDE(OP): Performed by: HOSPITALIST

## 2022-01-05 PROCEDURE — 81001 URINALYSIS AUTO W/SCOPE: CPT

## 2022-01-05 PROCEDURE — 700111 HCHG RX REV CODE 636 W/ 250 OVERRIDE (IP): Performed by: EMERGENCY MEDICINE

## 2022-01-05 PROCEDURE — A9270 NON-COVERED ITEM OR SERVICE: HCPCS | Performed by: HOSPITALIST

## 2022-01-05 PROCEDURE — 99223 1ST HOSP IP/OBS HIGH 75: CPT | Mod: AI | Performed by: HOSPITALIST

## 2022-01-05 PROCEDURE — 85025 COMPLETE CBC W/AUTO DIFF WBC: CPT

## 2022-01-05 PROCEDURE — 700101 HCHG RX REV CODE 250: Performed by: HOSPITALIST

## 2022-01-05 PROCEDURE — 770006 HCHG ROOM/CARE - MED/SURG/GYN SEMI*

## 2022-01-05 PROCEDURE — 87426 SARSCOV CORONAVIRUS AG IA: CPT

## 2022-01-05 PROCEDURE — 700105 HCHG RX REV CODE 258: Performed by: EMERGENCY MEDICINE

## 2022-01-05 PROCEDURE — 87040 BLOOD CULTURE FOR BACTERIA: CPT

## 2022-01-05 PROCEDURE — 700105 HCHG RX REV CODE 258: Performed by: HOSPITALIST

## 2022-01-05 PROCEDURE — 700111 HCHG RX REV CODE 636 W/ 250 OVERRIDE (IP): Performed by: HOSPITALIST

## 2022-01-05 PROCEDURE — 99285 EMERGENCY DEPT VISIT HI MDM: CPT

## 2022-01-05 PROCEDURE — 700117 HCHG RX CONTRAST REV CODE 255: Performed by: EMERGENCY MEDICINE

## 2022-01-05 PROCEDURE — 83605 ASSAY OF LACTIC ACID: CPT

## 2022-01-05 PROCEDURE — 71045 X-RAY EXAM CHEST 1 VIEW: CPT

## 2022-01-05 PROCEDURE — 80053 COMPREHEN METABOLIC PANEL: CPT

## 2022-01-05 RX ORDER — MIDODRINE HYDROCHLORIDE 5 MG/1
10 TABLET ORAL 2 TIMES DAILY
Status: DISCONTINUED | OUTPATIENT
Start: 2022-01-05 | End: 2022-01-09 | Stop reason: HOSPADM

## 2022-01-05 RX ORDER — BISACODYL 10 MG
10 SUPPOSITORY, RECTAL RECTAL
Status: DISCONTINUED | OUTPATIENT
Start: 2022-01-05 | End: 2022-01-06

## 2022-01-05 RX ORDER — SODIUM CHLORIDE, SODIUM LACTATE, POTASSIUM CHLORIDE, AND CALCIUM CHLORIDE .6; .31; .03; .02 G/100ML; G/100ML; G/100ML; G/100ML
30 INJECTION, SOLUTION INTRAVENOUS ONCE
Status: COMPLETED | OUTPATIENT
Start: 2022-01-05 | End: 2022-01-05

## 2022-01-05 RX ORDER — SODIUM CHLORIDE, SODIUM LACTATE, POTASSIUM CHLORIDE, CALCIUM CHLORIDE 600; 310; 30; 20 MG/100ML; MG/100ML; MG/100ML; MG/100ML
1000 INJECTION, SOLUTION INTRAVENOUS ONCE
Status: DISCONTINUED | OUTPATIENT
Start: 2022-01-05 | End: 2022-01-05

## 2022-01-05 RX ORDER — AMOXICILLIN 250 MG
2 CAPSULE ORAL 2 TIMES DAILY
Status: DISCONTINUED | OUTPATIENT
Start: 2022-01-05 | End: 2022-01-06

## 2022-01-05 RX ORDER — SODIUM CHLORIDE, SODIUM LACTATE, POTASSIUM CHLORIDE, AND CALCIUM CHLORIDE .6; .31; .03; .02 G/100ML; G/100ML; G/100ML; G/100ML
500 INJECTION, SOLUTION INTRAVENOUS
Status: DISCONTINUED | OUTPATIENT
Start: 2022-01-05 | End: 2022-01-09 | Stop reason: HOSPADM

## 2022-01-05 RX ORDER — ACETAMINOPHEN 325 MG/1
650 TABLET ORAL EVERY 6 HOURS PRN
Status: DISCONTINUED | OUTPATIENT
Start: 2022-01-05 | End: 2022-01-09 | Stop reason: HOSPADM

## 2022-01-05 RX ORDER — POLYETHYLENE GLYCOL 3350 17 G/17G
1 POWDER, FOR SOLUTION ORAL
Status: DISCONTINUED | OUTPATIENT
Start: 2022-01-05 | End: 2022-01-06

## 2022-01-05 RX ORDER — SODIUM CHLORIDE, SODIUM LACTATE, POTASSIUM CHLORIDE, AND CALCIUM CHLORIDE .6; .31; .03; .02 G/100ML; G/100ML; G/100ML; G/100ML
30 INJECTION, SOLUTION INTRAVENOUS
Status: DISCONTINUED | OUTPATIENT
Start: 2022-01-05 | End: 2022-01-09 | Stop reason: HOSPADM

## 2022-01-05 RX ORDER — SODIUM CHLORIDE, SODIUM LACTATE, POTASSIUM CHLORIDE, CALCIUM CHLORIDE 600; 310; 30; 20 MG/100ML; MG/100ML; MG/100ML; MG/100ML
1000 INJECTION, SOLUTION INTRAVENOUS ONCE
Status: COMPLETED | OUTPATIENT
Start: 2022-01-05 | End: 2022-01-05

## 2022-01-05 RX ORDER — SODIUM CHLORIDE AND POTASSIUM CHLORIDE 150; 900 MG/100ML; MG/100ML
INJECTION, SOLUTION INTRAVENOUS CONTINUOUS
Status: DISCONTINUED | OUTPATIENT
Start: 2022-01-05 | End: 2022-01-09 | Stop reason: HOSPADM

## 2022-01-05 RX ADMIN — MEROPENEM 500 MG: 500 INJECTION, POWDER, FOR SOLUTION INTRAVENOUS at 23:56

## 2022-01-05 RX ADMIN — SODIUM CHLORIDE, POTASSIUM CHLORIDE, SODIUM LACTATE AND CALCIUM CHLORIDE 1000 ML: 600; 310; 30; 20 INJECTION, SOLUTION INTRAVENOUS at 19:14

## 2022-01-05 RX ADMIN — SODIUM CHLORIDE, POTASSIUM CHLORIDE, SODIUM LACTATE AND CALCIUM CHLORIDE 2313 ML: 600; 310; 30; 20 INJECTION, SOLUTION INTRAVENOUS at 17:21

## 2022-01-05 RX ADMIN — POTASSIUM CHLORIDE AND SODIUM CHLORIDE: 900; 150 INJECTION, SOLUTION INTRAVENOUS at 21:18

## 2022-01-05 RX ADMIN — MEROPENEM 500 MG: 500 INJECTION, POWDER, FOR SOLUTION INTRAVENOUS at 17:21

## 2022-01-05 RX ADMIN — MIDODRINE HYDROCHLORIDE 10 MG: 5 TABLET ORAL at 21:18

## 2022-01-05 RX ADMIN — IOHEXOL 100 ML: 350 INJECTION, SOLUTION INTRAVENOUS at 18:30

## 2022-01-05 ASSESSMENT — LIFESTYLE VARIABLES
TOTAL SCORE: 0
EVER HAD A DRINK FIRST THING IN THE MORNING TO STEADY YOUR NERVES TO GET RID OF A HANGOVER: NO
HAVE YOU EVER FELT YOU SHOULD CUT DOWN ON YOUR DRINKING: NO
HOW MANY TIMES IN THE PAST YEAR HAVE YOU HAD 5 OR MORE DRINKS IN A DAY: 0
HAVE PEOPLE ANNOYED YOU BY CRITICIZING YOUR DRINKING: NO
EVER FELT BAD OR GUILTY ABOUT YOUR DRINKING: NO
ALCOHOL_USE: YES
AVERAGE NUMBER OF DAYS PER WEEK YOU HAVE A DRINK CONTAINING ALCOHOL: 0
ON A TYPICAL DAY WHEN YOU DRINK ALCOHOL HOW MANY DRINKS DO YOU HAVE: 0
CONSUMPTION TOTAL: NEGATIVE
TOTAL SCORE: 0
TOTAL SCORE: 0

## 2022-01-05 ASSESSMENT — ENCOUNTER SYMPTOMS
COUGH: 0
EYE DISCHARGE: 0
EYE REDNESS: 0
ABDOMINAL PAIN: 0
CHILLS: 1
FLANK PAIN: 0
FEVER: 1
FOCAL WEAKNESS: 0
STRIDOR: 0
MYALGIAS: 0
CONSTIPATION: 1
VOMITING: 0
NERVOUS/ANXIOUS: 0
SHORTNESS OF BREATH: 0
BRUISES/BLEEDS EASILY: 0

## 2022-01-05 ASSESSMENT — FIBROSIS 4 INDEX: FIB4 SCORE: 0.75

## 2022-01-05 ASSESSMENT — CHA2DS2 SCORE
HYPERTENSION: NO
PRIOR STROKE OR TIA OR THROMBOEMBOLISM: NO
CHF OR LEFT VENTRICULAR DYSFUNCTION: NO
AGE 75 OR GREATER: NO
VASCULAR DISEASE: YES
AGE 65 TO 74: NO
CHA2DS2 VASC SCORE: 1
DIABETES: NO
SEX: MALE

## 2022-01-05 ASSESSMENT — COPD QUESTIONNAIRES: HAVE YOU SMOKED AT LEAST 100 CIGARETTES IN YOUR ENTIRE LIFE: NO/DON'T KNOW

## 2022-01-05 ASSESSMENT — PAIN DESCRIPTION - PAIN TYPE: TYPE: ACUTE PAIN

## 2022-01-06 LAB
ALBUMIN SERPL BCP-MCNC: 3 G/DL (ref 3.2–4.9)
ALBUMIN/GLOB SERPL: 1.1 G/DL
ALP SERPL-CCNC: 56 U/L (ref 30–99)
ALT SERPL-CCNC: 13 U/L (ref 2–50)
ANION GAP SERPL CALC-SCNC: 12 MMOL/L (ref 7–16)
AST SERPL-CCNC: 16 U/L (ref 12–45)
BASOPHILS # BLD AUTO: 0.5 % (ref 0–1.8)
BASOPHILS # BLD: 0.05 K/UL (ref 0–0.12)
BILIRUB SERPL-MCNC: 0.5 MG/DL (ref 0.1–1.5)
BUN SERPL-MCNC: 14 MG/DL (ref 8–22)
CALCIUM SERPL-MCNC: 8.4 MG/DL (ref 8.4–10.2)
CHLORIDE SERPL-SCNC: 107 MMOL/L (ref 96–112)
CO2 SERPL-SCNC: 19 MMOL/L (ref 20–33)
CREAT SERPL-MCNC: 0.34 MG/DL (ref 0.5–1.4)
EOSINOPHIL # BLD AUTO: 0.54 K/UL (ref 0–0.51)
EOSINOPHIL NFR BLD: 5.9 % (ref 0–6.9)
ERYTHROCYTE [DISTWIDTH] IN BLOOD BY AUTOMATED COUNT: 46.7 FL (ref 35.9–50)
GLOBULIN SER CALC-MCNC: 2.8 G/DL (ref 1.9–3.5)
GLUCOSE SERPL-MCNC: 67 MG/DL (ref 65–99)
HCT VFR BLD AUTO: 35.2 % (ref 42–52)
HGB BLD-MCNC: 11.1 G/DL (ref 14–18)
IMM GRANULOCYTES # BLD AUTO: 0.05 K/UL (ref 0–0.11)
IMM GRANULOCYTES NFR BLD AUTO: 0.5 % (ref 0–0.9)
LACTATE BLD-SCNC: 1.1 MMOL/L (ref 0.5–2)
LYMPHOCYTES # BLD AUTO: 0.97 K/UL (ref 1–4.8)
LYMPHOCYTES NFR BLD: 10.6 % (ref 22–41)
MAGNESIUM SERPL-MCNC: 1.8 MG/DL (ref 1.5–2.5)
MCH RBC QN AUTO: 27.1 PG (ref 27–33)
MCHC RBC AUTO-ENTMCNC: 31.5 G/DL (ref 33.7–35.3)
MCV RBC AUTO: 86.1 FL (ref 81.4–97.8)
MONOCYTES # BLD AUTO: 0.46 K/UL (ref 0–0.85)
MONOCYTES NFR BLD AUTO: 5 % (ref 0–13.4)
NEUTROPHILS # BLD AUTO: 7.1 K/UL (ref 1.82–7.42)
NEUTROPHILS NFR BLD: 77.5 % (ref 44–72)
NRBC # BLD AUTO: 0 K/UL
NRBC BLD-RTO: 0 /100 WBC
PLATELET # BLD AUTO: 333 K/UL (ref 164–446)
PMV BLD AUTO: 10.1 FL (ref 9–12.9)
POTASSIUM SERPL-SCNC: 3.4 MMOL/L (ref 3.6–5.5)
PROT SERPL-MCNC: 5.8 G/DL (ref 6–8.2)
RBC # BLD AUTO: 4.09 M/UL (ref 4.7–6.1)
SODIUM SERPL-SCNC: 138 MMOL/L (ref 135–145)
WBC # BLD AUTO: 9.2 K/UL (ref 4.8–10.8)

## 2022-01-06 PROCEDURE — 700102 HCHG RX REV CODE 250 W/ 637 OVERRIDE(OP): Performed by: HOSPITALIST

## 2022-01-06 PROCEDURE — A9270 NON-COVERED ITEM OR SERVICE: HCPCS | Performed by: INTERNAL MEDICINE

## 2022-01-06 PROCEDURE — 700105 HCHG RX REV CODE 258: Performed by: HOSPITALIST

## 2022-01-06 PROCEDURE — 700111 HCHG RX REV CODE 636 W/ 250 OVERRIDE (IP): Performed by: HOSPITALIST

## 2022-01-06 PROCEDURE — 80053 COMPREHEN METABOLIC PANEL: CPT

## 2022-01-06 PROCEDURE — A9270 NON-COVERED ITEM OR SERVICE: HCPCS | Performed by: HOSPITALIST

## 2022-01-06 PROCEDURE — 83735 ASSAY OF MAGNESIUM: CPT

## 2022-01-06 PROCEDURE — 770006 HCHG ROOM/CARE - MED/SURG/GYN SEMI*

## 2022-01-06 PROCEDURE — 700101 HCHG RX REV CODE 250: Performed by: HOSPITALIST

## 2022-01-06 PROCEDURE — 83605 ASSAY OF LACTIC ACID: CPT

## 2022-01-06 PROCEDURE — 36415 COLL VENOUS BLD VENIPUNCTURE: CPT

## 2022-01-06 PROCEDURE — 99233 SBSQ HOSP IP/OBS HIGH 50: CPT | Performed by: INTERNAL MEDICINE

## 2022-01-06 PROCEDURE — 700102 HCHG RX REV CODE 250 W/ 637 OVERRIDE(OP): Performed by: INTERNAL MEDICINE

## 2022-01-06 PROCEDURE — 85025 COMPLETE CBC W/AUTO DIFF WBC: CPT

## 2022-01-06 RX ORDER — AMOXICILLIN 250 MG
2 CAPSULE ORAL 2 TIMES DAILY
Status: DISCONTINUED | OUTPATIENT
Start: 2022-01-06 | End: 2022-01-09 | Stop reason: HOSPADM

## 2022-01-06 RX ORDER — SODIUM HYPOCHLORITE 1.25 MG/ML
SOLUTION TOPICAL DAILY
Status: DISCONTINUED | OUTPATIENT
Start: 2022-01-06 | End: 2022-01-09 | Stop reason: HOSPADM

## 2022-01-06 RX ORDER — BISACODYL 10 MG
10 SUPPOSITORY, RECTAL RECTAL DAILY
Status: DISCONTINUED | OUTPATIENT
Start: 2022-01-07 | End: 2022-01-09 | Stop reason: HOSPADM

## 2022-01-06 RX ORDER — POLYETHYLENE GLYCOL 3350 17 G/17G
1 POWDER, FOR SOLUTION ORAL
Status: DISCONTINUED | OUTPATIENT
Start: 2022-01-06 | End: 2022-01-09 | Stop reason: HOSPADM

## 2022-01-06 RX ADMIN — SENNOSIDES AND DOCUSATE SODIUM 2 TABLET: 50; 8.6 TABLET ORAL at 18:17

## 2022-01-06 RX ADMIN — MEROPENEM 500 MG: 500 INJECTION, POWDER, FOR SOLUTION INTRAVENOUS at 11:27

## 2022-01-06 RX ADMIN — SENNOSIDES AND DOCUSATE SODIUM 2 TABLET: 8.6; 5 TABLET ORAL at 05:30

## 2022-01-06 RX ADMIN — MEROPENEM 500 MG: 500 INJECTION, POWDER, FOR SOLUTION INTRAVENOUS at 05:30

## 2022-01-06 RX ADMIN — MIDODRINE HYDROCHLORIDE 10 MG: 5 TABLET ORAL at 18:17

## 2022-01-06 RX ADMIN — POTASSIUM CHLORIDE AND SODIUM CHLORIDE: 900; 150 INJECTION, SOLUTION INTRAVENOUS at 23:56

## 2022-01-06 RX ADMIN — ACETAMINOPHEN 650 MG: 325 TABLET, FILM COATED ORAL at 00:04

## 2022-01-06 RX ADMIN — MIDODRINE HYDROCHLORIDE 10 MG: 5 TABLET ORAL at 05:30

## 2022-01-06 RX ADMIN — RIVAROXABAN 20 MG: 20 TABLET, FILM COATED ORAL at 18:18

## 2022-01-06 RX ADMIN — MEROPENEM 500 MG: 500 INJECTION, POWDER, FOR SOLUTION INTRAVENOUS at 18:19

## 2022-01-06 RX ADMIN — MEROPENEM 500 MG: 500 INJECTION, POWDER, FOR SOLUTION INTRAVENOUS at 23:56

## 2022-01-06 RX ADMIN — POTASSIUM CHLORIDE AND SODIUM CHLORIDE: 900; 150 INJECTION, SOLUTION INTRAVENOUS at 11:27

## 2022-01-06 ASSESSMENT — COGNITIVE AND FUNCTIONAL STATUS - GENERAL
DRESSING REGULAR UPPER BODY CLOTHING: A LOT
WALKING IN HOSPITAL ROOM: TOTAL
EATING MEALS: A LOT
STANDING UP FROM CHAIR USING ARMS: TOTAL
TURNING FROM BACK TO SIDE WHILE IN FLAT BAD: A LOT
PERSONAL GROOMING: A LOT
MOBILITY SCORE: 7
DRESSING REGULAR LOWER BODY CLOTHING: TOTAL
MOVING TO AND FROM BED TO CHAIR: UNABLE
HELP NEEDED FOR BATHING: TOTAL
MOVING FROM LYING ON BACK TO SITTING ON SIDE OF FLAT BED: UNABLE
DAILY ACTIVITIY SCORE: 10
SUGGESTED CMS G CODE MODIFIER MOBILITY: CM
TOILETING: A LOT
CLIMB 3 TO 5 STEPS WITH RAILING: TOTAL
SUGGESTED CMS G CODE MODIFIER DAILY ACTIVITY: CL

## 2022-01-06 ASSESSMENT — PAIN DESCRIPTION - PAIN TYPE
TYPE: ACUTE PAIN

## 2022-01-06 ASSESSMENT — ENCOUNTER SYMPTOMS
CONSTIPATION: 1
ABDOMINAL PAIN: 1

## 2022-01-06 ASSESSMENT — FIBROSIS 4 INDEX: FIB4 SCORE: 0.79

## 2022-01-06 NOTE — PROGRESS NOTES
Pt admitted to 201-1 from ED. Pt is alert and Oriented X4. No pain reported at this time. Pt placed on bariatric bed. Urostomy in place. Urine is brown and malodorous. Urostomy bag and tubing purple. Discussed changing urostomy with patient. Pt asked that it would be changed in the am as he would like to go to sleep as soon as possible. Sacral, coccyx wounds, and heel wound noted. Dressings changed and photos taken. Pt has heel float boots in place, Q2 turns initiated. Discussed plan for the night which includes IV fluids and abx. No other needs at this time, call light in reach, bed in lowest position.

## 2022-01-06 NOTE — HOSPITAL COURSE
"Per notes\"61 y.o. male with a past medical history of quadriplegia, chronic hypotension, history of recurrent urinary tract infection including ESBL organism who presented 1/5/2022 with fevers chills and diaphoresis.  Patient reports that he has not been feeling well in the past 10 days.  He has generalized weakness fevers and chills.  He reports that he often has no symptoms when he have a urinary tract infection however he is more concerned that he has not had a bowel movement over the past 14 days.  He denies having nausea or vomiting.  He denies having shortness of breath cough or sputum production.\"  "

## 2022-01-06 NOTE — DISCHARGE PLANNING
Anticipated Discharge Disposition:   Home with daughter    Action:   Chart review complete.     Discussed patient's plan of care and plans for discharge during rounds. Per MD, patient was admitted for UTI and constipation. Cultures pending.     MD anticipates discharge to home with daughter when medically cleared.     RN CM will continue to follow.     Barriers to Discharge:   Medical Clearance    Plan:   Hospital care management will continue to assist with discharge planning needs.
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
## 2022-01-06 NOTE — ASSESSMENT & PLAN NOTE
This is Sepsis Present on admission  SIRS criteria identified on my evaluation include: Tachycardia, with heart rate greater than 90 BPM, Tachypnea, with respirations greater than 20 per minute and Leukocytosis, with WBC greater than 12,000  Source is urinary tract infection   Sepsis protocol initiated  Fluid resuscitation ordered per protocol  IV antibiotics as appropriate for source of sepsis  While organ dysfunction may be noted elsewhere in this problem list or in the chart, degree of organ dysfunction does not meet CMS criteria for severe sepsis  No growth on cultures but will continue with empiric antibiotics given patient's positive symptoms admission.  Complete a 5-day course on 1/9.

## 2022-01-06 NOTE — ASSESSMENT & PLAN NOTE
Multiple previous infections with ESBL organisms.  Given current symptoms we will continue with Merrem, follow cultures and sensitivities and de-escalate when possible
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
## 2022-01-06 NOTE — CARE PLAN
The patient is Stable - Low risk of patient condition declining or worsening    Shift Goals  Clinical Goals: Pt will have skin integrity remain the same or improve    Progress made toward(s) clinical / shift goals:  Pt place on low airloss bariatric bed. Sacral and coccyx wounds dressed with new mepilex. Pt has heel float boots in place, Q2 turns implemented overnight. Pt skin integrity remained the same overnight.    Patient is not progressing towards the following goals:  N/a

## 2022-01-06 NOTE — PROGRESS NOTES
4 Eyes Skin Assessment Completed by MYRIAM Jack and MYRIAM Marcial.    Head WDL  Ears WDL  Nose WDL  Mouth WDL  Neck WDL  Breast/Chest WDL  Shoulder Blades WDL  Spine WDL  (R) Arm/Elbow/Hand WDL  (L) Arm/Elbow/Hand WDL  Abdomen RLQ urostomy  Groin WDL  Scrotum/Coccyx/Buttocks Non-Blanching wound on both coccyx and right buttock  (R) Leg small wound on back of calf  (L) Leg WDL  (R) Heel/Foot/Toe Redness, Blanching and Scab  (L) Heel/Foot/Toe Redness, Non-Blanching, Discoloration, Ulcer(s) and Scab          Devices In Places Pulse Ox      Interventions In Place Sacral Mepilex, Heel Float Boots, Pillows, Q2 Turns, Low Air Loss Mattress, Barrier Cream and Dri-Breezy Pads    Possible Skin Injury Yes    Pictures Uploaded Into Epic Yes  Wound Consult Placed Yes  RN Wound Prevention Protocol Ordered Yes

## 2022-01-06 NOTE — CARE PLAN
The patient is Stable - Low risk of patient condition declining or worsening    Shift Goals  Clinical Goals: daily disimpaction, IV abx  Patient Goals: comfort    Progress made toward(s) clinical / shift goals:      Problem: Knowledge Deficit - Standard  Goal: Patient and family/care givers will demonstrate understanding of plan of care, disease process/condition, diagnostic tests and medications  Outcome: Progressing     Problem: Hemodynamics  Goal: Patient's hemodynamics, fluid balance and neurologic status will be stable or improve  Outcome: Progressing     Problem: Urinary - Renal Perfusion  Goal: Ability to achieve and maintain adequate renal perfusion and functioning will improve  Outcome: Progressing     Problem: Respiratory  Goal: Patient will achieve/maintain optimum respiratory ventilation and gas exchange  Outcome: Progressing     Problem: Pain - Standard  Goal: Alleviation of pain or a reduction in pain to the patient’s comfort goal  Outcome: Progressing    Patient AOX4, denies nausea and pain. Turn Q 2 hours.  Urostomy intact draining hazy output.    This morning this RN discussed with patient POC, disimpaction, patient agreed.    BLE in boots for protection.  Coccyx dressings CDI.
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
## 2022-01-06 NOTE — ASSESSMENT & PLAN NOTE
Discussed with PM&R, Dr Shipley, for recommendations regarding neurogenic bowel and possible therapy.    Recommends daily Dulcolax suppository 1 to 2 hours prior to daily manual disimpaction.  Must be performed at the same time each day in order to establish routine.    Also recommended patient following up in the outpatient setting for further management of this issue.  Consult placed  We will continue to educate patient and family  Patient is agreeable to trying bowel regimen recommended by physiatrist.  Discussed with Occupational Therapy who will continue to work with patient  Discussed with gastroenterology who will consult for further recommendations  This is a chronic ongoing problem, and needs close follow-up in the outpatient setting.

## 2022-01-06 NOTE — PROGRESS NOTES
"Hospital Medicine Daily Progress Note    Date of Service  1/6/2022    Chief Complaint  Stevie Phoenix is a 61 y.o. male admitted 1/5/2022 with fevers chills and diaphoresis    Hospital Course  Per notes\"61 y.o. male with a past medical history of quadriplegia, chronic hypotension, history of recurrent urinary tract infection including ESBL organism who presented 1/5/2022 with fevers chills and diaphoresis.  Patient reports that he has not been feeling well in the past 10 days.  He has generalized weakness fevers and chills.  He reports that he often has no symptoms when he have a urinary tract infection however he is more concerned that he has not had a bowel movement over the past 14 days.  He denies having nausea or vomiting.  He denies having shortness of breath cough or sputum production.\"      Interval Problem Update  Symptoms much improved, still having issues with constipation but did have manual disimpaction and one bowel movement today.    Discussed with PM&R, Dr Shipley, for recommendations regarding neurogenic bowel and possible therapy.  Recommends daily Dulcolax suppository 1 to 2 hours prior to daily manual disimpaction.  Must be performed at the same time each day in order to establish routine.  Also recommended patient following up in the outpatient setting for further management of this issue.    I have personally seen and examined the patient at bedside. I discussed the plan of care with patient, bedside RN and charge RN.    Consultants/Specialty  Physiatry    Code Status  Full Code    Disposition  Patient is not medically cleared for discharge.   Anticipate discharge to to home with close outpatient follow-up.  I have placed the appropriate orders for post-discharge needs.    Review of Systems  Review of Systems   Gastrointestinal: Positive for abdominal pain and constipation.   Genitourinary: Positive for dysuria.   All other systems reviewed and are negative.       Physical Exam  Temp:  [36.4 " °C (97.5 °F)-37.3 °C (99.2 °F)] 36.4 °C (97.5 °F)  Pulse:  [69-97] 74  Resp:  [14-22] 17  BP: ()/(54-69) 95/55  SpO2:  [94 %-98 %] 97 %    Physical Exam  Vitals and nursing note reviewed.   Constitutional:       Appearance: Normal appearance.   Cardiovascular:      Rate and Rhythm: Normal rate and regular rhythm.      Pulses: Normal pulses.      Heart sounds: Normal heart sounds.   Pulmonary:      Effort: Pulmonary effort is normal.      Breath sounds: Normal breath sounds.   Abdominal:      General: Abdomen is flat. Bowel sounds are normal.      Palpations: Abdomen is soft.   Musculoskeletal:      Right lower leg: No edema.      Left lower leg: No edema.   Neurological:      General: No focal deficit present.      Mental Status: He is alert and oriented to person, place, and time. Mental status is at baseline.         Fluids    Intake/Output Summary (Last 24 hours) at 1/6/2022 1352  Last data filed at 1/6/2022 0759  Gross per 24 hour   Intake 2620 ml   Output 2100 ml   Net 520 ml       Laboratory  Recent Labs     01/05/22  1625 01/06/22  0549   WBC 22.5* 9.2   RBC 5.06 4.09*   HEMOGLOBIN 13.9* 11.1*   HEMATOCRIT 43.2 35.2*   MCV 85.4 86.1   MCH 27.5 27.1   MCHC 32.2* 31.5*   RDW 45.9 46.7   PLATELETCT 383 333   MPV 10.0 10.1     Recent Labs     01/05/22  1625 01/06/22  0549   SODIUM 134* 138   POTASSIUM 3.5* 3.4*   CHLORIDE 99 107   CO2 19* 19*   GLUCOSE 92 67   BUN 18 14   CREATININE 0.55 0.34*   CALCIUM 9.3 8.4                   Imaging  CT-ABDOMEN-PELVIS WITH   Final Result      1.  No evidence of hydronephrosis. Ureters are mildly prominent in ileal conduit right lower quadrant again appears to be present.      2.  No free fluid or abscess is identified in the abdomen or pelvis.      3.  Post cholecystectomy. No biliary ductal dilatation is appreciated.      DX-CHEST-PORTABLE (1 VIEW)   Final Result         No acute cardiac or pulmonary abnormality is identified.           Assessment/Plan  * Sepsis (HCC)-  (present on admission)  Assessment & Plan  This is Sepsis Present on admission  SIRS criteria identified on my evaluation include: Tachycardia, with heart rate greater than 90 BPM, Tachypnea, with respirations greater than 20 per minute and Leukocytosis, with WBC greater than 12,000  Source is urinary tract infection   Sepsis protocol initiated  Fluid resuscitation ordered per protocol  IV antibiotics as appropriate for source of sepsis  While organ dysfunction may be noted elsewhere in this problem list or in the chart, degree of organ dysfunction does not meet CMS criteria for severe sepsis  Improving continue with current antibiotics and follow-up with cultures    Acute cystitis without hematuria- (present on admission)  Assessment & Plan  Multiple previous infections with ESBL organisms.  Given current symptoms we will continue with Merrem, follow cultures and sensitivities and de-escalate when possible    Hyponatremia- (present on admission)  Assessment & Plan  Mild hypovolemic hyponatremia   I expect Na to improve with IVF     Paroxysmal atrial fibrillation- (present on admission)  Assessment & Plan  No ongoing blocking agents for  Resume home anticoagulation with rivaroxaban     Hypokalemia- (present on admission)  Assessment & Plan  Replacing, checking magnesium   Continue to monitor replace as needed     Hypotension- (present on admission)  Assessment & Plan  Appears to be chronic likely secondary to atonia from quadriplegia.  Resume home midodrine    Neurogenic bowel- (present on admission)  Assessment & Plan  Discussed with PM&R, Dr Shipley, for recommendations regarding neurogenic bowel and possible therapy.    Recommends daily Dulcolax suppository 1 to 2 hours prior to daily manual disimpaction.  Must be performed at the same time each day in order to establish routine.    Also recommended patient following up in the outpatient setting for further management of this issue.  Consult placed  Dr Shipley  also discussed with Occupational Therapy to see if there are any aids for disimpaction  We will continue to educate patient and family       VTE prophylaxis: SCDs/TEDs    I have performed a physical exam and reviewed and updated ROS and Plan today (1/6/2022). In review of yesterday's note (1/5/2022), there are no changes except as documented above.

## 2022-01-07 LAB
ANION GAP SERPL CALC-SCNC: 13 MMOL/L (ref 7–16)
BUN SERPL-MCNC: 15 MG/DL (ref 8–22)
CALCIUM SERPL-MCNC: 8.5 MG/DL (ref 8.4–10.2)
CHLORIDE SERPL-SCNC: 110 MMOL/L (ref 96–112)
CO2 SERPL-SCNC: 20 MMOL/L (ref 20–33)
CREAT SERPL-MCNC: 0.37 MG/DL (ref 0.5–1.4)
ERYTHROCYTE [DISTWIDTH] IN BLOOD BY AUTOMATED COUNT: 46.9 FL (ref 35.9–50)
GLUCOSE SERPL-MCNC: 120 MG/DL (ref 65–99)
HCT VFR BLD AUTO: 37 % (ref 42–52)
HGB BLD-MCNC: 11.5 G/DL (ref 14–18)
MAGNESIUM SERPL-MCNC: 1.9 MG/DL (ref 1.5–2.5)
MCH RBC QN AUTO: 27 PG (ref 27–33)
MCHC RBC AUTO-ENTMCNC: 31.1 G/DL (ref 33.7–35.3)
MCV RBC AUTO: 86.9 FL (ref 81.4–97.8)
PLATELET # BLD AUTO: 352 K/UL (ref 164–446)
PMV BLD AUTO: 10.3 FL (ref 9–12.9)
POTASSIUM SERPL-SCNC: 3.7 MMOL/L (ref 3.6–5.5)
RBC # BLD AUTO: 4.26 M/UL (ref 4.7–6.1)
SODIUM SERPL-SCNC: 143 MMOL/L (ref 135–145)
WBC # BLD AUTO: 6.4 K/UL (ref 4.8–10.8)

## 2022-01-07 PROCEDURE — 97165 OT EVAL LOW COMPLEX 30 MIN: CPT

## 2022-01-07 PROCEDURE — 700101 HCHG RX REV CODE 250: Performed by: HOSPITALIST

## 2022-01-07 PROCEDURE — 85027 COMPLETE CBC AUTOMATED: CPT

## 2022-01-07 PROCEDURE — A9270 NON-COVERED ITEM OR SERVICE: HCPCS | Performed by: INTERNAL MEDICINE

## 2022-01-07 PROCEDURE — 770006 HCHG ROOM/CARE - MED/SURG/GYN SEMI*

## 2022-01-07 PROCEDURE — 700111 HCHG RX REV CODE 636 W/ 250 OVERRIDE (IP): Performed by: HOSPITALIST

## 2022-01-07 PROCEDURE — 700102 HCHG RX REV CODE 250 W/ 637 OVERRIDE(OP): Performed by: HOSPITALIST

## 2022-01-07 PROCEDURE — 36415 COLL VENOUS BLD VENIPUNCTURE: CPT

## 2022-01-07 PROCEDURE — 700105 HCHG RX REV CODE 258: Performed by: INTERNAL MEDICINE

## 2022-01-07 PROCEDURE — 94760 N-INVAS EAR/PLS OXIMETRY 1: CPT

## 2022-01-07 PROCEDURE — 700102 HCHG RX REV CODE 250 W/ 637 OVERRIDE(OP): Performed by: INTERNAL MEDICINE

## 2022-01-07 PROCEDURE — 700105 HCHG RX REV CODE 258: Performed by: HOSPITALIST

## 2022-01-07 PROCEDURE — 700111 HCHG RX REV CODE 636 W/ 250 OVERRIDE (IP): Performed by: INTERNAL MEDICINE

## 2022-01-07 PROCEDURE — 99233 SBSQ HOSP IP/OBS HIGH 50: CPT | Performed by: INTERNAL MEDICINE

## 2022-01-07 PROCEDURE — 80048 BASIC METABOLIC PNL TOTAL CA: CPT

## 2022-01-07 PROCEDURE — 83735 ASSAY OF MAGNESIUM: CPT

## 2022-01-07 PROCEDURE — A9270 NON-COVERED ITEM OR SERVICE: HCPCS | Performed by: HOSPITALIST

## 2022-01-07 RX ORDER — LACTULOSE 20 G/30ML
30 SOLUTION ORAL ONCE
Status: DISCONTINUED | OUTPATIENT
Start: 2022-01-08 | End: 2022-01-07

## 2022-01-07 RX ORDER — LACTULOSE 20 G/30ML
15 SOLUTION ORAL ONCE
Status: COMPLETED | OUTPATIENT
Start: 2022-01-08 | End: 2022-01-07

## 2022-01-07 RX ADMIN — MIDODRINE HYDROCHLORIDE 10 MG: 5 TABLET ORAL at 17:49

## 2022-01-07 RX ADMIN — POTASSIUM CHLORIDE AND SODIUM CHLORIDE: 900; 150 INJECTION, SOLUTION INTRAVENOUS at 13:53

## 2022-01-07 RX ADMIN — MEROPENEM 500 MG: 500 INJECTION, POWDER, FOR SOLUTION INTRAVENOUS at 06:20

## 2022-01-07 RX ADMIN — SENNOSIDES AND DOCUSATE SODIUM 2 TABLET: 50; 8.6 TABLET ORAL at 06:20

## 2022-01-07 RX ADMIN — MEROPENEM 500 MG: 500 INJECTION, POWDER, FOR SOLUTION INTRAVENOUS at 14:00

## 2022-01-07 RX ADMIN — RIVAROXABAN 20 MG: 20 TABLET, FILM COATED ORAL at 17:48

## 2022-01-07 RX ADMIN — MEROPENEM 500 MG: 500 INJECTION, POWDER, FOR SOLUTION INTRAVENOUS at 22:41

## 2022-01-07 RX ADMIN — BISACODYL 10 MG: 10 SUPPOSITORY RECTAL at 10:48

## 2022-01-07 RX ADMIN — LACTULOSE 15 ML: 20 SOLUTION ORAL at 23:02

## 2022-01-07 RX ADMIN — MIDODRINE HYDROCHLORIDE 10 MG: 5 TABLET ORAL at 06:20

## 2022-01-07 ASSESSMENT — ENCOUNTER SYMPTOMS
CONSTIPATION: 1
ABDOMINAL PAIN: 1

## 2022-01-07 ASSESSMENT — FIBROSIS 4 INDEX: FIB4 SCORE: 0.74

## 2022-01-07 NOTE — DIETARY
"Nutrition Services: Brief Update    Consult received for pressure ulcer stage 2-4 or non-healing wound, \"paraplegic and wanting dietary advise\". Pt has several wounds, wound care consult pending. Pt had recent admit with multiple stage 2/4 wounds and DTIs. Pt seen at bedside, more concerned about nutrition advice for neurogenic bowel. Provided multiple handouts that focus on general healthy diet with adequate fiber and high protein foods list for wound healing. Pt is within normal weight category for BMI. Pt reports adequate PO intake prior to admit and denies weight loss. Pt ate % of breakfast today. Pt appears adequately nourished in upper body, noted paraplegic.    No nutrition intervention needed at this time. Handouts and education provided, documented in education tab. RD available prn.      "

## 2022-01-07 NOTE — PROGRESS NOTES
"Hospital Medicine Daily Progress Note    Date of Service  1/7/2022    Chief Complaint  Stevie Phoenix is a 61 y.o. male admitted 1/5/2022 with fevers chills and diaphoresis    Hospital Course  Per notes\"61 y.o. male with a past medical history of quadriplegia, chronic hypotension, history of recurrent urinary tract infection including ESBL organism who presented 1/5/2022 with fevers chills and diaphoresis.  Patient reports that he has not been feeling well in the past 10 days.  He has generalized weakness fevers and chills.  He reports that he often has no symptoms when he have a urinary tract infection however he is more concerned that he has not had a bowel movement over the past 14 days.  He denies having nausea or vomiting.  He denies having shortness of breath cough or sputum production.\"      Interval Problem Update  Patient is very frustrated with ongoing chronic constipation, states he does not understand the hospital can go 1 to 2 weeks when he is outside hospital and wants to know why.  I reviewed the current indications again with patient.  He is resistant to trying medications and suppository as he believes these will not work and he has tried in the past.  I stated these must be done together, per recommendations, he did agree to try these.  Additionally I have also asked gastroenterology to consult for further recommendations.    1/6: Discussed with PM&R, Dr Shipley, for recommendations regarding neurogenic bowel and possible therapy.  Recommends daily Dulcolax suppository 1 to 2 hours prior to daily manual disimpaction.  Must be performed at the same time each day in order to establish routine.  Also recommended patient following up in the outpatient setting for further management of this issue.    1/7: Discussed with Occupational Therapy, will continue to work with patient for manual disimpaction training, requested they work with patient's family as well.  Also discussed with gastroenterology, " will consult for further recommendations of neurogenic bowel.    I have personally seen and examined the patient at bedside. I discussed the plan of care with patient, bedside RN and charge RN.    Consultants/Specialty  Physiatry  Nutrition  Occupational Therapy  Gastroenterology    Code Status  Full Code    Disposition  Patient is not medically cleared for discharge.   Anticipate discharge to to home with close outpatient follow-up.  I have placed the appropriate orders for post-discharge needs.    Review of Systems  Review of Systems   Gastrointestinal: Positive for abdominal pain and constipation.   Genitourinary: Positive for dysuria.   All other systems reviewed and are negative.       Physical Exam  Temp:  [36.2 °C (97.2 °F)-37.1 °C (98.8 °F)] 37.1 °C (98.8 °F)  Pulse:  [] 108  Resp:  [17-20] 20  BP: ()/(50-85) 132/85  SpO2:  [93 %-98 %] 93 %    Physical Exam  Vitals and nursing note reviewed.   Constitutional:       Appearance: Normal appearance.   Cardiovascular:      Rate and Rhythm: Normal rate and regular rhythm.      Pulses: Normal pulses.      Heart sounds: Normal heart sounds.   Pulmonary:      Effort: Pulmonary effort is normal.      Breath sounds: Normal breath sounds.   Abdominal:      General: Abdomen is flat. Bowel sounds are normal.      Palpations: Abdomen is soft.   Musculoskeletal:      Right lower leg: No edema.      Left lower leg: No edema.   Neurological:      General: No focal deficit present.      Mental Status: He is alert and oriented to person, place, and time. Mental status is at baseline.         Fluids    Intake/Output Summary (Last 24 hours) at 1/7/2022 1309  Last data filed at 1/7/2022 1150  Gross per 24 hour   Intake --   Output 4000 ml   Net -4000 ml       Laboratory  Recent Labs     01/05/22  1625 01/06/22  0549 01/07/22  0117   WBC 22.5* 9.2 6.4   RBC 5.06 4.09* 4.26*   HEMOGLOBIN 13.9* 11.1* 11.5*   HEMATOCRIT 43.2 35.2* 37.0*   MCV 85.4 86.1 86.9   MCH 27.5 27.1  27.0   MCHC 32.2* 31.5* 31.1*   RDW 45.9 46.7 46.9   PLATELETCT 383 333 352   MPV 10.0 10.1 10.3     Recent Labs     01/05/22  1625 01/06/22  0549 01/07/22  0117   SODIUM 134* 138 143   POTASSIUM 3.5* 3.4* 3.7   CHLORIDE 99 107 110   CO2 19* 19* 20   GLUCOSE 92 67 120*   BUN 18 14 15   CREATININE 0.55 0.34* 0.37*   CALCIUM 9.3 8.4 8.5                   Imaging  CT-ABDOMEN-PELVIS WITH   Final Result      1.  No evidence of hydronephrosis. Ureters are mildly prominent in ileal conduit right lower quadrant again appears to be present.      2.  No free fluid or abscess is identified in the abdomen or pelvis.      3.  Post cholecystectomy. No biliary ductal dilatation is appreciated.      DX-CHEST-PORTABLE (1 VIEW)   Final Result         No acute cardiac or pulmonary abnormality is identified.           Assessment/Plan  * Sepsis (HCC)- (present on admission)  Assessment & Plan  This is Sepsis Present on admission  SIRS criteria identified on my evaluation include: Tachycardia, with heart rate greater than 90 BPM, Tachypnea, with respirations greater than 20 per minute and Leukocytosis, with WBC greater than 12,000  Source is urinary tract infection   Sepsis protocol initiated  Fluid resuscitation ordered per protocol  IV antibiotics as appropriate for source of sepsis  While organ dysfunction may be noted elsewhere in this problem list or in the chart, degree of organ dysfunction does not meet CMS criteria for severe sepsis  Improving continue with current antibiotics and follow-up with cultures    Acute cystitis without hematuria- (present on admission)  Assessment & Plan  Multiple previous infections with ESBL organisms.  Given current symptoms we will continue with Merrem, follow cultures and sensitivities and de-escalate when possible    Hyponatremia- (present on admission)  Assessment & Plan  Mild hypovolemic hyponatremia   I expect Na to improve with IVF     Paroxysmal atrial fibrillation- (present on  admission)  Assessment & Plan  No ongoing blocking agents for  Resume home anticoagulation with rivaroxaban     Hypokalemia- (present on admission)  Assessment & Plan  Replacing, checking magnesium   Continue to monitor replace as needed     Hypotension- (present on admission)  Assessment & Plan  Appears to be chronic likely secondary to atonia from quadriplegia.  Resume home midodrine    Neurogenic bowel- (present on admission)  Assessment & Plan  Discussed with PM&R, Dr Shipley, for recommendations regarding neurogenic bowel and possible therapy.    Recommends daily Dulcolax suppository 1 to 2 hours prior to daily manual disimpaction.  Must be performed at the same time each day in order to establish routine.    Also recommended patient following up in the outpatient setting for further management of this issue.  Consult placed  We will continue to educate patient and family  Patient is agreeable to trying bowel regimen recommended by physiatrist.  Discussed with Occupational Therapy who will continue to work with patient  Discussed with gastroenterology who will consult for further recommendations  This is a chronic ongoing problem, and needs close follow-up in the outpatient setting.       VTE prophylaxis: SCDs/TEDs    I have performed a physical exam and reviewed and updated ROS and Plan today (1/7/2022). In review of yesterday's note (1/6/2022), there are no changes except as documented above.

## 2022-01-07 NOTE — CARE PLAN
The patient is Stable - Low risk of patient condition declining or worsening    Shift Goals  Clinical Goals: disimpaction daily, IV abx  Patient Goals: comfort    Progress made toward(s) clinical / shift goals:        Problem: Knowledge Deficit - Standard  Goal: Patient and family/care givers will demonstrate understanding of plan of care, disease process/condition, diagnostic tests and medications  1/7/2022 1022 by Kim Salazar R.N.  Outcome: Progressing  1/7/2022 1022 by Kim Salazar R.N.  Outcome: Progressing     Problem: Hemodynamics  Goal: Patient's hemodynamics, fluid balance and neurologic status will be stable or improve  1/7/2022 1022 by Kim Salazar R.N.  Outcome: Progressing  1/7/2022 1022 by Kim Salazar R.N.  Outcome: Progressing     Problem: Fluid Volume  Goal: Fluid volume balance will be maintained  1/7/2022 1022 by Kim Salazar R.N.  Outcome: Progressing  1/7/2022 1022 by Kim Salazar R.N.  Outcome: Progressing     Problem: Urinary - Renal Perfusion  Goal: Ability to achieve and maintain adequate renal perfusion and functioning will improve  1/7/2022 1022 by Kim Salazar R.N.  Outcome: Progressing  1/7/2022 1022 by Kim Salazar R.N.  Outcome: Progressing     Problem: Respiratory  Goal: Patient will achieve/maintain optimum respiratory ventilation and gas exchange  1/7/2022 1022 by Kim Salazar R.N.  Outcome: Progressing  1/7/2022 1022 by Kim Salazar R.N.  Outcome: Progressing     Patient AOX4, denies nausea and pain.  Turned Q 2 hours with 1-2 assists.    Good appetite.  Daily disimpaction with suppository ~1 hour prior.    GI and nutritional consults ordered.    IV abx and fluids.

## 2022-01-07 NOTE — PROGRESS NOTES
Report received from Kim MIGUEL. Patient is resting in bed at time of report. No signs of labored breathing or discomfort. No needs at this time. Safety measures in place and call light/water within reach.
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
## 2022-01-07 NOTE — CARE PLAN
The patient is Stable - Low risk of patient condition declining or worsening    Shift Goals  Clinical Goals: IV abx, change urostomy  Patient Goals: comfort    Progress made toward(s) clinical / shift goals:  IV abx continued, will replace urostomy during this shift.       Problem: Skin Integrity  Goal: Skin integrity is maintained or improved  Outcome: Progressing  Note: Patient is educated on turn importance and demonstrates understanding. Patient has urostomy in place.      Problem: Urinary - Renal Perfusion  Goal: Ability to achieve and maintain adequate renal perfusion and functioning will improve  Outcome: Progressing       Patient is not progressing towards the following goals: none

## 2022-01-08 LAB
BACTERIA UR CULT: NORMAL
SIGNIFICANT IND 70042: NORMAL
SITE SITE: NORMAL
SOURCE SOURCE: NORMAL

## 2022-01-08 PROCEDURE — 770006 HCHG ROOM/CARE - MED/SURG/GYN SEMI*

## 2022-01-08 PROCEDURE — 99233 SBSQ HOSP IP/OBS HIGH 50: CPT | Performed by: INTERNAL MEDICINE

## 2022-01-08 PROCEDURE — 700101 HCHG RX REV CODE 250: Performed by: HOSPITALIST

## 2022-01-08 PROCEDURE — 700101 HCHG RX REV CODE 250: Performed by: INTERNAL MEDICINE

## 2022-01-08 PROCEDURE — 700102 HCHG RX REV CODE 250 W/ 637 OVERRIDE(OP): Performed by: HOSPITALIST

## 2022-01-08 PROCEDURE — 94760 N-INVAS EAR/PLS OXIMETRY 1: CPT

## 2022-01-08 PROCEDURE — 700102 HCHG RX REV CODE 250 W/ 637 OVERRIDE(OP): Performed by: INTERNAL MEDICINE

## 2022-01-08 PROCEDURE — A9270 NON-COVERED ITEM OR SERVICE: HCPCS | Performed by: INTERNAL MEDICINE

## 2022-01-08 PROCEDURE — A9270 NON-COVERED ITEM OR SERVICE: HCPCS | Performed by: HOSPITALIST

## 2022-01-08 PROCEDURE — 700111 HCHG RX REV CODE 636 W/ 250 OVERRIDE (IP): Performed by: INTERNAL MEDICINE

## 2022-01-08 PROCEDURE — 700105 HCHG RX REV CODE 258: Performed by: INTERNAL MEDICINE

## 2022-01-08 RX ORDER — LACTULOSE 20 G/30ML
30 SOLUTION ORAL DAILY
Status: DISCONTINUED | OUTPATIENT
Start: 2022-01-08 | End: 2022-01-09 | Stop reason: HOSPADM

## 2022-01-08 RX ADMIN — POTASSIUM CHLORIDE AND SODIUM CHLORIDE: 900; 150 INJECTION, SOLUTION INTRAVENOUS at 17:19

## 2022-01-08 RX ADMIN — RIVAROXABAN 20 MG: 20 TABLET, FILM COATED ORAL at 17:19

## 2022-01-08 RX ADMIN — MEROPENEM 500 MG: 500 INJECTION, POWDER, FOR SOLUTION INTRAVENOUS at 21:11

## 2022-01-08 RX ADMIN — POTASSIUM CHLORIDE AND SODIUM CHLORIDE: 900; 150 INJECTION, SOLUTION INTRAVENOUS at 03:27

## 2022-01-08 RX ADMIN — MIDODRINE HYDROCHLORIDE 10 MG: 5 TABLET ORAL at 17:19

## 2022-01-08 RX ADMIN — MIDODRINE HYDROCHLORIDE 10 MG: 5 TABLET ORAL at 05:56

## 2022-01-08 RX ADMIN — MEROPENEM 500 MG: 500 INJECTION, POWDER, FOR SOLUTION INTRAVENOUS at 05:56

## 2022-01-08 RX ADMIN — SENNOSIDES AND DOCUSATE SODIUM 2 TABLET: 50; 8.6 TABLET ORAL at 17:19

## 2022-01-08 RX ADMIN — MEROPENEM 500 MG: 500 INJECTION, POWDER, FOR SOLUTION INTRAVENOUS at 13:48

## 2022-01-08 RX ADMIN — SODIUM HYPOCHLORITE 10 ML: 1.25 SOLUTION TOPICAL at 05:57

## 2022-01-08 ASSESSMENT — ENCOUNTER SYMPTOMS
CONSTIPATION: 1
ABDOMINAL PAIN: 1

## 2022-01-08 ASSESSMENT — PAIN DESCRIPTION - PAIN TYPE: TYPE: ACUTE PAIN

## 2022-01-08 NOTE — CARE PLAN
The patient is Stable - Low risk of patient condition declining or worsening    Shift Goals  Clinical Goals: monitor dressings, urostomy, daily disimpaction  Patient Goals: comfort    Progress made toward(s) clinical / shift goals:       Problem: Knowledge Deficit - Standard  Goal: Patient and family/care givers will demonstrate understanding of plan of care, disease process/condition, diagnostic tests and medications  Outcome: Progressing     Problem: Hemodynamics  Goal: Patient's hemodynamics, fluid balance and neurologic status will be stable or improve  Outcome: Progressing     Problem: Fluid Volume  Goal: Fluid volume balance will be maintained  Outcome: Progressing     Problem: Urinary - Renal Perfusion  Goal: Ability to achieve and maintain adequate renal perfusion and functioning will improve  Outcome: Progressing     Problem: Respiratory  Goal: Patient will achieve/maintain optimum respiratory ventilation and gas exchange  Outcome: Progressing    Patient AOX4, denies pain and nausea.    Patient bedrest, turned Q 2 hours.    Daily disimpaction, coccyx dressings X2 changed daily/PRN.    Urostomy intact, draining clear yellow urine.    Patient consulted by GI MD this am on POC:    IV Abx.

## 2022-01-08 NOTE — DISCHARGE PLANNING
Anticipated Discharge Disposition:   Home     Action:   Chart review complete.     Per MD, patient is anticipated to be medically cleared tomorrow, Sunday.   LSW or RN CM to arrange transport home via [facility].     1500: RN CM met with patient at bedside to confirm details of transfer. RN CM also called patient's daughter Giselle.     Patient's address: [place] [address]   Steps: 2   Patient's daughter will be home. She wanted patient home around [id number].     1515: Transport forms faxed to [facility].    Barriers to Discharge:   Medical Clearance  Transportation confirmation    Plan:   Hospital care management will continue to assist with discharge planning needs.

## 2022-01-08 NOTE — PROGRESS NOTES
"Hospital Medicine Daily Progress Note    Date of Service  1/8/2022    Chief Complaint  Stevie Phoenix is a 61 y.o. male admitted 1/5/2022 with fevers chills and diaphoresis    Hospital Course  Per notes\"61 y.o. male with a past medical history of quadriplegia, chronic hypotension, history of recurrent urinary tract infection including ESBL organism who presented 1/5/2022 with fevers chills and diaphoresis.  Patient reports that he has not been feeling well in the past 10 days.  He has generalized weakness fevers and chills.  He reports that he often has no symptoms when he have a urinary tract infection however he is more concerned that he has not had a bowel movement over the past 14 days.  He denies having nausea or vomiting.  He denies having shortness of breath cough or sputum production.\"      Interval Problem Update  Patient is very frustrated with ongoing chronic constipation, states he does not understand the hospital can go 1 to 2 weeks when he is outside hospital and wants to know why.  I reviewed the current indications again with patient.  He is resistant to trying medications and suppository as he believes these will not work and he has tried in the past.  I stated these must be done together, per recommendations, he did agree to try these.  Additionally I have also asked gastroenterology to consult for further recommendations.    1/6: Discussed with PM&R, Dr Shipley, for recommendations regarding neurogenic bowel and possible therapy.  Recommends daily Dulcolax suppository 1 to 2 hours prior to daily manual disimpaction.  Must be performed at the same time each day in order to establish routine.  Also recommended patient following up in the outpatient setting for further management of this issue.    1/7: Discussed with Occupational Therapy, will continue to work with patient for manual disimpaction training, requested they work with patient's family as well.  Also discussed with gastroenterology, " will consult for further recommendations of neurogenic bowel.    1/8: No growth on cultures today, gastroenterology saw the patient and discussed bowel regimen with family family was interested in colostomy.  Patient has stated he is not ready for this, I will readdress with patient if he is interested, will discuss with general surgery.  This is something that can likely be followed in the outpatient setting.  I discussed the importance of following up with physiatry, gastroenterology and if needed general surgery in the outpatient setting with the patient.  He can likely be cleared for discharge tomorrow.    I have personally seen and examined the patient at bedside. I discussed the plan of care with patient, bedside RN and charge RN.    Consultants/Specialty  Physiatry  Nutrition  Occupational Therapy  Gastroenterology    Code Status  Full Code    Disposition  Patient is not medically cleared for discharge.   Anticipate discharge to to home with close outpatient follow-up.  I have placed the appropriate orders for post-discharge needs.    Review of Systems  Review of Systems   Gastrointestinal: Positive for abdominal pain and constipation.   Genitourinary: Positive for dysuria.   All other systems reviewed and are negative.       Physical Exam  Temp:  [36.6 °C (97.8 °F)-37.2 °C (99 °F)] 36.9 °C (98.5 °F)  Pulse:  [74-89] 89  Resp:  [17-20] 17  BP: ()/(60-75) 97/60  SpO2:  [95 %-96 %] 95 %    Physical Exam  Vitals and nursing note reviewed.   Constitutional:       Appearance: Normal appearance.   Cardiovascular:      Rate and Rhythm: Normal rate and regular rhythm.      Pulses: Normal pulses.      Heart sounds: Normal heart sounds.   Pulmonary:      Effort: Pulmonary effort is normal.      Breath sounds: Normal breath sounds.   Abdominal:      General: Abdomen is flat. Bowel sounds are normal.      Palpations: Abdomen is soft.   Musculoskeletal:      Right lower leg: No edema.      Left lower leg: No edema.    Neurological:      General: No focal deficit present.      Mental Status: He is alert and oriented to person, place, and time. Mental status is at baseline.         Fluids    Intake/Output Summary (Last 24 hours) at 1/8/2022 1258  Last data filed at 1/8/2022 1200  Gross per 24 hour   Intake 1020 ml   Output 3750 ml   Net -2730 ml       Laboratory  Recent Labs     01/05/22  1625 01/06/22  0549 01/07/22  0117   WBC 22.5* 9.2 6.4   RBC 5.06 4.09* 4.26*   HEMOGLOBIN 13.9* 11.1* 11.5*   HEMATOCRIT 43.2 35.2* 37.0*   MCV 85.4 86.1 86.9   MCH 27.5 27.1 27.0   MCHC 32.2* 31.5* 31.1*   RDW 45.9 46.7 46.9   PLATELETCT 383 333 352   MPV 10.0 10.1 10.3     Recent Labs     01/05/22  1625 01/06/22  0549 01/07/22  0117   SODIUM 134* 138 143   POTASSIUM 3.5* 3.4* 3.7   CHLORIDE 99 107 110   CO2 19* 19* 20   GLUCOSE 92 67 120*   BUN 18 14 15   CREATININE 0.55 0.34* 0.37*   CALCIUM 9.3 8.4 8.5                   Imaging  CT-ABDOMEN-PELVIS WITH   Final Result      1.  No evidence of hydronephrosis. Ureters are mildly prominent in ileal conduit right lower quadrant again appears to be present.      2.  No free fluid or abscess is identified in the abdomen or pelvis.      3.  Post cholecystectomy. No biliary ductal dilatation is appreciated.      DX-CHEST-PORTABLE (1 VIEW)   Final Result         No acute cardiac or pulmonary abnormality is identified.           Assessment/Plan  * Sepsis (HCC)- (present on admission)  Assessment & Plan  This is Sepsis Present on admission  SIRS criteria identified on my evaluation include: Tachycardia, with heart rate greater than 90 BPM, Tachypnea, with respirations greater than 20 per minute and Leukocytosis, with WBC greater than 12,000  Source is urinary tract infection   Sepsis protocol initiated  Fluid resuscitation ordered per protocol  IV antibiotics as appropriate for source of sepsis  While organ dysfunction may be noted elsewhere in this problem list or in the chart, degree of organ  dysfunction does not meet CMS criteria for severe sepsis  No growth on cultures but will continue with empiric antibiotics given patient's positive symptoms admission.  Complete a 5-day course on 1/9.    Acute cystitis without hematuria- (present on admission)  Assessment & Plan  Multiple previous infections with ESBL organisms.  Given current symptoms we will continue with Merrem, follow cultures and sensitivities and de-escalate when possible    Hyponatremia- (present on admission)  Assessment & Plan  Mild hypovolemic hyponatremia   I expect Na to improve with IVF     Paroxysmal atrial fibrillation- (present on admission)  Assessment & Plan  No ongoing blocking agents for  Resume home anticoagulation with rivaroxaban     Hypokalemia- (present on admission)  Assessment & Plan  Replacing, checking magnesium   Continue to monitor replace as needed     Hypotension- (present on admission)  Assessment & Plan  Appears to be chronic likely secondary to atonia from quadriplegia.  Resume home midodrine    Neurogenic bowel- (present on admission)  Assessment & Plan  Discussed with PM&R, Dr Shipley, for recommendations regarding neurogenic bowel and possible therapy.    Recommends daily Dulcolax suppository 1 to 2 hours prior to daily manual disimpaction.  Must be performed at the same time each day in order to establish routine.    Also recommended patient following up in the outpatient setting for further management of this issue.  Consult placed  We will continue to educate patient and family  Patient is agreeable to trying bowel regimen recommended by physiatrist.  Discussed with Occupational Therapy who will continue to work with patient  Discussed with gastroenterology who will consult for further recommendations  This is a chronic ongoing problem, and needs close follow-up in the outpatient setting.       VTE prophylaxis: SCDs/TEDs    I have performed a physical exam and reviewed and updated ROS and Plan today  (1/8/2022). In review of yesterday's note (1/7/2022), there are no changes except as documented above.

## 2022-01-08 NOTE — CARE PLAN
The patient is Stable - Low risk of patient condition declining or worsening    Shift Goals  Clinical Goals: Pt urostomy will be changed. Pt skin intregrity will improve or remain the same.  Patient Goals: comfort    Progress made toward(s) clinical / shift goals:  Pt urostomy was changed overnight. Pt provided with perineal care frequently. Pt is on bariatric bed and Q2 turns were encouraged.    Patient is not progressing towards the following goals: n/a

## 2022-01-08 NOTE — THERAPY
Occupational Therapy   Initial Evaluation     Patient Name: Stevie Phoenix  Age:  61 y.o., Sex:  male  Medical Record #: [id number]  Today's Date: 1/7/2022          Assessment  Patient is a 61 y.o. male with h/o quadriplegia admitted for UTI and constipation. Pt lives with his daughter and son-in-law in [place]. His JENNA helps with his bowel program at home. Pt does not perform bowel care on a commode, rather he performs it in sidelying in bed. He performs his bowel program in the mornings. OT consult was requested to help with bowel program and determine if adaptive equipment would be beneficial.     OT met with patient. He reports that he is very happy living with his daughter and JENNA and does not want to be a burden to them. He believes that his JENNA is helping him effectively with his bowel program and does not believe there is an issue with technique or comfort level on the part of his JENNA. OT introduced AE for bowel program including long handled suppository  and dig stim device. Patient was engaged for the education but does not think that these tools will be helpful for the particular bowel issues he is having. He reports his issue is being so constipated that there is no stool in the rectal vault to be evacuated. The patient appreciated GI physician's input and education today and believes that his daughter and JENNA would benefit from speaking with her and receiving the same education.     During OT session, pt donned his glasses, used his cell phone, adjusted the head of the bed and fed himself dinner with AE using adapted techniques. Pt's only concern is bowel movements and he believes the answer will come from the GI physician and her medication recommendations.       Plan    Recommend Occupational Therapy 1 more session if needed for family education in conjunction with nursing prior to discharge for the following treatments:  Adaptive Equipment and Self Care/Activities of Daily  "Living.      Subjective    \"I know my body better than anyone else after all these years.\"      Objective       01/07/22 1717   Prior Living Situation   Prior Services Continuous (24 Hour) Care Giving Family   Housing / Facility 1 Story House   Comments Pt lives with his daughter, son-in-law and their 9 month old daughter    Prior Level of ADL Function   Comments Pt's son-in-law helps the patient with  his bowel program. The patient performs his bowel program in sidelying on the bed. He does not sit on a padded commode for the bowel program    Patient / Family Goals   Patient / Family Goal #1 to return home with his daughter    Short Term Goals   Short Term Goal # 1 Pt and family will have no further questions regarding bowel program and adaptive equipment    Education Group   Education Provided Role of Occupational Therapist;Adaptive Equipment   Role of Occupational Therapist Patient Response Patient;Acceptance;Explanation;Verbal Demonstration   Adaptive Equipment Patient Response Patient;Acceptance;Explanation;Teaching Refused  (education on AE- suppository  and dig stim )   Problem List   Problem List Decreased Active Daily Living Skills                 "
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
## 2022-01-08 NOTE — PROGRESS NOTES
Assumed care of pt at 1915. Received report from Kim MIGUEL. Pt resting in bed. Pt expressed concern about talking to GI specialist in the morning to ensure there is a plan for his bowel regimen. Assured pt that this concern would be passed to day shift. Lorelei MIGUEL supervisor at bedside as well assured pt that this information would be relayed. Pt concerned that urostomy bag has not been changed. Informed pt that plan would be to change this at 2200 when antibiotic is due. Pt verbalized agreement. No other needs at this time, call light in reach, bed in lowest position.

## 2022-01-09 VITALS
BODY MASS INDEX: 21.6 KG/M2 | SYSTOLIC BLOOD PRESSURE: 96 MMHG | TEMPERATURE: 98.9 F | HEIGHT: 75 IN | RESPIRATION RATE: 18 BRPM | OXYGEN SATURATION: 93 % | WEIGHT: 173.72 LBS | HEART RATE: 90 BPM | DIASTOLIC BLOOD PRESSURE: 69 MMHG

## 2022-01-09 PROBLEM — E87.1 HYPONATREMIA: Status: RESOLVED | Noted: 2022-01-05 | Resolved: 2022-01-09

## 2022-01-09 PROBLEM — N30.00 ACUTE CYSTITIS WITHOUT HEMATURIA: Status: RESOLVED | Noted: 2022-01-05 | Resolved: 2022-01-09

## 2022-01-09 PROBLEM — A41.9 SEPSIS (HCC): Status: RESOLVED | Noted: 2017-03-06 | Resolved: 2022-01-09

## 2022-01-09 PROCEDURE — A9270 NON-COVERED ITEM OR SERVICE: HCPCS | Performed by: INTERNAL MEDICINE

## 2022-01-09 PROCEDURE — 700101 HCHG RX REV CODE 250: Performed by: HOSPITALIST

## 2022-01-09 PROCEDURE — A9270 NON-COVERED ITEM OR SERVICE: HCPCS | Performed by: HOSPITALIST

## 2022-01-09 PROCEDURE — 700102 HCHG RX REV CODE 250 W/ 637 OVERRIDE(OP): Performed by: INTERNAL MEDICINE

## 2022-01-09 PROCEDURE — 700111 HCHG RX REV CODE 636 W/ 250 OVERRIDE (IP): Performed by: INTERNAL MEDICINE

## 2022-01-09 PROCEDURE — 700105 HCHG RX REV CODE 258: Performed by: INTERNAL MEDICINE

## 2022-01-09 PROCEDURE — 99239 HOSP IP/OBS DSCHRG MGMT >30: CPT | Performed by: INTERNAL MEDICINE

## 2022-01-09 PROCEDURE — 700102 HCHG RX REV CODE 250 W/ 637 OVERRIDE(OP): Performed by: HOSPITALIST

## 2022-01-09 RX ORDER — POLYETHYLENE GLYCOL 3350 17 G/17G
POWDER, FOR SOLUTION ORAL
Refills: 3 | COMMUNITY
Start: 2022-01-09 | End: 2022-01-26

## 2022-01-09 RX ORDER — BISACODYL 10 MG
10 SUPPOSITORY, RECTAL RECTAL DAILY
Qty: 30 SUPPOSITORY | Refills: 0 | COMMUNITY
Start: 2022-01-09 | End: 2022-01-26

## 2022-01-09 RX ORDER — AMOXICILLIN 250 MG
2 CAPSULE ORAL 2 TIMES DAILY
Qty: 30 TABLET | Refills: 0 | Status: SHIPPED | OUTPATIENT
Start: 2022-01-09 | End: 2022-01-26

## 2022-01-09 RX ADMIN — SODIUM HYPOCHLORITE 10 ML: 1.25 SOLUTION TOPICAL at 05:46

## 2022-01-09 RX ADMIN — MIDODRINE HYDROCHLORIDE 10 MG: 5 TABLET ORAL at 05:40

## 2022-01-09 RX ADMIN — POTASSIUM CHLORIDE AND SODIUM CHLORIDE: 900; 150 INJECTION, SOLUTION INTRAVENOUS at 05:41

## 2022-01-09 RX ADMIN — SENNOSIDES AND DOCUSATE SODIUM 2 TABLET: 50; 8.6 TABLET ORAL at 05:40

## 2022-01-09 RX ADMIN — MEROPENEM 500 MG: 500 INJECTION, POWDER, FOR SOLUTION INTRAVENOUS at 05:40

## 2022-01-09 ASSESSMENT — PAIN DESCRIPTION - PAIN TYPE: TYPE: ACUTE PAIN

## 2022-01-09 NOTE — PROGRESS NOTES
Assumed care of pt at 1915. Received report from Kim MIGUEL. Pt resting in bed watching tv. Pt turned and provided with perineal care as pt had a BM. Pt requested to be further disimpacted. BM is medium sized, soft and brown. Wound care provided on sacral wound. Buttocks dressing is intact with old drainage. Barrier cream applied. No other needs at this time, call light in reach, bed in lowest position.

## 2022-01-09 NOTE — DISCHARGE PLANNING
Anticipated Discharge Disposition: Home- [address]  [place] [address]    Action: Pt discussed during morning rounds. Per Dr. Van pt cleared for d/c today. Pt requested to be home between [id number] due to son in law being available to assist pt when he arrives home.     LSW messaged Claire SCALES and requested transport for 1200 since pt lives in [place]. With the goal of pt arriving home around 1300.     Barriers to Discharge: None     Plan: Await confirmed transport time, LSW to continue to follow and assist as needed    Addendum 0936  LSW informed by Claire SCALES that [facility] confirmed for 1200 today.   LSW provided update to Dr. Van and RN, Aliyah.     Addendum 0966  LSW called pt's daughter, Giselle- [phone] to provide update. LSW informed that her /pt's son in law and caretaker is currently getting tested for COVID. Giselle will call back if he is positive and if transport needs to be cancelled. LSW provided call back number for Giselle. If Giselle does not call back to cancel, [facility] transport to continue as coordinated.     Addendum 1000  LSW met with pt at bedside to inform that [facility] transport confirmed for 1200 today.     Addendum 1151  LSW called pt's daughter, Giselle to confirm everything was okay to continue to proceed with [facility] transport. Giselle confirmed they are waiting for pt to arrive home.

## 2022-01-09 NOTE — PROGRESS NOTES
Received report from night shift RN. Patient A&Ox4, denies pain, N/V, Sob at this time. Bowel disimpaction performed. Dressings changed due to soilage. POC discussed. Call light, belongings in reach.
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
## 2022-01-09 NOTE — PROGRESS NOTES
Discharging patient home per MD orders. Patient demonstrated understanding of discharge instructions and educational materials, as well as, follow up appointments, medications, prescriptions and home care for wounds. Patient is voiding without difficulty, pain is well controlled, tolerating oral medications. O2 is greater than 90%. All questions have been answered. Patient has been discharged off the unit with [facility].

## 2022-01-09 NOTE — DISCHARGE PLANNING
Received Transport Form @ 1511 on 1/08/22  Spoke to Ana @ [facility]    Transport is scheduled for 1/09/22 @1200 going to home:  [id number]  [id number]    [facility] Trip #[id number]    LSW notified via Teams.

## 2022-01-09 NOTE — DISCHARGE INSTRUCTIONS
Gastroenterology Recommendations:  - Daily abdominal massage for 15 minutes.  - Daily anorectal stimulation with tapwater enemas or suppositories.  - Lactulose daily with MiraLAX and stool softener as needed.  - High fiber diet 25-30 g daily. Low fat and low diary products.    Physical Medicine and Rehabilitation Physician Recommendations:  - Dulcolax suppository 1 to 2 hours prior to daily manual disimpaction.   - Manual disimpaction must be performed at the same time each day to establish routine.    - Follow-up in the outpatient setting for further management of this issue.      Constipation, Adult  Constipation is when a person:  · Poops (has a bowel movement) fewer times in a week than normal.  · Has a hard time pooping.  · Has poop that is dry, hard, or bigger than normal.  Follow these instructions at home:  Eating and drinking    · Eat foods that have a lot of fiber, such as:  ? Fresh fruits and vegetables.  ? Whole grains.  ? Beans.  · Eat less of foods that are high in fat, low in fiber, or overly processed, such as:  ? French fries.  ? Hamburgers.  ? Cookies.  ? Candy.  ? Soda.  · Drink enough fluid to keep your pee (urine) clear or pale yellow.  General instructions  · Exercise regularly or as told by your doctor.  · Go to the restroom when you feel like you need to poop. Do not hold it in.  · Take over-the-counter and prescription medicines only as told by your doctor. These include any fiber supplements.  · Do pelvic floor retraining exercises, such as:  ? Doing deep breathing while relaxing your lower belly (abdomen).  ? Relaxing your pelvic floor while pooping.  · Watch your condition for any changes.  · Keep all follow-up visits as told by your doctor. This is important.  Contact a doctor if:  · You have pain that gets worse.  · You have a fever.  · You have not pooped for 4 days.  · You throw up (vomit).  · You are not hungry.  · You lose weight.  · You are bleeding from the anus.  · You have thin,  pencil-like poop (stool).  Get help right away if:  · You have a fever, and your symptoms suddenly get worse.  · You leak poop or have blood in your poop.  · Your belly feels hard or bigger than normal (is bloated).  · You have very bad belly pain.  · You feel dizzy or you faint.  This information is not intended to replace advice given to you by your health care provider. Make sure you discuss any questions you have with your health care provider.  Document Released: 06/05/2009 Document Revised: 11/30/2018 Document Reviewed: 06/07/2017  [employer] Patient Education © 2020 [employer]    Urinary Tract Infection, Adult  A urinary tract infection (UTI) is an infection of any part of the urinary tract. The urinary tract includes:  · The kidneys.  · The ureters.  · The bladder.  · The urethra.  These organs make, store, and get rid of pee (urine) in the body.  What are the causes?  This is caused by germs (bacteria) in your genital area. These germs grow and cause swelling (inflammation) of your urinary tract.  What increases the risk?  You are more likely to develop this condition if:  · You have a small, thin tube (catheter) to drain pee.  · You cannot control when you pee or poop (incontinence).  · You are female, and:  ? You use these methods to prevent pregnancy:  ? A medicine that kills sperm (spermicide).  ? A device that blocks sperm (diaphragm).  ? You have low levels of a female hormone (estrogen).  ? You are pregnant.  · You have genes that add to your risk.  · You are sexually active.  · You take antibiotic medicines.  · You have trouble peeing because of:  ? A prostate that is bigger than normal, if you are male.  ? A blockage in the part of your body that drains pee from the bladder (urethra).  ? A kidney stone.  ? A nerve condition that affects your bladder (neurogenic bladder).  ? Not getting enough to drink.  ? Not peeing often enough.  · You have other conditions, such as:  ? Diabetes.  ? A weak  disease-fighting system (immune system).  ? Sickle cell disease.  ? Gout.  ? Injury of the spine.  What are the signs or symptoms?  Symptoms of this condition include:  · Needing to pee right away (urgently).  · Peeing often.  · Peeing small amounts often.  · Pain or burning when peeing.  · Blood in the pee.  · Pee that smells bad or not like normal.  · Trouble peeing.  · Pee that is cloudy.  · Fluid coming from the vagina, if you are female.  · Pain in the belly or lower back.  Other symptoms include:  · Throwing up (vomiting).  · No urge to eat.  · Feeling mixed up (confused).  · Being tired and grouchy (irritable).  · A fever.  · Watery poop (diarrhea).  How is this treated?  This condition may be treated with:  · Antibiotic medicine.  · Other medicines.  · Drinking enough water.  Follow these instructions at home:    Medicines  · Take over-the-counter and prescription medicines only as told by your doctor.  · If you were prescribed an antibiotic medicine, take it as told by your doctor. Do not stop taking it even if you start to feel better.  General instructions  · Make sure you:  ? Pee until your bladder is empty.  ? Do not hold pee for a long time.  ? Empty your bladder after sex.  ? Wipe from front to back after pooping if you are a female. Use each tissue one time when you wipe.  · Drink enough fluid to keep your pee pale yellow.  · Keep all follow-up visits as told by your doctor. This is important.  Contact a doctor if:  · You do not get better after 1-2 days.  · Your symptoms go away and then come back.  Get help right away if:  · You have very bad back pain.  · You have very bad pain in your lower belly.  · You have a fever.  · You are sick to your stomach (nauseous).  · You are throwing up.  Summary  · A urinary tract infection (UTI) is an infection of any part of the urinary tract.  · This condition is caused by germs in your genital area.  · There are many risk factors for a UTI. These include having  a small, thin tube to drain pee and not being able to control when you pee or poop.  · Treatment includes antibiotic medicines for germs.  · Drink enough fluid to keep your pee pale yellow.  This information is not intended to replace advice given to you by your health care provider. Make sure you discuss any questions you have with your health care provider.  Document Released: 06/05/2009 Document Revised: 12/05/2019 Document Reviewed: 06/27/2019  [employer] Patient Education © 2020 [employer]  Discharge Instructions    Discharged to home by ambulance with escort. Discharged via ambulance, hospital escort: Yes.  Special equipment needed: Not Applicable    Be sure to schedule a follow-up appointment with your primary care doctor or any specialists as instructed.     Discharge Plan:   Diet Plan: Discussed  Activity Level: Discussed  Confirmed Follow up Appointment: Patient to Call and Schedule Appointment  Confirmed Symptoms Management: Discussed  Medication Reconciliation Updated: Yes  Influenza Vaccine Indication: Not indicated: Previously immunized this influenza season and > 8 years of age    I understand that a diet low in cholesterol, fat, and sodium is recommended for good health. Unless I have been given specific instructions below for another diet, I accept this instruction as my diet prescription.   Other diet: Regular    Special Instructions:   Sepsis, Diagnosis, Adult  Sepsis is a serious bodily reaction to an infection. The infection that triggers sepsis may be from a bacteria, virus, or fungus. Sepsis can result from an infection in any part of your body. Infections that commonly lead to sepsis include skin, lung, and urinary tract infections.  Sepsis is a medical emergency that must be treated right away in a hospital. In severe cases, it can lead to septic shock. Septic shock can weaken your heart and cause your blood pressure to drop. This can cause your central nervous system and your body's organs  to stop working.  What are the causes?  This condition is caused by a severe reaction to infections from bacteria, viruses, or fungus. The germs that most often lead to sepsis include:  · Escherichia coli (E. coli) bacteria.  · Staphylococcus aureus (staph) bacteria.  · Some types of Streptococcus bacteria.  The most common infections affect these organs:  · The lung (pneumonia).  · The kidneys or bladder (urinary tract infection).  · The skin (cellulitis).  · The bowel, gallbladder, or pancreas.  What increases the risk?  You are more likely to develop this condition if:  · Your body's disease-fighting system (immune system) is weakened.  · You are age 65 or older.  · You are male.  · You had surgery or you have been hospitalized.  · You have these devices inserted into your body:  ? A small, thin tube (catheter).  ? IV line.  ? Breathing tube.  ? Drainage tube.  · You are not getting enough nutrients from food (malnourished).  · You have a long-term (chronic) disease, such as cancer, lung disease, kidney disease, or diabetes.  · You are .  What are the signs or symptoms?  Symptoms of this condition may include:  · Fever.  · Chills or feeling very cold.  · Confusion or anxiety.  · Fatigue.  · Muscle aches.  · Shortness of breath.  · Nausea and vomiting.  · Urinating much less than usual.  · Fast heart rate (tachycardia).  · Rapid breathing (hyperventilation).  · Changes in skin color. Your skin may look blotchy, pale, or blue.  · Cool, clammy, or sweaty skin.  · Skin rash.  Other symptoms depend on the source of your infection.  How is this diagnosed?  This condition is diagnosed based on:  · Your symptoms.  · Your medical history.  · A physical exam.  Other tests may also be done to find out the cause of the infection and how severe the sepsis is. These tests may include:  · Blood tests.  · Urine tests.  · Swabs from other areas of your body that may have an infection. These samples may be tested  (cultured) to find out what type of bacteria is causing the infection.  · Chest X-ray to check for pneumonia. Other imaging tests, such as a CT scan, may also be done.  · Lumbar puncture. This removes a small amount of the fluid that surrounds your brain and spinal cord. The fluid is then examined for infection.  How is this treated?  This condition must be treated in a hospital. Based on the cause of your infection, you may be given an antibiotic, antiviral, or antifungal medicine.  You may also receive:  · Fluids through an IV.  · Oxygen and breathing assistance.  · Medicines to increase your blood pressure.  · Kidney dialysis. This process cleans your blood if your kidneys have failed.  · Surgery to remove infected tissue.  · Blood transfusion if needed.  · Medicine to prevent blood clots.  · Nutrients to correct imbalances in basic body function (metabolism). You may:  ? Receive important salts and minerals (electrolytes) through an IV.  ? Have your blood sugar level adjusted.  Follow these instructions at home:  Medicines    · Take over-the-counter and prescription medicines only as told by your health care provider.  · If you were prescribed an antibiotic, antiviral, or antifungal medicine, take it as told by your health care provider. Do not stop taking the medicine even if you start to feel better.  General instructions  · If you have a catheter or other indwelling device, ask to have it removed as soon as possible.  · Keep all follow-up visits as told by your health care provider. This is important.  Contact a health care provider if:  · You do not feel like you are getting better or regaining strength.  · You are having trouble coping with your recovery.  · You frequently feel tired.  · You feel worse or do not seem to get better after surgery.  · You think you may have an infection after surgery.  Get help right away if:  · You have any symptoms of sepsis.  · You have difficulty breathing.  · You have a  rapid or skipping heartbeat.  · You become confused or disoriented.  · You have a high fever.  · Your skin becomes blotchy, pale, or blue.  · You have an infection that is getting worse or not getting better.  These symptoms may represent a serious problem that is an emergency. Do not wait to see if the symptoms will go away. Get medical help right away. Call your local emergency services (911 in the [place] Do not drive yourself to the hospital.  Summary  · Sepsis is a medical emergency that requires immediate treatment in a hospital.  · This condition is caused by a severe reaction to infections from bacteria, viruses, or fungus.  · Based on the cause of your infection, you may be given an antibiotic, antiviral, or antifungal medicine.  · Treatment may also include IV fluids, breathing assistance, and kidney dialysis.  This information is not intended to replace advice given to you by your health care provider. Make sure you discuss any questions you have with your health care provider.  Document Released: 09/15/2004 Document Revised: 07/26/2019 Document Reviewed: 07/26/2019  [employer] Patient Education © 2020 [employer]      · Is patient discharged on Warfarin / Coumadin?   No     Depression / Suicide Risk    As you are discharged from this [facility] facility, it is important to learn how to keep safe from harming yourself.    Recognize the warning signs:  · Abrupt changes in personality, positive or negative- including increase in energy   · Giving away possessions  · Change in eating patterns- significant weight changes-  positive or negative  · Change in sleeping patterns- unable to sleep or sleeping all the time   · Unwillingness or inability to communicate  · Depression  · Unusual sadness, discouragement and loneliness  · Talk of wanting to die  · Neglect of personal appearance   · Rebelliousness- reckless behavior  · Withdrawal from people/activities they love  · Confusion- inability to  concentrate     If you or a loved one observes any of these behaviors or has concerns about self-harm, here's what you can do:  · Talk about it- your feelings and reasons for harming yourself  · Remove any means that you might use to hurt yourself (examples: pills, rope, extension cords, firearm)  · Get professional help from the community (Mental Health, Substance Abuse, psychological counseling)  · Do not be alone:Call your Safe Contact- someone whom you trust who will be there for you.  · Call your local CRISIS HOTLINE 782-2796 or 505-978-3212  · Call your local Children's Mobile Crisis Response Team [place] (999) 481-2934 or www.[employer]  · Call the toll free National Suicide Prevention Hotlines   · National Suicide Prevention Lifeline 297-389-OFZC (5106)  · National Hope Line Network 800-SUICIDE (315-8522)

## 2022-01-09 NOTE — WOUND TEAM
This wound RN in to see patient. Patient well known to wound team. Dressings currently in place, per patient, patient son in law and daughter help perform dressing care at home and patient states he is likely discharging tomorrow. Orders in place for nursing if needed, hydrofera blue ordered, patient states at home they have been doing wet to dry's - both are equally appropriate for wounds. Patient also with established urostomy, orders placed. Patient asked this RN about wound healing and colostomy, based on patient wounds and locations, this RN does not feel that colostomy is absolutely necessary at this time (from a wound perspective), if wounds began to worsen due to stool ruining integrity of dressings very frequently, then would highly recommend. At this time, wound team to follow up bi-monthly, wounds are chronic and stable, patient agrees. Please call for questions or concerns.

## 2022-01-09 NOTE — CARE PLAN
The patient is Stable - Low risk of patient condition declining or worsening    Shift Goals  Clinical Goals: Skin integrity will remain  Patient Goals: comfort    Progress made toward(s) clinical / shift goals:  Patient repositioned Q2 hours. Bowel care preformed.   Problem: Knowledge Deficit - Standard  Goal: Patient and family/care givers will demonstrate understanding of plan of care, disease process/condition, diagnostic tests and medications  Outcome: Progressing     Problem: Hemodynamics  Goal: Patient's hemodynamics, fluid balance and neurologic status will be stable or improve  Outcome: Progressing     Problem: Fluid Volume  Goal: Fluid volume balance will be maintained  Outcome: Progressing     Problem: Urinary - Renal Perfusion  Goal: Ability to achieve and maintain adequate renal perfusion and functioning will improve  Outcome: Progressing     Problem: Respiratory  Goal: Patient will achieve/maintain optimum respiratory ventilation and gas exchange  Outcome: Progressing     Problem: Skin Integrity  Goal: Skin integrity is maintained or improved  Outcome: Progressing       Patient is not progressing towards the following goals:

## 2022-01-09 NOTE — CARE PLAN
The patient is Stable - Low risk of patient condition declining or worsening    Shift Goals  Clinical Goals: Pt will have skin integrity improve or remain at same level.  Patient Goals: comfort    Progress made toward(s) clinical / shift goals:  Pt skin integrity remained the same overnight. Pt on low air loss bed. Barrier paste applied. Educated pt on importance of Q2 turns. Heel float boots on.    Patient is not progressing towards the following goals: n/a

## 2022-01-10 ENCOUNTER — PATIENT OUTREACH (OUTPATIENT)
Dept: HEALTH INFORMATION MANAGEMENT | Facility: OTHER | Age: 62
End: 2022-01-10

## 2022-01-11 NOTE — CARE PLAN
The patient is Stable - Low risk of patient condition declining or worsening    Shift Goals  Clinical Goals: reposition pt  Patient Goals: pt will rest  Family Goals: No family present    Progress made toward(s) clinical / shift goals:  Pt was reposition    Patient is not progressing towards the following goals:    Problem: Skin Integrity  Goal: Skin integrity is maintained or improved  Outcome: Progressing     Problem: Fall Risk  Goal: Patient will remain free from falls  Outcome: Progressing        Show Applicator Variable?: Yes Post-Care Instructions: I reviewed with the patient in detail post-care instructions. Patient is to wear sunprotection, and avoid picking at any of the treated lesions. Pt may apply Vaseline to crusted or scabbing areas. Include Z78.9 (Other Specified Conditions Influencing Health Status) As An Associated Diagnosis?: No Detail Level: Detailed Medical Necessity Information: It is in your best interest to select a reason for this procedure from the list below. All of these items fulfill various CMS LCD requirements except the new and changing color options. Medical Necessity Clause: This procedure was medically necessary because the lesions that were treated were: Spray Paint Text: The liquid nitrogen was applied to the skin utilizing a spray paint frosting technique. Consent: The patient's consent was obtained including but not limited to risks of crusting, scabbing, blistering, scarring, darker or lighter pigmentary change, recurrence, incomplete removal and infection.

## 2022-01-12 SDOH — ECONOMIC STABILITY: FOOD INSECURITY: WITHIN THE PAST 12 MONTHS, THE FOOD YOU BOUGHT JUST DIDN'T LAST AND YOU DIDN'T HAVE MONEY TO GET MORE.: NEVER TRUE

## 2022-01-12 SDOH — ECONOMIC STABILITY: FOOD INSECURITY: WITHIN THE PAST 12 MONTHS, YOU WORRIED THAT YOUR FOOD WOULD RUN OUT BEFORE YOU GOT MONEY TO BUY MORE.: NEVER TRUE

## 2022-01-12 SDOH — ECONOMIC STABILITY: TRANSPORTATION INSECURITY
IN THE PAST 12 MONTHS, HAS LACK OF TRANSPORTATION KEPT YOU FROM MEETINGS, WORK, OR FROM GETTING THINGS NEEDED FOR DAILY LIVING?: NO

## 2022-01-12 SDOH — ECONOMIC STABILITY: TRANSPORTATION INSECURITY
IN THE PAST 12 MONTHS, HAS THE LACK OF TRANSPORTATION KEPT YOU FROM MEDICAL APPOINTMENTS OR FROM GETTING MEDICATIONS?: NO

## 2022-01-12 SDOH — ECONOMIC STABILITY: INCOME INSECURITY: HOW HARD IS IT FOR YOU TO PAY FOR THE VERY BASICS LIKE FOOD, HOUSING, MEDICAL CARE, AND HEATING?: NOT HARD AT ALL

## 2022-01-12 NOTE — PROGRESS NOTES
1/12/2022- TEOFILO Glez contacted pt daughter, Sue via TC post d/c to introduce CCM services. Completed SDOH screening and outpatient assessment. Sue will make Stevie's follow up visit with PCP as needed. She will also contact [facility] for referral and visit. Pt declined assistance with scheduling. She mentions good support from family and friends. No medical equipment used at home. Pt is confident in ability to manage care post d/c. No issues keeping appointments or financial barriers to care. Completed AVS review/ medication/ questions. Pt denies need for resources such as food, transportation or housing. CCM contact info left with Sue and encouraged pt to contact if needed.     Community Health Worker Intake  • Social determinates of health intake completed  • Identified barriers to none.   • Contact information provided to Stevie Phoenix. Yes   • Has PCP appointment scheduled for. Sue, pt daughter will make follow up visits as needed. She manages care.   • Scheduled Food Delivery/Home Visit/Outpatient Visit: No   • Accepted/Declined Meds-To-Beds. No   • Inpatient/Outpatient assessment completed. Outpatient   • Did the patient receive medications post discharge: Yes/ Picked up at local pharmacy.     Plan: D/c pt from CCM services as all need met.

## 2022-01-17 ENCOUNTER — TELEPHONE (OUTPATIENT)
Dept: PHYSICAL MEDICINE AND REHAB | Facility: MEDICAL CENTER | Age: 62
End: 2022-01-17

## 2022-01-19 ENCOUNTER — TELEPHONE (OUTPATIENT)
Dept: PHYSICAL MEDICINE AND REHAB | Facility: MEDICAL CENTER | Age: 62
End: 2022-01-19

## 2022-01-20 NOTE — DOCUMENTATION QUERY
[facility]                                                                       Query Response Note      PATIENT:               KELLY BUNN  ACCT #:                  [id number]  MRN:                     [id number]  :                      1960  ADMIT DATE:       2022 3:52 PM  DISCH DATE:        2022 12:31 PM  RESPONDING  PROVIDER #:        [id number]           QUERY TEXT:    Please clarify in documentation the relationship, if any, between  Urostomy and sepsis    The patient's Clinical Indicators include:  Clinical Indicators:  WBC 22.5  resp 22  temp 99.2  pulse 69    Treatment and monitoring:  IV antibiotics    Risk factors:  Quadriplegic  recurrent UTI's    [email or web address]  Options provided:   -- Condition sepsis is due to or associated with Urostomy   -- Unrelated to each other   -- Unable to determine      Query created by: Mariel Perales on 2022 8:20 AM    RESPONSE TEXT:    Unrelated to each other          Electronically signed by:  JENNIFER BRIDGES MD 2022 4:39 PM

## 2022-01-26 ENCOUNTER — APPOINTMENT (OUTPATIENT)
Dept: RADIOLOGY | Facility: MEDICAL CENTER | Age: 62
End: 2022-01-26
Attending: EMERGENCY MEDICINE
Payer: MEDICAID

## 2022-01-26 ENCOUNTER — HOSPITAL ENCOUNTER (EMERGENCY)
Facility: MEDICAL CENTER | Age: 62
End: 2022-01-26
Attending: EMERGENCY MEDICINE
Payer: MEDICAID

## 2022-01-26 VITALS
BODY MASS INDEX: 22.3 KG/M2 | HEIGHT: 74 IN | HEART RATE: 60 BPM | DIASTOLIC BLOOD PRESSURE: 51 MMHG | WEIGHT: 173.72 LBS | SYSTOLIC BLOOD PRESSURE: 84 MMHG | OXYGEN SATURATION: 100 % | RESPIRATION RATE: 18 BRPM | TEMPERATURE: 98.5 F

## 2022-01-26 DIAGNOSIS — S82.302A CLOSED FRACTURE OF DISTAL END OF LEFT TIBIA, UNSPECIFIED FRACTURE MORPHOLOGY, INITIAL ENCOUNTER: ICD-10-CM

## 2022-01-26 PROCEDURE — 73590 X-RAY EXAM OF LOWER LEG: CPT | Mod: LT

## 2022-01-26 PROCEDURE — 99284 EMERGENCY DEPT VISIT MOD MDM: CPT

## 2022-01-26 PROCEDURE — 29105 APPLICATION LONG ARM SPLINT: CPT

## 2022-01-26 PROCEDURE — 73630 X-RAY EXAM OF FOOT: CPT | Mod: LT

## 2022-01-26 PROCEDURE — 302874 HCHG BANDAGE ACE 2 OR 3""

## 2022-01-26 PROCEDURE — 73610 X-RAY EXAM OF ANKLE: CPT | Mod: LT

## 2022-01-26 RX ORDER — SULFAMETHOXAZOLE AND TRIMETHOPRIM 800; 160 MG/1; MG/1
1 TABLET ORAL 2 TIMES DAILY
Status: SHIPPED | COMMUNITY
End: 2023-02-21

## 2022-01-26 ASSESSMENT — FIBROSIS 4 INDEX: FIB4 SCORE: 0.74

## 2022-01-26 NOTE — ED TRIAGE NOTES
"Chief Complaint   Patient presents with   • Ankle Injury     ran L side ankle and foot into table while at lunch, \"heard a pop, and my anle was turned pretty far\"     BP (!) 84/51   Pulse (!) 58   Temp 36.5 °C (97.7 °F) (Temporal)   Resp 16   Ht 1.88 m (6' 2\")   Wt 78.8 kg (173 lb 11.6 oz)   SpO2 96%   BMI 22.30 kg/m²     Pt BIB family for above concern, pt w/c bound    Has this patient been vaccinated for COVID - NO    "
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
## 2022-01-27 NOTE — ED NOTES
Med rec updated and complete, per pt  Allergies reviewed, per pt  Interviewed pt with son in law at bedside with permission from pt.

## 2022-01-27 NOTE — ED NOTES
Patient given discharge instructions questions and concerns addressed.  Prior to discharge attempted to re-check BP patient reports it is always low and didn't want ups to keep trying he will re-check it at home

## 2022-02-08 NOTE — ED PROVIDER NOTES
ED Provider Note    Addendum: Procedure: A splint was applied to the left lower extremity.  The patient is very concerned about the possibility of developing a pressure ulceration therefore we are very careful to apply a large amounts of web roll and we did not apply any splints material to the heel of the foot where he has high risk for developing a pressure ulceration.  This was discussed with him prior to application.  He indicates he was comfortable with the plan.

## 2022-04-05 NOTE — CARE PLAN
Problem: Safety  Goal: Will remain free from falls  Intervention: Assess risk factors for falls    03/13/17 0900 03/14/17 0900   OTHER   Fall Risk --  High Risk to Fall - 2 or more points    Risk for Injury-Any positive answers results in the pt being at high risk for fall related injury --  Not Applicable   Mobility Status Assessment --  2-2 Healthcare Providers Required for Assistance with Ambulation & Transfer   History of fall --  0   Date of Last Fall 10/31/16 --    Pt Calls for Assistance --  Yes                Pt unable to swallow crushed pills in apple sauce or chocolate ice cream. This RN and ED PA at bedside to assist pt. Pt immediately gags and states she can taste the meds. ED PA to call OB on call

## 2022-06-03 ENCOUNTER — HOME HEALTH ADMISSION (OUTPATIENT)
Dept: HOME HEALTH SERVICES | Facility: HOME HEALTHCARE | Age: 62
End: 2022-06-03
Payer: MEDICAID

## 2022-08-30 NOTE — PROGRESS NOTES
Eyes on patient at 19:45. Patient sleeping. Took report from Day RN regarding wound care and q2h turns from left side to back, avoiding Right side. Next repositioning due at 20:00.    Home PT

## 2022-12-24 NOTE — PROGRESS NOTES
500 mL NS bolus complete, BP is 93/77.   Patient is in memory care unit. Alert and oriented to self. Patient was placed in bedside chair by previous shift. Oncoming shift found patient sitting on floor. Patient denies falling, states \"I just slid there. \"  No bruising or injury noted. Patient able to move all extremities. States everything always hurts. C-collar removed by Dr Jonel Powers.

## 2023-02-21 ENCOUNTER — APPOINTMENT (OUTPATIENT)
Dept: RADIOLOGY | Facility: MEDICAL CENTER | Age: 63
DRG: 871 | End: 2023-02-21
Attending: EMERGENCY MEDICINE
Payer: MEDICAID

## 2023-02-21 ENCOUNTER — HOSPITAL ENCOUNTER (INPATIENT)
Facility: MEDICAL CENTER | Age: 63
LOS: 11 days | DRG: 871 | End: 2023-03-04
Attending: EMERGENCY MEDICINE | Admitting: INTERNAL MEDICINE
Payer: MEDICAID

## 2023-02-21 DIAGNOSIS — G82.50 QUADRIPLEGIA (HCC): ICD-10-CM

## 2023-02-21 DIAGNOSIS — N13.30 HYDRONEPHROSIS OF LEFT KIDNEY: ICD-10-CM

## 2023-02-21 DIAGNOSIS — E43 SEVERE PROTEIN-CALORIE MALNUTRITION (HCC): ICD-10-CM

## 2023-02-21 DIAGNOSIS — M86.9 OSTEOMYELITIS OF PELVIC REGION (HCC): ICD-10-CM

## 2023-02-21 DIAGNOSIS — Z86.718 HISTORY OF DVT (DEEP VEIN THROMBOSIS): ICD-10-CM

## 2023-02-21 DIAGNOSIS — D50.8 IRON DEFICIENCY ANEMIA SECONDARY TO INADEQUATE DIETARY IRON INTAKE: ICD-10-CM

## 2023-02-21 DIAGNOSIS — A41.9 SEPSIS DUE TO URINARY TRACT INFECTION (HCC): ICD-10-CM

## 2023-02-21 DIAGNOSIS — R82.71 ASYMPTOMATIC BACTERIURIA: ICD-10-CM

## 2023-02-21 DIAGNOSIS — N39.0 LOWER URINARY TRACT INFECTIOUS DISEASE: ICD-10-CM

## 2023-02-21 DIAGNOSIS — E86.0 DEHYDRATION: ICD-10-CM

## 2023-02-21 DIAGNOSIS — K92.2 UPPER GI BLEED: ICD-10-CM

## 2023-02-21 DIAGNOSIS — B35.6 TINEA CRURIS: ICD-10-CM

## 2023-02-21 DIAGNOSIS — D64.9 NORMOCYTIC ANEMIA: ICD-10-CM

## 2023-02-21 DIAGNOSIS — N39.0 RECURRENT UTI: ICD-10-CM

## 2023-02-21 DIAGNOSIS — L89.323 DECUBITUS ULCER OF LEFT ISCHIUM, STAGE 3 (HCC): ICD-10-CM

## 2023-02-21 DIAGNOSIS — A49.9 ESBL (EXTENDED SPECTRUM BETA-LACTAMASE) PRODUCING BACTERIA INFECTION: ICD-10-CM

## 2023-02-21 DIAGNOSIS — N39.0 SEPSIS DUE TO URINARY TRACT INFECTION (HCC): ICD-10-CM

## 2023-02-21 DIAGNOSIS — N20.0 CALCULUS OF KIDNEY: ICD-10-CM

## 2023-02-21 DIAGNOSIS — Z97.8 CHRONIC INDWELLING FOLEY CATHETER: ICD-10-CM

## 2023-02-21 DIAGNOSIS — M62.838 MUSCLE SPASM: ICD-10-CM

## 2023-02-21 DIAGNOSIS — R11.2 NAUSEA AND VOMITING, UNSPECIFIED VOMITING TYPE: ICD-10-CM

## 2023-02-21 DIAGNOSIS — M46.28 OSTEOMYELITIS OF SACROILIAC REGION (HCC): ICD-10-CM

## 2023-02-21 DIAGNOSIS — Z86.19 HISTORY OF CLOSTRIDIUM DIFFICILE INFECTION: ICD-10-CM

## 2023-02-21 DIAGNOSIS — K56.7 ILEUS (HCC): ICD-10-CM

## 2023-02-21 DIAGNOSIS — A04.72 C. DIFFICILE DIARRHEA: ICD-10-CM

## 2023-02-21 DIAGNOSIS — N31.9 NEUROGENIC BLADDER: Chronic | ICD-10-CM

## 2023-02-21 DIAGNOSIS — R78.81 BACTEREMIA: ICD-10-CM

## 2023-02-21 DIAGNOSIS — A41.9 RECURRENT SEPSIS DUE TO URINARY TRACT INFECTION (HCC): ICD-10-CM

## 2023-02-21 DIAGNOSIS — K92.1 MELENA: ICD-10-CM

## 2023-02-21 DIAGNOSIS — Z02.9 PROBLEM RELATED TO DISCHARGE PLANNING: ICD-10-CM

## 2023-02-21 DIAGNOSIS — K56.609 SBO (SMALL BOWEL OBSTRUCTION) (HCC): ICD-10-CM

## 2023-02-21 DIAGNOSIS — F41.9 ANXIETY: ICD-10-CM

## 2023-02-21 DIAGNOSIS — G90.4 AUTONOMIC DYSREFLEXIA: ICD-10-CM

## 2023-02-21 DIAGNOSIS — I48.0 PAF (PAROXYSMAL ATRIAL FIBRILLATION) (HCC): ICD-10-CM

## 2023-02-21 DIAGNOSIS — I48.0 PAROXYSMAL ATRIAL FIBRILLATION (HCC): ICD-10-CM

## 2023-02-21 DIAGNOSIS — Z93.6 S/P ILEAL CONDUIT (HCC): ICD-10-CM

## 2023-02-21 DIAGNOSIS — D75.839 THROMBOCYTOSIS: ICD-10-CM

## 2023-02-21 DIAGNOSIS — D50.9 MICROCYTIC ANEMIA: ICD-10-CM

## 2023-02-21 DIAGNOSIS — N39.0 RECURRENT SEPSIS DUE TO URINARY TRACT INFECTION (HCC): ICD-10-CM

## 2023-02-21 DIAGNOSIS — G82.53 COMPLETE QUADRIPLEGIA AT C5-C8 LEVEL (HCC): ICD-10-CM

## 2023-02-21 DIAGNOSIS — A04.72 CLOSTRIDIUM DIFFICILE DIARRHEA: ICD-10-CM

## 2023-02-21 DIAGNOSIS — Z98.890 HISTORY OF SUPRAPUBIC CATHETER: ICD-10-CM

## 2023-02-21 DIAGNOSIS — Z16.12 ESBL (EXTENDED SPECTRUM BETA-LACTAMASE) PRODUCING BACTERIA INFECTION: ICD-10-CM

## 2023-02-21 DIAGNOSIS — M79.2 NEUROPATHIC PAIN: ICD-10-CM

## 2023-02-21 DIAGNOSIS — A41.9 SEPSIS, DUE TO UNSPECIFIED ORGANISM, UNSPECIFIED WHETHER ACUTE ORGAN DYSFUNCTION PRESENT (HCC): ICD-10-CM

## 2023-02-21 DIAGNOSIS — K59.2 NEUROGENIC BOWEL: Chronic | ICD-10-CM

## 2023-02-21 DIAGNOSIS — I95.1 AUTONOMIC POSTURAL HYPOTENSION: ICD-10-CM

## 2023-02-21 DIAGNOSIS — J18.9 HCAP (HEALTHCARE-ASSOCIATED PNEUMONIA): ICD-10-CM

## 2023-02-21 DIAGNOSIS — E87.20 METABOLIC ACIDOSIS: ICD-10-CM

## 2023-02-21 DIAGNOSIS — L89.314 STAGE IV PRESSURE ULCER OF RIGHT BUTTOCK (HCC): ICD-10-CM

## 2023-02-21 DIAGNOSIS — N31.9 BLADDER DYSFUNCTION: ICD-10-CM

## 2023-02-21 DIAGNOSIS — R57.9 SHOCK (HCC): ICD-10-CM

## 2023-02-21 DIAGNOSIS — R53.1 WEAKNESS: ICD-10-CM

## 2023-02-21 LAB
ABO GROUP BLD: NORMAL
ALBUMIN SERPL BCP-MCNC: 2.9 G/DL (ref 3.2–4.9)
ALBUMIN/GLOB SERPL: 1 G/DL
ALP SERPL-CCNC: 62 U/L (ref 30–99)
ALT SERPL-CCNC: 9 U/L (ref 2–50)
AMORPH CRY #/AREA URNS HPF: PRESENT /HPF
ANION GAP SERPL CALC-SCNC: 12 MMOL/L (ref 7–16)
APPEARANCE UR: ABNORMAL
APTT PPP: 35.5 SEC (ref 24.7–36)
AST SERPL-CCNC: 16 U/L (ref 12–45)
BACTERIA #/AREA URNS HPF: ABNORMAL /HPF
BARCODED ABORH UBTYP: 6200
BARCODED PRD CODE UBPRD: NORMAL
BARCODED UNIT NUM UBUNT: NORMAL
BASOPHILS # BLD AUTO: 0.5 % (ref 0–1.8)
BASOPHILS # BLD: 0.07 K/UL (ref 0–0.12)
BILIRUB SERPL-MCNC: 0.2 MG/DL (ref 0.1–1.5)
BILIRUB UR QL STRIP.AUTO: NEGATIVE
BLD GP AB SCN SERPL QL: NORMAL
BUN SERPL-MCNC: 42 MG/DL (ref 8–22)
C DIFF DNA SPEC QL NAA+PROBE: NEGATIVE
C DIFF TOX GENS STL QL NAA+PROBE: NEGATIVE
CALCIUM ALBUM COR SERPL-MCNC: 8.8 MG/DL (ref 8.5–10.5)
CALCIUM SERPL-MCNC: 7.9 MG/DL (ref 8.4–10.2)
CHLORIDE SERPL-SCNC: 104 MMOL/L (ref 96–112)
CO2 SERPL-SCNC: 19 MMOL/L (ref 20–33)
COLOR UR: YELLOW
COMPONENT R 8504R: NORMAL
CREAT SERPL-MCNC: 0.39 MG/DL (ref 0.5–1.4)
EOSINOPHIL # BLD AUTO: 0.19 K/UL (ref 0–0.51)
EOSINOPHIL NFR BLD: 1.3 % (ref 0–6.9)
EPI CELLS #/AREA URNS HPF: ABNORMAL /HPF
ERYTHROCYTE [DISTWIDTH] IN BLOOD BY AUTOMATED COUNT: 52.2 FL (ref 35.9–50)
GFR SERPLBLD CREATININE-BSD FMLA CKD-EPI: 126 ML/MIN/1.73 M 2
GLOBULIN SER CALC-MCNC: 2.8 G/DL (ref 1.9–3.5)
GLUCOSE SERPL-MCNC: 105 MG/DL (ref 65–99)
GLUCOSE UR STRIP.AUTO-MCNC: NEGATIVE MG/DL
HCT VFR BLD AUTO: 20.1 % (ref 42–52)
HCT VFR BLD AUTO: 20.6 % (ref 42–52)
HGB BLD-MCNC: 6.2 G/DL (ref 14–18)
HGB BLD-MCNC: 6.2 G/DL (ref 14–18)
IMM GRANULOCYTES # BLD AUTO: 0.21 K/UL (ref 0–0.11)
IMM GRANULOCYTES NFR BLD AUTO: 1.4 % (ref 0–0.9)
INR PPP: 1.13 (ref 0.87–1.13)
KETONES UR STRIP.AUTO-MCNC: NEGATIVE MG/DL
LACTATE SERPL-SCNC: 1.3 MMOL/L (ref 0.5–2)
LACTATE SERPL-SCNC: 2.2 MMOL/L (ref 0.5–2)
LEUKOCYTE ESTERASE UR QL STRIP.AUTO: NEGATIVE
LYMPHOCYTES # BLD AUTO: 1.5 K/UL (ref 1–4.8)
LYMPHOCYTES NFR BLD: 10.2 % (ref 22–41)
MCH RBC QN AUTO: 28.7 PG (ref 27–33)
MCHC RBC AUTO-ENTMCNC: 30.1 G/DL (ref 33.7–35.3)
MCV RBC AUTO: 95.4 FL (ref 81.4–97.8)
MICRO URNS: ABNORMAL
MONOCYTES # BLD AUTO: 1.03 K/UL (ref 0–0.85)
MONOCYTES NFR BLD AUTO: 7 % (ref 0–13.4)
NEUTROPHILS # BLD AUTO: 11.72 K/UL (ref 1.82–7.42)
NEUTROPHILS NFR BLD: 79.6 % (ref 44–72)
NITRITE UR QL STRIP.AUTO: NEGATIVE
NRBC # BLD AUTO: 0.02 K/UL
NRBC BLD-RTO: 0.1 /100 WBC
PH UR STRIP.AUTO: >=9 [PH] (ref 5–8)
PLATELET # BLD AUTO: 458 K/UL (ref 164–446)
PMV BLD AUTO: 9.7 FL (ref 9–12.9)
POTASSIUM SERPL-SCNC: 4 MMOL/L (ref 3.6–5.5)
PRODUCT TYPE UPROD: NORMAL
PROT SERPL-MCNC: 5.7 G/DL (ref 6–8.2)
PROT UR QL STRIP: NEGATIVE MG/DL
PROTHROMBIN TIME: 14.4 SEC (ref 12–14.6)
RBC # BLD AUTO: 2.16 M/UL (ref 4.7–6.1)
RBC # URNS HPF: ABNORMAL /HPF
RBC UR QL AUTO: NEGATIVE
RH BLD: NORMAL
SODIUM SERPL-SCNC: 135 MMOL/L (ref 135–145)
SP GR UR STRIP.AUTO: <=1.005
UNIT STATUS USTAT: NORMAL
URIC ACID CRYSTAL  1766: POSITIVE /HPF
WBC # BLD AUTO: 14.7 K/UL (ref 4.8–10.8)
WBC #/AREA URNS HPF: ABNORMAL /HPF

## 2023-02-21 PROCEDURE — P9016 RBC LEUKOCYTES REDUCED: HCPCS | Mod: 91

## 2023-02-21 PROCEDURE — 30233N1 TRANSFUSION OF NONAUTOLOGOUS RED BLOOD CELLS INTO PERIPHERAL VEIN, PERCUTANEOUS APPROACH: ICD-10-PCS | Performed by: EMERGENCY MEDICINE

## 2023-02-21 PROCEDURE — 81001 URINALYSIS AUTO W/SCOPE: CPT

## 2023-02-21 PROCEDURE — 85610 PROTHROMBIN TIME: CPT

## 2023-02-21 PROCEDURE — 87150 DNA/RNA AMPLIFIED PROBE: CPT | Mod: 91

## 2023-02-21 PROCEDURE — 700102 HCHG RX REV CODE 250 W/ 637 OVERRIDE(OP): Performed by: INTERNAL MEDICINE

## 2023-02-21 PROCEDURE — 74177 CT ABD & PELVIS W/CONTRAST: CPT

## 2023-02-21 PROCEDURE — 36415 COLL VENOUS BLD VENIPUNCTURE: CPT

## 2023-02-21 PROCEDURE — 700105 HCHG RX REV CODE 258: Performed by: INTERNAL MEDICINE

## 2023-02-21 PROCEDURE — 85730 THROMBOPLASTIN TIME PARTIAL: CPT

## 2023-02-21 PROCEDURE — 85018 HEMOGLOBIN: CPT

## 2023-02-21 PROCEDURE — 71045 X-RAY EXAM CHEST 1 VIEW: CPT

## 2023-02-21 PROCEDURE — 700117 HCHG RX CONTRAST REV CODE 255: Performed by: EMERGENCY MEDICINE

## 2023-02-21 PROCEDURE — 87040 BLOOD CULTURE FOR BACTERIA: CPT

## 2023-02-21 PROCEDURE — 86923 COMPATIBILITY TEST ELECTRIC: CPT

## 2023-02-21 PROCEDURE — 85014 HEMATOCRIT: CPT

## 2023-02-21 PROCEDURE — 87077 CULTURE AEROBIC IDENTIFY: CPT | Mod: 91

## 2023-02-21 PROCEDURE — 85025 COMPLETE CBC W/AUTO DIFF WBC: CPT

## 2023-02-21 PROCEDURE — 83605 ASSAY OF LACTIC ACID: CPT | Mod: 91

## 2023-02-21 PROCEDURE — A9270 NON-COVERED ITEM OR SERVICE: HCPCS | Performed by: INTERNAL MEDICINE

## 2023-02-21 PROCEDURE — 99223 1ST HOSP IP/OBS HIGH 75: CPT | Performed by: INTERNAL MEDICINE

## 2023-02-21 PROCEDURE — 87086 URINE CULTURE/COLONY COUNT: CPT

## 2023-02-21 PROCEDURE — 86901 BLOOD TYPING SEROLOGIC RH(D): CPT

## 2023-02-21 PROCEDURE — 700101 HCHG RX REV CODE 250: Performed by: INTERNAL MEDICINE

## 2023-02-21 PROCEDURE — 770022 HCHG ROOM/CARE - ICU (200)

## 2023-02-21 PROCEDURE — 700105 HCHG RX REV CODE 258: Performed by: EMERGENCY MEDICINE

## 2023-02-21 PROCEDURE — 87493 C DIFF AMPLIFIED PROBE: CPT

## 2023-02-21 PROCEDURE — 93005 ELECTROCARDIOGRAM TRACING: CPT | Performed by: EMERGENCY MEDICINE

## 2023-02-21 PROCEDURE — 86850 RBC ANTIBODY SCREEN: CPT

## 2023-02-21 PROCEDURE — C9113 INJ PANTOPRAZOLE SODIUM, VIA: HCPCS | Performed by: EMERGENCY MEDICINE

## 2023-02-21 PROCEDURE — 86900 BLOOD TYPING SEROLOGIC ABO: CPT

## 2023-02-21 PROCEDURE — 36430 TRANSFUSION BLD/BLD COMPNT: CPT

## 2023-02-21 PROCEDURE — 87205 SMEAR GRAM STAIN: CPT

## 2023-02-21 PROCEDURE — 700111 HCHG RX REV CODE 636 W/ 250 OVERRIDE (IP): Performed by: EMERGENCY MEDICINE

## 2023-02-21 PROCEDURE — 87186 SC STD MICRODIL/AGAR DIL: CPT

## 2023-02-21 PROCEDURE — 80053 COMPREHEN METABOLIC PANEL: CPT

## 2023-02-21 RX ORDER — AMOXICILLIN 250 MG
2 CAPSULE ORAL 2 TIMES DAILY
Status: DISCONTINUED | OUTPATIENT
Start: 2023-02-21 | End: 2023-03-04 | Stop reason: HOSPADM

## 2023-02-21 RX ORDER — MIDODRINE HYDROCHLORIDE 5 MG/1
5 TABLET ORAL
Status: DISCONTINUED | OUTPATIENT
Start: 2023-02-22 | End: 2023-02-23

## 2023-02-21 RX ORDER — ONDANSETRON 2 MG/ML
4 INJECTION INTRAMUSCULAR; INTRAVENOUS ONCE
Status: COMPLETED | OUTPATIENT
Start: 2023-02-21 | End: 2023-02-21

## 2023-02-21 RX ORDER — PANTOPRAZOLE SODIUM 40 MG/10ML
80 INJECTION, POWDER, LYOPHILIZED, FOR SOLUTION INTRAVENOUS ONCE
Status: COMPLETED | OUTPATIENT
Start: 2023-02-21 | End: 2023-02-21

## 2023-02-21 RX ORDER — BISACODYL 10 MG
10 SUPPOSITORY, RECTAL RECTAL
Status: DISCONTINUED | OUTPATIENT
Start: 2023-02-21 | End: 2023-03-04 | Stop reason: HOSPADM

## 2023-02-21 RX ORDER — SULFAMETHOXAZOLE AND TRIMETHOPRIM 800; 160 MG/1; MG/1
1 TABLET ORAL 2 TIMES DAILY
Status: ON HOLD | COMMUNITY
End: 2023-03-03

## 2023-02-21 RX ORDER — SODIUM CHLORIDE, SODIUM LACTATE, POTASSIUM CHLORIDE, CALCIUM CHLORIDE 600; 310; 30; 20 MG/100ML; MG/100ML; MG/100ML; MG/100ML
INJECTION, SOLUTION INTRAVENOUS CONTINUOUS
Status: DISCONTINUED | OUTPATIENT
Start: 2023-02-21 | End: 2023-02-23

## 2023-02-21 RX ORDER — POLYETHYLENE GLYCOL 3350 17 G/17G
1 POWDER, FOR SOLUTION ORAL
Status: DISCONTINUED | OUTPATIENT
Start: 2023-02-21 | End: 2023-03-04 | Stop reason: HOSPADM

## 2023-02-21 RX ORDER — ACETAMINOPHEN 325 MG/1
650 TABLET ORAL EVERY 6 HOURS PRN
Status: DISCONTINUED | OUTPATIENT
Start: 2023-02-21 | End: 2023-03-04 | Stop reason: HOSPADM

## 2023-02-21 RX ORDER — METRONIDAZOLE 500 MG/100ML
500 INJECTION, SOLUTION INTRAVENOUS EVERY 8 HOURS
Status: DISCONTINUED | OUTPATIENT
Start: 2023-02-21 | End: 2023-02-22

## 2023-02-21 RX ORDER — SODIUM CHLORIDE 9 MG/ML
INJECTION, SOLUTION INTRAVENOUS CONTINUOUS
Status: ACTIVE | OUTPATIENT
Start: 2023-02-21 | End: 2023-02-22

## 2023-02-21 RX ORDER — SODIUM CHLORIDE, SODIUM LACTATE, POTASSIUM CHLORIDE, CALCIUM CHLORIDE 600; 310; 30; 20 MG/100ML; MG/100ML; MG/100ML; MG/100ML
1000 INJECTION, SOLUTION INTRAVENOUS ONCE
Status: COMPLETED | OUTPATIENT
Start: 2023-02-21 | End: 2023-02-21

## 2023-02-21 RX ORDER — METOCLOPRAMIDE HYDROCHLORIDE 5 MG/ML
10 INJECTION INTRAMUSCULAR; INTRAVENOUS ONCE
Status: COMPLETED | OUTPATIENT
Start: 2023-02-21 | End: 2023-02-21

## 2023-02-21 RX ORDER — METRONIDAZOLE 500 MG/100ML
500 INJECTION, SOLUTION INTRAVENOUS EVERY 6 HOURS
Status: DISCONTINUED | OUTPATIENT
Start: 2023-02-21 | End: 2023-02-21

## 2023-02-21 RX ORDER — PANTOPRAZOLE SODIUM 40 MG/10ML
40 INJECTION, POWDER, LYOPHILIZED, FOR SOLUTION INTRAVENOUS 2 TIMES DAILY
Status: DISCONTINUED | OUTPATIENT
Start: 2023-02-22 | End: 2023-02-27

## 2023-02-21 RX ORDER — CEFTRIAXONE 2 G/1
2000 INJECTION, POWDER, FOR SOLUTION INTRAMUSCULAR; INTRAVENOUS ONCE
Status: COMPLETED | OUTPATIENT
Start: 2023-02-21 | End: 2023-02-21

## 2023-02-21 RX ADMIN — SODIUM CHLORIDE: 9 INJECTION, SOLUTION INTRAVENOUS at 18:45

## 2023-02-21 RX ADMIN — CEFTRIAXONE SODIUM 2000 MG: 2 INJECTION, POWDER, FOR SOLUTION INTRAMUSCULAR; INTRAVENOUS at 19:15

## 2023-02-21 RX ADMIN — IOHEXOL 100 ML: 350 INJECTION, SOLUTION INTRAVENOUS at 17:48

## 2023-02-21 RX ADMIN — PANTOPRAZOLE SODIUM 80 MG: 40 INJECTION, POWDER, LYOPHILIZED, FOR SOLUTION INTRAVENOUS at 19:30

## 2023-02-21 RX ADMIN — SODIUM CHLORIDE, POTASSIUM CHLORIDE, SODIUM LACTATE AND CALCIUM CHLORIDE 1000 ML: 600; 310; 30; 20 INJECTION, SOLUTION INTRAVENOUS at 14:34

## 2023-02-21 RX ADMIN — METRONIDAZOLE 500 MG: 500 INJECTION, SOLUTION INTRAVENOUS at 22:00

## 2023-02-21 RX ADMIN — SENNOSIDES AND DOCUSATE SODIUM 2 TABLET: 50; 8.6 TABLET ORAL at 20:15

## 2023-02-21 RX ADMIN — METOCLOPRAMIDE 10 MG: 5 INJECTION, SOLUTION INTRAMUSCULAR; INTRAVENOUS at 14:35

## 2023-02-21 RX ADMIN — SODIUM CHLORIDE, POTASSIUM CHLORIDE, SODIUM LACTATE AND CALCIUM CHLORIDE: 600; 310; 30; 20 INJECTION, SOLUTION INTRAVENOUS at 20:15

## 2023-02-21 RX ADMIN — ONDANSETRON 4 MG: 2 INJECTION INTRAMUSCULAR; INTRAVENOUS at 19:21

## 2023-02-21 ASSESSMENT — ENCOUNTER SYMPTOMS
FALLS: 0
VOMITING: 0
HEARTBURN: 0
BLOOD IN STOOL: 1
COUGH: 0
NERVOUS/ANXIOUS: 0
DIAPHORESIS: 1
BLURRED VISION: 0
CHILLS: 1
DOUBLE VISION: 0
SHORTNESS OF BREATH: 0
HEADACHES: 0
BACK PAIN: 0
PALPITATIONS: 0
DIZZINESS: 0

## 2023-02-21 ASSESSMENT — PAIN DESCRIPTION - PAIN TYPE
TYPE: ACUTE PAIN

## 2023-02-21 ASSESSMENT — FIBROSIS 4 INDEX: FIB4 SCORE: 0.76

## 2023-02-21 ASSESSMENT — LIFESTYLE VARIABLES: SUBSTANCE_ABUSE: 0

## 2023-02-21 NOTE — ED TRIAGE NOTES
Late entry Epic down time:  pt arrived via [facility] at 11:58 from home  possible UTI and constipations issues per pt  has had them prior   was not having anything come out of colostomy for a couple of days   now very little  urine very concentrated and foul smelling
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
## 2023-02-22 ENCOUNTER — ANESTHESIA (OUTPATIENT)
Dept: SURGERY | Facility: MEDICAL CENTER | Age: 63
DRG: 871 | End: 2023-02-22
Payer: MEDICAID

## 2023-02-22 ENCOUNTER — ANESTHESIA EVENT (OUTPATIENT)
Dept: SURGERY | Facility: MEDICAL CENTER | Age: 63
DRG: 871 | End: 2023-02-22
Payer: MEDICAID

## 2023-02-22 PROBLEM — N32.89 BLOOD CLOT IN BLADDER: Status: ACTIVE | Noted: 2023-02-22

## 2023-02-22 PROBLEM — K92.2 GI BLEEDING: Status: RESOLVED | Noted: 2023-02-21 | Resolved: 2023-02-22

## 2023-02-22 LAB
ALBUMIN SERPL BCP-MCNC: 3 G/DL (ref 3.2–4.9)
ALBUMIN/GLOB SERPL: 1 G/DL
ALP SERPL-CCNC: 65 U/L (ref 30–99)
ALT SERPL-CCNC: 13 U/L (ref 2–50)
ANION GAP SERPL CALC-SCNC: 14 MMOL/L (ref 7–16)
AST SERPL-CCNC: 20 U/L (ref 12–45)
BASOPHILS # BLD AUTO: 0.7 % (ref 0–1.8)
BASOPHILS # BLD: 0.12 K/UL (ref 0–0.12)
BILIRUB SERPL-MCNC: 0.6 MG/DL (ref 0.1–1.5)
BUN SERPL-MCNC: 31 MG/DL (ref 8–22)
CALCIUM ALBUM COR SERPL-MCNC: 8.7 MG/DL (ref 8.5–10.5)
CALCIUM SERPL-MCNC: 7.9 MG/DL (ref 8.4–10.2)
CHLORIDE SERPL-SCNC: 106 MMOL/L (ref 96–112)
CO2 SERPL-SCNC: 15 MMOL/L (ref 20–33)
COMMENT 1642: NORMAL
CREAT SERPL-MCNC: 0.36 MG/DL (ref 0.5–1.4)
EOSINOPHIL # BLD AUTO: 0.14 K/UL (ref 0–0.51)
EOSINOPHIL NFR BLD: 0.8 % (ref 0–6.9)
ERYTHROCYTE [DISTWIDTH] IN BLOOD BY AUTOMATED COUNT: 55.1 FL (ref 35.9–50)
GFR SERPLBLD CREATININE-BSD FMLA CKD-EPI: 129 ML/MIN/1.73 M 2
GLOBULIN SER CALC-MCNC: 3 G/DL (ref 1.9–3.5)
GLUCOSE SERPL-MCNC: 123 MG/DL (ref 65–99)
HCT VFR BLD AUTO: 21.7 % (ref 42–52)
HCT VFR BLD AUTO: 26.7 % (ref 42–52)
HCT VFR BLD AUTO: 29.7 % (ref 42–52)
HCT VFR BLD AUTO: 32.7 % (ref 42–52)
HGB BLD-MCNC: 6.8 G/DL (ref 14–18)
HGB BLD-MCNC: 8.7 G/DL (ref 14–18)
HGB BLD-MCNC: 9.5 G/DL (ref 14–18)
HGB BLD-MCNC: 9.8 G/DL (ref 14–18)
IMM GRANULOCYTES # BLD AUTO: 0.22 K/UL (ref 0–0.11)
IMM GRANULOCYTES NFR BLD AUTO: 1.3 % (ref 0–0.9)
LYMPHOCYTES # BLD AUTO: 1.64 K/UL (ref 1–4.8)
LYMPHOCYTES NFR BLD: 9.5 % (ref 22–41)
MAGNESIUM SERPL-MCNC: 1.8 MG/DL (ref 1.5–2.5)
MCH RBC QN AUTO: 29 PG (ref 27–33)
MCHC RBC AUTO-ENTMCNC: 30 G/DL (ref 33.7–35.3)
MCV RBC AUTO: 96.7 FL (ref 81.4–97.8)
MONOCYTES # BLD AUTO: 1.57 K/UL (ref 0–0.85)
MONOCYTES NFR BLD AUTO: 9.1 % (ref 0–13.4)
NEUTROPHILS # BLD AUTO: 13.54 K/UL (ref 1.82–7.42)
NEUTROPHILS NFR BLD: 78.6 % (ref 44–72)
NRBC # BLD AUTO: 0.03 K/UL
NRBC BLD-RTO: 0.2 /100 WBC
PLATELET # BLD AUTO: 352 K/UL (ref 164–446)
PMV BLD AUTO: 9.7 FL (ref 9–12.9)
POTASSIUM SERPL-SCNC: 3.9 MMOL/L (ref 3.6–5.5)
PROT SERPL-MCNC: 6 G/DL (ref 6–8.2)
RBC # BLD AUTO: 3.38 M/UL (ref 4.7–6.1)
SCCMEC + MECA PNL NOSE NAA+PROBE: NEGATIVE
SODIUM SERPL-SCNC: 135 MMOL/L (ref 135–145)
WBC # BLD AUTO: 17.2 K/UL (ref 4.8–10.8)

## 2023-02-22 PROCEDURE — 80053 COMPREHEN METABOLIC PANEL: CPT

## 2023-02-22 PROCEDURE — 0DJ08ZZ INSPECTION OF UPPER INTESTINAL TRACT, VIA NATURAL OR ARTIFICIAL OPENING ENDOSCOPIC: ICD-10-PCS | Performed by: INTERNAL MEDICINE

## 2023-02-22 PROCEDURE — 99291 CRITICAL CARE FIRST HOUR: CPT | Performed by: HOSPITALIST

## 2023-02-22 PROCEDURE — 700111 HCHG RX REV CODE 636 W/ 250 OVERRIDE (IP): Performed by: INTERNAL MEDICINE

## 2023-02-22 PROCEDURE — P9016 RBC LEUKOCYTES REDUCED: HCPCS

## 2023-02-22 PROCEDURE — 700102 HCHG RX REV CODE 250 W/ 637 OVERRIDE(OP): Performed by: INTERNAL MEDICINE

## 2023-02-22 PROCEDURE — 307059 PAD,EAR PROTECTOR: Performed by: INTERNAL MEDICINE

## 2023-02-22 PROCEDURE — 85025 COMPLETE CBC W/AUTO DIFF WBC: CPT

## 2023-02-22 PROCEDURE — 94640 AIRWAY INHALATION TREATMENT: CPT

## 2023-02-22 PROCEDURE — C9113 INJ PANTOPRAZOLE SODIUM, VIA: HCPCS | Performed by: INTERNAL MEDICINE

## 2023-02-22 PROCEDURE — A9270 NON-COVERED ITEM OR SERVICE: HCPCS | Performed by: INTERNAL MEDICINE

## 2023-02-22 PROCEDURE — 160202 HCHG ENDO MINUTES - 1ST 30 MINS LEVEL 3: Performed by: INTERNAL MEDICINE

## 2023-02-22 PROCEDURE — 99291 CRITICAL CARE FIRST HOUR: CPT | Performed by: INTERNAL MEDICINE

## 2023-02-22 PROCEDURE — 700101 HCHG RX REV CODE 250

## 2023-02-22 PROCEDURE — 700105 HCHG RX REV CODE 258: Performed by: INTERNAL MEDICINE

## 2023-02-22 PROCEDURE — 87641 MR-STAPH DNA AMP PROBE: CPT

## 2023-02-22 PROCEDURE — 83735 ASSAY OF MAGNESIUM: CPT

## 2023-02-22 PROCEDURE — 302098 PASTE RING (FLAT): Performed by: INTERNAL MEDICINE

## 2023-02-22 PROCEDURE — 85018 HEMOGLOBIN: CPT

## 2023-02-22 PROCEDURE — 700111 HCHG RX REV CODE 636 W/ 250 OVERRIDE (IP): Performed by: HOSPITALIST

## 2023-02-22 PROCEDURE — 97602 WOUND(S) CARE NON-SELECTIVE: CPT

## 2023-02-22 PROCEDURE — 700105 HCHG RX REV CODE 258: Performed by: ANESTHESIOLOGY

## 2023-02-22 PROCEDURE — 700105 HCHG RX REV CODE 258

## 2023-02-22 PROCEDURE — 00731 ANES UPR GI NDSC PX NOS: CPT | Performed by: ANESTHESIOLOGY

## 2023-02-22 PROCEDURE — 700111 HCHG RX REV CODE 636 W/ 250 OVERRIDE (IP): Performed by: ANESTHESIOLOGY

## 2023-02-22 PROCEDURE — 302111 WAFER OST 2.25IN N IMG RD 2 PC (BARRIER): Performed by: INTERNAL MEDICINE

## 2023-02-22 PROCEDURE — 302106 OSTOMY POWDER: Performed by: INTERNAL MEDICINE

## 2023-02-22 PROCEDURE — 160048 HCHG OR STATISTICAL LEVEL 1-5: Performed by: INTERNAL MEDICINE

## 2023-02-22 PROCEDURE — 85014 HEMATOCRIT: CPT

## 2023-02-22 PROCEDURE — 700101 HCHG RX REV CODE 250: Performed by: INTERNAL MEDICINE

## 2023-02-22 PROCEDURE — 87070 CULTURE OTHR SPECIMN AEROBIC: CPT

## 2023-02-22 PROCEDURE — 3C1ZX8Z IRRIGATION OF INDWELLING DEVICE USING IRRIGATING SUBSTANCE, EXTERNAL APPROACH: ICD-10-PCS | Performed by: UROLOGY

## 2023-02-22 PROCEDURE — 36430 TRANSFUSION BLD/BLD COMPNT: CPT

## 2023-02-22 PROCEDURE — 94760 N-INVAS EAR/PLS OXIMETRY 1: CPT

## 2023-02-22 PROCEDURE — 770022 HCHG ROOM/CARE - ICU (200)

## 2023-02-22 PROCEDURE — 99140 ANES COMP EMERGENCY COND: CPT | Performed by: ANESTHESIOLOGY

## 2023-02-22 PROCEDURE — 88312 SPECIAL STAINS GROUP 1: CPT

## 2023-02-22 PROCEDURE — 88305 TISSUE EXAM BY PATHOLOGIST: CPT

## 2023-02-22 PROCEDURE — 99292 CRITICAL CARE ADDL 30 MIN: CPT | Performed by: INTERNAL MEDICINE

## 2023-02-22 PROCEDURE — 86923 COMPATIBILITY TEST ELECTRIC: CPT | Mod: 91

## 2023-02-22 PROCEDURE — 87205 SMEAR GRAM STAIN: CPT

## 2023-02-22 PROCEDURE — 160009 HCHG ANES TIME/MIN: Performed by: INTERNAL MEDICINE

## 2023-02-22 RX ORDER — SODIUM CHLORIDE, SODIUM LACTATE, POTASSIUM CHLORIDE, CALCIUM CHLORIDE 600; 310; 30; 20 MG/100ML; MG/100ML; MG/100ML; MG/100ML
INJECTION, SOLUTION INTRAVENOUS
Status: DISCONTINUED | OUTPATIENT
Start: 2023-02-22 | End: 2023-02-22 | Stop reason: SURG

## 2023-02-22 RX ORDER — ALBUTEROL SULFATE 90 UG/1
2 AEROSOL, METERED RESPIRATORY (INHALATION)
Status: DISCONTINUED | OUTPATIENT
Start: 2023-02-22 | End: 2023-02-22

## 2023-02-22 RX ORDER — NOREPINEPHRINE BITARTRATE 0.03 MG/ML
INJECTION, SOLUTION INTRAVENOUS
Status: COMPLETED
Start: 2023-02-22 | End: 2023-02-22

## 2023-02-22 RX ORDER — SODIUM CHLORIDE 9 MG/ML
INJECTION, SOLUTION INTRAVENOUS CONTINUOUS
Status: DISCONTINUED | OUTPATIENT
Start: 2023-02-22 | End: 2023-02-23

## 2023-02-22 RX ORDER — IPRATROPIUM BROMIDE AND ALBUTEROL SULFATE 2.5; .5 MG/3ML; MG/3ML
3 SOLUTION RESPIRATORY (INHALATION)
Status: DISCONTINUED | OUTPATIENT
Start: 2023-02-22 | End: 2023-02-24

## 2023-02-22 RX ORDER — NOREPINEPHRINE BITARTRATE 0.03 MG/ML
0-1 INJECTION, SOLUTION INTRAVENOUS CONTINUOUS
Status: DISCONTINUED | OUTPATIENT
Start: 2023-02-22 | End: 2023-02-25

## 2023-02-22 RX ORDER — MORPHINE SULFATE 4 MG/ML
2 INJECTION INTRAVENOUS
Status: DISCONTINUED | OUTPATIENT
Start: 2023-02-22 | End: 2023-02-22

## 2023-02-22 RX ORDER — MORPHINE SULFATE 4 MG/ML
1 INJECTION INTRAVENOUS
Status: DISCONTINUED | OUTPATIENT
Start: 2023-02-22 | End: 2023-03-04 | Stop reason: HOSPADM

## 2023-02-22 RX ORDER — METOPROLOL TARTRATE 1 MG/ML
5 INJECTION, SOLUTION INTRAVENOUS
Status: DISCONTINUED | OUTPATIENT
Start: 2023-02-22 | End: 2023-03-04 | Stop reason: HOSPADM

## 2023-02-22 RX ORDER — SODIUM CHLORIDE, SODIUM LACTATE, POTASSIUM CHLORIDE, CALCIUM CHLORIDE 600; 310; 30; 20 MG/100ML; MG/100ML; MG/100ML; MG/100ML
INJECTION, SOLUTION INTRAVENOUS CONTINUOUS
Status: ACTIVE | OUTPATIENT
Start: 2023-02-22 | End: 2023-02-22

## 2023-02-22 RX ADMIN — SODIUM CHLORIDE, POTASSIUM CHLORIDE, SODIUM LACTATE AND CALCIUM CHLORIDE: 600; 310; 30; 20 INJECTION, SOLUTION INTRAVENOUS at 14:07

## 2023-02-22 RX ADMIN — MIDODRINE HYDROCHLORIDE 5 MG: 5 TABLET ORAL at 17:53

## 2023-02-22 RX ADMIN — MIDODRINE HYDROCHLORIDE 5 MG: 5 TABLET ORAL at 07:47

## 2023-02-22 RX ADMIN — NOREPINEPHRINE BITARTRATE 0.05 MCG/KG/MIN: 1 INJECTION INTRAVENOUS at 01:56

## 2023-02-22 RX ADMIN — VANCOMYCIN HYDROCHLORIDE 2250 MG: 500 INJECTION, POWDER, LYOPHILIZED, FOR SOLUTION INTRAVENOUS at 13:48

## 2023-02-22 RX ADMIN — METRONIDAZOLE 500 MG: 500 INJECTION, SOLUTION INTRAVENOUS at 05:24

## 2023-02-22 RX ADMIN — SODIUM CHLORIDE, POTASSIUM CHLORIDE, SODIUM LACTATE AND CALCIUM CHLORIDE: 600; 310; 30; 20 INJECTION, SOLUTION INTRAVENOUS at 08:15

## 2023-02-22 RX ADMIN — SODIUM CHLORIDE: 9 INJECTION, SOLUTION INTRAVENOUS at 20:53

## 2023-02-22 RX ADMIN — SODIUM CHLORIDE, POTASSIUM CHLORIDE, SODIUM LACTATE AND CALCIUM CHLORIDE: 600; 310; 30; 20 INJECTION, SOLUTION INTRAVENOUS at 17:56

## 2023-02-22 RX ADMIN — NOREPINEPHRINE BITARTRATE 0.05 MCG/KG/MIN: 1 INJECTION, SOLUTION, CONCENTRATE INTRAVENOUS at 01:56

## 2023-02-22 RX ADMIN — PANTOPRAZOLE SODIUM 40 MG: 40 INJECTION, POWDER, FOR SOLUTION INTRAVENOUS at 17:52

## 2023-02-22 RX ADMIN — PROPOFOL 100 MCG/KG/MIN: 10 INJECTION, EMULSION INTRAVENOUS at 14:07

## 2023-02-22 RX ADMIN — MIDODRINE HYDROCHLORIDE 5 MG: 5 TABLET ORAL at 11:10

## 2023-02-22 RX ADMIN — SENNOSIDES AND DOCUSATE SODIUM 2 TABLET: 50; 8.6 TABLET ORAL at 17:53

## 2023-02-22 RX ADMIN — MORPHINE SULFATE 1 MG: 4 INJECTION INTRAVENOUS at 02:20

## 2023-02-22 RX ADMIN — ALBUTEROL SULFATE 2 PUFF: 90 AEROSOL, METERED RESPIRATORY (INHALATION) at 02:57

## 2023-02-22 RX ADMIN — PANTOPRAZOLE SODIUM 40 MG: 40 INJECTION, POWDER, FOR SOLUTION INTRAVENOUS at 05:23

## 2023-02-22 ASSESSMENT — COGNITIVE AND FUNCTIONAL STATUS - GENERAL
TOILETING: TOTAL
TURNING FROM BACK TO SIDE WHILE IN FLAT BAD: UNABLE
MOVING TO AND FROM BED TO CHAIR: UNABLE
PERSONAL GROOMING: TOTAL
MOBILITY SCORE: 6
WALKING IN HOSPITAL ROOM: TOTAL
DRESSING REGULAR UPPER BODY CLOTHING: TOTAL
SUGGESTED CMS G CODE MODIFIER DAILY ACTIVITY: CN
SUGGESTED CMS G CODE MODIFIER MOBILITY: CN
MOVING FROM LYING ON BACK TO SITTING ON SIDE OF FLAT BED: UNABLE
HELP NEEDED FOR BATHING: TOTAL
EATING MEALS: TOTAL
STANDING UP FROM CHAIR USING ARMS: TOTAL
DAILY ACTIVITIY SCORE: 6
DRESSING REGULAR LOWER BODY CLOTHING: TOTAL
CLIMB 3 TO 5 STEPS WITH RAILING: TOTAL

## 2023-02-22 ASSESSMENT — LIFESTYLE VARIABLES
CONSUMPTION TOTAL: NEGATIVE
HAVE PEOPLE ANNOYED YOU BY CRITICIZING YOUR DRINKING: NO
HAVE YOU EVER FELT YOU SHOULD CUT DOWN ON YOUR DRINKING: NO
ALCOHOL_USE: YES
EVER_SMOKED: NEVER
AVERAGE NUMBER OF DAYS PER WEEK YOU HAVE A DRINK CONTAINING ALCOHOL: 1
TOTAL SCORE: 0
EVER HAD A DRINK FIRST THING IN THE MORNING TO STEADY YOUR NERVES TO GET RID OF A HANGOVER: NO
EVER FELT BAD OR GUILTY ABOUT YOUR DRINKING: NO
HOW MANY TIMES IN THE PAST YEAR HAVE YOU HAD 5 OR MORE DRINKS IN A DAY: 0
ON A TYPICAL DAY WHEN YOU DRINK ALCOHOL HOW MANY DRINKS DO YOU HAVE: 1

## 2023-02-22 ASSESSMENT — ENCOUNTER SYMPTOMS
CHILLS: 0
SHORTNESS OF BREATH: 0
NAUSEA: 0
VOMITING: 0
CONSTIPATION: 1
NAUSEA: 1
FEVER: 0

## 2023-02-22 ASSESSMENT — COPD QUESTIONNAIRES
DURING THE PAST 4 WEEKS HOW MUCH DID YOU FEEL SHORT OF BREATH: SOME OF THE TIME
HAVE YOU SMOKED AT LEAST 100 CIGARETTES IN YOUR ENTIRE LIFE: NO/DON'T KNOW
COPD SCREENING SCORE: 3
DO YOU EVER COUGH UP ANY MUCUS OR PHLEGM?: NO/ONLY WITH OCCASIONAL COLDS OR INFECTIONS

## 2023-02-22 ASSESSMENT — PAIN DESCRIPTION - PAIN TYPE
TYPE: ACUTE PAIN
TYPE: ACUTE PAIN
TYPE: NEUROPATHIC PAIN
TYPE: NEUROPATHIC PAIN
TYPE: ACUTE PAIN
TYPE: NEUROPATHIC PAIN
TYPE: ACUTE PAIN
TYPE: ACUTE PAIN

## 2023-02-22 ASSESSMENT — FIBROSIS 4 INDEX: FIB4 SCORE: 0.7

## 2023-02-22 NOTE — ASSESSMENT & PLAN NOTE
No antiplatelets/anticoagulants/NSAIDs  SCDs only, IVC filter in place    Transfuse Hb > 7  Hgb remains 7.1, monitoring. I offered patient transfusion as he c/o dizziness and given his low BP, patient deferred PRBC transfusion today.

## 2023-02-22 NOTE — ANESTHESIA POSTPROCEDURE EVALUATION
Patient: Stevie Phoenix    Procedure Summary     Date: 02/22/23 Room / Location:  ENDOSCOPIC ULTRASOUND ROOM / SURGERY [facility]    Anesthesia Start: 1407 Anesthesia Stop: 1431    Procedure: GASTROSCOPY Diagnosis:       Duodenal ulcer      (Duodenal ulcer [532.ICD-9-CM])    Surgeons: David Moody D.O. Responsible Provider: Dylan Bond M.D.    Anesthesia Type: MAC ASA Status: 3 - Emergent          Final Anesthesia Type: MAC  Last vitals  BP   Blood Pressure: 133/62    Temp   36.5 °C (97.7 °F)    Pulse   99   Resp   16    SpO2   98 %      Anesthesia Post Evaluation    Patient location during evaluation: ICU  Patient participation: complete - patient participated  Level of consciousness: awake and alert    Airway patency: patent  Anesthetic complications: no  Cardiovascular status: hemodynamically stable  Respiratory status: acceptable  Hydration status: euvolemic    PONV: none          No notable events documented.     Nurse Pain Score: 2 (NPRS)

## 2023-02-22 NOTE — ASSESSMENT & PLAN NOTE
Completed rocephin  Ucx 02/21  Enterococcus faecalis   Klebsiella pneumoniae  Proteus vulgaris group  Aerococcus sanguinicola

## 2023-02-22 NOTE — ASSESSMENT & PLAN NOTE
"Hx of  With ostomy bag  CT reported: \"Significantly distended urinary bladder with high density material within. This could be blood clot or high density debris.\"  Urology has been consulted, we appreciate further recommendations  "
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
## 2023-02-22 NOTE — PROGRESS NOTES
12-hour chart check complete.    Monitor Summary  Rhythm: Aflutter  Rate: 80'-100's  Ectopy: Rare-Occasional PVC's  Measurements: -/.10/-

## 2023-02-22 NOTE — PROGRESS NOTES
OVERNIGHT HOSPITALIST:    I received full signout about this patient from Dr. Joe at the beginning of my shift.  He is being progressively hypotensive in spite of receiving PRBC blood transfusions and I transferred him to the ICU.  I personally evaluated him by the bedside when he was a still in the emergency department and after 22 [place] Medrano catheter was placed, revealing dark blood that is not flowing through the bag, but no pus or purulent discharge.  I discussed above findings with urologist, Dr. Interiano who is not recommending additional intervention at this time.  Patient has no complaints he actually reports feeling slightly better after receiving PRBC blood transfusion.          Vitals: Temp:37.1       HR:99      RR:16      BP:89/58  Gen: No acute distress  Lungs: Clear to auscultation laterally, no wheezing  Heart: Tachycardia  Abd: Mild abdominal distention with colonoscopy and urostomy bag appreciated.  : Medrano catheter was also placed, draining dark thick blood, not freely flowing through the Medrano bag.    A/P:  #1:Septic shock vs Hypovolemic shock: Starting Norpinephrine infusion  #2:  Dark blood in urinary bladder under the context of urostomy: Medrano in place and appreciate urology team input.  They will be following in the morning.  #3.  GI bleed: Repeat hemoglobin is 6.2 after PRBC blood transfusion for I added 1 additional unit of PRBC now.        The patient remains critically ill.  Critical care time =41  minutes in directly providing and coordinating critical care and extensive data review.  No time overlap and excludes procedures.

## 2023-02-22 NOTE — CARE PLAN
"The patient is Watcher - Medium risk of patient condition declining or worsening    Shift Goals  Clinical Goals: Monitor H&H/ s/s of bleeding, hemodynamic stability  Patient Goals: Pain relief and comfort    Progress made toward(s) clinical / shift goals:    Problem: Pain - Standard  Goal: Alleviation of pain or a reduction in pain to the patient’s comfort goal  Outcome: Progressing  Note: Morphine added for pain management. Pt reports \"for the first time in years\", his pain managed properly.        Patient is not progressing towards the following goals:      Problem: Skin Integrity  Goal: Skin integrity is maintained or improved  Outcome: Not Progressing  Note: Pt with multiple wounds, Wound consult placed and dressing protocol begun.      "
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
## 2023-02-22 NOTE — ASSESSMENT & PLAN NOTE
Paroxysmal AF/flutter, rate controlled currently  Not on rate control or AC at baseline  MTP 5mg IVP PRN HR > 120  SCDs only in setting of GI bleed

## 2023-02-22 NOTE — ED NOTES
Pt started on 1 U of RBCs at this time. NAD or pain. Call light within reach. Pt educated on possible side effects of receiving blood.

## 2023-02-22 NOTE — ASSESSMENT & PLAN NOTE
Mixed shock hemorrhagic from GIB +/- neurogenic from SCI +/- sepsis  Vasopressors titrated to maintain MAP greater than 65, SBP greater than 90  Midodrine  DC Flagyl, continue Rocephin for possible GIB, noted history of ESBL  Transfuse to keep hemoglobin greater than 7

## 2023-02-22 NOTE — THERAPY
Occupational Therapy Contact Note    Patient Name: Stevie Phoenix  Age:  62 y.o., Sex:  male  Medical Record #: [id number]  Today's Date: 2/22/2023    Discussed missed therapy with RN       02/22/23 [id number]   Interdisciplinary Plan of Care Collaboration   Collaboration Comments OT orders rec'd, pt just back from Gastroscopy procedure.  Will hold eval today and monitor for appropriateness of therapy evaluation tomorrow.  This pt has been seen (or therapy has attempted to see) multiple times by therapy in the past.  He has been at a baseline level of requiring caregiver or family assist for transfers and ADl's except adaptive feeding and grooming for many years now and often denies the need for therapy intervention.  Will follow for therapy needs in the next 1-2 days.

## 2023-02-22 NOTE — PROGRESS NOTES
0010- Pt arrived to unit. 1 unit PRBc's infusing. Pt transferred over to ICU bed, all monitoring applied. Pt noted to have multiple wounds throughout body, pictures and appropriate ADL's opened, wound consult placed.     1308- MD Ordoñez contacted regarding pt's rhythm change to Aflutter, rate 80's-100's. Also, regarding smyth catheter output consisting of elijah blood. This RN inquiring regarding any interventions. Per MD, purpose of smyth besides decompression of bladder, to assess for any drainage of pus. Blood clots noted and manually flushed by this RN. Md aware of state and no new orders received at this time. Pt having adequate urine output from Urostomy.     1342- MD Ordoñez contacted again regarding pt's new hypotension. Norepinephrine added.     1419- MD Ordoñez made aware of pt reports of shortness of breath. Sats mid to high 90's, Lung sounds clear/diminished. RT protocol added and breathing treatment given to pt by RT.  Pt later reported relief of symptoms.

## 2023-02-22 NOTE — ASSESSMENT & PLAN NOTE
It seems patient with hx of chronic hypotension from chronic autonomic dysreflexia.    Patient's hypotension worse, MAP maintaining less than 60  Increase midodrine to 15 mg 3 times daily
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
## 2023-02-22 NOTE — ASSESSMENT & PLAN NOTE
status colostomy  Bowel regimen  Monitor and replace bag  Constipated on XR abdomen. Giving trial of lactulose. Pt reporting decreased stool output.
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
## 2023-02-22 NOTE — ASSESSMENT & PLAN NOTE
Patient with melanotic looking stool from ostomy  GI has been consulted by ERP  Protonix IV BID for now  Monitor Hb, transfuse as needed   Irregular Contractions

## 2023-02-22 NOTE — ANESTHESIA TIME REPORT
Anesthesia Start and Stop Event Times     Date Time Event    2/22/2023 1400 Ready for Procedure     1407 Anesthesia Start     1431 Anesthesia Stop        Responsible Staff  02/22/23    Name Role Begin End    Dylan Bond M.D. Anesth 1407 1431        Overtime Reason:  no overtime (within assigned shift)    Comments:

## 2023-02-22 NOTE — DIETARY
[employer] - Alert received for newly identified wound, dx of decubital ulcer present on admit. Per chart, pt w/ multiple wounds. Wound team consult pending, will await wound staging to make recommendations if appropriate. Pt is currently NPO, admitted w/ GI bleed.    RD will monitor per dept policy.

## 2023-02-22 NOTE — PROGRESS NOTES
"Pharmacy Vancomycin Kinetics Note for 2/22/2023     62 y.o. male on Vancomycin day # 1     Vancomycin Indication (AUC Dosing): Sepsis    Provider specified end date: 03/01/23    Active Antibiotics (From admission, onward)      Ordered     Ordering Provider       Wed Feb 22, 2023  3:01 PM    02/22/23 1501  vancomycin 1750 mg/500mL NS IVPB premix  (vancomycin (VANCOCIN) IV (LD + Maintenance))  EVERY 12 HOURS         Glynn Resendiz M.D.       Wed Feb 22, 2023  1:35 PM    02/22/23 1335  vancomycin 2250 mg/500mL NS IVPB premix  (vancomycin (VANCOCIN) IV (LD + Maintenance))  ONCE         Glynn Resendiz M.D.       Wed Feb 22, 2023  1:34 PM    02/22/23 1334  MD Alert...Vancomycin per Pharmacy  PHARMACY TO DOSE        Question:  Indication(s) for vancomycin?  Answer:  Staphylococcus aureus bacteremia    Glynn Resendiz M.D.            Dosing Weight: 89.9 kg (198 lb 3.1 oz)      Admission History: Admitted on 2/21/2023 for GI bleed [K92.2]  Pertinent history: GIB, quadrapalegic, recurrent UTI w/ hx of ESBL, septic shock, C diff hx, chronic supra-pubic cath, decubital ulcer (chronic).    Allergies:     Piperacillin sod-tazobactam so     Pertinent cultures to date:     Results       Procedure Component Value Units Date/Time    BLOOD CULTURE [[id number]]  (Abnormal) Collected: 02/21/23 1212    Order Status: Completed Specimen: Blood from Peripheral Updated: 02/22/23 1252     Significant Indicator POS     Source BLD     Site PERIPHERAL     Culture Result Growth detected by Bactec instrument. 02/22/2023  12:52  Gram Stain: Gram positive cocci: Possible Staphylococcus sp.      Narrative:      CALL  [facility]  ICU tel. ,  CALLED  ICU tel.  02/22/2023, 12:52, RB PERF. RESULTS CALLED TO: RN [id number]  Per Hospital Policy: Only change Specimen Src: to \"Line\" if  specified by physician order.  Right Hand    BLOOD CULTURE [[id number]]  (Abnormal) Collected: 02/21/23 1200    Order Status: Completed Specimen: Blood from Peripheral Updated: " "02/22/23 1252     Significant Indicator POS     Source BLD     Site PERIPHERAL     Culture Result Growth detected by Bactec instrument. 02/22/2023  12:45  Gram Stain: Gram positive cocci: Possible Staphylococcus sp.      Narrative:      CALL  [facility]  ICU tel. ,  CALLED  ICU tel.  02/22/2023, 12:50, RB PERF. RESULTS CALLED TO: RN [id number]  Per Hospital Policy: Only change Specimen Src: to \"Line\" if  specified by physician order.  Left AC    URINE CULTURE(NEW) [[id number]]  (Abnormal) Collected: 02/21/23 1200    Order Status: Completed Specimen: Urine, Suprapubic Updated: 02/22/23 1208     Significant Indicator POS     Source UR     Site URINE, SUPRAPUBIC     Culture Result -      Non-lactose fermenting Gram negative manuela  >100,000 cfu/mL      Narrative:      Indication for culture:->Emergency Room Patient  Indication for culture:->Emergency Room Patient    C Diff by PCR rflx Toxin [[id number]] Collected: 02/21/23 1630    Order Status: Completed Specimen: Stool Updated: 02/21/23 2155     C Diff by PCR Negative     Comment: C. difficile NOT detected by PCR.  Treatment not indicated per guidelines.  Repeat testing not indicated within 7 days.          027-NAP1-BI Presumptive Negative     Comment: Presumptive 027/NAP1/BI target DNA sequences are NOT DETECTED.       Narrative:      Does this patient have risk factors for C-diff?->Yes  C-Diff Risk Factors->antibiotic exposure  Has patient taken stool softeners or laxatives in the last 5  days?->Yes    URINALYSIS [[id number]]  (Abnormal) Collected: 02/21/23 1200    Order Status: Completed Specimen: Urine Updated: 02/21/23 1616     Color Yellow     Character Cloudy     Specific Gravity <=1.005     Ph >=9.0     Glucose Negative mg/dL      Ketones Negative mg/dL      Protein Negative mg/dL      Bilirubin Negative     Nitrite Negative     Leukocyte Esterase Negative     Occult Blood Negative     Micro Urine Req Microscopic    Narrative:      Indication for culture:->Emergency Room " "Patient            Labs:     Estimated Creatinine Clearance: 260.8 mL/min (A) (by C-G formula based on SCr of 0.36 mg/dL (L)).  Recent Labs     23  1200 23  0600   WBC 14.7* 17.2*   NEUTSPOLYS 79.60* 78.60*     Recent Labs     23  1200 23  0300   BUN 42* 31*   CREATININE 0.39* 0.36*   ALBUMIN 2.9* 3.0*     Intake/Output Summary (Last 24 hours) at 2023 1504  Last data filed at 2023 1448  Gross per 24 hour   Intake 3134.36 ml   Output 2125 ml   Net 1009.36 ml      /62   Pulse 99   Temp 36.5 °C (97.7 °F) (Temporal)   Resp 16   Ht 1.905 m (6' 3\")   Wt 89.9 kg (198 lb 3.1 oz)   SpO2 98%  Temp (24hrs), Av.8 °C (98.3 °F), Min:36.5 °C (97.7 °F), Max:37.3 °C (99.2 °F)    List concerns for Vancomycin clearance:     Hypermetabolic State (SIRS);Nephrotoxic drugs;Pressors/Hypotension;BUN/Scr ratio greater than 20:1    Pharmacokinetics:     AUC kinetics:   Ke (hr ^-1): 0.1223 hr^-1  Half life: 5.67 hr  Clearance: 7.147  Estimated TDD: 3573.5  Estimated Dose: 1146  Estimated interval: 7.7    A/P:     -  Vancomycin dose: 1750 mg q12h starting on  @ 0200.    -  Next vancomycin level(s):    -  @ 1700   - Vt @ 0130     -  Predicted vancomycin AUC from initial AUC test calculator: 490 mg·hr/L    -  Comments: Blood cultures 2 of 2 possible staph. Patient in shock on pressors and GIB. MRSA nare ordered and pending.     Isreal Adan, PharmD    "

## 2023-02-22 NOTE — ASSESSMENT & PLAN NOTE
Anemic with FOBT+  No hx of GIB, no NSAID use no etoh  PPI gtt  NPO  GI consulted, plan for endoscopy this afternoon  Transfuse to keep Hb >7

## 2023-02-22 NOTE — PROGRESS NOTES
4 Eyes Skin Assessment Completed by Vahe RN and MYRIAM Del Valle.    Head WDL  Ears WDL  Nose WDL  Mouth WDL  Neck WDL  Breast/Chest WDL  Shoulder Blades WDL  Spine WDL  (R) Arm/Elbow/Hand Redness and Blanching  (L) Arm/Elbow/Hand Redness and Blanching  Abdomen Bilateral stomas  Groin Redness  Scrotum/Coccyx/Buttocks Redness and Excoriationm, pressure injury- see ADL's  (R) Leg Abrasion- See ADL's  (L) Leg Abrasion- See ADL's  (R) Heel/Foot/Toe Ulcer(s)  (L) Heel/Foot/Toe Ulcer(s)          Devices In Places ECG, Tele Box, Blood Pressure Cuff, Pulse Ox, Medrano, and SCD's      Interventions In Place Heel Mepilex, Heel Float Boots, TAP System, Pillows, Q2 Turns, Low Air Loss Mattress, Barrier Cream, and Heels Loaded W/Pillows    Possible Skin Injury Yes    Pictures Uploaded Into Epic Yes  Wound Consult Placed Yes  RN Wound Prevention Protocol Ordered Yes

## 2023-02-22 NOTE — PROGRESS NOTES
12-hour chart check complete.    Monitor Summary  Rhythm: atrial flutter  Rate:   Ectopy: rare-frequent PVC / R trig  Measurements: -/.08/-

## 2023-02-23 LAB
ALBUMIN SERPL BCP-MCNC: 2.6 G/DL (ref 3.2–4.9)
ALBUMIN/GLOB SERPL: 1 G/DL
ALP SERPL-CCNC: 62 U/L (ref 30–99)
ALT SERPL-CCNC: 9 U/L (ref 2–50)
ANION GAP SERPL CALC-SCNC: 13 MMOL/L (ref 7–16)
AST SERPL-CCNC: 15 U/L (ref 12–45)
BASOPHILS # BLD AUTO: 0.4 % (ref 0–1.8)
BASOPHILS # BLD: 0.06 K/UL (ref 0–0.12)
BILIRUB SERPL-MCNC: 0.9 MG/DL (ref 0.1–1.5)
BUN SERPL-MCNC: 14 MG/DL (ref 8–22)
CALCIUM ALBUM COR SERPL-MCNC: 8.5 MG/DL (ref 8.5–10.5)
CALCIUM SERPL-MCNC: 7.4 MG/DL (ref 8.4–10.2)
CHLORIDE SERPL-SCNC: 108 MMOL/L (ref 96–112)
CO2 SERPL-SCNC: 16 MMOL/L (ref 20–33)
CREAT SERPL-MCNC: 0.27 MG/DL (ref 0.5–1.4)
EOSINOPHIL # BLD AUTO: 0.35 K/UL (ref 0–0.51)
EOSINOPHIL NFR BLD: 2.6 % (ref 0–6.9)
ERYTHROCYTE [DISTWIDTH] IN BLOOD BY AUTOMATED COUNT: 50.1 FL (ref 35.9–50)
GFR SERPLBLD CREATININE-BSD FMLA CKD-EPI: 140 ML/MIN/1.73 M 2
GLOBULIN SER CALC-MCNC: 2.6 G/DL (ref 1.9–3.5)
GLUCOSE SERPL-MCNC: 113 MG/DL (ref 65–99)
GRAM STN SPEC: NORMAL
GRAM STN SPEC: NORMAL
HCT VFR BLD AUTO: 30.6 % (ref 42–52)
HCT VFR BLD AUTO: 32.6 % (ref 42–52)
HGB BLD-MCNC: 10.2 G/DL (ref 14–18)
HGB BLD-MCNC: 10.5 G/DL (ref 14–18)
IMM GRANULOCYTES # BLD AUTO: 0.14 K/UL (ref 0–0.11)
IMM GRANULOCYTES NFR BLD AUTO: 1 % (ref 0–0.9)
LYMPHOCYTES # BLD AUTO: 1.33 K/UL (ref 1–4.8)
LYMPHOCYTES NFR BLD: 9.9 % (ref 22–41)
MAGNESIUM SERPL-MCNC: 1.7 MG/DL (ref 1.5–2.5)
MCH RBC QN AUTO: 29.6 PG (ref 27–33)
MCHC RBC AUTO-ENTMCNC: 32.2 G/DL (ref 33.7–35.3)
MCV RBC AUTO: 91.8 FL (ref 81.4–97.8)
MONOCYTES # BLD AUTO: 0.99 K/UL (ref 0–0.85)
MONOCYTES NFR BLD AUTO: 7.4 % (ref 0–13.4)
NEUTROPHILS # BLD AUTO: 10.54 K/UL (ref 1.82–7.42)
NEUTROPHILS NFR BLD: 78.7 % (ref 44–72)
NRBC # BLD AUTO: 0.03 K/UL
NRBC BLD-RTO: 0.2 /100 WBC
PATHOLOGY CONSULT NOTE: NORMAL
PHOSPHATE SERPL-MCNC: 2.9 MG/DL (ref 2.5–4.5)
PLATELET # BLD AUTO: 341 K/UL (ref 164–446)
PMV BLD AUTO: 9.4 FL (ref 9–12.9)
POTASSIUM SERPL-SCNC: 3.5 MMOL/L (ref 3.6–5.5)
PROT SERPL-MCNC: 5.2 G/DL (ref 6–8.2)
RBC # BLD AUTO: 3.55 M/UL (ref 4.7–6.1)
SIGNIFICANT IND 70042: NORMAL
SIGNIFICANT IND 70042: NORMAL
SITE SITE: NORMAL
SITE SITE: NORMAL
SODIUM SERPL-SCNC: 137 MMOL/L (ref 135–145)
SOURCE SOURCE: NORMAL
SOURCE SOURCE: NORMAL
WBC # BLD AUTO: 13.4 K/UL (ref 4.8–10.8)

## 2023-02-23 PROCEDURE — 700111 HCHG RX REV CODE 636 W/ 250 OVERRIDE (IP)

## 2023-02-23 PROCEDURE — 770022 HCHG ROOM/CARE - ICU (200)

## 2023-02-23 PROCEDURE — 80053 COMPREHEN METABOLIC PANEL: CPT

## 2023-02-23 PROCEDURE — 303105 HCHG CATHETER EXTRA

## 2023-02-23 PROCEDURE — 36415 COLL VENOUS BLD VENIPUNCTURE: CPT

## 2023-02-23 PROCEDURE — 700111 HCHG RX REV CODE 636 W/ 250 OVERRIDE (IP): Performed by: INTERNAL MEDICINE

## 2023-02-23 PROCEDURE — A9270 NON-COVERED ITEM OR SERVICE: HCPCS | Performed by: INTERNAL MEDICINE

## 2023-02-23 PROCEDURE — 700111 HCHG RX REV CODE 636 W/ 250 OVERRIDE (IP): Performed by: HOSPITALIST

## 2023-02-23 PROCEDURE — 84100 ASSAY OF PHOSPHORUS: CPT

## 2023-02-23 PROCEDURE — 85014 HEMATOCRIT: CPT

## 2023-02-23 PROCEDURE — 700102 HCHG RX REV CODE 250 W/ 637 OVERRIDE(OP): Performed by: INTERNAL MEDICINE

## 2023-02-23 PROCEDURE — 85025 COMPLETE CBC W/AUTO DIFF WBC: CPT

## 2023-02-23 PROCEDURE — 96375 TX/PRO/DX INJ NEW DRUG ADDON: CPT

## 2023-02-23 PROCEDURE — 96365 THER/PROPH/DIAG IV INF INIT: CPT

## 2023-02-23 PROCEDURE — 99291 CRITICAL CARE FIRST HOUR: CPT | Performed by: INTERNAL MEDICINE

## 2023-02-23 PROCEDURE — 83735 ASSAY OF MAGNESIUM: CPT

## 2023-02-23 PROCEDURE — 51702 INSERT TEMP BLADDER CATH: CPT

## 2023-02-23 PROCEDURE — C9113 INJ PANTOPRAZOLE SODIUM, VIA: HCPCS | Performed by: INTERNAL MEDICINE

## 2023-02-23 PROCEDURE — 700101 HCHG RX REV CODE 250: Performed by: INTERNAL MEDICINE

## 2023-02-23 PROCEDURE — 97165 OT EVAL LOW COMPLEX 30 MIN: CPT

## 2023-02-23 PROCEDURE — 85018 HEMOGLOBIN: CPT

## 2023-02-23 PROCEDURE — 87040 BLOOD CULTURE FOR BACTERIA: CPT

## 2023-02-23 PROCEDURE — 700105 HCHG RX REV CODE 258: Performed by: INTERNAL MEDICINE

## 2023-02-23 PROCEDURE — 94760 N-INVAS EAR/PLS OXIMETRY 1: CPT

## 2023-02-23 PROCEDURE — 94640 AIRWAY INHALATION TREATMENT: CPT

## 2023-02-23 PROCEDURE — 99291 CRITICAL CARE FIRST HOUR: CPT

## 2023-02-23 PROCEDURE — 700101 HCHG RX REV CODE 250: Performed by: HOSPITALIST

## 2023-02-23 RX ORDER — MAGNESIUM SULFATE HEPTAHYDRATE 40 MG/ML
4 INJECTION, SOLUTION INTRAVENOUS ONCE
Status: COMPLETED | OUTPATIENT
Start: 2023-02-23 | End: 2023-02-23

## 2023-02-23 RX ORDER — MIDODRINE HYDROCHLORIDE 5 MG/1
10 TABLET ORAL
Status: DISCONTINUED | OUTPATIENT
Start: 2023-02-23 | End: 2023-02-28

## 2023-02-23 RX ORDER — POTASSIUM CHLORIDE 7.45 MG/ML
INJECTION INTRAVENOUS
Status: COMPLETED
Start: 2023-02-23 | End: 2023-02-23

## 2023-02-23 RX ORDER — POTASSIUM CHLORIDE 7.45 MG/ML
10 INJECTION INTRAVENOUS
Status: COMPLETED | OUTPATIENT
Start: 2023-02-23 | End: 2023-02-23

## 2023-02-23 RX ADMIN — SODIUM CHLORIDE, POTASSIUM CHLORIDE, SODIUM LACTATE AND CALCIUM CHLORIDE: 600; 310; 30; 20 INJECTION, SOLUTION INTRAVENOUS at 03:22

## 2023-02-23 RX ADMIN — POTASSIUM CHLORIDE 10 MEQ: 7.46 INJECTION, SOLUTION INTRAVENOUS at 12:09

## 2023-02-23 RX ADMIN — MIDODRINE HYDROCHLORIDE 10 MG: 5 TABLET ORAL at 12:31

## 2023-02-23 RX ADMIN — CEFTRIAXONE SODIUM 2000 MG: 2 INJECTION, POWDER, FOR SOLUTION INTRAMUSCULAR; INTRAVENOUS at 09:27

## 2023-02-23 RX ADMIN — PANTOPRAZOLE SODIUM 40 MG: 40 INJECTION, POWDER, FOR SOLUTION INTRAVENOUS at 06:06

## 2023-02-23 RX ADMIN — IPRATROPIUM BROMIDE AND ALBUTEROL SULFATE 3 ML: .5; 2.5 SOLUTION RESPIRATORY (INHALATION) at 19:20

## 2023-02-23 RX ADMIN — SENNOSIDES AND DOCUSATE SODIUM 2 TABLET: 50; 8.6 TABLET ORAL at 17:42

## 2023-02-23 RX ADMIN — VANCOMYCIN HYDROCHLORIDE 1750 MG: 1 INJECTION, POWDER, LYOPHILIZED, FOR SOLUTION INTRAVENOUS at 02:21

## 2023-02-23 RX ADMIN — POTASSIUM CHLORIDE 10 MEQ: 7.46 INJECTION, SOLUTION INTRAVENOUS at 10:48

## 2023-02-23 RX ADMIN — SENNOSIDES AND DOCUSATE SODIUM 2 TABLET: 50; 8.6 TABLET ORAL at 06:07

## 2023-02-23 RX ADMIN — POTASSIUM CHLORIDE 10 MEQ: 7.46 INJECTION, SOLUTION INTRAVENOUS at 14:22

## 2023-02-23 RX ADMIN — NOREPINEPHRINE BITARTRATE 0.05 MCG/KG/MIN: 1 INJECTION INTRAVENOUS at 02:43

## 2023-02-23 RX ADMIN — MORPHINE SULFATE 1 MG: 4 INJECTION INTRAVENOUS at 11:15

## 2023-02-23 RX ADMIN — MAGNESIUM SULFATE HEPTAHYDRATE 4 G: 40 INJECTION, SOLUTION INTRAVENOUS at 10:09

## 2023-02-23 RX ADMIN — PANTOPRAZOLE SODIUM 40 MG: 40 INJECTION, POWDER, FOR SOLUTION INTRAVENOUS at 17:44

## 2023-02-23 RX ADMIN — MIDODRINE HYDROCHLORIDE 10 MG: 5 TABLET ORAL at 17:42

## 2023-02-23 RX ADMIN — MIDODRINE HYDROCHLORIDE 5 MG: 5 TABLET ORAL at 07:47

## 2023-02-23 RX ADMIN — POTASSIUM CHLORIDE 10 MEQ: 7.46 INJECTION, SOLUTION INTRAVENOUS at 13:17

## 2023-02-23 ASSESSMENT — COGNITIVE AND FUNCTIONAL STATUS - GENERAL
WALKING IN HOSPITAL ROOM: TOTAL
HELP NEEDED FOR BATHING: TOTAL
DAILY ACTIVITIY SCORE: 11
TOILETING: TOTAL
SUGGESTED CMS G CODE MODIFIER MOBILITY: CM
DAILY ACTIVITIY SCORE: 6
SUGGESTED CMS G CODE MODIFIER DAILY ACTIVITY: CN
DRESSING REGULAR UPPER BODY CLOTHING: A LOT
STANDING UP FROM CHAIR USING ARMS: TOTAL
SUGGESTED CMS G CODE MODIFIER DAILY ACTIVITY: CL
MOVING TO AND FROM BED TO CHAIR: UNABLE
DRESSING REGULAR LOWER BODY CLOTHING: TOTAL
HELP NEEDED FOR BATHING: TOTAL
CLIMB 3 TO 5 STEPS WITH RAILING: TOTAL
PERSONAL GROOMING: TOTAL
MOBILITY SCORE: 7
DRESSING REGULAR LOWER BODY CLOTHING: TOTAL
TURNING FROM BACK TO SIDE WHILE IN FLAT BAD: A LOT
EATING MEALS: A LITTLE
TOILETING: TOTAL
EATING MEALS: TOTAL
MOVING FROM LYING ON BACK TO SITTING ON SIDE OF FLAT BED: UNABLE
PERSONAL GROOMING: A LITTLE
DRESSING REGULAR UPPER BODY CLOTHING: TOTAL

## 2023-02-23 ASSESSMENT — ENCOUNTER SYMPTOMS
NAUSEA: 0
CONSTIPATION: 1
NAUSEA: 1
FEVER: 0
CHILLS: 0
VOMITING: 0
ABDOMINAL PAIN: 0

## 2023-02-23 ASSESSMENT — PAIN DESCRIPTION - PAIN TYPE
TYPE: NEUROPATHIC PAIN
TYPE: CHRONIC PAIN;NEUROPATHIC PAIN
TYPE: NEUROPATHIC PAIN
TYPE: CHRONIC PAIN;NEUROPATHIC PAIN
TYPE: NEUROPATHIC PAIN
TYPE: CHRONIC PAIN;NEUROPATHIC PAIN

## 2023-02-23 ASSESSMENT — ACTIVITIES OF DAILY LIVING (ADL): TOILETING: DEPENDENT

## 2023-02-23 NOTE — PROGRESS NOTES
Note to reader: this note follows the APSO format rather than the historical SOAP format. Assessment and plan located at the top of the note for ease of use.    Chief Complaint  62 y.o. year old male here with UTI (Pt believes he has a UTI   has had them prior ) and Constipation (Has had issues with this  believes he is again constipated )      Assessment/Plan  Interval History   Active Hospital Problems    Diagnosis     Blood clot in bladder [N32.89]     Upper GI bleed [K92.2]     Metabolic acidosis secondary to dehydration [E87.20]     H/O Paroxysmal atrial fibrillation (HCC) [I48.0]     Recurrent UTI [N39.0]     Lower paraplegia (HCC) [G82.20]     Decubital ulcer [L89.90]     Anemia due to GI blood loss [D50.0]     Septic shock (HCC) [A41.9, R65.21]     History of urostomy [Z98.890]      Open draining Stoma       Leukocytosis [D72.829]     Thrombocytosis [D75.839]     Hypotension [I95.9]     Lower paraplegia (HCC) [G82.20]      ICD-10 transition  IMO load March 2020      Neurogenic bowel [K59.2]     Neurogenic bladder [N31.9]       pt seen and examined     2/23- urine strawberry lemonade in the bag from smyth catheter. Urine from ostomy clear. Patient very upset about the potential of not getting cystoscopy in house as he has trouble getting to the office. H/h 10.5/32.6.      Disposition  Guarded      PLAN:  CT revealed likely clot/debris in the bladder, but no evidence of tumor. Recommend continue hand irrigate q4 to strawberry lemonade to break up clot. Will need cystoscopy once patient more stable, would likely be outpatient as long as his urine continues to clear.   Discussed case with Dr. Martinez who agrees with this plan.     Review of Systems  Physical Exam   Review of Systems   Constitutional:  Negative for chills and fever.   Gastrointestinal:  Negative for abdominal pain, nausea and vomiting.   Genitourinary:  Positive for hematuria.   Vitals:    02/23/23 0930 02/23/23 0945 02/23/23 1000 02/23/23 1015    BP: (!) 72/47 94/62 (!) 86/51    Pulse: 83 89 68 86   Resp: (!) 41 (!) 35 (!) 25 15   Temp:       TempSrc:       SpO2: 96% 96% 93% 95%   Weight:       Height:         Physical Exam  Vitals and nursing note reviewed.   HENT:      Head: Normocephalic and atraumatic.   Eyes:      Pupils: Pupils are equal, round, and reactive to light.   Pulmonary:      Effort: Pulmonary effort is normal.   Genitourinary:     Comments: Urostomy in place with healthy appearing mucosa  Neurological:      Mental Status: He is alert and oriented to person, place, and time.   Psychiatric:         Mood and Affect: Mood normal.        Hematology Chemistry   Lab Results   Component Value Date/Time    WBC 13.4 (H) 02/23/2023 03:15 AM    HEMOGLOBIN 10.5 (L) 02/23/2023 03:15 AM    HEMATOCRIT 32.6 (L) 02/23/2023 03:15 AM    PLATELETCT 341 02/23/2023 03:15 AM     Lab Results   Component Value Date/Time    SODIUM 137 02/23/2023 03:15 AM    POTASSIUM 3.5 (L) 02/23/2023 03:15 AM    CHLORIDE 108 02/23/2023 03:15 AM    CO2 16 (L) 02/23/2023 03:15 AM    GLUCOSE 113 (H) 02/23/2023 03:15 AM    BUN 14 02/23/2023 03:15 AM    CREATININE 0.27 (L) 02/23/2023 03:15 AM         Labs not explicitly included in this progress note were reviewed by the author.   Radiology/imaging not explicitly included in this progress note was reviewed by the author.     Core Measures

## 2023-02-23 NOTE — WOUND TEAM
"[facility] Wound/Ostomy Team  Inpatient Services  Ostomy Management    HPI: reviewed  PMH: reviewed  FH:reviewed  SH:reviewed    Subjective:     Objective: appliances intact, DD bag is old and has particulates   Ostomy type:colostomy and urostomy   Stoma assessment:      Colostomy 02/22/23 LLQ (Active)      02/22/23 0200   Stomal Appliance Assessment Intact    Stoma Assessment Pink    Stoma Shape Oval    Stoma Size (in) 2    Peristomal Assessment Intact    Mucocutaneous Junction Intact    Treatment Cleansed with water/washcloth;Appliance Changed    Peristomal Protectant Paste Ring    Stomal Appliance 2 3/4\" (70mm) CTF    Output (mL) 0 mL    Output Color Brown    WOUND RN ONLY - Stomal Appliance  2 Piece;2 3/4\" (70mm) CTF;Transparent Pouch Lock & Roll;Paste Ring, 2\"    Appliance (Pouch) # [id number], barrier 17602, 1/2 VT 7805    Appliance Brand [place]         Urostomy Ileal conduit RLQ (Active)      02/22/23 0332   Stomal Appliance Assessment Changed    Stoma Assessment Beefy red    Stoma Shape Oval    Stoma Size (in) 1    Peristomal Assessment Intact    Mucocutaneous Junction Intact    Treatment Cleansed with water/washcloth;Appliance Changed    Stomal Appliance 2 1/4\" (57mm) CTF;Paste Ring, 2\"    Output (mL) 100 mL    Output Color Yellow    WOUND RN ONLY - Stomal Appliance  2 Piece;2 1/4\" (57mm) CTF;Urostomy Pouch;Paste Ring, 2\";Down Drain Bag    Appliance (Pouch) # [id number], barrier 55881, 1/2 VT 7805    Appliance Brand [place]              Appliance size and type:  Two piece Colostomy 2.75\" with 1/2 paste ring stretched to fit       Two piece Urostomy  2.25: with 1/2 paste ring stretched to fit  Assessment: appliances intact from home, scant smear to colostomy stoma dry brown stool   Interventions: Started with urostomy. Then followed with colostomy. Removed appliance using a push pull method. Cleaned stoma and peristomal skin with moist warm washcloths. Made template and traced to barrier. Barrier cut, checked fit. " Half a paste ring stretched to fit opening and then applied to back of barrier. Applied barrier to skin and adhered with friction. Attached pouch with end already closed.  Urostomy pouch attached DD adapter and the DD bag, opened end.     Plan: Ghassan to perform ostomy care and changes

## 2023-02-23 NOTE — ASSESSMENT & PLAN NOTE
"CT reported: \"Significantly distended urinary bladder with high density material within. This could be blood clot or high density debris.\"  Urostomy: draining yellow urine, no clots, no obstruction  s/p hand irrigation of clots  Urology following, unable to perform cystoscopy as inpatient due to unavailable necessary equipment, they have signed off and will contact pt after discharge for outpatient cystoscopy  DC bladder catheter as blood from smyth has cleared with hand irrigation  "

## 2023-02-23 NOTE — CARE PLAN
Problem: Skin Integrity  Goal: Skin integrity is maintained or improved  Outcome: Progressing     Problem: Risk for Fluid Volume Deficit Related to Bleeding  Goal: Patient will show no signs and symptoms of excessive bleeding  Outcome: Progressing     Problem: Hemodynamics  Goal: Patient's hemodynamics, fluid balance and neurologic status will be stable or improve    Outcome: Progressing   The patient is Watcher - Medium risk of patient condition declining or worsening    Shift Goals  Clinical Goals: stable h&h, no s/s bleeding  Patient Goals: sleep, pain relief    Progress made toward(s) clinical / shift goals:     Stevie having no N/V, EGD complete in endo. Large (healed) duodenal ulcer found, not actively bleeding. Currently on Protonix IV BID, NPO. No output from colostomy thus far. Urostomy draining sediment, hazy, yellow urine. 575 out this shift. Medrano draining elijah blood. Irrigated by urology. Continue to monitor for clots. Reached out to Dr Carlin via voalte,no orders for irrigation at this time or other interventions. H&H at noon dropped to 8.7 (from 9.8), recheck at 1800. Two PIV, levophed running at 0.07, LR at 100. Vancomycin infusing. BC & UC +. Afebrile with oral temps. Wound RN consult in place for chronic wounds, heel boots on, SCD in place. Reposition Q2 hours.     Patient is not progressing towards the following goals:

## 2023-02-23 NOTE — ASSESSMENT & PLAN NOTE
EGD 2/22/2023 demonstrated 2 cm large clean-based very deep ulceration in the duodenum, not amenable to endoscopic therapy.  No active bleeding.  Also revealed a small hiatal hernia with grade B esophagitis.  -- Continue PPI IV BID  -- Avoid NSAIDs.  -- If rebleeds, will need transfer to regional for IR as ulcer not amenable to endoscopic therapy  -- tolerating regular diet  -- H/H stable

## 2023-02-23 NOTE — ASSESSMENT & PLAN NOTE
+ draining urine however +ESBL history  No clinical signs of UTI, Cr normal, imaging reassuring  Polymicrobial growth from UCx, suspected colonization, completed C3

## 2023-02-23 NOTE — DISCHARGE PLANNING
"  LSW met with pt at bedside to complete assessment. Pt verified address on face sheet [address] [place] [place] [address]. Pt lives with brother and sister in law. Pt has a PCP with \"[facility]\" Jovany Sanderson. Pt provided contact info for brother Jose L [place] [phone]. Pt states he will need [facility] transport home. Pt had no other questions/concerns at this time.    Care Transition Team Assessment    Information Source  Orientation Level: Oriented X4, Oriented to place, Oriented to time, Oriented to situation, Oriented to person  Information Given By: Patient  Who is responsible for making decisions for patient? : Patient         Elopement Risk  Legal Hold: No  Ambulatory or Self Mobile in Wheelchair: No-Not an Elopement Risk  Elopement Risk: Not at Risk for Elopement    Interdisciplinary Discharge Planning  Primary Care Physician: Jovany Sanderson NP  Lives with - Patient's Self Care Capacity: Sibling, Related Adult, Other (Comments)  Patient or legal guardian wants to designate a caregiver: No  Support Systems: Family Member(s)  Housing / Facility: [address]  Durable Medical Equipment: Not Applicable    Discharge Preparedness  What is your plan after discharge?: Home with help  What are your discharge supports?: Sibling  Prior Functional Level: Needs Assist with Activities of Daily Living    Functional Assesment  Prior Functional Level: Needs Assist with Activities of Daily Living    Finances  Financial Barriers to Discharge: No  Prescription Coverage: Yes    Vision / Hearing Impairment  Vision Impairment : No  Hearing Impairment : No         Advance Directive  Advance Directive?: DPOA for Health Care  Durable Power of  Name and Contact : Dana Lagunas [phone]    Domestic Abuse  Have you ever been the victim of abuse or violence?: No  Physical Abuse or Sexual Abuse: No  Verbal Abuse or Emotional Abuse: No  Possible Abuse/Neglect Reported to:: Not Applicable         Discharge Risks or " Barriers  Patient risk factors: Vulnerable adult    Anticipated Discharge Information  Discharge Disposition: Discharged to home/self care (01)  Discharge Address: [address] [place] [address]

## 2023-02-23 NOTE — PROGRESS NOTES
12-hour chart check complete.    Monitor Summary  Rhythm: A-Flutter/ A-Fib  Rate: 81-94  Ectopy: Rare PVC/ Trigem  Measurements: -/.08/-

## 2023-02-23 NOTE — ASSESSMENT & PLAN NOTE
-- PT/OT  -- Plan for outpatient physiatry follow up  -- SW referral placed to apply for medicare/SSD

## 2023-02-23 NOTE — ASSESSMENT & PLAN NOTE
This is Septic shock Present on admission  SIRS criteria identified on my evaluation include: Tachypnea, with respirations greater than 20 per minute and Leukocytosis, with WBC greater than 12,000  Indicators of septic shock include: Severe sepsis present and persistent hypotension after 30 ml/kg completed.   Sources is: Gram positive bacteremia  Sepsis protocol initiated  Crystalloid Fluid Administration: Fluid resuscitation ordered per standard protocol - 30 mL/kg per current or ideal body weight  IV antibiotics as appropriate for source of sepsis  Reassessment: I have reassessed the patient's hemodynamic status    Bcx + staph hominus, high risk for infection (wounds, lines, GI bleed, hardware), repeat BCx from 2/23 NGTD  ID consulted, restart vanco. Completed course of ceftriaxone  Wound care consult -> wounds don't appear infected  Polymicrobial growth from urostomy-> colonization  Cdiff negative from stool  Mixed shock hemorrhagic from GIB +/- neurogenic from SCI as well -> H/H stable, off pressors, cont midodrine  Midline placed 2/24
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
## 2023-02-23 NOTE — THERAPY
"Occupational Therapy   Initial Evaluation     Patient Name: Stevie Phoenix  Age:  62 y.o., Sex:  male  Medical Record #: [id number]  Today's Date: 2/23/2023          Assessment  Patient is 62 y.o. male with a diagnosis of GI bleed.  H/o quadriplegia for 11+ years, now has ostomy and urostomy for B&B mgmt.  OT consulted for ADL's/self feeding.  Pt declined need for OT intervention.  He reports he can feed self and do some grooming with his AE from home and that he has it here with him.  He declined to let OT set it up for him and demo'd no interest in being able to even drink independently at this time.  He is at his functional baseline, with no further OT needs.  May benefit from HH nursing/CNA if medically warranted upon discharge.      Plan    Occupational Therapy Initial Treatment Plan   Duration: (P) Evaluation only    DC Equipment Recommendations: (P) None  Discharge Recommendations: (P) Anticipate that the patient will have no further occupational therapy needs after discharge from the hospital (Needs caregiver services and may benefit from HH nursing/CNA if medically appropriate)     Subjective    \"There's nothing any therapy can do to help me get any better.\"     Objective       02/23/23 1325   Prior Living Situation   Prior Services shelter Home Aide Services;Continuous (24 Hour) Care Giving Family   Housing / Facility 1 Story House   Equipment Owned Power Wheel Chair   Lives with - Patient's Self Care Capacity Sibling;Other (Comments)   Comments Pt lives with brother Courtney.  They provide assist as needed with self cares when caregiver not present or unavailable.   Prior Level of ADL Function   Self Feeding Independent  (with AE)   Grooming / Hygiene Requires Assist   Bathing Dependent   Dressing Dependent   Toileting Dependent   Comments has ostomy and urostomy for toileting, needs assist with mgmt   Prior Level of IADL Function   Medication Management Dependent   Laundry Dependent   Kitchen " "Mobility Dependent   Finances Dependent   Home Management Dependent   Shopping Dependent   Prior Level Of Mobility   (power chair with joystick controls for home/community mobility)   Driving / Transportation Relatives / Others Provide Transportation   Leisure Interests Unable To Determine At This Time   Vitals   O2 Delivery Device None - Room Air   Cognition    Comments grossly intact, presents as defensive at times in conversation.  When asked if he was interested in having home health services, he asked \"what is that? I don't know what you mean\" which is questionable based on his diagnosis and numerous hospitalizations over the years.  Unclear if confused at times or wanting to disengage with conversation.   Active ROM Upper Body   Comments Has active movement of B shoulders, elbows, fingers contracted in flexion with some active mvmt of L thumb.  Position of hands/ wrists functional for pt to use handled cup or universal cuff for feeding and parts of grooming   Coordination Upper Body   Comments impaired 2/2 quadriplegia   ADL Assessment   Comments OT requested by RN 2/2 \"He cannot feed himself.\"  OT recalled previous notes that pt uses u-cuff and handled cup to drink with at baseline.  Rn stated \"yes but he doesn't have any of those things here.  He needs you to give him the tools he needs so we don't have to keep feeding him.\"  Upon meeting with pt for assessment, he reports that he does indeed have his own feeding tools with him (cup and u-cuff) and a camelback waterbottle with sip tube he keeps by his head to drink water from. OT offered to set items up for him and he refused, stating \"I don't need them now, they aren't even giving me real food, and its a joke to think I could eat that soup with a spoon by myself.\"  OT inquired if pt could sip soup from a handled cup with straw if setup and he said \"No, I don't need that now.\"  Pt encouraged to advocate for himself and direct nursing staff on how to setup " "his AE for him when he is given a full meal tray to eat   How much help from another person does the patient currently need...   Putting on and taking off regular lower body clothing? 1   Bathing (including washing, rinsing, and drying)? 1   Toileting, which includes using a toilet, bedpan, or urinal? 1   Putting on and taking off regular upper body clothing? 1   Taking care of personal grooming such as brushing teeth? 1   Eating meals? 1   6 Clicks Daily Activity Score 6   Education Group   Education Provided Role of Occupational Therapist   Role of Occupational Therapist Patient Response Patient;Acceptance;Explanation;Verbal Demonstration   Occupational Therapy Initial Treatment Plan    Duration Evaluation only   Anticipated Discharge Equipment and Recommendations   DC Equipment Recommendations None   Discharge Recommendations Anticipate that the patient will have no further occupational therapy needs after discharge from the hospital  (Needs caregiver services and may benefit from HH nursing/CNA if medically appropriate)   Interdisciplinary Plan of Care Collaboration   IDT Collaboration with  Nursing   Collaboration Comments Brief OT assessment completed.  Pt reports \"I don't need therapy.  If I thought there was any chance I could get better by doing therapy I would, but obviously this is how I am for life.\"  RN aware that pt has all own self feeding tools from home with him in his belongings bag and was encouraged to ask for them when he is given sold food to eat.  No further therapy needs at this time as pt is at his functional baseline.                   "

## 2023-02-23 NOTE — CARE PLAN
The patient is Watcher - Medium risk of patient condition declining or worsening    Shift Goals  Clinical Goals: Monitor H and H, Hemodynamic stability  Patient Goals: Rest and comfort  Family Goals: NA    Progress made toward(s) clinical / shift goals:    Problem: Pain - Standard  Goal: Alleviation of pain or a reduction in pain to the patient’s comfort goal  Description: Target End Date:  Prior to discharge or change in level of care  Outcome: Progressing    Problem: Skin Integrity  Goal: Skin integrity is maintained or improved  Description: Target End Date:  Prior to discharge or change in level of care  Outcome: Progressing    Problem: Risk for Fluid Volume Deficit Related to Bleeding  Goal: Fluid volume balance will be maintained  Description: Target End Date:  Prior to discharge or change in level of care  Outcome: Progressing    Problem: Risk for Bleeding  Goal: Patient will take measures to prevent bleeding and recognizes signs of bleeding that need to be reported immediately to a health care professional  Description: Target End Date:  Prior to discharge or change in level of care  Outcome: Progressing    Problem: Hemodynamics  Goal: Patient's hemodynamics, fluid balance and neurologic status will be stable or improve  Description: Target End Date:  Prior to discharge or change in level of care  Outcome: Progressing    Problem: Fluid Volume  Goal: Fluid volume balance will be maintained  Description: Target End Date:  Prior to discharge or change in level of care  Outcome: Progressing

## 2023-02-23 NOTE — OP REPORT
DATE OF SERVICE:  02/22/2023     PROCEDURE:  Esophagogastroduodenoscopy.     PREOPERATIVE DIAGNOSIS:  Gastrointestinal bleed.     POSTOPERATIVE DIAGNOSES:  Duodenal ulcer, esophagitis, hiatal hernia.     ANESTHESIA:  Per anesthesiologist.     DESCRIPTION OF PROCEDURE:  After informed consent and appropriate sedation,   the patient was placed in left lateral position and the gastroscope was   advanced into the oropharynx, through to the second portion of the duodenum.    The scope was then advanced through to the fourth portion of the duodenum and   slowly withdrawn.  The third and fourth portion of the duodenum were   unremarkable as was the second portion; however, the duodenal sweep and distal   bulb of duodenum had a 2 cm large clean based very deep ulceration that was   not amenable to endoscopic therapy.  However, it was not actively bleeding at   the time.  The scope was withdrawn back into the stomach.  Cold forceps   biopsies were taken in the gastric antrum to rule out Helicobacter pylori   infection.  The gastric body was unremarkable as was the gastric cardia and   fundus.  The stomach was decompressed as the scope was gently withdrawn back   into the esophagus.  There was a small hiatal hernia of approximately 1 cm as   well as LA grade B esophagitis.  The remainder of the esophagus was   unremarkable other than some potential trachealization, but no significant   narrowing.  The scope was withdrawn.  The bowel was decompressed.  There were   no immediate postop complications.     RECOMMENDATIONS:  1.  Recommend IV PPI b.i.d. versus IV PPI drip while in the hospital.  2.  Recommend transitioning to oral PPI b.i.d. for a minimum of 8 weeks and   then decreasing to once daily.  3.  Avoid NSAIDs.  4.  Follow up on biopsies to rule out Helicobacter pylori infection.  5.  Okay to advance diet as tolerated from a GI standpoint unless the patient   has other procedures pending.  6.  We will sign off for now.   Please call with any questions or concerns.  7.  Please note that if the patient has significant GI bleeding, the patient   should be transferred to [facility] for interventional radiology evaluation   as this ulcer is so large, it is not amenable to endoscopic therapy.        ______________________________  DO YADIRA Ross    DD:  02/22/2023 14:16  DT:  02/22/2023 16:45    Job#:  [id number]
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
## 2023-02-23 NOTE — WOUND TEAM
[facility]  Inpatient Services  Initial Wound and Skin Care Evaluation    Admission Date: 2/21/2023     Last order of IP CONSULT TO WOUND CARE was found on 2/22/2023 from Hospital Encounter on 2/21/2023     HPI, PMH, SH: Reviewed    Past Surgical History:   Procedure Laterality Date    ULCER EXCISION Right 10/18/2017    Procedure: ULCER EXCISION- ISCHIAL PRESSURE UCLER;  Surgeon: Marbin Arriola M.D.;  Location: [facility];  Service: Plastics    FLAP CLOSURE  10/18/2017    Procedure: FLAP CLOSURE- MUSCLE;  Surgeon: Marbin Arriola M.D.;  Location: [facility];  Service: Plastics    ILEO LOOP DIVERSION N/A 10/24/2016    Procedure: ILEO LOOP DIVERSION, APPENDECTOMY;  Surgeon: Tiago Martinez M.D.;  Location: [facility];  Service:     CYSTOSTOMY  5/5/2016    Procedure: CYSTOSTOMY CLOSURE;  Surgeon: Tiago Martinez M.D.;  Location: [facility];  Service:     CYSTOSCOPY  5/5/2016    Procedure: CYSTOSCOPY;  Surgeon: Tiago Martinez M.D.;  Location: [facility];  Service:     CYSTOSCOPY WITH COLLAGEN  12/17/2015    Procedure: CYSTOSCOPY WITH BOTOX INJECTION;  Surgeon: Tiago Martinez M.D.;  Location: [facility];  Service:     CYSTOSCOPY STENT REMOVAL Left 9/8/2015    Procedure: CYSTOSCOPY STENT REMOVAL;  Surgeon: Tiago Martinez M.D.;  Location: [facility];  Service:     URETEROSCOPY  9/8/2015    Procedure: URETEROSCOPY;  Surgeon: Tiago Martinez M.D.;  Location: [facility];  Service:     LASERTRIPSY  9/8/2015    Procedure: LASER LITHOTRIPSY;  Surgeon: Tiago Martinez M.D.;  Location: [facility];  Service:     CYSTOSCOPY  4/20/2015    Performed by Tiago Martinez M.D. at [facility]    URETEROSCOPY  4/20/2015    Performed by Tiago Martinez M.D. at [facility]    LASERTRIPSY  4/20/2015    Performed by Tiago Martinez M.D. at SURGERY [facility]   9/20/2011    Performed by SURGERY, IR[facility]RECOVERY [facility]    RECOVERY  8/1/2011    Performed by SURGERY, IR-RECOVERY at SURGERY SAME DAY [facility] ORS    KAYCE BY LAPAROSCOPY  5/14/2011    Performed by KATHERINE LR at SURGERY [facility] ORS    VENA CAVA AIN  2/25/2011    Performed by JEWEL WELLER at SURGERY [facility] ORS    CERVICAL FUSION POSTERIOR  2/21/2011    Performed by RALPH SANTAMARIA at SURGERY [facility] ORS    CERVICAL LAMINECTOMY POSTERIOR  2/21/2011    Performed by RALPH SANTAMARIA at SURGERY [facility] ORS    GASTROSTOMY LAPAROSCOPIC  2/9/2011    Performed by CONSUELO TAYLOR at SURGERY [facility] ORS    CASE CANCELLED  2/5/2011    Performed by RALPH SANTAMARIA at SURGERY [facility] ORS    OTHER NEUROLOGICAL SURG      OTHER ORTHOPEDIC SURGERY       Social History     Tobacco Use    Smoking status: Never    Smokeless tobacco: Former     Types: Chew     Quit date: 2010   Substance Use Topics    Alcohol use: No     Chief Complaint   Patient presents with    UTI     Pt believes he has a UTI   has had them prior     Constipation     Has had issues with this  believes he is again constipated      Diagnosis: GI bleed [K92.2]    Unit where seen by Wound Team: 3317/00     WOUND CONSULT/FOLLOW UP RELATED TO:  heels, legs, feet, R coccyx/buttocks     WOUND HISTORY:   PMH of quadriplegia, he is up to WC  and has had multiple wounds to feet. He has been seen by wound care for sacrococcygeal pressure injuries and R side St 4 is slowly resolving the others are resolved.     WOUND ASSESSMENT/LDA  Wound 02/22/23 Pressure Injury Heel Mid Bilateral;Left (Active)      02/22/23 0030   Periwound Assessment Pink;Intact    Margins Defined edges;Attached edges    Closure None    Drainage Amount None    Treatments Cleansed    Wound Cleansing Foam Cleanser/Washcloth    Periwound Protectant Not Applicable    Dressing Cleansing/Solutions Not Applicable    Dressing Options Mepilex Heel    Dressing  Changed Observed    Dressing Status Intact    Dressing Change/Treatment Frequency Every 72 hrs, and As Needed    NEXT Dressing Change/Treatment Date 02/24/23    NEXT Weekly Photo (Inpatient Only) 02/28/23    WOUND NURSE ONLY - Pressure Injury Stage U 02/22/23 2000   Wound Length (cm) 2 cm 02/22/23 2000   Wound Width (cm) 5 cm 02/22/23 2000   Wound Surface Area (cm^2) 10 cm^2 02/22/23 2000   Shape oval    Wound Odor None    Exposed Structures LUCAS    Number of days: 0       Wound 02/22/23 Pressure Injury Heel Right (Active)       02/22/23 0030   Site Assessment Grey;Red;Pink;Tan    Periwound Assessment Intact    Margins Defined edges    Closure Secondary intention    Drainage Amount Scant    Treatments Cleansed    Wound Cleansing Normal Saline Irrigation    Periwound Protectant Not Applicable    Dressing Cleansing/Solutions Not Applicable    Dressing Options Hydrofiber Silver;Mepilex Heel    Dressing Changed New    Dressing Status Intact    Dressing Change/Treatment Frequency Every 72 hrs, and As Needed    NEXT Dressing Change/Treatment Date 02/25/23    NEXT Weekly Photo (Inpatient Only) 02/28/23    WOUND NURSE ONLY - Pressure Injury Stage U 02/22/23 2000   Wound Length (cm) 6 cm 02/22/23 2000   Wound Width (cm) 5 cm 02/22/23 2000   Wound Surface Area (cm^2) 30 cm^2 02/22/23 2000   Shape circular 0   Number of days: 0       Wound 02/22/23 Coccyx;Buttocks Right (Active)        02/22/23 0030   Site Assessment Red    Periwound Assessment Pink;Scar tissue    Margins Defined edges    Closure Secondary intention    Drainage Amount Scant    Drainage Description Serosanguineous    Treatments Cleansed    Wound Cleansing Foam Cleanser/Washcloth    Periwound Protectant Not Applicable    Dressing Cleansing/Solutions Not Applicable    Dressing Options Petrolatum Gauze (yellow);Mepilex    Dressing Changed Changed    Dressing Status Intact    Dressing Change/Treatment Frequency Every 72 hrs, and As Needed    NEXT Dressing  Change/Treatment Date 02/24/23    NEXT Weekly Photo (Inpatient Only) 02/28/23    Wound Length (cm) 10 cm 02/22/23 2000   Wound Width (cm) 6 cm 02/22/23 2000   Wound Depth (cm) 0.2 cm 02/22/23 2000   Wound Surface Area (cm^2) 60 cm^2 02/22/23 2000   Wound Volume (cm^3) 12 cm^3 02/22/23 2000   Shape irregular    Exposed Structures None    Number of days: 0                 R shin    R toes    R foot    L shin          L foot      Vascular:    HANY:   No results found.    Lab Values:    Lab Results   Component Value Date/Time    WBC 17.2 (H) 02/22/2023 06:00 AM    RBC 3.38 (L) 02/22/2023 06:00 AM    HEMOGLOBIN 6.8 (L) 02/22/2023 06:10 PM    HEMATOCRIT 21.7 (L) 02/22/2023 06:10 PM    CREACTPROT 14.38 (H) 10/25/2017 03:24 PM    SEDRATEWES 11 10/18/2017 03:14 PM        Culture Results show:  No results found for this or any previous visit (from the past 720 hour(s)).    Pain Level/Medicated:  None, Tolerated without pain medication       INTERVENTIONS BY WOUND TEAM:  Chart and images reviewed. Discussed with bedside RN. All areas of concern (based on picture review, LDA review and discussion with bedside RN) have been thoroughly assessed. Documentation of areas based on significant findings. This RN in to assess patient. Performed standard wound care which includes appropriate positioning, dressing removal and non-selective debridement. Pictures and measurements obtained weekly if/when required.    R coccyx/buttocks  Preparation for Dressing removal: Dressing soaked with Removed without difficulty  Non-selectively Debrided with:  Moist warm washcloth and No rinse foam soap   Sharp debridement: NA  Nova wound: Cleansed with Moist warm washcloth and No rinse foam soap, Prepped with NA  Primary Dressing: Xeroform (Yellow Petrolatum)  Secondary (Outer) Dressing: Mepilex    R heel  Preparation for Dressing removal: Dressing soaked with Removed without difficulty  Non-selectively Debrided with:  Normal Saline and Gauze   Sharp  debridement: NA  Nova wound: Cleansed with Normal Saline and Gauze, Prepped with NA  Primary Dressing: Aquacel ag (Hydrofiber Silver)  Secondary (Outer) Dressing: Mepilex    Advanced Wound Care Discharge Planning  Number of Clinicians necessary to complete wound care: 2 one is for assistance to turn  Is patient requiring IV pain medications for dressing changes: no  Length of time for dressing change 45 min. (This does not include chart review, pre-medication time, set up, clean up or time spent charting.)    Interdisciplinary consultation: Patient, Bedside RN ,     EVALUATION / RATIONALE FOR TREATMENT:  Most Recent Date:  2/22: Pt has unstageable pressure injuries to bilateral heel. L is dry and R with some drainage. Both to have slver hydrofiber and heel adhesive foam. There are numerous scabs to dorsal feet and toes. R coccyx/buttock with slowly resolving St 4. He has some skin damage from friction. Mepilex helps protect against the extrinsic forces of pressure, shear and friction and manages microclimate of wound bed.        Goals: Steady decrease in wound area and depth weekly.    WOUND TEAM PLAN OF CARE ([X] for frequency of wound follow up,):   Nursing to follow dressing orders written for wound care. Contact wound team if area fails to progress, deteriorates or with any questions/concerns if something comes up before next scheduled follow up (See below as to whether wound is following and frequency of wound follow up)  Dressing changes by wound team:                   Follow up 3 times weekly:                NPWT change 3 times weekly:     Follow up 1-2 times weekly:      Follow up Bi-Monthly:           Follow up Monthly (High Risk):                        Follow up as needed:     Other (explain):     NURSING PLAN OF CARE ORDERS (X):  Dressing changes: See Dressing Care orders: x  Skin care: See Skin Care orders:   RN Prevention Protocol:   Rectal tube care: See Rectal Tube Care orders:   Other  orders:    RSKIN:   CURRENTLY IN PLACE (X), APPLIED THIS VISIT (A), ORDERED (O):   Q shift Sebas:  X  Q shift pressure point assessments:  X    Surface/Positioning   Pressure redistribution mattress            Low Airloss          ICU Low Airloss X  Bariatric ASHOK     Waffle cushion        Waffle Overlay          Reposition q 2 hours    X  TAPs Turning system   X  Z Breezy Pillow  O    Offloading/Redistribution   Sacral Mepilex (Silicone dressing)   X  Heel Mepilex (Silicone dressing)   X      Heel float boots (Prevalon boot)      X       Float Heels off Bed with Pillows           Respiratory   Silicone O2 tubing    x     Gray Foam Ear protectors   x  Cannula fixation Device (Tender )          High flow offloading Clip    Elastic head band offloading device      Anchorfast                                                         Trach with Optifoam split foam             Containment/Moisture Prevention colostomy  and urostomy    Rectal tube or BMS    Purwick/Condom Cath        Medrano Catheter  for bladder bleeding  Barrier wipes           Barrier paste       Antifungal tx      Interdry        Mobilization not assessed      Up to chair        Ambulate      PT/OT      Nutrition       Dietician        Diabetes Education      PO     TF     TPN     NPO 1.25  # days     Other        Anticipated discharge plans:   LTACH:        SNF/Rehab:                  Home Health Care:           Outpatient Wound Center:            Self/Family Care:        Other:                  Vac Discharge Needs:   Vac Discharge plan is purely a recommendation from wound team and not a requirement for discharge unless otherwise stated by physician.  Not Applicable Pt not on a wound vac:     x  Regular Vac while inpatient, alternative dressing at DC:        Regular Vac in use and continued at DC:            Reg. Vac w/ Skin Sub/Biologic in use. Will need to be changed 2x wkly:      Veraflo Vac while inpatient, ok to transition to Regular Vac on  Discharge (Bedside RN to Clamp small instillation tubing at time of DC):           Veraflo Vac while inpatient, would benefit from remaining on Veraflo Vac upon discharge:

## 2023-02-23 NOTE — CARE PLAN
The patient is Watcher - Medium risk of patient condition declining or worsening    Shift Goals  Clinical Goals: SBP >85; MAP >60  Patient Goals: rest and remain comfortable, speak with doctor about plan of care.  Family Goals: LUCAS    Progress made toward(s) clinical / shift goals:    Problem: Risk for Fluid Volume Deficit Related to Bleeding  Goal: Fluid volume balance will be maintained  Outcome: Progressing  Note: Patient was able to transition to a clear liquid diet today and fluid intake has been adequate.   Goal: Patient will show no signs and symptoms of excessive bleeding  Outcome: Progressing  Note: H&H values have been trending positively. No blood noted in stool.      Problem: Hemodynamics  Goal: Patient's hemodynamics, fluid balance and neurologic status will be stable or improve  Outcome: Progressing  Note: Patient has remained A&Ox4 throughout shift. SBP >85 throughout shift with MAP >65.      Problem: Fluid Volume  Goal: Fluid volume balance will be maintained  Outcome: Progressing  Note: Patient was able to transition to a clear liquid diet today and fluid intake has been adequate.      Problem: Respiratory  Goal: Patient will achieve/maintain optimum respiratory ventilation and gas exchange  Outcome: Progressing  Note: Oxygen saturation >90% throughout shift with no complaints of dyspnea or SOB.        Patient is not progressing towards the following goals:

## 2023-02-24 ENCOUNTER — APPOINTMENT (OUTPATIENT)
Dept: RADIOLOGY | Facility: MEDICAL CENTER | Age: 63
DRG: 871 | End: 2023-02-24
Attending: INTERNAL MEDICINE
Payer: MEDICAID

## 2023-02-24 LAB
ALBUMIN SERPL BCP-MCNC: 2.7 G/DL (ref 3.2–4.9)
ALBUMIN/GLOB SERPL: 1 G/DL
ALP SERPL-CCNC: 63 U/L (ref 30–99)
ALT SERPL-CCNC: 12 U/L (ref 2–50)
ANION GAP SERPL CALC-SCNC: 10 MMOL/L (ref 7–16)
AST SERPL-CCNC: 13 U/L (ref 12–45)
BACTERIA BLD CULT: ABNORMAL
BACTERIA BLD CULT: ABNORMAL
BASOPHILS # BLD AUTO: 0.5 % (ref 0–1.8)
BASOPHILS # BLD: 0.05 K/UL (ref 0–0.12)
BILIRUB SERPL-MCNC: 0.4 MG/DL (ref 0.1–1.5)
BUN SERPL-MCNC: 12 MG/DL (ref 8–22)
CALCIUM ALBUM COR SERPL-MCNC: 8.9 MG/DL (ref 8.5–10.5)
CALCIUM SERPL-MCNC: 7.9 MG/DL (ref 8.4–10.2)
CHLORIDE SERPL-SCNC: 105 MMOL/L (ref 96–112)
CO2 SERPL-SCNC: 18 MMOL/L (ref 20–33)
CREAT SERPL-MCNC: 0.33 MG/DL (ref 0.5–1.4)
EOSINOPHIL # BLD AUTO: 0.3 K/UL (ref 0–0.51)
EOSINOPHIL NFR BLD: 3.1 % (ref 0–6.9)
ERYTHROCYTE [DISTWIDTH] IN BLOOD BY AUTOMATED COUNT: 51.8 FL (ref 35.9–50)
GFR SERPLBLD CREATININE-BSD FMLA CKD-EPI: 132 ML/MIN/1.73 M 2
GLOBULIN SER CALC-MCNC: 2.8 G/DL (ref 1.9–3.5)
GLUCOSE SERPL-MCNC: 115 MG/DL (ref 65–99)
HCT VFR BLD AUTO: 31 % (ref 42–52)
HGB BLD-MCNC: 10.1 G/DL (ref 14–18)
IMM GRANULOCYTES # BLD AUTO: 0.07 K/UL (ref 0–0.11)
IMM GRANULOCYTES NFR BLD AUTO: 0.7 % (ref 0–0.9)
LYMPHOCYTES # BLD AUTO: 1.11 K/UL (ref 1–4.8)
LYMPHOCYTES NFR BLD: 11.4 % (ref 22–41)
MAGNESIUM SERPL-MCNC: 2.2 MG/DL (ref 1.5–2.5)
MCH RBC QN AUTO: 29.8 PG (ref 27–33)
MCHC RBC AUTO-ENTMCNC: 32.6 G/DL (ref 33.7–35.3)
MCV RBC AUTO: 91.4 FL (ref 81.4–97.8)
MONOCYTES # BLD AUTO: 0.94 K/UL (ref 0–0.85)
MONOCYTES NFR BLD AUTO: 9.6 % (ref 0–13.4)
NEUTROPHILS # BLD AUTO: 7.3 K/UL (ref 1.82–7.42)
NEUTROPHILS NFR BLD: 74.7 % (ref 44–72)
NRBC # BLD AUTO: 0.03 K/UL
NRBC BLD-RTO: 0.3 /100 WBC
PHOSPHATE SERPL-MCNC: 2.5 MG/DL (ref 2.5–4.5)
PLATELET # BLD AUTO: 356 K/UL (ref 164–446)
PMV BLD AUTO: 8.8 FL (ref 9–12.9)
POTASSIUM SERPL-SCNC: 4 MMOL/L (ref 3.6–5.5)
PROT SERPL-MCNC: 5.5 G/DL (ref 6–8.2)
RBC # BLD AUTO: 3.39 M/UL (ref 4.7–6.1)
SIGNIFICANT IND 70042: ABNORMAL
SITE SITE: ABNORMAL
SODIUM SERPL-SCNC: 133 MMOL/L (ref 135–145)
SOURCE SOURCE: ABNORMAL
WBC # BLD AUTO: 9.8 K/UL (ref 4.8–10.8)

## 2023-02-24 PROCEDURE — 700102 HCHG RX REV CODE 250 W/ 637 OVERRIDE(OP): Performed by: INTERNAL MEDICINE

## 2023-02-24 PROCEDURE — 36410 VNPNXR 3YR/> PHY/QHP DX/THER: CPT

## 2023-02-24 PROCEDURE — 770022 HCHG ROOM/CARE - ICU (200)

## 2023-02-24 PROCEDURE — 700101 HCHG RX REV CODE 250: Performed by: INTERNAL MEDICINE

## 2023-02-24 PROCEDURE — 94640 AIRWAY INHALATION TREATMENT: CPT

## 2023-02-24 PROCEDURE — 80053 COMPREHEN METABOLIC PANEL: CPT

## 2023-02-24 PROCEDURE — 700111 HCHG RX REV CODE 636 W/ 250 OVERRIDE (IP): Performed by: INTERNAL MEDICINE

## 2023-02-24 PROCEDURE — A9270 NON-COVERED ITEM OR SERVICE: HCPCS | Performed by: INTERNAL MEDICINE

## 2023-02-24 PROCEDURE — 36415 COLL VENOUS BLD VENIPUNCTURE: CPT

## 2023-02-24 PROCEDURE — 700105 HCHG RX REV CODE 258: Performed by: INTERNAL MEDICINE

## 2023-02-24 PROCEDURE — 87040 BLOOD CULTURE FOR BACTERIA: CPT

## 2023-02-24 PROCEDURE — 84100 ASSAY OF PHOSPHORUS: CPT

## 2023-02-24 PROCEDURE — 05HY33Z INSERTION OF INFUSION DEVICE INTO UPPER VEIN, PERCUTANEOUS APPROACH: ICD-10-PCS | Performed by: INTERNAL MEDICINE

## 2023-02-24 PROCEDURE — C9113 INJ PANTOPRAZOLE SODIUM, VIA: HCPCS | Performed by: INTERNAL MEDICINE

## 2023-02-24 PROCEDURE — 83735 ASSAY OF MAGNESIUM: CPT

## 2023-02-24 PROCEDURE — 85025 COMPLETE CBC W/AUTO DIFF WBC: CPT

## 2023-02-24 PROCEDURE — 99291 CRITICAL CARE FIRST HOUR: CPT | Performed by: INTERNAL MEDICINE

## 2023-02-24 RX ORDER — ALBUTEROL SULFATE 90 UG/1
2 AEROSOL, METERED RESPIRATORY (INHALATION)
Status: DISCONTINUED | OUTPATIENT
Start: 2023-02-24 | End: 2023-03-04 | Stop reason: HOSPADM

## 2023-02-24 RX ADMIN — ALBUTEROL SULFATE 2 PUFF: 90 AEROSOL, METERED RESPIRATORY (INHALATION) at 20:29

## 2023-02-24 RX ADMIN — VANCOMYCIN HYDROCHLORIDE 1750 MG: 1 INJECTION, POWDER, LYOPHILIZED, FOR SOLUTION INTRAVENOUS at 22:01

## 2023-02-24 RX ADMIN — MIDODRINE HYDROCHLORIDE 10 MG: 5 TABLET ORAL at 17:45

## 2023-02-24 RX ADMIN — PANTOPRAZOLE SODIUM 40 MG: 40 INJECTION, POWDER, FOR SOLUTION INTRAVENOUS at 17:44

## 2023-02-24 RX ADMIN — VANCOMYCIN HYDROCHLORIDE 2250 MG: 500 INJECTION, POWDER, LYOPHILIZED, FOR SOLUTION INTRAVENOUS at 12:24

## 2023-02-24 RX ADMIN — MIDODRINE HYDROCHLORIDE 10 MG: 5 TABLET ORAL at 08:40

## 2023-02-24 RX ADMIN — SENNOSIDES AND DOCUSATE SODIUM 2 TABLET: 50; 8.6 TABLET ORAL at 05:15

## 2023-02-24 RX ADMIN — SENNOSIDES AND DOCUSATE SODIUM 2 TABLET: 50; 8.6 TABLET ORAL at 17:45

## 2023-02-24 RX ADMIN — MIDODRINE HYDROCHLORIDE 10 MG: 5 TABLET ORAL at 12:21

## 2023-02-24 RX ADMIN — CEFTRIAXONE SODIUM 2000 MG: 2 INJECTION, POWDER, FOR SOLUTION INTRAMUSCULAR; INTRAVENOUS at 15:56

## 2023-02-24 RX ADMIN — PANTOPRAZOLE SODIUM 40 MG: 40 INJECTION, POWDER, FOR SOLUTION INTRAVENOUS at 05:16

## 2023-02-24 ASSESSMENT — ENCOUNTER SYMPTOMS
VOMITING: 0
NAUSEA: 1
CONSTIPATION: 1
ABDOMINAL PAIN: 0
FEVER: 0
NAUSEA: 0
CHILLS: 0

## 2023-02-24 ASSESSMENT — PAIN DESCRIPTION - PAIN TYPE
TYPE: CHRONIC PAIN;NEUROPATHIC PAIN
TYPE: CHRONIC PAIN
TYPE: ACUTE PAIN
TYPE: ACUTE PAIN
TYPE: CHRONIC PAIN;ACUTE PAIN
TYPE: ACUTE PAIN
TYPE: CHRONIC PAIN;ACUTE PAIN
TYPE: CHRONIC PAIN;NEUROPATHIC PAIN
TYPE: ACUTE PAIN

## 2023-02-24 ASSESSMENT — FIBROSIS 4 INDEX: FIB4 SCORE: 0.63

## 2023-02-24 NOTE — PROGRESS NOTES
12-hour chart check complete.    Monitor Summary  Rhythm: Aflutter/Afib  Rate: 74-81  Ectopy: Rare PVC  Measurements: - /0.8 /-

## 2023-02-24 NOTE — DISCHARGE PLANNING
Case Management Note:     LSW spoke to pt at bedside about qualifying for Medicare with disability. Pt states his family has been trying to get Medicare, but he is being denied due to income. LSW provided resources to pt at bedside.
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
## 2023-02-24 NOTE — CARE PLAN
The patient is Watcher - Medium risk of patient condition declining or worsening    Shift Goals:  Clinical Goals: Hemodynamic stability, wean off vasopressors, assist with ADL's  Patient Goals: Rest, urology consult  Family Goals: LUCAS    Progress made toward(s) clinical / shift goals:    Problem: Skin Care - Ostomy  Goal: Skin remains free from irritation  Outcome: Progressing     Problem: Risk for Fluid Volume Deficit Related to Bleeding  Goal: Fluid volume balance will be maintained  Outcome: Progressing     Problem: Hemodynamics  Goal: Patient's hemodynamics, fluid balance and neurologic status will be stable or improve  Outcome: Progressing  Note:  Pt off vasopressor     Problem: Fluid Volume  Goal: Fluid volume balance will be maintained  Outcome: Progressing       Patient is not progressing towards the following goals: ID consult placed, vanco and rocephin added.

## 2023-02-24 NOTE — PROCEDURES
Vascular Access Team    Date of Insertion: 2/24/23  Arm Circumference: 31.5 cm  Internal length: 9 cm  External Length: 0 cm  Vein Occupancy %: 20%  Reason for Midline: access/pressors  Labs: WBC 9.8, , BUN 12, Cr 0.33, , INR N/A    Orders confirmed, vessel patency confirmed with ultrasound. Risks and benefits of procedure explained to patient and education regarding line associated bloodstream infections provided. Questions answered.     Power Midline placed in RUE per licensed provider order with ultrasound guidance. 4 Fr, single lumen Power Midline placed in basilic vein after 1 attempt(s). 2 mL of 1% lidocaine injected intradermally, 21 gauge microintroducer needle was visualized entering the vein and modified Seldinger technique used. 9 cm catheter inserted with good blood return. Secured at 0 cm marker. Internal positioning stylet removed and verified to be intact. Each lumen flushed without resistance with 10 mL 0.9% normal saline. Midline secured with Biopatch and Tegaderm.     Midline placement is confirmed by nurse using ultrasound and ability to flush and draw blood. Midline is appropriate for use at this time.  Patient tolerated procedure well, without complications.  No X-ray is needed for placement confirmation.  Patient condition relayed to unit RN or ordering physician via this post procedure note in the EMR.     Ultrasound images uploaded to PACS and viewable in the EMR - yes  Ultrasound imaged printed and placed in paper chart - no     BARD Power Midline ref # [id number], Lot # [id number], Expiration Date 01/31/2024

## 2023-02-24 NOTE — PROGRESS NOTES
Pharmacy Vancomycin Kinetics Note for 2/24/2023     62 y.o. male on Vancomycin day # 1     Vancomycin Indication (AUC Dosing):    Vancomycin Indication (Two level/Trough based Dosing): Bacteremia (goal trough 15-20)    Provider specified end date: 03/03/23    Active Antibiotics (From admission, onward)      Ordered     Ordering Provider       Fri Feb 24, 2023 10:07 AM    02/24/23 1007  vancomycin 1750 mg/500mL NS IVPB premix  (vancomycin (VANCOCIN) IV (LD + Maintenance))  EVERY 12 HOURS         Glynn Resendiz M.D.       Fri Feb 24, 2023  9:42 AM    02/24/23 0942  vancomycin 2250 mg/500mL NS IVPB premix  (vancomycin (VANCOCIN) IV (LD + Maintenance))  ONCE         Glynn Resendiz M.D.       Fri Feb 24, 2023  9:37 AM    02/24/23 0937  MD Alert...Vancomycin per Pharmacy  PHARMACY TO DOSE        Question:  Indication(s) for vancomycin?  Answer:  Unknown source of infection    Glynn Resendiz M.D.    02/24/23 0937  meropenem (Merrem) 500 mg in  mL IV-MBP  EVERY 6 HOURS        Question:  Indication  Answer:  Bacteremia    Glynn Resendiz M.D.            Dosing Weight: 94.2 kg (207 lb 10.8 oz)      Admission History: Admitted on 2/21/2023 for GI bleed [K92.2]  Pertinent history: Admitted on 2/21/2023 for GI bleed (K92.2)  Pertinent history: GIB, quadrapalegic, recurrent UTI w/ hx of ESBL, septic shock, C diff hx, chronic supra-pubic cath, decubital ulcer (chronic).    Allergies:     Piperacillin sod-tazobactam so     Pertinent cultures to date:     Results       Procedure Component Value Units Date/Time    BLOOD CULTURE [[id number]]     Order Status: Sent Specimen: Blood from Peripheral     BLOOD CULTURE [[id number]]     Order Status: Sent Specimen: Blood from Peripheral     BLOOD CULTURE [[id number]] Collected: 02/23/23 0315    Order Status: Completed Specimen: Blood from Peripheral Updated: 02/24/23 0556     Significant Indicator NEG     Source BLD     Site PERIPHERAL     Culture Result No Growth  Note: Blood  "cultures are incubated for 5 days and  are monitored continuously.Positive blood cultures  are called to the RN and reported as soon as  they are identified.  Blood culture testing and Gram stain, if indicated, are  performed at [facility], [address] [place] [address].[place], [place].  Positive blood cultures are  sent to [facility], [address] [place] [address], for organism identification and  susceptibility testing.      Narrative:      Per Hospital Policy: Only change Specimen Src: to \"Line\" if  specified by physician order.  2nd draw was on anpther finer on the left hand...  Left Hand    BLOOD CULTURE [[id number]] Collected: 02/23/23 0315    Order Status: Completed Specimen: Blood from Peripheral Updated: 02/24/23 0556     Significant Indicator NEG     Source BLD     Site PERIPHERAL     Culture Result No Growth  Note: Blood cultures are incubated for 5 days and  are monitored continuously.Positive blood cultures  are called to the RN and reported as soon as  they are identified.  Blood culture testing and Gram stain, if indicated, are  performed at [facility], [address] [place] [address].[place], [place].  Positive blood cultures are  sent to [facility], [address] [place] [address], for organism identification and  susceptibility testing.      Narrative:      Per Hospital Policy: Only change Specimen Src: to \"Line\" if  specified by physician order.  Left Hand    GRAM STAIN [[id number]] Collected: 02/21/23 1200    Order Status: Completed Specimen: Other Updated: 02/23/23 1719     Significant Indicator .     Source URSP     Site URINE, SUPRAPUBIC     Gram Stain Result Many Gram positive cocci in clusters.    Narrative:      Indication for culture:->Emergency Room Patient  Indication for culture:->Emergency Room Patient    URINE CULTURE(NEW) [[id number]]  (Abnormal)  (Susceptibility) Collected: 02/21/23 1200    Order Status: Completed " "Specimen: Other from Urine, Suprapubic Updated: 02/23/23 1719     Significant Indicator POS     Source URSP     Site URINE, SUPRAPUBIC     Culture Result -     Gram Stain Result Many Gram positive cocci in clusters.     Culture Result Klebsiella pneumoniae  >100,000 cfu/mL        Enterococcus faecalis  >100,000 cfu/mL      Narrative:      Indication for culture:->Emergency Room Patient  Indication for culture:->Emergency Room Patient    Culture Wound w/Gram Stain [[id number]] Collected: 02/22/23 2000    Order Status: Sent Specimen: Wound from Right Hip Updated: 02/23/23 1716     Significant Indicator NEG     Source WND     Site RIGHT HIP     Culture Result -     Gram Stain Result Moderate Gram positive cocci in chains.    Narrative:      Collected By: [id number] DAJUAN CELESTIN posterior Ischium  Collected By: [id number] DAJUAN CARLIN    GRAM STAIN [[id number]] Collected: 02/22/23 2000    Order Status: Completed Specimen: Wound Updated: 02/23/23 1716     Significant Indicator .     Source WND     Site RIGHT HIP     Gram Stain Result Moderate Gram positive cocci in chains.    Narrative:      Collected By: [id number] DAJUAN CELESTIN posterior Ischium  Collected By: [id number] DAJUAN CARLIN    BLOOD CULTURE [[id number]]  (Abnormal) Collected: 02/21/23 1212    Order Status: Completed Specimen: Blood from Peripheral Updated: 02/23/23 1319     Significant Indicator POS     Source BLD     Site PERIPHERAL     Culture Result Growth detected by Bactec instrument. 02/22/2023  12:52      Staphylococcus hominis    Narrative:      CALL  [facility]  ICU tel. ,  CALLED  ICU tel.  02/22/2023, 12:52, RB PERF. RESULTS CALLED TO: RN [phone]  Per Hospital Policy: Only change Specimen Src: to \"Line\" if  specified by physician order.  Right Hand    BLOOD CULTURE [[id number]]  (Abnormal) Collected: 02/21/23 1200    Order Status: Completed Specimen: Blood from Peripheral Updated: 02/23/23 1319     Significant Indicator POS     Source BLD     " "Site PERIPHERAL     Culture Result Growth detected by Bactec instrument. 02/22/2023  12:45  Negative for Staphylococcus aureus and MRSA by PCR. Correlate  ongoing need for antibiotics with clinical condition.  Further report to follow.        Staphylococcus hominis  Susceptibilities in progress      Narrative:      CALL  [facility]  ICU tel. ,  CALLED  ICU tel.  02/22/2023, 17:27, RB PERF. RESULTS CALLED TO: Pharmacy x  [phone]  Per Hospital Policy: Only change Specimen Src: to \"Line\" if  specified by physician order.  Left AC    MRSA By PCR (Amp) [[id number]] Collected: 02/22/23 1515    Order Status: Completed Specimen: Respirate from Nares Updated: 02/22/23 2159     MRSA by PCR Negative    Narrative:      Special Contact Isolation  Collected By: [id number] ROE MICHEL I    C Diff by PCR rflx Toxin [[id number]] Collected: 02/21/23 1630    Order Status: Completed Specimen: Stool Updated: 02/21/23 2155     C Diff by PCR Negative     Comment: C. difficile NOT detected by PCR.  Treatment not indicated per guidelines.  Repeat testing not indicated within 7 days.          027-NAP1-BI Presumptive Negative     Comment: Presumptive 027/NAP1/BI target DNA sequences are NOT DETECTED.       Narrative:      Does this patient have risk factors for C-diff?->Yes  C-Diff Risk Factors->antibiotic exposure  Has patient taken stool softeners or laxatives in the last 5  days?->Yes    URINALYSIS [[id number]]  (Abnormal) Collected: 02/21/23 1200    Order Status: Completed Specimen: Urine Updated: 02/21/23 1616     Color Yellow     Character Cloudy     Specific Gravity <=1.005     Ph >=9.0     Glucose Negative mg/dL      Ketones Negative mg/dL      Protein Negative mg/dL      Bilirubin Negative     Nitrite Negative     Leukocyte Esterase Negative     Occult Blood Negative     Micro Urine Req Microscopic    Narrative:      Indication for culture:->Emergency Room Patient            Labs:     Estimated Creatinine Clearance: 284.5 mL/min (A) (by C-G " "formula based on SCr of 0.33 mg/dL (L)).  Recent Labs     23  1200 23  0600 23  0315 23  0333   WBC 14.7* 17.2* 13.4* 9.8   NEUTSPOLYS 79.60* 78.60* 78.70* 74.70*     Recent Labs     23  1200 23  0300 23  0315 23  0333   BUN 42* 31* 14 12   CREATININE 0.39* 0.36* 0.27* 0.33*   ALBUMIN 2.9* 3.0* 2.6* 2.7*       Intake/Output Summary (Last 24 hours) at 2023 1007  Last data filed at 2023 1000  Gross per 24 hour   Intake 1076.37 ml   Output 1630 ml   Net -553.63 ml      BP 91/61   Pulse 84   Temp 37.1 °C (98.7 °F) (Temporal)   Resp (!) 45   Ht 1.905 m (6' 3\")   Wt 94.2 kg (207 lb 10.8 oz)   SpO2 96%  Temp (24hrs), Av.8 °C (98.3 °F), Min:36.4 °C (97.5 °F), Max:37.2 °C (98.9 °F)      List concerns for Vancomycin clearance:     Hypermetabolic State (SIRS);Pressors/Hypotension;BUN/Scr ratio greater than 20:1;Nephrotoxic drugs    Pharmacokinetics:       Trough kinetics:   No results for input(s): VANCOTROUGH, VANCOPEAK, VANCORANDOM in the last 72 hours.    A/P:     -  Vancomycin dose: 2250 mg Loading dose, followed by 1750 mg IV every 12 hours.    -  Next vancomycin level(s):    - Vt @ 0930       -  Comments: Will continue with trough based dosing, renal function not stable , on pressors    Raad Putnam, [facility]    "

## 2023-02-24 NOTE — CARE PLAN
The patient is Stable - Low risk of patient condition declining or worsening    Shift Goals  Clinical Goals: SBP> 85; MAP >60  Patient Goals: Rest, Speak with doctor about care plan  Family Goals: LUCAS    Progress made toward(s) clinical / shift goals:    Problem: Knowledge Deficit - Standard  Goal: Patient and family/care givers will demonstrate understanding of plan of care, disease process/condition, diagnostic tests and medications  Description: Target End Date:  1-3 days or as soon as patient condition allows  Outcome: Progressing     Problem: Skin Integrity  Goal: Skin integrity is maintained or improved  Description: Target End Date:  Prior to discharge or change in level of care  Outcome: Progressing      Problem: Skin Care - Ostomy  Goal: Skin remains free from irritation  Description: Target End Date:  Prior to discharge or change in level of care  Outcome: Progressing      Problem: Hemodynamics  Goal: Patient's hemodynamics, fluid balance and neurologic status will be stable or improve  Description: Target End Date:  Prior to discharge or change in level of care  Outcome: Progressing      Problem: Respiratory  Goal: Patient will achieve/maintain optimum respiratory ventilation and gas exchange  Description: Target End Date:  Prior to discharge or change in level of care  Outcome: Progressing

## 2023-02-24 NOTE — DIETARY
Nutrition Services: Update   Day 3 of admit.  Stevie Phoenix is a 62 y.o. male with admitting DX of GI bleed [K92.2]    Pt on regular diet, though is NPO per flowsheets this evening. PO intake per ADLs has been % of meals through breakfast this a.m. RD attempted bedside visit w/ pt this a.m. and this afternoon; on both occasions, multiple staff in room working w/ pt.  Pt seen by wound team: unstageable pressure wounds to B/L heels and slowly resolving stage IV wound to coccyx.    Malnutrition Risk: None noted on admit; malnutrition admit screen score 0, negative for poor PO or wt loss PTA.    Recommendations/Plan:  Once PO diet resumed, recommend Boost Plus TID w/ meals to aid pt in meeting increased kcal/protein needs to support wound healing. Goal intake >50-75% of meals and supplements.  Document intake of all meals as % taken in ADL's to provide interdisciplinary communication across all shifts.   Monitor weight.  Nutrition rep will continue to see patient for ongoing meal and snack preferences.    RD following
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
## 2023-02-24 NOTE — PROGRESS NOTES
12-hour chart check complete.    Monitor Summary  Rhythm: Afib   Rate: 74-90   Ectopy: Rare PVC   Measurements: -/0.08/-

## 2023-02-24 NOTE — PROGRESS NOTES
Pt had started receiving his administration of ceftriaxone and started complaining of arm pain. The infusion was stopped and Dr. Ordoñez was informed at 05:52. Dr. Ordoñez stated to postpone the infusion and consult with Dr. Resendiz about the possibility of a midline.

## 2023-02-25 LAB
ALBUMIN SERPL BCP-MCNC: 2.6 G/DL (ref 3.2–4.9)
ALBUMIN/GLOB SERPL: 1.1 G/DL
ALP SERPL-CCNC: 58 U/L (ref 30–99)
ALT SERPL-CCNC: 9 U/L (ref 2–50)
ANION GAP SERPL CALC-SCNC: 9 MMOL/L (ref 7–16)
AST SERPL-CCNC: 11 U/L (ref 12–45)
BACTERIA BLD CULT: ABNORMAL
BACTERIA UR CULT: ABNORMAL
BASOPHILS # BLD AUTO: 0.4 % (ref 0–1.8)
BASOPHILS # BLD: 0.03 K/UL (ref 0–0.12)
BILIRUB SERPL-MCNC: 0.2 MG/DL (ref 0.1–1.5)
BUN SERPL-MCNC: 11 MG/DL (ref 8–22)
CALCIUM ALBUM COR SERPL-MCNC: 8.4 MG/DL (ref 8.5–10.5)
CALCIUM SERPL-MCNC: 7.3 MG/DL (ref 8.4–10.2)
CHLORIDE SERPL-SCNC: 111 MMOL/L (ref 96–112)
CO2 SERPL-SCNC: 19 MMOL/L (ref 20–33)
CREAT SERPL-MCNC: 0.43 MG/DL (ref 0.5–1.4)
EOSINOPHIL # BLD AUTO: 0.28 K/UL (ref 0–0.51)
EOSINOPHIL NFR BLD: 3.4 % (ref 0–6.9)
ERYTHROCYTE [DISTWIDTH] IN BLOOD BY AUTOMATED COUNT: 51.8 FL (ref 35.9–50)
GFR SERPLBLD CREATININE-BSD FMLA CKD-EPI: 122 ML/MIN/1.73 M 2
GLOBULIN SER CALC-MCNC: 2.3 G/DL (ref 1.9–3.5)
GLUCOSE SERPL-MCNC: 113 MG/DL (ref 65–99)
GRAM STN SPEC: ABNORMAL
HCT VFR BLD AUTO: 28.6 % (ref 42–52)
HGB BLD-MCNC: 9.2 G/DL (ref 14–18)
IMM GRANULOCYTES # BLD AUTO: 0.05 K/UL (ref 0–0.11)
IMM GRANULOCYTES NFR BLD AUTO: 0.6 % (ref 0–0.9)
LYMPHOCYTES # BLD AUTO: 0.88 K/UL (ref 1–4.8)
LYMPHOCYTES NFR BLD: 10.7 % (ref 22–41)
MAGNESIUM SERPL-MCNC: 2.1 MG/DL (ref 1.5–2.5)
MCH RBC QN AUTO: 29.8 PG (ref 27–33)
MCHC RBC AUTO-ENTMCNC: 32.2 G/DL (ref 33.7–35.3)
MCV RBC AUTO: 92.6 FL (ref 81.4–97.8)
MONOCYTES # BLD AUTO: 0.81 K/UL (ref 0–0.85)
MONOCYTES NFR BLD AUTO: 9.8 % (ref 0–13.4)
NEUTROPHILS # BLD AUTO: 6.2 K/UL (ref 1.82–7.42)
NEUTROPHILS NFR BLD: 75.1 % (ref 44–72)
NRBC # BLD AUTO: 0.02 K/UL
NRBC BLD-RTO: 0.2 /100 WBC
PHOSPHATE SERPL-MCNC: 2.3 MG/DL (ref 2.5–4.5)
PLATELET # BLD AUTO: 345 K/UL (ref 164–446)
PMV BLD AUTO: 9.4 FL (ref 9–12.9)
POTASSIUM SERPL-SCNC: 3.6 MMOL/L (ref 3.6–5.5)
PROT SERPL-MCNC: 4.9 G/DL (ref 6–8.2)
RBC # BLD AUTO: 3.09 M/UL (ref 4.7–6.1)
SIGNIFICANT IND 70042: ABNORMAL
SIGNIFICANT IND 70042: ABNORMAL
SITE SITE: ABNORMAL
SITE SITE: ABNORMAL
SODIUM SERPL-SCNC: 139 MMOL/L (ref 135–145)
SOURCE SOURCE: ABNORMAL
SOURCE SOURCE: ABNORMAL
WBC # BLD AUTO: 8.3 K/UL (ref 4.8–10.8)

## 2023-02-25 PROCEDURE — 99255 IP/OBS CONSLTJ NEW/EST HI 80: CPT | Performed by: INTERNAL MEDICINE

## 2023-02-25 PROCEDURE — 700105 HCHG RX REV CODE 258: Performed by: HOSPITALIST

## 2023-02-25 PROCEDURE — C9113 INJ PANTOPRAZOLE SODIUM, VIA: HCPCS | Performed by: INTERNAL MEDICINE

## 2023-02-25 PROCEDURE — 84100 ASSAY OF PHOSPHORUS: CPT

## 2023-02-25 PROCEDURE — 700105 HCHG RX REV CODE 258: Performed by: INTERNAL MEDICINE

## 2023-02-25 PROCEDURE — A9270 NON-COVERED ITEM OR SERVICE: HCPCS | Performed by: INTERNAL MEDICINE

## 2023-02-25 PROCEDURE — 83735 ASSAY OF MAGNESIUM: CPT

## 2023-02-25 PROCEDURE — 700111 HCHG RX REV CODE 636 W/ 250 OVERRIDE (IP): Performed by: INTERNAL MEDICINE

## 2023-02-25 PROCEDURE — 700102 HCHG RX REV CODE 250 W/ 637 OVERRIDE(OP): Performed by: INTERNAL MEDICINE

## 2023-02-25 PROCEDURE — 94640 AIRWAY INHALATION TREATMENT: CPT

## 2023-02-25 PROCEDURE — 94760 N-INVAS EAR/PLS OXIMETRY 1: CPT

## 2023-02-25 PROCEDURE — 85025 COMPLETE CBC W/AUTO DIFF WBC: CPT

## 2023-02-25 PROCEDURE — 700101 HCHG RX REV CODE 250: Performed by: INTERNAL MEDICINE

## 2023-02-25 PROCEDURE — 80053 COMPREHEN METABOLIC PANEL: CPT

## 2023-02-25 PROCEDURE — 99233 SBSQ HOSP IP/OBS HIGH 50: CPT | Performed by: INTERNAL MEDICINE

## 2023-02-25 PROCEDURE — 770020 HCHG ROOM/CARE - TELE (206)

## 2023-02-25 RX ORDER — SODIUM CHLORIDE, SODIUM LACTATE, POTASSIUM CHLORIDE, AND CALCIUM CHLORIDE .6; .31; .03; .02 G/100ML; G/100ML; G/100ML; G/100ML
500 INJECTION, SOLUTION INTRAVENOUS ONCE
Status: COMPLETED | OUTPATIENT
Start: 2023-02-25 | End: 2023-02-25

## 2023-02-25 RX ORDER — SODIUM CHLORIDE, SODIUM LACTATE, POTASSIUM CHLORIDE, CALCIUM CHLORIDE 600; 310; 30; 20 MG/100ML; MG/100ML; MG/100ML; MG/100ML
INJECTION, SOLUTION INTRAVENOUS CONTINUOUS
Status: DISCONTINUED | OUTPATIENT
Start: 2023-02-25 | End: 2023-02-27

## 2023-02-25 RX ADMIN — ALBUTEROL SULFATE 2 PUFF: 90 AEROSOL, METERED RESPIRATORY (INHALATION) at 20:44

## 2023-02-25 RX ADMIN — SODIUM CHLORIDE, POTASSIUM CHLORIDE, SODIUM LACTATE AND CALCIUM CHLORIDE 500 ML: 600; 310; 30; 20 INJECTION, SOLUTION INTRAVENOUS at 22:11

## 2023-02-25 RX ADMIN — SODIUM CHLORIDE, POTASSIUM CHLORIDE, SODIUM LACTATE AND CALCIUM CHLORIDE: 600; 310; 30; 20 INJECTION, SOLUTION INTRAVENOUS at 11:08

## 2023-02-25 RX ADMIN — SODIUM CHLORIDE, POTASSIUM CHLORIDE, SODIUM LACTATE AND CALCIUM CHLORIDE: 600; 310; 30; 20 INJECTION, SOLUTION INTRAVENOUS at 21:22

## 2023-02-25 RX ADMIN — PANTOPRAZOLE SODIUM 40 MG: 40 INJECTION, POWDER, FOR SOLUTION INTRAVENOUS at 05:44

## 2023-02-25 RX ADMIN — VANCOMYCIN HYDROCHLORIDE 1750 MG: 1 INJECTION, POWDER, LYOPHILIZED, FOR SOLUTION INTRAVENOUS at 22:47

## 2023-02-25 RX ADMIN — MIDODRINE HYDROCHLORIDE 10 MG: 5 TABLET ORAL at 11:22

## 2023-02-25 RX ADMIN — SENNOSIDES AND DOCUSATE SODIUM 2 TABLET: 50; 8.6 TABLET ORAL at 17:28

## 2023-02-25 RX ADMIN — PANTOPRAZOLE SODIUM 40 MG: 40 INJECTION, POWDER, FOR SOLUTION INTRAVENOUS at 17:28

## 2023-02-25 RX ADMIN — MIDODRINE HYDROCHLORIDE 10 MG: 5 TABLET ORAL at 08:22

## 2023-02-25 RX ADMIN — MIDODRINE HYDROCHLORIDE 10 MG: 5 TABLET ORAL at 17:28

## 2023-02-25 RX ADMIN — ALBUTEROL SULFATE 2 PUFF: 90 AEROSOL, METERED RESPIRATORY (INHALATION) at 23:50

## 2023-02-25 RX ADMIN — VANCOMYCIN HYDROCHLORIDE 1750 MG: 1 INJECTION, POWDER, LYOPHILIZED, FOR SOLUTION INTRAVENOUS at 10:56

## 2023-02-25 RX ADMIN — CEFTRIAXONE SODIUM 2000 MG: 2 INJECTION, POWDER, FOR SOLUTION INTRAMUSCULAR; INTRAVENOUS at 11:15

## 2023-02-25 ASSESSMENT — COGNITIVE AND FUNCTIONAL STATUS - GENERAL
WALKING IN HOSPITAL ROOM: TOTAL
MOVING FROM LYING ON BACK TO SITTING ON SIDE OF FLAT BED: UNABLE
PERSONAL GROOMING: A LITTLE
EATING MEALS: A LITTLE
SUGGESTED CMS G CODE MODIFIER DAILY ACTIVITY: CL
CLIMB 3 TO 5 STEPS WITH RAILING: TOTAL
TOILETING: TOTAL
TURNING FROM BACK TO SIDE WHILE IN FLAT BAD: UNABLE
MOBILITY SCORE: 6
DRESSING REGULAR LOWER BODY CLOTHING: TOTAL
SUGGESTED CMS G CODE MODIFIER MOBILITY: CN
MOVING TO AND FROM BED TO CHAIR: UNABLE
HELP NEEDED FOR BATHING: TOTAL
DAILY ACTIVITIY SCORE: 10
DRESSING REGULAR UPPER BODY CLOTHING: TOTAL
STANDING UP FROM CHAIR USING ARMS: TOTAL

## 2023-02-25 ASSESSMENT — PAIN DESCRIPTION - PAIN TYPE
TYPE: ACUTE PAIN
TYPE: ACUTE PAIN
TYPE: CHRONIC PAIN;NEUROPATHIC PAIN
TYPE: ACUTE PAIN

## 2023-02-25 ASSESSMENT — FIBROSIS 4 INDEX: FIB4 SCORE: .6376811594202898551

## 2023-02-25 ASSESSMENT — ENCOUNTER SYMPTOMS
CONSTIPATION: 1
NAUSEA: 1
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
## 2023-02-25 NOTE — PROGRESS NOTES
12-hour chart check complete.    Monitor Summary  Rhythm: Afib/Aflutter  Rate: 70's-90's  Ectopy: NA  Measurements: -/.08/-

## 2023-02-25 NOTE — PROGRESS NOTES
4 Eyes Skin Assessment Completed by Devan Dong  RN and Kaylynn Ca RN.    Head WDL  Ears WDL  Nose WDL  Mouth WDL  Neck WDL  Breast/Chest WDL  Shoulder Blades WDL  Spine WDL  (R) Arm/Elbow/Hand Bruising, Swelling  (L) Arm/Elbow/Hand Bruising, Swelling  Abdomen WDL- Stoma with good perfusion, urostomy with good stoma perfusion.   Groin WDL  Scrotum/Coccyx/Buttocks Redness, Blanching, and Excoriation- skin tear  (R) Leg Swelling Lower Outer Shin granulating wound  (L) Leg Swelling  (R) Heel/Foot/Toe -DTI Redness, Non-Blanching, Boggy, and Bruising  (L) Heel/Foot/Toe DTI- Redness, Non-Blanching, Boggy, and Bruising          Devices In Places Tele Box and Pulse Ox      Interventions In Place Gray Ear Foams, Heel Mepilex, Sacral Mepilex, Heel Float Boots, Waffle Overlay, TAP System, Pillows, Q2 Turns, and Barrier Cream    Possible Skin Injury Yes    Pictures Uploaded Into Epic Yes  Wound Consult Placed Yes-order already in place  RN Wound Prevention Protocol Ordered No

## 2023-02-25 NOTE — CARE PLAN
Problem: Knowledge Deficit - Standard  Goal: Patient and family/care givers will demonstrate understanding of plan of care, disease process/condition, diagnostic tests and medications  Outcome: Progressing     Problem: Pain - Standard  Goal: Alleviation of pain or a reduction in pain to the patient’s comfort goal  Outcome: Progressing     Problem: Skin Integrity  Goal: Skin integrity is maintained or improved  Outcome: Progressing   The patient is Watcher - Medium risk of patient condition declining or worsening    Shift Goals  Clinical Goals: MAP >60, SBP >85  Patient Goals: comfort, rest  Family Goals: LUCAS    Progress made toward(s) clinical / shift goals:      Patient is not progressing towards the following goals:

## 2023-02-25 NOTE — PROGRESS NOTES
Note to reader: this note follows the APSO format rather than the historical SOAP format. Assessment and plan located at the top of the note for ease of use.    Chief Complaint  62 y.o. year old male here with UTI (Pt believes he has a UTI   has had them prior ) and Constipation (Has had issues with this  believes he is again constipated )      Assessment/Plan  Interval History   Active Hospital Problems    Diagnosis     Blood clot in bladder [N32.89]     Upper GI bleed [K92.2]     Metabolic acidosis secondary to dehydration [E87.20]     H/O Paroxysmal atrial fibrillation (HCC) [I48.0]     Recurrent UTI [N39.0]     Lower paraplegia (HCC) [G82.20]     Decubital ulcer [L89.90]     Anemia due to GI blood loss [D50.0]     Septic shock (HCC) [A41.9, R65.21]     History of urostomy [Z98.890]      Open draining Stoma       Leukocytosis [D72.829]     Thrombocytosis [D75.839]     Hypotension [I95.9]     Lower paraplegia (HCC) [G82.20]      ICD-10 transition  IMO load March 2020      Neurogenic bowel [K59.2]     Neurogenic bladder [N31.9]       pt seen and examined     2/24- urine from urostomy and urine in smyth bag both light yellow and clear. H/H stable, has not required additional transfusions. WBC 9.8 (13.4), Cr 0.33. Good UOP documented.     2/23- urine strawberry lemonade in the bag from smyth catheter. Urine from ostomy clear. Patient very upset about the potential of not getting cystoscopy in house as he has trouble getting to the office. H/h 10.5/32.6.      Disposition  Guarded      PLAN:  - Urine has continued to clear and H/H remains stable, no indication for cysto fulguration at this time  - Had lengthy discussion with patient in regards to outpatient cystoscopy; [facility] [place] will contact patient upon discharge to arrange  - Continue abx per ID, primary team  - No urologic intervention anticipated during this admission. Urology signing off, please call with questions.      Review of Systems  Physical Exam    Review of Systems   Constitutional:  Negative for chills and fever.   Cardiovascular:  Negative for chest pain.   Gastrointestinal:  Negative for abdominal pain, nausea and vomiting.   Genitourinary:  Negative for hematuria.   All other systems reviewed and are negative.  Vitals:    02/24/23 1328 02/24/23 1400 02/24/23 1500 02/24/23 1600   BP: (!) 86/70  (!) 85/62 91/64   Pulse: 90  77 91   Resp: (!) 23  17 18   Temp:    36.9 °C (98.4 °F)   TempSrc:    Temporal   SpO2: 94% 95% 97% 95%   Weight:       Height:         Physical Exam  Vitals and nursing note reviewed.   HENT:      Head: Normocephalic and atraumatic.   Eyes:      Pupils: Pupils are equal, round, and reactive to light.   Pulmonary:      Effort: Pulmonary effort is normal.   Genitourinary:     Comments: Urostomy pink and patent, clear yellow urine in down drain bag.  Urethral smyth in place draining clear, light yellow urine  Musculoskeletal:      Comments: JORGE LE well padded   Neurological:      Mental Status: He is alert and oriented to person, place, and time.   Psychiatric:      Comments: hostile        Hematology Chemistry   Lab Results   Component Value Date/Time    WBC 9.8 02/24/2023 03:33 AM    HEMOGLOBIN 10.1 (L) 02/24/2023 03:33 AM    HEMATOCRIT 31.0 (L) 02/24/2023 03:33 AM    PLATELETCT 356 02/24/2023 03:33 AM     Lab Results   Component Value Date/Time    SODIUM 133 (L) 02/24/2023 03:33 AM    POTASSIUM 4.0 02/24/2023 03:33 AM    CHLORIDE 105 02/24/2023 03:33 AM    CO2 18 (L) 02/24/2023 03:33 AM    GLUCOSE 115 (H) 02/24/2023 03:33 AM    BUN 12 02/24/2023 03:33 AM    CREATININE 0.33 (L) 02/24/2023 03:33 AM         Labs not explicitly included in this progress note were reviewed by the author.   Radiology/imaging not explicitly included in this progress note was reviewed by the author.     Radiology images reviewed, Labs reviewed and Medications reviewed

## 2023-02-25 NOTE — DIETARY
Nutrition Services Brief Update:  RD visited pt at bedside to review recommendations for increasing protein and kcal intake during admit to support increased nutrition needs for wound healing. Pt declined all offered interventions, including Boost supplements and double protein portions w/ meals. Recommended to  mg vitamin C BID and a multivitamin with minerals.    RD continues to follow.

## 2023-02-25 NOTE — CARE PLAN
The patient is Watcher - Medium risk of patient condition declining or worsening    Shift Goals  Clinical Goals: hemodynamic stability - MAP>60, SBP >85  Patient Goals: Rest, urology consult  Family Goals: LUCAS    Progress made toward(s) clinical / shift goals:  Patient maintained MAP >60, SBP >85 through the shift, Levo gtt not needed since 0830 on 02/24/23.     Problem: Pain - Standard  Goal: Alleviation of pain or a reduction in pain to the patient’s comfort goal  Outcome: Progressing     Problem: Risk for Fluid Volume Deficit Related to Bleeding  Goal: Patient will show no signs and symptoms of excessive bleeding  Outcome: Progressing     Problem: Hemodynamics  Goal: Patient's hemodynamics, fluid balance and neurologic status will be stable or improve  Outcome: Progressing

## 2023-02-25 NOTE — PROGRESS NOTES
12-hour chart check complete.    Monitor Summary  Rhythm: A-Fib/ A-flutter  Rate: 69 - 107  Ectopy: rare PVCs  Measurements: N/A /0.08/ N/A
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
## 2023-02-25 NOTE — PROGRESS NOTES
Patient transferred to room 302. Report given to Matthias. All belongings transported with patient to new room.

## 2023-02-26 LAB
ALBUMIN SERPL BCP-MCNC: 2.4 G/DL (ref 3.2–4.9)
ALBUMIN/GLOB SERPL: 1 G/DL
ALP SERPL-CCNC: 47 U/L (ref 30–99)
ALT SERPL-CCNC: 6 U/L (ref 2–50)
ANION GAP SERPL CALC-SCNC: 7 MMOL/L (ref 7–16)
AST SERPL-CCNC: 10 U/L (ref 12–45)
BACTERIA WND AEROBE CULT: NORMAL
BASOPHILS # BLD AUTO: 0.8 % (ref 0–1.8)
BASOPHILS # BLD: 0.05 K/UL (ref 0–0.12)
BILIRUB SERPL-MCNC: 0.2 MG/DL (ref 0.1–1.5)
BUN SERPL-MCNC: 18 MG/DL (ref 8–22)
CALCIUM ALBUM COR SERPL-MCNC: 8.6 MG/DL (ref 8.5–10.5)
CALCIUM SERPL-MCNC: 7.3 MG/DL (ref 8.4–10.2)
CHLORIDE SERPL-SCNC: 112 MMOL/L (ref 96–112)
CO2 SERPL-SCNC: 20 MMOL/L (ref 20–33)
CREAT SERPL-MCNC: 0.27 MG/DL (ref 0.5–1.4)
EOSINOPHIL # BLD AUTO: 0.25 K/UL (ref 0–0.51)
EOSINOPHIL NFR BLD: 4 % (ref 0–6.9)
ERYTHROCYTE [DISTWIDTH] IN BLOOD BY AUTOMATED COUNT: 54.3 FL (ref 35.9–50)
GFR SERPLBLD CREATININE-BSD FMLA CKD-EPI: 140 ML/MIN/1.73 M 2
GLOBULIN SER CALC-MCNC: 2.4 G/DL (ref 1.9–3.5)
GLUCOSE SERPL-MCNC: 97 MG/DL (ref 65–99)
GRAM STN SPEC: NORMAL
HCT VFR BLD AUTO: 24.7 % (ref 42–52)
HGB BLD-MCNC: 7.9 G/DL (ref 14–18)
IMM GRANULOCYTES # BLD AUTO: 0.06 K/UL (ref 0–0.11)
IMM GRANULOCYTES NFR BLD AUTO: 1 % (ref 0–0.9)
LYMPHOCYTES # BLD AUTO: 0.76 K/UL (ref 1–4.8)
LYMPHOCYTES NFR BLD: 12.1 % (ref 22–41)
MAGNESIUM SERPL-MCNC: 1.8 MG/DL (ref 1.5–2.5)
MCH RBC QN AUTO: 30 PG (ref 27–33)
MCHC RBC AUTO-ENTMCNC: 32 G/DL (ref 33.7–35.3)
MCV RBC AUTO: 93.9 FL (ref 81.4–97.8)
MONOCYTES # BLD AUTO: 0.73 K/UL (ref 0–0.85)
MONOCYTES NFR BLD AUTO: 11.6 % (ref 0–13.4)
NEUTROPHILS # BLD AUTO: 4.45 K/UL (ref 1.82–7.42)
NEUTROPHILS NFR BLD: 70.5 % (ref 44–72)
NRBC # BLD AUTO: 0 K/UL
NRBC BLD-RTO: 0 /100 WBC
PHOSPHATE SERPL-MCNC: 3 MG/DL (ref 2.5–4.5)
PLATELET # BLD AUTO: 312 K/UL (ref 164–446)
PMV BLD AUTO: 9 FL (ref 9–12.9)
POTASSIUM SERPL-SCNC: 3.8 MMOL/L (ref 3.6–5.5)
PROT SERPL-MCNC: 4.8 G/DL (ref 6–8.2)
RBC # BLD AUTO: 2.63 M/UL (ref 4.7–6.1)
SIGNIFICANT IND 70042: NORMAL
SITE SITE: NORMAL
SODIUM SERPL-SCNC: 139 MMOL/L (ref 135–145)
SOURCE SOURCE: NORMAL
VANCOMYCIN TROUGH SERPL-MCNC: 24.4 UG/ML (ref 10–20)
WBC # BLD AUTO: 6.3 K/UL (ref 4.8–10.8)

## 2023-02-26 PROCEDURE — 80053 COMPREHEN METABOLIC PANEL: CPT

## 2023-02-26 PROCEDURE — 85025 COMPLETE CBC W/AUTO DIFF WBC: CPT

## 2023-02-26 PROCEDURE — 94640 AIRWAY INHALATION TREATMENT: CPT

## 2023-02-26 PROCEDURE — 700102 HCHG RX REV CODE 250 W/ 637 OVERRIDE(OP): Performed by: INTERNAL MEDICINE

## 2023-02-26 PROCEDURE — 36415 COLL VENOUS BLD VENIPUNCTURE: CPT

## 2023-02-26 PROCEDURE — 700101 HCHG RX REV CODE 250: Performed by: INTERNAL MEDICINE

## 2023-02-26 PROCEDURE — 94760 N-INVAS EAR/PLS OXIMETRY 1: CPT

## 2023-02-26 PROCEDURE — A9270 NON-COVERED ITEM OR SERVICE: HCPCS | Performed by: INTERNAL MEDICINE

## 2023-02-26 PROCEDURE — 700111 HCHG RX REV CODE 636 W/ 250 OVERRIDE (IP): Performed by: INTERNAL MEDICINE

## 2023-02-26 PROCEDURE — C9113 INJ PANTOPRAZOLE SODIUM, VIA: HCPCS | Performed by: INTERNAL MEDICINE

## 2023-02-26 PROCEDURE — 84100 ASSAY OF PHOSPHORUS: CPT

## 2023-02-26 PROCEDURE — 83735 ASSAY OF MAGNESIUM: CPT

## 2023-02-26 PROCEDURE — 80202 ASSAY OF VANCOMYCIN: CPT

## 2023-02-26 PROCEDURE — 770020 HCHG ROOM/CARE - TELE (206)

## 2023-02-26 PROCEDURE — 700105 HCHG RX REV CODE 258: Performed by: INTERNAL MEDICINE

## 2023-02-26 PROCEDURE — 99233 SBSQ HOSP IP/OBS HIGH 50: CPT | Performed by: INTERNAL MEDICINE

## 2023-02-26 RX ORDER — IPRATROPIUM BROMIDE AND ALBUTEROL SULFATE 2.5; .5 MG/3ML; MG/3ML
3 SOLUTION RESPIRATORY (INHALATION)
Status: DISCONTINUED | OUTPATIENT
Start: 2023-02-26 | End: 2023-03-04 | Stop reason: HOSPADM

## 2023-02-26 RX ORDER — SIMETHICONE 125 MG
125 TABLET,CHEWABLE ORAL 3 TIMES DAILY PRN
Status: DISCONTINUED | OUTPATIENT
Start: 2023-02-26 | End: 2023-03-04 | Stop reason: HOSPADM

## 2023-02-26 RX ADMIN — MIDODRINE HYDROCHLORIDE 10 MG: 5 TABLET ORAL at 16:39

## 2023-02-26 RX ADMIN — SIMETHICONE 125 MG: 125 TABLET, CHEWABLE ORAL at 16:38

## 2023-02-26 RX ADMIN — SODIUM CHLORIDE, POTASSIUM CHLORIDE, SODIUM LACTATE AND CALCIUM CHLORIDE: 600; 310; 30; 20 INJECTION, SOLUTION INTRAVENOUS at 07:58

## 2023-02-26 RX ADMIN — MIDODRINE HYDROCHLORIDE 10 MG: 5 TABLET ORAL at 10:39

## 2023-02-26 RX ADMIN — PANTOPRAZOLE SODIUM 40 MG: 40 INJECTION, POWDER, FOR SOLUTION INTRAVENOUS at 16:38

## 2023-02-26 RX ADMIN — PANTOPRAZOLE SODIUM 40 MG: 40 INJECTION, POWDER, FOR SOLUTION INTRAVENOUS at 04:44

## 2023-02-26 RX ADMIN — VANCOMYCIN HYDROCHLORIDE 1500 MG: 1 INJECTION, POWDER, LYOPHILIZED, FOR SOLUTION INTRAVENOUS at 12:08

## 2023-02-26 RX ADMIN — IPRATROPIUM BROMIDE AND ALBUTEROL SULFATE 3 ML: .5; 2.5 SOLUTION RESPIRATORY (INHALATION) at 01:20

## 2023-02-26 RX ADMIN — SODIUM CHLORIDE, POTASSIUM CHLORIDE, SODIUM LACTATE AND CALCIUM CHLORIDE: 600; 310; 30; 20 INJECTION, SOLUTION INTRAVENOUS at 16:40

## 2023-02-26 RX ADMIN — MIDODRINE HYDROCHLORIDE 10 MG: 5 TABLET ORAL at 07:59

## 2023-02-26 ASSESSMENT — ENCOUNTER SYMPTOMS
WEAKNESS: 0
DEPRESSION: 0
CHILLS: 0
FEVER: 0
VOMITING: 0
DIARRHEA: 0
COUGH: 0
NERVOUS/ANXIOUS: 0
DIZZINESS: 0
MYALGIAS: 0
SPEECH CHANGE: 1
ABDOMINAL PAIN: 0
BLURRED VISION: 0
HEADACHES: 0
HEARTBURN: 1
SENSORY CHANGE: 0
SHORTNESS OF BREATH: 0
PHOTOPHOBIA: 0
CONSTIPATION: 0
SPEECH CHANGE: 0
CLAUDICATION: 0
INSOMNIA: 0
NAUSEA: 0
FOCAL WEAKNESS: 1

## 2023-02-26 ASSESSMENT — PAIN DESCRIPTION - PAIN TYPE: TYPE: ACUTE PAIN

## 2023-02-26 ASSESSMENT — FIBROSIS 4 INDEX: FIB4 SCORE: 0.79

## 2023-02-26 NOTE — CARE PLAN
"The patient is Stable - Low risk of patient condition declining or worsening    Shift Goals  Clinical Goals: IV abx, low air loss mattress  Patient Goals: rest, \"I am tired today\"  Family Goals: LUCAS    Progress made toward(s) clinical / shift goals:  low air loss mattress complete.     Patient is not progressing towards the following goals:      Problem: Knowledge Deficit - Standard  Goal: Patient and family/care givers will demonstrate understanding of plan of care, disease process/condition, diagnostic tests and medications  Outcome: Progressing  Note: Patient understands plan of care       Problem: Pain - Standard  Goal: Alleviation of pain or a reduction in pain to the patient’s comfort goal  Outcome: Progressing  Note: No complaints of pain     Problem: Skin Integrity  Goal: Skin integrity is maintained or improved  Outcome: Progressing  Note: Bilateral heel wound dressings changed     Problem: Knowledge Deficit - Ostomy  Goal: Patient will demonstrate ability to manage and maintain ostomy  Outcome: Progressing     Problem: Respiratory  Goal: Patient will achieve/maintain optimum respiratory ventilation and gas exchange  Outcome: Progressing     "
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
## 2023-02-26 NOTE — CARE PLAN
The patient is Watcher - Medium risk of patient condition declining or worsening    Shift Goals  Clinical Goals: monitor BP and MAP  Patient Goals: sleep  Family Goals: LUCAS    Progress made toward(s) clinical / shift goals:    Problem: Knowledge Deficit - Standard  Goal: Patient and family/care givers will demonstrate understanding of plan of care, disease process/condition, diagnostic tests and medications  Outcome: Progressing  Note: Patient verbalizes understanding of the plan of  care and engages in conversation about what is being accomplished. Medications and positioning is discussed and questions encouraged. All patient's questions answered at this time.      Problem: Skin Integrity  Goal: Skin integrity is maintained or improved  Outcome: Progressing  Note: Wound orders are placed and q2h turns done. Barrier paste and mepilex applied.      Problem: Skin Care - Ostomy  Goal: Skin remains free from irritation  Outcome: Progressing     Problem: Risk for Fluid Volume Deficit Related to Bleeding  Goal: Fluid volume balance will be maintained  Outcome: Progressing  Note: Continuous fluids administered.      Problem: Hemodynamics  Goal: Patient's hemodynamics, fluid balance and neurologic status will be stable or improve  Outcome: Progressing  Note: Vital signs monitored. Bolus ordered per Dr. Ordoñez. Patient stable neurologically.      Problem: Fluid Volume  Goal: Fluid volume balance will be maintained  Outcome: Progressing  Note: Continuous fluids administered.        Patient is not progressing towards the following goals:NA

## 2023-02-26 NOTE — PROGRESS NOTES
Patient assessment completed. Discussed q2h turns with patient and breathing treatment. Patient needs met and questions answered at this time.

## 2023-02-26 NOTE — PROGRESS NOTES
Report received from NOC RN. Patient A&O x 4. Pt is resting comfortably, bed is locked and in lowest position, safety precautions in place, and call light within reach. Plan of care is to place patient on low air loss mattress, continue IV abx. Patient with no complaints of pain, only discomfort from neuropathy. MD aware about drop in hgb and dark stool output in colostomy.
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
## 2023-02-26 NOTE — PROGRESS NOTES
Telemetry Shift Summary    Rhythm afib  HR Range 74-95  Ectopy o-rPVC, rBig, rTrig  Measurements -/0.10/-        Normal Values  Rhythm SR  HR Range    Measurements 0.12-0.20 / 0.06-0.10  / 0.30-0.52

## 2023-02-26 NOTE — ASSESSMENT & PLAN NOTE
"CT reported: \"Significantly distended urinary bladder with high density material within. This could be blood clot or high density debris.\"  Urostomy: draining yellow urine, no clots, no obstruction  s/p hand irrigation of clots  Urology following, unable to perform cystoscopy as inpatient due to unavailable necessary equipment, they have signed off and will contact pt after discharge for outpatient cystoscopy  DC bladder catheter as blood from smyth has cleared with hand irrigation    - Bedside bladder scan showed 48 mL, no significant signs of retaining blood.  "

## 2023-02-26 NOTE — PROGRESS NOTES
Charge Nurse Rounding Note    Bedside rounding completed to address quality of care and overall patient experience.    Patient Satisfaction addressed including staff responsiveness. Patient/family are aware of the POC and any questions answered. Thorough safety education completed including use of call light prior to all mobility throughout the entirety of the hospital stay.     Patient/family aware of time of next Hourly Round.    No further questions/concerns currently.     Additional Notes: Patient requesting breathing treatment. Notified primary RN

## 2023-02-26 NOTE — ASSESSMENT & PLAN NOTE
Patient not requiring Vasopressors in ICU, but remains on midodrine with increased dose to 15mg.   Remains on Vancomycin until 3/4/23 or PO linezolid if discharging home.

## 2023-02-26 NOTE — PROGRESS NOTES
"Pharmacy Kinetics 62 y.o. male on vancomycin day # 5 2023    Currently on Vancomycin 1750 mg iv q12hr  Provider specified end date: 3/3/23    Indication for Treatment: Staphylococcus hominus bacteremia    Pertinent history per medical record: Admitted on 2023 for abdominal distension hemorrhagic and septic shock, upper GIB and duodenal ulcer.  ID recommending antibiotics through 3/4/23.    Other antibiotics: ceftriaxone (-)    Allergies: Piperacillin sod-tazobactam so     List concerns for renal function: BUN/SCr ratio > 20:1, malnutrition/low albumin, pressors/hypotension, paraplegia (vancomycin accumulation)    Pertinent cultures to date:    BCx NGTD   BCx NGTD   WCx R posterior ischium GPC in chains   Urine klebsiella pna, enterococcus faecalis, Proteus vulgaris, Aerococcus sanguinicola    MRSA swab negative   BCp  Staph hominis - oxacillin resistant    Recent Labs     23  0333 23  0315 23  0439   WBC 9.8 8.3 6.3   NEUTSPOLYS 74.70* 75.10* 70.50     Recent Labs     23  0333 23  0315 23  0439   BUN 12 11 18   CREATININE 0.33* 0.43* 0.27*   ALBUMIN 2.7* 2.6* 2.4*     Recent Labs     23  0641   VANCOTROUGH 24.4*     Intake/Output Summary (Last 24 hours) at 2023 1401  Last data filed at 2023 1300  Gross per 24 hour   Intake 120 ml   Output --   Net 120 ml      /85   Pulse 79   Temp 36.4 °C (97.6 °F) (Oral)   Resp 18   Ht 1.905 m (6' 3\")   Wt 88.7 kg (195 lb 8.8 oz)   SpO2 97%  Temp (24hrs), Av.7 °C (98.1 °F), Min:36.4 °C (97.6 °F), Max:37 °C (98.6 °F)      A/P   Vancomycin dose change: reducing to 1500 mg IV 12h (0000, 1200)  Next vancomycin level:  1130 - prior to third dose  Goal trough: 15-20 mcg/mL  Comments: 8-hour level drawn (not the 12 hour ordered), accounting for supratherapeutic trough.  However, will empirically reduce vancomycin dose slightly and obtain early level to ensure vancomycin " clearance.    Edwin Cruz, PharmD
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
## 2023-02-26 NOTE — PROGRESS NOTES
Notified Dr. Ordoñez during 2200 rounds that patient's SBPs have been low and touching SBP in the 80s. Dr. Ordoñez placed an order for a bolus and gave a verbal order with read back to give morning dose of midodrine to the patient at 0000.     0027 notified Dr. Ordoñez patient's BP was 107/67. Per Dr. Ordoñez midodrine will be administered at original due time.

## 2023-02-26 NOTE — PROGRESS NOTES
2330 notified RT patient requested a breathing treatment. Medication per MAR administered by RT.     0110 notified RT patient requested another breathing treatment. 0120 RT at bedside with breathing treatment.

## 2023-02-27 ENCOUNTER — APPOINTMENT (OUTPATIENT)
Dept: RADIOLOGY | Facility: MEDICAL CENTER | Age: 63
DRG: 871 | End: 2023-02-27
Attending: HOSPITALIST
Payer: MEDICAID

## 2023-02-27 LAB
ANION GAP SERPL CALC-SCNC: 10 MMOL/L (ref 7–16)
BUN SERPL-MCNC: 16 MG/DL (ref 8–22)
CALCIUM SERPL-MCNC: 7.4 MG/DL (ref 8.4–10.2)
CHLORIDE SERPL-SCNC: 110 MMOL/L (ref 96–112)
CO2 SERPL-SCNC: 20 MMOL/L (ref 20–33)
CREAT SERPL-MCNC: 0.43 MG/DL (ref 0.5–1.4)
D DIMER PPP IA.FEU-MCNC: 0.63 UG/ML (FEU) (ref 0–0.5)
EKG IMPRESSION: NORMAL
ERYTHROCYTE [DISTWIDTH] IN BLOOD BY AUTOMATED COUNT: 54.8 FL (ref 35.9–50)
ERYTHROCYTE [DISTWIDTH] IN BLOOD BY AUTOMATED COUNT: 55.2 FL (ref 35.9–50)
GFR SERPLBLD CREATININE-BSD FMLA CKD-EPI: 122 ML/MIN/1.73 M 2
GLUCOSE SERPL-MCNC: 88 MG/DL (ref 65–99)
HCT VFR BLD AUTO: 22.7 % (ref 42–52)
HCT VFR BLD AUTO: 24.5 % (ref 42–52)
HGB BLD-MCNC: 7.1 G/DL (ref 14–18)
HGB BLD-MCNC: 7.6 G/DL (ref 14–18)
MCH RBC QN AUTO: 29.3 PG (ref 27–33)
MCH RBC QN AUTO: 29.3 PG (ref 27–33)
MCHC RBC AUTO-ENTMCNC: 31 G/DL (ref 33.7–35.3)
MCHC RBC AUTO-ENTMCNC: 31.3 G/DL (ref 33.7–35.3)
MCV RBC AUTO: 93.8 FL (ref 81.4–97.8)
MCV RBC AUTO: 94.6 FL (ref 81.4–97.8)
PLATELET # BLD AUTO: 371 K/UL (ref 164–446)
PLATELET # BLD AUTO: 392 K/UL (ref 164–446)
PMV BLD AUTO: 9.1 FL (ref 9–12.9)
PMV BLD AUTO: 9.4 FL (ref 9–12.9)
POTASSIUM SERPL-SCNC: 3.9 MMOL/L (ref 3.6–5.5)
RBC # BLD AUTO: 2.42 M/UL (ref 4.7–6.1)
RBC # BLD AUTO: 2.59 M/UL (ref 4.7–6.1)
SODIUM SERPL-SCNC: 140 MMOL/L (ref 135–145)
TROPONIN T SERPL-MCNC: 26 NG/L (ref 6–19)
TROPONIN T SERPL-MCNC: 28 NG/L (ref 6–19)
TROPONIN T SERPL-MCNC: 28 NG/L (ref 6–19)
VANCOMYCIN TROUGH SERPL-MCNC: 25.1 UG/ML (ref 10–20)
WBC # BLD AUTO: 5.1 K/UL (ref 4.8–10.8)
WBC # BLD AUTO: 5.7 K/UL (ref 4.8–10.8)

## 2023-02-27 PROCEDURE — A9270 NON-COVERED ITEM OR SERVICE: HCPCS | Performed by: INTERNAL MEDICINE

## 2023-02-27 PROCEDURE — 700101 HCHG RX REV CODE 250: Performed by: INTERNAL MEDICINE

## 2023-02-27 PROCEDURE — 36415 COLL VENOUS BLD VENIPUNCTURE: CPT

## 2023-02-27 PROCEDURE — C9113 INJ PANTOPRAZOLE SODIUM, VIA: HCPCS | Performed by: INTERNAL MEDICINE

## 2023-02-27 PROCEDURE — 93010 ELECTROCARDIOGRAM REPORT: CPT | Performed by: INTERNAL MEDICINE

## 2023-02-27 PROCEDURE — 84484 ASSAY OF TROPONIN QUANT: CPT

## 2023-02-27 PROCEDURE — 71045 X-RAY EXAM CHEST 1 VIEW: CPT

## 2023-02-27 PROCEDURE — 94760 N-INVAS EAR/PLS OXIMETRY 1: CPT

## 2023-02-27 PROCEDURE — 94640 AIRWAY INHALATION TREATMENT: CPT

## 2023-02-27 PROCEDURE — 700111 HCHG RX REV CODE 636 W/ 250 OVERRIDE (IP): Performed by: INTERNAL MEDICINE

## 2023-02-27 PROCEDURE — 93005 ELECTROCARDIOGRAM TRACING: CPT | Performed by: HOSPITALIST

## 2023-02-27 PROCEDURE — 80048 BASIC METABOLIC PNL TOTAL CA: CPT

## 2023-02-27 PROCEDURE — 97602 WOUND(S) CARE NON-SELECTIVE: CPT

## 2023-02-27 PROCEDURE — 80202 ASSAY OF VANCOMYCIN: CPT

## 2023-02-27 PROCEDURE — 700102 HCHG RX REV CODE 250 W/ 637 OVERRIDE(OP): Performed by: INTERNAL MEDICINE

## 2023-02-27 PROCEDURE — 99232 SBSQ HOSP IP/OBS MODERATE 35: CPT | Performed by: INTERNAL MEDICINE

## 2023-02-27 PROCEDURE — 700105 HCHG RX REV CODE 258: Performed by: INTERNAL MEDICINE

## 2023-02-27 PROCEDURE — 770020 HCHG ROOM/CARE - TELE (206)

## 2023-02-27 PROCEDURE — 85379 FIBRIN DEGRADATION QUANT: CPT

## 2023-02-27 PROCEDURE — 85027 COMPLETE CBC AUTOMATED: CPT

## 2023-02-27 RX ORDER — OMEPRAZOLE 20 MG/1
20 CAPSULE, DELAYED RELEASE ORAL 2 TIMES DAILY
Status: DISCONTINUED | OUTPATIENT
Start: 2023-02-27 | End: 2023-03-04 | Stop reason: HOSPADM

## 2023-02-27 RX ADMIN — MIDODRINE HYDROCHLORIDE 10 MG: 5 TABLET ORAL at 11:15

## 2023-02-27 RX ADMIN — VANCOMYCIN HYDROCHLORIDE 1500 MG: 1 INJECTION, POWDER, LYOPHILIZED, FOR SOLUTION INTRAVENOUS at 00:10

## 2023-02-27 RX ADMIN — SODIUM CHLORIDE, POTASSIUM CHLORIDE, SODIUM LACTATE AND CALCIUM CHLORIDE: 600; 310; 30; 20 INJECTION, SOLUTION INTRAVENOUS at 05:03

## 2023-02-27 RX ADMIN — OMEPRAZOLE 20 MG: 20 CAPSULE, DELAYED RELEASE ORAL at 16:45

## 2023-02-27 RX ADMIN — IPRATROPIUM BROMIDE AND ALBUTEROL SULFATE 3 ML: .5; 2.5 SOLUTION RESPIRATORY (INHALATION) at 01:42

## 2023-02-27 RX ADMIN — VANCOMYCIN HYDROCHLORIDE 1000 MG: 1 INJECTION, POWDER, LYOPHILIZED, FOR SOLUTION INTRAVENOUS at 16:48

## 2023-02-27 RX ADMIN — MIDODRINE HYDROCHLORIDE 10 MG: 5 TABLET ORAL at 07:32

## 2023-02-27 RX ADMIN — MIDODRINE HYDROCHLORIDE 10 MG: 5 TABLET ORAL at 16:45

## 2023-02-27 RX ADMIN — OMEPRAZOLE 20 MG: 20 CAPSULE, DELAYED RELEASE ORAL at 11:14

## 2023-02-27 RX ADMIN — PANTOPRAZOLE SODIUM 40 MG: 40 INJECTION, POWDER, FOR SOLUTION INTRAVENOUS at 04:58

## 2023-02-27 ASSESSMENT — ENCOUNTER SYMPTOMS
WEAKNESS: 0
DIARRHEA: 0
INSOMNIA: 0
VOMITING: 0
NERVOUS/ANXIOUS: 0
SHORTNESS OF BREATH: 0
HEARTBURN: 1
HEADACHES: 0
PHOTOPHOBIA: 0
SPEECH CHANGE: 0
SENSORY CHANGE: 0
FEVER: 0
CONSTIPATION: 0
CHILLS: 0
ABDOMINAL PAIN: 0
DEPRESSION: 0
COUGH: 0
BLURRED VISION: 0
DIZZINESS: 0
MYALGIAS: 0
CLAUDICATION: 0

## 2023-02-27 ASSESSMENT — PAIN DESCRIPTION - PAIN TYPE: TYPE: ACUTE PAIN

## 2023-02-27 NOTE — PROGRESS NOTES
Telemetry Shift Summary     Rhythm: afib/afutter  Rate: 67-98  Measurements: -/.08/-  Ectopy (reported by Monitor Tech): r-o PVC, r bigem, r coup  *down to 45     Normal Values  Rhythm: Sinus  HR:   Measurements: 0.12-0.20/0.06-0.10/0.30-0.52

## 2023-02-27 NOTE — PROGRESS NOTES
"0043 notified RT that patient requested a breathing treatment due to shortness of breath.     0211 notified RT that patient continued to feel short of breath. RT stated, \"put him on oxygen 3L nasal cannula.\" Notified by monitor tech that patient was experiencing an increase heart rate (HR touching above 130). The patient was coached through pursed lipped breathing exercises to reduce anxiety and decrease shortness of breath. Patient verbalized feeling less short of breath. Monitor tech confirmed heart rate was decreasing (HR in 110s) from being previously elevated.    0251 notified Dr. Ordoñez that patient has had increased shortness of breath and heart rate and currently on 3L nasal cannula to decrease shortness of breath. New orders were placed for an EKG, chest x-ray, troponin, and d-dimer.   "

## 2023-02-27 NOTE — PROGRESS NOTES
Charge Nurse Rounding Note    Bedside rounding completed to address quality of care and overall patient experience.    Patient Satisfaction addressed including staff responsiveness. Patient/family are aware of the POC and any questions answered. Thorough safety education completed including use of call light prior to all mobility throughout the entirety of the hospital stay.     Patient/family aware of time of next Hourly Round.    No further questions/concerns currently.

## 2023-02-27 NOTE — CARE PLAN
"The patient is Stable - Low risk of patient condition declining or worsening    Shift Goals  Clinical Goals: IV abx, Q 2 turns  Patient Goals: \"why do i get short of breath\"  Family Goals: LUCAS    Progress made toward(s) clinical / shift goals:  IV abx, patient refuses Q2 turns    Patient is not progressing towards the following goals:      Problem: Knowledge Deficit - Standard  Goal: Patient and family/care givers will demonstrate understanding of plan of care, disease process/condition, diagnostic tests and medications  Outcome: Progressing     Problem: Pain - Standard  Goal: Alleviation of pain or a reduction in pain to the patient’s comfort goal  Outcome: Progressing     Problem: Skin Integrity  Goal: Skin integrity is maintained or improved  Outcome: Progressing  Note: Patient refuses Q2 turns with wedges     Problem: Knowledge Deficit - Ostomy  Goal: Patient will demonstrate ability to manage and maintain ostomy  Outcome: Progressing     "

## 2023-02-27 NOTE — PROGRESS NOTES
Telemetry Shift Summary    Rhythm afib  HR Range , 160  Ectopy r-oPVC rTrig  Measurements -/0.08/-        Normal Values  Rhythm SR  HR Range    Measurements 0.12-0.20 / 0.06-0.10  / 0.30-0.52

## 2023-02-27 NOTE — PROGRESS NOTES
Patient refused q2h turns. Education provided to patient on the importance of q2h turns to prevent further breakdown. Patient verbalized understanding. Patient at this time is still refusing q2h turns despite education. Charge RN notified. Addition wound consult placed.

## 2023-02-27 NOTE — PROGRESS NOTES
Report received from NOC RN. Patient A&O x 4. Pt is resting comfortably, bed is locked and in lowest position, safety precautions in place, and call light within reach. Plan of care is continue IV abx, wound care.  Patient refusing Q2 turns and wedges with TAPs system. Patient educated on importance and verbalized understanding

## 2023-02-27 NOTE — PROGRESS NOTES
Patient assessment completed. Wound dressing on sacrum/coccyx changed and picture taken. Per patient's request guaze administered instead of mepilex listed in wound care order. Q2H repositioning and wound care discussed. Patient's needs and questions addressed at this time.

## 2023-02-27 NOTE — PROGRESS NOTES
OVERNIGHT HOSPITALIST:    Paged by nursing Staff multiple times this evening about this patient.  He has been having increased shortness of breath with anxiety with intermittent tachycardia.  He received DuoNeb treatment around 140 this morning without significant improvement.  Nursing staff put him on 3 L of oxygen via nasal cannula for comfort and patient reports feeling better.    I have order EKG which is showing A-fib at 109 bpm.  No acute ischemia.  His repeat troponin is a stable at 26 mg/L.  His D-dimer is slightly positive at 0.63 and his chest x-ray is showing interstitial edema.    I considered giving him Lasix, but he also has been intermittently hypotensive throughout the hospitalization.  We will anticoagulate him given underlying GI bleed and drop in hemoglobin, this morning hemoglobin is down from 7.9-7.1.  I discussed with nursing staff that we will continue to chronic monitor him.  Seems to be comfortable now on 3 L of oxygen.

## 2023-02-27 NOTE — CARE PLAN
The patient is Watcher - Medium risk of patient condition declining or worsening    Shift Goals  Clinical Goals: IV abx, q2h turns  Patient Goals: adjust in bed  Family Goals: LUCAS    Progress made toward(s) clinical / shift goals:    Problem: Knowledge Deficit - Standard  Goal: Patient and family/care givers will demonstrate understanding of plan of care, disease process/condition, diagnostic tests and medications  Outcome: Progressing  Note: Patient asks questions appropriately regarding plan of care and medications. Patient is engaged in medical treatment.        Patient is not progressing towards the following goals:      Problem: Skin Integrity  Goal: Skin integrity is maintained or improved  Outcome: Not Progressing  Note: Patient declines q2h turns due to neck discomfort and shortness of breath. Wound care orders in place to follow.
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
## 2023-02-27 NOTE — PROGRESS NOTES
----- Message from Sandra Bangura sent at 8/23/2018  9:41 AM CDT -----  Regarding: Prescription refills  Contact: [phone]  I need refills of Gabapentin 300 mg   I only take one a day and now the Pharmacy requires a fill for 90 days each fill.  My Pharmacy is [facility]  Which is located on my files.  Thank you.  Sandra Bangura   Pharmacy Vancomycin Kinetics Note for 2/27/2023     62 y.o. male on Vancomycin day # 4     Provider specified end date: 03/03/23    Active Antibiotics (From admission, onward)      Ordered     Ordering Provider       Mon Feb 27, 2023 12:37 PM    02/27/23 1237  vancomycin 1000 mg in 250 mL NS IVPB Premix  (vancomycin (VANCOCIN) IV (LD + Maintenance))  EVERY 12 HOURS         Ester Doherty D.O.       Fri Feb 24, 2023  9:37 AM    02/24/23 0937  MD Alert...Vancomycin per Pharmacy  PHARMACY TO DOSE        Question:  Indication(s) for vancomycin?  Answer:  Unknown source of infection    Glynn Resendiz M.D.            Dosing Weight: 94.2 kg (207 lb 10.8 oz)      Admission History: Admitted on 2/21/2023 for GI bleed [K92.2]  Pertinent history: Admitted on 2/21/2023 for GI bleed (K92.2)  Pertinent history: GIB, quadrapalegic, recurrent UTI w/ hx of ESBL, septic shock, C diff hx, chronic supra-pubic cath, decubital ulcer (chronic).    Allergies:     Piperacillin sod-tazobactam so     Pertinent cultures to date:     Results       Procedure Component Value Units Date/Time    Culture Wound w/Gram Stain [[id number]] Collected: 02/22/23 2000    Order Status: Completed Specimen: Wound from Right Hip Updated: 02/26/23 0950     Significant Indicator NEG     Source WND     Site RIGHT HIP     Culture Result Heavy growth mixed enteric geeta.     Gram Stain Result Moderate Gram positive cocci in chains.    Narrative:      Collected By: [id number] DAJUAN CELESTIN posterior Ischium  Collected By: [id number] DAJUAN CARLIN    BLOOD CULTURE [[id number]]  (Abnormal) Collected: 02/21/23 1212    Order Status: Completed Specimen: Blood from Peripheral Updated: 02/25/23 1410     Significant Indicator POS     Source BLD     Site PERIPHERAL     Culture Result Growth detected by Bactec instrument. 02/22/2023  12:52      Staphylococcus hominis  See previous culture for sensitivity report.        Coagulase-negative Staphylococcus  "species  Possible Contaminant  Isolated from one set only, please correlate with clinical  condition. Contact the Microbiology department within 48 hr  if identification and susceptibility are needed.      Narrative:      CALL  [facility]  ICU tel. ,  CALLED  ICU tel.  02/22/2023, 12:52, RB PERF. RESULTS CALLED TO: RN [phone]  Per Hospital Policy: Only change Specimen Src: to \"Line\" if  specified by physician order.  Right Hand    URINE CULTURE(NEW) [[id number]]  (Abnormal)  (Susceptibility) Collected: 02/21/23 1200    Order Status: Completed Specimen: Other from Urine, Suprapubic Updated: 02/25/23 0917     Significant Indicator POS     Source URSP     Site URINE, SUPRAPUBIC     Culture Result -     Gram Stain Result Many Gram positive cocci in clusters.     Culture Result Enterococcus faecalis  >100,000 cfu/mL        Klebsiella pneumoniae  >100,000 cfu/mL        Proteus vulgaris group  >100,000 cfu/mL        Aerococcus sanguinicola  >100,000 cfu/mL      Narrative:      Indication for culture:->Emergency Room Patient  Indication for culture:->Emergency Room Patient    BLOOD CULTURE [[id number]] Collected: 02/24/23 1012    Order Status: Completed Specimen: Blood from Peripheral Updated: 02/25/23 0635     Significant Indicator NEG     Source BLD     Site PERIPHERAL     Culture Result No Growth  Note: Blood cultures are incubated for 5 days and  are monitored continuously.Positive blood cultures  are called to the RN and reported as soon as  they are identified.  Blood culture testing and Gram stain, if indicated, are  performed at [facility], [address] [place] [address].  Positive blood cultures are  sent to [facility], [address] [place] [address], for organism identification and  susceptibility testing.      Narrative:      Per Hospital Policy: Only change Specimen Src: to \"Line\" if  specified by physician order.  Right Hand    BLOOD CULTURE [[id number]] Collected: " "02/24/23 0930    Order Status: Completed Specimen: Blood from Peripheral Updated: 02/25/23 0635     Significant Indicator NEG     Source BLD     Site PERIPHERAL     Culture Result No Growth  Note: Blood cultures are incubated for 5 days and  are monitored continuously.Positive blood cultures  are called to the RN and reported as soon as  they are identified.  Blood culture testing and Gram stain, if indicated, are  performed at [facility], [address] [place] [address].  Positive blood cultures are  sent to [facility], [address] [place] [address], for organism identification and  susceptibility testing.      Narrative:      Per Hospital Policy: Only change Specimen Src: to \"Line\" if  specified by physician order.  Left Hand    BLOOD CULTURE [[id number]]  (Abnormal)  (Susceptibility) Collected: 02/21/23 1200    Order Status: Completed Specimen: Blood from Peripheral Updated: 02/24/23 1215     Significant Indicator POS     Source BLD     Site PERIPHERAL     Culture Result Growth detected by Bactec instrument. 02/22/2023  12:45  Negative for Staphylococcus aureus and MRSA by PCR. Correlate  ongoing need for antibiotics with clinical condition.        Staphylococcus hominis    Narrative:      CALL  [facility]  ICU tel. ,  CALLED  ICU tel.  02/22/2023, 17:27, RB PERF. RESULTS CALLED TO: Pharmacy x  [phone]  Per Hospital Policy: Only change Specimen Src: to \"Line\" if  specified by physician order.  Left AC    BLOOD CULTURE [[id number]] Collected: 02/23/23 0315    Order Status: Completed Specimen: Blood from Peripheral Updated: 02/24/23 0556     Significant Indicator NEG     Source BLD     Site PERIPHERAL     Culture Result No Growth  Note: Blood cultures are incubated for 5 days and  are monitored continuously.Positive blood cultures  are called to the RN and reported as soon as  they are identified.  Blood culture testing and Gram stain, if indicated, are  performed at [facility] "[facility], [address] [place] [address].[place], [place].  Positive blood cultures are  sent to [facility], [address] [place] [address], for organism identification and  susceptibility testing.      Narrative:      Per Hospital Policy: Only change Specimen Src: to \"Line\" if  specified by physician order.  2nd draw was on anpther finer on the left hand...  Left Hand    BLOOD CULTURE [[id number]] Collected: 02/23/23 0315    Order Status: Completed Specimen: Blood from Peripheral Updated: 02/24/23 0556     Significant Indicator NEG     Source BLD     Site PERIPHERAL     Culture Result No Growth  Note: Blood cultures are incubated for 5 days and  are monitored continuously.Positive blood cultures  are called to the RN and reported as soon as  they are identified.  Blood culture testing and Gram stain, if indicated, are  performed at [facility], [address] [place] [address].  Positive blood cultures are  sent to [facility], [address] [place] [address], for organism identification and  susceptibility testing.      Narrative:      Per Hospital Policy: Only change Specimen Src: to \"Line\" if  specified by physician order.  Left Hand    GRAM STAIN [[id number]] Collected: 02/21/23 1200    Order Status: Completed Specimen: Other Updated: 02/23/23 1719     Significant Indicator .     Source URSP     Site URINE, SUPRAPUBIC     Gram Stain Result Many Gram positive cocci in clusters.    Narrative:      Indication for culture:->Emergency Room Patient  Indication for culture:->Emergency Room Patient    GRAM STAIN [[id number]] Collected: 02/22/23 2000    Order Status: Completed Specimen: Wound Updated: 02/23/23 1716     Significant Indicator .     Source WND     Site RIGHT HIP     Gram Stain Result Moderate Gram positive cocci in chains.    Narrative:      Collected By: [id number] DAJUAN CELESTIN posterior Ischium  Collected By: [id number] DAJUAN " JESSICA CARLIN    MRSA By PCR (Amp) [[id number]] Collected: 02/22/23 1515    Order Status: Completed Specimen: Respirate from Nares Updated: 02/22/23 2159     MRSA by PCR Negative    Narrative:      Special Contact Isolation  Collected By: [id number] ROE MICHEL I    C Diff by PCR rflx Toxin [[id number]] Collected: 02/21/23 1630    Order Status: Completed Specimen: Stool Updated: 02/21/23 2155     C Diff by PCR Negative     Comment: C. difficile NOT detected by PCR.  Treatment not indicated per guidelines.  Repeat testing not indicated within 7 days.          027-NAP1-BI Presumptive Negative     Comment: Presumptive 027/NAP1/BI target DNA sequences are NOT DETECTED.       Narrative:      Does this patient have risk factors for C-diff?->Yes  C-Diff Risk Factors->antibiotic exposure  Has patient taken stool softeners or laxatives in the last 5  days?->Yes    URINALYSIS [[id number]]  (Abnormal) Collected: 02/21/23 1200    Order Status: Completed Specimen: Urine Updated: 02/21/23 1616     Color Yellow     Character Cloudy     Specific Gravity <=1.005     Ph >=9.0     Glucose Negative mg/dL      Ketones Negative mg/dL      Protein Negative mg/dL      Bilirubin Negative     Nitrite Negative     Leukocyte Esterase Negative     Occult Blood Negative     Micro Urine Req Microscopic    Narrative:      Indication for culture:->Emergency Room Patient            Labs:     Estimated Creatinine Clearance: 218.3 mL/min (A) (by C-G formula based on SCr of 0.43 mg/dL (L)).  Recent Labs     02/25/23 0315 02/26/23 0439 02/27/23 0057   WBC 8.3 6.3 5.1   NEUTSPOLYS 75.10* 70.50  --      Recent Labs     02/25/23  0315 02/26/23  0439 02/27/23  0057   BUN 11 18 16   CREATININE 0.43* 0.27* 0.43*   ALBUMIN 2.6* 2.4*  --        Intake/Output Summary (Last 24 hours) at 2/27/2023 1238  Last data filed at 2/27/2023 0900  Gross per 24 hour   Intake 480 ml   Output 2650 ml   Net -2170 ml      BP (!) 89/49   Pulse 71   Temp 36.6 °C (97.9 °F)  "(Axillary)   Resp 18   Ht 1.905 m (6' 3\")   Wt 88.7 kg (195 lb 8.8 oz)   SpO2 97%  Temp (24hrs), Av.7 °C (98.1 °F), Min:36.3 °C (97.4 °F), Max:37 °C (98.6 °F)      List concerns for Vancomycin clearance:     Hypermetabolic State (SIRS);Pressors/Hypotension;BUN/Scr ratio greater than 20:1;Nephrotoxic drugs    Pharmacokinetics:     Trough kinetics:   Recent Labs     23  1134   VANCOTROUGH 25.1*       A/P:     -  Vancomycin dose: 1500 mg q 12h, decreased to 1gm q 12h    -  Next vancomycin level(s):    --3/1 Vt @ 05:30     - Calculated AUC (from trough): 487    -  Comments: Based on trough based dosing new dose of 1 GM q12 with next trough on 3/1 @ 05:30    Cecille Siddiqui, [facility]    "

## 2023-02-28 LAB
ABO GROUP BLD: NORMAL
ANION GAP SERPL CALC-SCNC: 9 MMOL/L (ref 7–16)
BACTERIA BLD CULT: NORMAL
BACTERIA BLD CULT: NORMAL
BARCODED ABORH UBTYP: 6200
BARCODED PRD CODE UBPRD: NORMAL
BARCODED UNIT NUM UBUNT: NORMAL
BLD GP AB SCN SERPL QL: NORMAL
BUN SERPL-MCNC: 11 MG/DL (ref 8–22)
CALCIUM SERPL-MCNC: 7.6 MG/DL (ref 8.4–10.2)
CHLORIDE SERPL-SCNC: 108 MMOL/L (ref 96–112)
CO2 SERPL-SCNC: 21 MMOL/L (ref 20–33)
COMPONENT R 8504R: NORMAL
CREAT SERPL-MCNC: 0.33 MG/DL (ref 0.5–1.4)
ERYTHROCYTE [DISTWIDTH] IN BLOOD BY AUTOMATED COUNT: 54.9 FL (ref 35.9–50)
ERYTHROCYTE [DISTWIDTH] IN BLOOD BY AUTOMATED COUNT: 57.1 FL (ref 35.9–50)
GFR SERPLBLD CREATININE-BSD FMLA CKD-EPI: 132 ML/MIN/1.73 M 2
GLUCOSE SERPL-MCNC: 90 MG/DL (ref 65–99)
HCT VFR BLD AUTO: 22.7 % (ref 42–52)
HCT VFR BLD AUTO: 23.6 % (ref 42–52)
HGB BLD-MCNC: 7.1 G/DL (ref 14–18)
HGB BLD-MCNC: 7.1 G/DL (ref 14–18)
MAGNESIUM SERPL-MCNC: 2.1 MG/DL (ref 1.5–2.5)
MCH RBC QN AUTO: 29.1 PG (ref 27–33)
MCH RBC QN AUTO: 29.7 PG (ref 27–33)
MCHC RBC AUTO-ENTMCNC: 30.1 G/DL (ref 33.7–35.3)
MCHC RBC AUTO-ENTMCNC: 31.3 G/DL (ref 33.7–35.3)
MCV RBC AUTO: 95 FL (ref 81.4–97.8)
MCV RBC AUTO: 96.7 FL (ref 81.4–97.8)
PLATELET # BLD AUTO: 331 K/UL (ref 164–446)
PLATELET # BLD AUTO: 381 K/UL (ref 164–446)
PMV BLD AUTO: 9 FL (ref 9–12.9)
PMV BLD AUTO: 9.5 FL (ref 9–12.9)
POTASSIUM SERPL-SCNC: 4.1 MMOL/L (ref 3.6–5.5)
PRODUCT TYPE UPROD: NORMAL
RBC # BLD AUTO: 2.39 M/UL (ref 4.7–6.1)
RBC # BLD AUTO: 2.44 M/UL (ref 4.7–6.1)
RH BLD: NORMAL
SIGNIFICANT IND 70042: NORMAL
SIGNIFICANT IND 70042: NORMAL
SITE SITE: NORMAL
SITE SITE: NORMAL
SODIUM SERPL-SCNC: 138 MMOL/L (ref 135–145)
SOURCE SOURCE: NORMAL
SOURCE SOURCE: NORMAL
UNIT STATUS USTAT: NORMAL
WBC # BLD AUTO: 4.9 K/UL (ref 4.8–10.8)
WBC # BLD AUTO: 5.3 K/UL (ref 4.8–10.8)

## 2023-02-28 PROCEDURE — 86923 COMPATIBILITY TEST ELECTRIC: CPT

## 2023-02-28 PROCEDURE — 86901 BLOOD TYPING SEROLOGIC RH(D): CPT

## 2023-02-28 PROCEDURE — 700102 HCHG RX REV CODE 250 W/ 637 OVERRIDE(OP): Performed by: INTERNAL MEDICINE

## 2023-02-28 PROCEDURE — A9270 NON-COVERED ITEM OR SERVICE: HCPCS | Performed by: INTERNAL MEDICINE

## 2023-02-28 PROCEDURE — 700101 HCHG RX REV CODE 250: Performed by: INTERNAL MEDICINE

## 2023-02-28 PROCEDURE — 700111 HCHG RX REV CODE 636 W/ 250 OVERRIDE (IP): Performed by: INTERNAL MEDICINE

## 2023-02-28 PROCEDURE — 80048 BASIC METABOLIC PNL TOTAL CA: CPT

## 2023-02-28 PROCEDURE — 99233 SBSQ HOSP IP/OBS HIGH 50: CPT | Performed by: INTERNAL MEDICINE

## 2023-02-28 PROCEDURE — 700105 HCHG RX REV CODE 258: Performed by: INTERNAL MEDICINE

## 2023-02-28 PROCEDURE — 770020 HCHG ROOM/CARE - TELE (206)

## 2023-02-28 PROCEDURE — 86850 RBC ANTIBODY SCREEN: CPT

## 2023-02-28 PROCEDURE — 94760 N-INVAS EAR/PLS OXIMETRY 1: CPT

## 2023-02-28 PROCEDURE — 85027 COMPLETE CBC AUTOMATED: CPT

## 2023-02-28 PROCEDURE — 83735 ASSAY OF MAGNESIUM: CPT

## 2023-02-28 PROCEDURE — 86900 BLOOD TYPING SEROLOGIC ABO: CPT

## 2023-02-28 PROCEDURE — 36415 COLL VENOUS BLD VENIPUNCTURE: CPT

## 2023-02-28 RX ORDER — SODIUM CHLORIDE 9 MG/ML
500 INJECTION, SOLUTION INTRAVENOUS ONCE
Status: ACTIVE | OUTPATIENT
Start: 2023-02-28 | End: 2023-03-01

## 2023-02-28 RX ORDER — MIDODRINE HYDROCHLORIDE 5 MG/1
15 TABLET ORAL
Status: DISCONTINUED | OUTPATIENT
Start: 2023-02-28 | End: 2023-03-04 | Stop reason: HOSPADM

## 2023-02-28 RX ADMIN — VANCOMYCIN HYDROCHLORIDE 1000 MG: 1 INJECTION, POWDER, LYOPHILIZED, FOR SOLUTION INTRAVENOUS at 16:35

## 2023-02-28 RX ADMIN — MIDODRINE HYDROCHLORIDE 10 MG: 5 TABLET ORAL at 08:11

## 2023-02-28 RX ADMIN — OMEPRAZOLE 20 MG: 20 CAPSULE, DELAYED RELEASE ORAL at 16:33

## 2023-02-28 RX ADMIN — VANCOMYCIN HYDROCHLORIDE 1000 MG: 1 INJECTION, POWDER, LYOPHILIZED, FOR SOLUTION INTRAVENOUS at 05:24

## 2023-02-28 RX ADMIN — OMEPRAZOLE 20 MG: 20 CAPSULE, DELAYED RELEASE ORAL at 04:22

## 2023-02-28 RX ADMIN — MIDODRINE HYDROCHLORIDE 10 MG: 5 TABLET ORAL at 01:32

## 2023-02-28 RX ADMIN — MIDODRINE HYDROCHLORIDE 15 MG: 5 TABLET ORAL at 11:59

## 2023-02-28 RX ADMIN — MIDODRINE HYDROCHLORIDE 15 MG: 5 TABLET ORAL at 16:33

## 2023-02-28 ASSESSMENT — ENCOUNTER SYMPTOMS
VOMITING: 0
CHILLS: 0
SHORTNESS OF BREATH: 0
COUGH: 0
PALPITATIONS: 0
CONSTIPATION: 0
DIARRHEA: 0
DIZZINESS: 0
NAUSEA: 0
HEADACHES: 0
FEVER: 0
BACK PAIN: 0
ABDOMINAL PAIN: 0

## 2023-02-28 ASSESSMENT — PAIN DESCRIPTION - PAIN TYPE
TYPE: CHRONIC PAIN
TYPE: ACUTE PAIN
TYPE: CHRONIC PAIN
TYPE: ACUTE PAIN

## 2023-02-28 NOTE — PROGRESS NOTES
Repeat BP 85/55 MD notified, 500 mL bolus ordered. Patient refused fluid bolus stating that it is normal for his blood pressure to have highs and lows and he does not want to keep getting pumped full of fluid since it will not fix the problem. MD notified, no new orders received.
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
## 2023-02-28 NOTE — CARE PLAN
Problem: Knowledge Deficit - Standard  Goal: Patient and family/care givers will demonstrate understanding of plan of care, disease process/condition, diagnostic tests and medications  Outcome: Progressing     Problem: Pain - Standard  Goal: Alleviation of pain or a reduction in pain to the patient’s comfort goal  Outcome: Progressing   The patient is Stable - Low risk of patient condition declining or worsening    Shift Goals  Clinical Goals: IV ABX, Monitor labs  Patient Goals: Comfort  Family Goals: LUCAS    Progress made toward(s) clinical / shift goals:      Patient is not progressing towards the following goals:

## 2023-02-28 NOTE — DIETARY
Nutrition Services Brief Update:    Day 7 of admit.  Stevie Phoenix is a 62 y.o. male with admitting DX of GI bleed [K92.2]    Current Diet: regular    PO intake has been adequate at % of all recent recorded meals.     Problem: Nutritional:  Goal: Achieve adequate nutritional intake  Description: Patient will consume >75% of meals  Outcome: met    RD will follow weekly or PRN.

## 2023-02-28 NOTE — WOUND TEAM
[facility]  Inpatient Services  Initial Wound and Skin Care Evaluation    Admission Date: 2/21/2023     Last order of IP CONSULT TO WOUND CARE was found on 2/22/2023 from Hospital Encounter on 2/21/2023     HPI, PMH, SH: Reviewed    Past Surgical History:   Procedure Laterality Date    GA UPPER GI ENDOSCOPY,DIAGNOSIS N/A 2/22/2023    Procedure: GASTROSCOPY;  Surgeon: David Modoy D.O.;  Location: [facility];  Service: Gastroenterology    ULCER EXCISION Right 10/18/2017    Procedure: ULCER EXCISION- ISCHIAL PRESSURE UCLER;  Surgeon: Marbin Arriola M.D.;  Location: [facility];  Service: Plastics    FLAP CLOSURE  10/18/2017    Procedure: FLAP CLOSURE- MUSCLE;  Surgeon: Marbin Arriola M.D.;  Location: [facility];  Service: Plastics    ILEO LOOP DIVERSION N/A 10/24/2016    Procedure: ILEO LOOP DIVERSION, APPENDECTOMY;  Surgeon: Tiago Martinez M.D.;  Location: [facility];  Service:     CYSTOSTOMY  5/5/2016    Procedure: CYSTOSTOMY CLOSURE;  Surgeon: Tiago Martinez M.D.;  Location: [facility];  Service:     CYSTOSCOPY  5/5/2016    Procedure: CYSTOSCOPY;  Surgeon: Tiago Martinez M.D.;  Location: [facility];  Service:     CYSTOSCOPY WITH COLLAGEN  12/17/2015    Procedure: CYSTOSCOPY WITH BOTOX INJECTION;  Surgeon: Tiago Martinez M.D.;  Location: [facility];  Service:     CYSTOSCOPY STENT REMOVAL Left 9/8/2015    Procedure: CYSTOSCOPY STENT REMOVAL;  Surgeon: Tiago Martinez M.D.;  Location: [facility];  Service:     URETEROSCOPY  9/8/2015    Procedure: URETEROSCOPY;  Surgeon: Tiago Martinez M.D.;  Location: [facility];  Service:     LASERTRIPSY  9/8/2015    Procedure: LASER LITHOTRIPSY;  Surgeon: Tiago Martinez M.D.;  Location: [facility];  Service:     CYSTOSCOPY  4/20/2015    Performed by Tiago Martinez M.D. at [facility]    URETEROSCOPY  4/20/2015     Performed by Tiago Martinez M.D. at SURGERY [facility]    LASERTRIPSY  4/20/2015    Performed by Tiago Martinez M.D. at SURGERY [facility]    RECOVERY  9/20/2011    Performed by SURGERY, IR-RECOVERY at SURGERY SAME DAY [facility] ORS    RECOVERY  8/1/2011    Performed by SURGERY, IR-RECOVERY at SURGERY SAME DAY [facility] ORS    KAYCE BY LAPAROSCOPY  5/14/2011    Performed by KATHERINE LR at SURGERY [facility] ORS    VENA CAVA IAN  2/25/2011    Performed by JEWEL WELLER at SURGERY [facility] ORS    CERVICAL FUSION POSTERIOR  2/21/2011    Performed by RALPH SANTAMARIA at SURGERY [facility]    CERVICAL LAMINECTOMY POSTERIOR  2/21/2011    Performed by RALPH SANTAMARIA at SURGERY [facility] ORS    GASTROSTOMY LAPAROSCOPIC  2/9/2011    Performed by CONSUELO TAYLOR at SURGERY [facility]    CASE CANCELLED  2/5/2011    Performed by RALPH SANTAMARIA at SURGERY [facility]    OTHER NEUROLOGICAL SURG      OTHER ORTHOPEDIC SURGERY       Social History     Tobacco Use    Smoking status: Never    Smokeless tobacco: Former     Types: Chew     Quit date: 2010   Substance Use Topics    Alcohol use: No     Chief Complaint   Patient presents with    UTI     Pt believes he has a UTI   has had them prior     Constipation     Has had issues with this  believes he is again constipated      Diagnosis: GI bleed [K92.2]    Unit where seen by Wound Team: 3302/01    WOUND CONSULT/FOLLOW UP RELATED TO:  heels, legs, feet, R coccyx/buttocks     WOUND HISTORY:   PMH of quadriplegia, he is up to WC  and has had multiple wounds to feet. He has been seen by wound care for sacrococcygeal pressure injuries and R side St 4 is slowly resolving the others are resolved.     WOUND ASSESSMENT/LDA       Wound 02/22/23 Pressure Injury Heel Mid Bilateral;Left (Active)      02/27/23 2100   Site Assessment Dry;Brown    Periwound Assessment Pink;Intact    Margins Defined edges;Attached edges    Closure Secondary intention     Drainage Amount None    Treatments Cleansed    Wound Cleansing Not Applicable    Periwound Protectant Hydrofiber    Dressing Cleansing/Solutions Not Applicable    Dressing Options Hydrofiber Silver;Mepilex Heel    Dressing Changed Changed    Dressing Status Intact    Dressing Change/Treatment Frequency Every 72 hrs, and As Needed    NEXT Dressing Change/Treatment Date 03/02/23    NEXT Weekly Photo (Inpatient Only) 03/06/23    WOUND NURSE ONLY - Pressure Injury Stage U 02/27/23 2100   Wound Length (cm) 4 cm 02/27/23 2100   Wound Width (cm) 4 cm 02/27/23 2100   Wound Surface Area (cm^2) 16 cm^2 02/27/23 2100   Shape oval    Wound Odor None    Exposed Structures LUCAS        Wound 02/22/23 Pressure Injury Heel Right (Active)       02/27/23 2100   Site Assessment Brown;Black;Dry    Periwound Assessment Intact    Margins Defined edges;Attached edges    Closure Secondary intention    Drainage Amount None    Drainage Description Serosanguineous;Sanguineous;Yellow    Treatments Cleansed    Wound Cleansing Normal Saline Irrigation    Periwound Protectant Skin Protectant Wipes to Periwound    Dressing Cleansing/Solutions Not Applicable    Dressing Options Hydrofiber Silver;Mepilex Heel    Dressing Changed Changed    Dressing Status Intact    Dressing Change/Treatment Frequency Every 72 hrs, and As Needed    NEXT Dressing Change/Treatment Date 03/02/23    NEXT Weekly Photo (Inpatient Only) 03/06/23    WOUND NURSE ONLY - Pressure Injury Stage U 02/27/23 2100   Wound Length (cm) 2 cm 02/27/23 2100   Wound Width (cm) 5 cm 02/27/23 2100   Wound Surface Area (cm^2) 10 cm^2 02/27/23 2100   Shape oval    Wound Odor None        Wound 02/22/23 Pressure Injury Coccyx;Buttocks;Sacrum Right slowly resolving St 4 (Active)      02/27/23 2100   Site Assessment Red    Periwound Assessment Bleeding;Red;Scar tissue    Margins Defined edges    Closure Secondary intention    Drainage Amount Scant    Drainage Description Serosanguineous     Treatments Cleansed    Wound Cleansing Normal Saline Irrigation    Periwound Protectant Not Applicable    Dressing Cleansing/Solutions Not Applicable    Dressing Options Mepitel One;Hydrofiber Silver;Mepilex    Dressing Changed Changed    Dressing Status Intact    Dressing Change/Treatment Frequency Every 72 hrs, and As Needed    NEXT Dressing Change/Treatment Date 03/02/23    NEXT Weekly Photo (Inpatient Only) 03/06/23    WOUND NURSE ONLY - Pressure Injury Stage 4 02/27/23 2100   Wound Length (cm) 13.5 cm 02/27/23 2100   Wound Width (cm) 5 cm 02/27/23 2100   Wound Depth (cm) 0.3 cm 02/27/23 2100   Wound Surface Area (cm^2) 67.5 cm^2 02/27/23 2100   Wound Volume (cm^3) 20.25 cm^3 02/27/23 2100   Wound Healing % -69 02/27/23 2100   Shape irregular    Wound Odor None    Exposed Structures None               R shin    R toes    R foot    L shin          L foot      Vascular:    HANY:   No results found.    Lab Values:    Lab Results   Component Value Date/Time    WBC 5.7 02/27/2023 01:50 PM    RBC 2.59 (L) 02/27/2023 01:50 PM    HEMOGLOBIN 7.6 (L) 02/27/2023 01:50 PM    HEMATOCRIT 24.5 (L) 02/27/2023 01:50 PM    CREACTPROT 14.38 (H) 10/25/2017 03:24 PM    SEDRATEWES 11 10/18/2017 03:14 PM        Culture Results show:  Recent Results (from the past 720 hour(s))   Culture Wound w/Gram Stain    Collection Time: 02/22/23  8:00 PM    Specimen: Right Hip; Wound   Result Value Ref Range    Significant Indicator NEG     Source WND     Site RIGHT HIP     Culture Result Heavy growth mixed enteric geeta.     Gram Stain Result Moderate Gram positive cocci in chains.        Pain Level/Medicated:  None, Tolerated without pain medication       INTERVENTIONS BY WOUND TEAM:  Chart and images reviewed. Discussed with bedside RN. All areas of concern (based on picture review, LDA review and discussion with bedside RN) have been thoroughly assessed. Documentation of areas based on significant findings. This RN in to assess patient.  "Performed standard wound care which includes appropriate positioning, dressing removal and non-selective debridement. Pictures and measurements obtained weekly if/when required.    R coccyx/buttocks  Preparation for Dressing removal: Dressing soaked with Removed without difficulty  Non-selectively Debrided with:  Normal Saline and Gauze   Sharp debridement: NA  Nova wound: Cleansed with Moist warm washcloth and No rinse foam soap, Prepped with NA  Primary Dressing: Aquacel ag (Hydrofiber Silver) and Mepitel One  Secondary (Outer) Dressing: Mepilex    R heel  Preparation for Dressing removal: Dressing soaked with Removed without difficulty  Non-selectively Debrided with:  Normal Saline and Gauze   Sharp debridement: NA  Nova wound: Cleansed with Normal Saline and Gauze, Prepped with NA  Primary Dressing: Aquacel ag (Hydrofiber Silver)  Secondary (Outer) Dressing: Mepilex    Advanced Wound Care Discharge Planning  Number of Clinicians necessary to complete wound care: 2 one is for assistance to turn  Is patient requiring IV pain medications for dressing changes: no  Length of time for dressing change 45 min. (This does not include chart review, pre-medication time, set up, clean up or time spent charting.)    Interdisciplinary consultation: Patient, Bedside RN      EVALUATION / RATIONALE FOR TREATMENT:  Most Recent Date:  2/27: Pt's R sacrococcygeal area has worsened with more \"washboard\" present r/t friction. Changed from xeroform to Mepitel one to decrease trauma from drsg change. R heel is now dry brown as was expected when moist brown purple tissue dried. Pt needs to be on ASHOK bed r/t scar tissue and slow to resolve St 4 pressure injury to R sacrococcygeal/buttocks.   2/22: Pt has unstageable pressure injuries to bilateral heel. L is dry and R with some drainage. Both to have slver hydrofiber and heel adhesive foam. There are numerous scabs to dorsal feet and toes. R coccyx/buttock with slowly resolving St 4. He " has some skin damage from friction. Mepilex helps protect against the extrinsic forces of pressure, shear and friction and manages microclimate of wound bed.        Goals: Steady decrease in wound area and depth weekly.    WOUND TEAM PLAN OF CARE ([X] for frequency of wound follow up,):   Nursing to follow dressing orders written for wound care. Contact wound team if area fails to progress, deteriorates or with any questions/concerns if something comes up before next scheduled follow up (See below as to whether wound is following and frequency of wound follow up)  Dressing changes by wound team:                   Follow up 3 times weekly:                NPWT change 3 times weekly:     Follow up 1-2 times weekly:      Follow up Bi-Monthly:           Follow up Monthly (High Risk):                        Follow up as needed:     Other (explain):     NURSING PLAN OF CARE ORDERS (X):  Dressing changes: See Dressing Care orders: x  Skin care: See Skin Care orders:   RN Prevention Protocol:   Rectal tube care: See Rectal Tube Care orders:   Other orders:    RSKIN:   CURRENTLY IN PLACE (X), APPLIED THIS VISIT (A), ORDERED (O):   Q shift Sebas:  X  Q shift pressure point assessments:  X    Surface/Positioning   Pressure redistribution mattress            Low Airloss          ICU Low Airloss X  Bariatric ASHOK     Waffle cushion        Waffle Overlay          Reposition q 2 hours    X  TAPs Turning system   X  Z Breezy Pillow  pt still requesting no pillow    Offloading/Redistribution   Sacral Mepilex (Silicone dressing)   X  Heel Mepilex (Silicone dressing)   X      Heel float boots (Prevalon boot)      X       Float Heels off Bed with Pillows           Respiratory   Silicone O2 tubing    x     Gray Foam Ear protectors   x  Cannula fixation Device (Tender )          High flow offloading Clip    Elastic head band offloading device      Anchorfast                                                         Trach with Optifoam  split foam             Containment/Moisture Prevention colostomy  and urostomy    Rectal tube or BMS    Purwick/Condom Cath        Medrano Catheter   Barrier wipes           Barrier paste       Antifungal tx      Interdry        Mobilization not assessed      Up to chair        Ambulate      PT/OT      Nutrition       Dietician        Diabetes Education      PO x   TF     TPN     NPO   # days     Other        Anticipated discharge plans: HH  LTACH:        SNF/Rehab:                  Home Health Care:           Outpatient Wound Center:            Self/Family Care:        Other:                  Vac Discharge Needs:   Vac Discharge plan is purely a recommendation from wound team and not a requirement for discharge unless otherwise stated by physician.  Not Applicable Pt not on a wound vac:     x  Regular Vac while inpatient, alternative dressing at DC:        Regular Vac in use and continued at DC:            Reg. Vac w/ Skin Sub/Biologic in use. Will need to be changed 2x wkly:      Veraflo Vac while inpatient, ok to transition to Regular Vac on Discharge (Bedside RN to Clamp small instillation tubing at time of DC):           Veraflo Vac while inpatient, would benefit from remaining on Veraflo Vac upon discharge:

## 2023-02-28 NOTE — CARE PLAN
The patient is Watcher - Medium risk of patient condition declining or worsening    Shift Goals  Clinical Goals: stable hemodynamics  Patient Goals: comfort  Family Goals: LUCAS    Progress made toward(s) clinical / shift goals:    Problem: Knowledge Deficit - Standard  Goal: Patient and family/care givers will demonstrate understanding of plan of care, disease process/condition, diagnostic tests and medications  Outcome: Progressing     Problem: Pain - Standard  Goal: Alleviation of pain or a reduction in pain to the patient’s comfort goal  Outcome: Progressing     Problem: Skin Integrity  Goal: Skin integrity is maintained or improved  Outcome: Progressing     Problem: Skin Care - Ostomy  Goal: Skin remains free from irritation  Outcome: Progressing     Problem: Fall Risk  Goal: Patient will remain free from falls  Outcome: Progressing       Patient is not progressing towards the following goals:

## 2023-02-28 NOTE — PROGRESS NOTES
Pt BP 77/44 MD notified, Provider okay with early administration of Midodrine for hypotension.  RN to recheck BP after one hour per MD.

## 2023-02-28 NOTE — PROGRESS NOTES
Telemetry Shift Summary     Rhythm: aflutter  Rate: 77-96  Measurements: -/.08/-  Ectopy (reported by Monitor Tech): r PVC, r bigem, r trigem     Normal Values  Rhythm: Sinus  HR:   Measurements: 0.12-0.20/0.06-0.10/0.30-0.52

## 2023-02-28 NOTE — PROGRESS NOTES
Telemetry Shift Summary     Rhythm AFIB/FLUTTER  HR Range 77-98  Ectopy R-FPVC, RBIG  Measurements  -/.08/-    Normal Values  Rhythm SR  HR Range:   Measurements: 0.12-0.20/0.06-0.10/0.30-0.52

## 2023-03-01 ENCOUNTER — APPOINTMENT (OUTPATIENT)
Dept: RADIOLOGY | Facility: MEDICAL CENTER | Age: 63
DRG: 871 | End: 2023-03-01
Attending: INTERNAL MEDICINE
Payer: MEDICAID

## 2023-03-01 LAB
BACTERIA BLD CULT: NORMAL
BACTERIA BLD CULT: NORMAL
ERYTHROCYTE [DISTWIDTH] IN BLOOD BY AUTOMATED COUNT: 55.4 FL (ref 35.9–50)
HCT VFR BLD AUTO: 24.2 % (ref 42–52)
HGB BLD-MCNC: 7.3 G/DL (ref 14–18)
MCH RBC QN AUTO: 28.7 PG (ref 27–33)
MCHC RBC AUTO-ENTMCNC: 30.2 G/DL (ref 33.7–35.3)
MCV RBC AUTO: 95.3 FL (ref 81.4–97.8)
PLATELET # BLD AUTO: 378 K/UL (ref 164–446)
PMV BLD AUTO: 9 FL (ref 9–12.9)
RBC # BLD AUTO: 2.54 M/UL (ref 4.7–6.1)
SIGNIFICANT IND 70042: NORMAL
SIGNIFICANT IND 70042: NORMAL
SITE SITE: NORMAL
SITE SITE: NORMAL
SOURCE SOURCE: NORMAL
SOURCE SOURCE: NORMAL
VANCOMYCIN TROUGH SERPL-MCNC: 22.5 UG/ML (ref 10–20)
WBC # BLD AUTO: 4.3 K/UL (ref 4.8–10.8)

## 2023-03-01 PROCEDURE — 700105 HCHG RX REV CODE 258: Performed by: INTERNAL MEDICINE

## 2023-03-01 PROCEDURE — 770020 HCHG ROOM/CARE - TELE (206)

## 2023-03-01 PROCEDURE — A9270 NON-COVERED ITEM OR SERVICE: HCPCS | Performed by: INTERNAL MEDICINE

## 2023-03-01 PROCEDURE — 80202 ASSAY OF VANCOMYCIN: CPT

## 2023-03-01 PROCEDURE — 700111 HCHG RX REV CODE 636 W/ 250 OVERRIDE (IP): Performed by: INTERNAL MEDICINE

## 2023-03-01 PROCEDURE — 36415 COLL VENOUS BLD VENIPUNCTURE: CPT

## 2023-03-01 PROCEDURE — 99233 SBSQ HOSP IP/OBS HIGH 50: CPT | Performed by: INTERNAL MEDICINE

## 2023-03-01 PROCEDURE — 700102 HCHG RX REV CODE 250 W/ 637 OVERRIDE(OP): Performed by: INTERNAL MEDICINE

## 2023-03-01 PROCEDURE — 74018 RADEX ABDOMEN 1 VIEW: CPT

## 2023-03-01 PROCEDURE — 94760 N-INVAS EAR/PLS OXIMETRY 1: CPT

## 2023-03-01 PROCEDURE — 700101 HCHG RX REV CODE 250: Performed by: INTERNAL MEDICINE

## 2023-03-01 PROCEDURE — 85027 COMPLETE CBC AUTOMATED: CPT

## 2023-03-01 RX ORDER — LACTULOSE 20 G/30ML
15 SOLUTION ORAL ONCE
Status: COMPLETED | OUTPATIENT
Start: 2023-03-01 | End: 2023-03-01

## 2023-03-01 RX ADMIN — VANCOMYCIN HYDROCHLORIDE 750 MG: 500 INJECTION, POWDER, LYOPHILIZED, FOR SOLUTION INTRAVENOUS at 20:52

## 2023-03-01 RX ADMIN — POLYETHYLENE GLYCOL 3350 1 PACKET: 17 POWDER, FOR SOLUTION ORAL at 11:07

## 2023-03-01 RX ADMIN — LACTULOSE 15 ML: 20 SOLUTION ORAL at 12:42

## 2023-03-01 RX ADMIN — MIDODRINE HYDROCHLORIDE 15 MG: 5 TABLET ORAL at 11:07

## 2023-03-01 RX ADMIN — OMEPRAZOLE 20 MG: 20 CAPSULE, DELAYED RELEASE ORAL at 04:48

## 2023-03-01 RX ADMIN — SENNOSIDES AND DOCUSATE SODIUM 2 TABLET: 50; 8.6 TABLET ORAL at 04:48

## 2023-03-01 RX ADMIN — MIDODRINE HYDROCHLORIDE 15 MG: 5 TABLET ORAL at 08:18

## 2023-03-01 RX ADMIN — VANCOMYCIN HYDROCHLORIDE 1000 MG: 1 INJECTION, POWDER, LYOPHILIZED, FOR SOLUTION INTRAVENOUS at 04:50

## 2023-03-01 RX ADMIN — MIDODRINE HYDROCHLORIDE 15 MG: 5 TABLET ORAL at 18:38

## 2023-03-01 RX ADMIN — OMEPRAZOLE 20 MG: 20 CAPSULE, DELAYED RELEASE ORAL at 18:38

## 2023-03-01 RX ADMIN — SENNOSIDES AND DOCUSATE SODIUM 2 TABLET: 50; 8.6 TABLET ORAL at 18:38

## 2023-03-01 ASSESSMENT — ENCOUNTER SYMPTOMS
HEADACHES: 0
FEVER: 0
CHILLS: 0
BACK PAIN: 0
ABDOMINAL PAIN: 0
VOMITING: 0
NAUSEA: 0
DIZZINESS: 0
SHORTNESS OF BREATH: 0
COUGH: 0
DIARRHEA: 0
CONSTIPATION: 0
PALPITATIONS: 0

## 2023-03-01 ASSESSMENT — PAIN DESCRIPTION - PAIN TYPE: TYPE: ACUTE PAIN

## 2023-03-01 NOTE — PROGRESS NOTES
Telemetry Shift Summary     Rhythm Afib  HR Range 74-90  Ectopy R-FPVC, RCoup  Measurements-/.08/-      Normal Values  Rhythm SR  HR Range:   Measurements: 0.12-0.20/0.06-0.10/0.30-0.52

## 2023-03-01 NOTE — PROGRESS NOTES
Telemetry Shift Summary     Rhythm: aflutter/afib  Rate: 65-91  Measurements: -/.08/-  Ectopy (reported by Monitor Tech): r-o PVC, r trigem     Normal Values  Rhythm: Sinus  HR:   Measurements: 0.12-0.20/0.06-0.10/0.30-0.52

## 2023-03-01 NOTE — CARE PLAN
Problem: Knowledge Deficit - Standard  Goal: Patient and family/care givers will demonstrate understanding of plan of care, disease process/condition, diagnostic tests and medications  Outcome: Progressing     Problem: Pain - Standard  Goal: Alleviation of pain or a reduction in pain to the patient’s comfort goal  Outcome: Progressing   The patient is Stable - Low risk of patient condition declining or worsening    Shift Goals  Clinical Goals: Monitor labs  Patient Goals: Comfort  Family Goals: LUCAS    Progress made toward(s) clinical / shift goals:      Patient is not progressing towards the following goals:

## 2023-03-01 NOTE — ASSESSMENT & PLAN NOTE
Staph hominus bacteremia, methicillin-resistant  Continue Vancomycin end date 3/4/23. Remove PICC line once completed  - Pharmacy to dose  - carefully need to monitor renal function, vanco trough or AUC, monitor for allergic response.

## 2023-03-01 NOTE — PROGRESS NOTES
"Pharmacy Vancomycin Kinetics Note for 3/1/2023     62 y.o. male on Vancomycin day # 6     Vancomycin Indication (Two level/Trough based Dosing): Bacteremia (goal trough 15-20)    Provider specified end date: 03/04/23    Active Antibiotics (From admission, onward)      Ordered     Ordering Provider       Wed Mar 1, 2023 11:21 AM    03/01/23 1121  vancomycin 750 mg in 250 mL NS IVPB Premix  (vancomycin (VANCOCIN) IV (LD + Maintenance))  EVERY 12 HOURS         Khai Nelson M.D.       Fri Feb 24, 2023  9:37 AM    02/24/23 0937  MD Alert...Vancomycin per Pharmacy  PHARMACY TO DOSE        Question:  Indication(s) for vancomycin?  Answer:  Unknown source of infection    Khai Nelson M.D.          Dosing Weight: 94.2 kg (207 lb 10.8 oz)  Admission History: Admitted on 2/21/2023 for GI bleed [K92.2]  Pertinent history: Admitted on 2/21/2023 for GI bleed (K92.2)  Pertinent history: GIB, quadrapalegic, recurrent UTI w/ hx of ESBL, septic shock, C diff hx, chronic supra-pubic cath, decubital ulcer (chronic).    Allergies:   Piperacillin sod-tazobactam so     Pertinent cultures to date:   Results       Procedure Component Value Units Date/Time    BLOOD CULTURE [[id number]] Collected: 02/24/23 1012    Order Status: Completed Specimen: Blood from Peripheral Updated: 03/01/23 1017     Significant Indicator NEG     Source BLD     Site PERIPHERAL     Culture Result No growth after 5 days of incubation.  Blood culture testing and Gram stain, if indicated, are  performed at [facility], [address] [place] [address].  Positive blood cultures are  sent to [facility], [address] [place] [address], for organism identification and  susceptibility testing.      Narrative:      Per Hospital Policy: Only change Specimen Src: to \"Line\" if  specified by physician order.  Right Hand    BLOOD CULTURE [[id number]] Collected: 02/23/23 0315    Order Status: Completed Specimen: Blood " "from Peripheral Updated: 02/28/23 0417     Significant Indicator NEG     Source BLD     Site PERIPHERAL     Culture Result No growth after 5 days of incubation.  Blood culture testing and Gram stain, if indicated, are  performed at [facility], [address] [place] [address].  Positive blood cultures are  sent to [facility], [address] [place] [address], for organism identification and  susceptibility testing.      Narrative:      Per Hospital Policy: Only change Specimen Src: to \"Line\" if  specified by physician order.  Left Hand    BLOOD CULTURE [[id number]] Collected: 02/23/23 0315    Order Status: Completed Specimen: Blood from Peripheral Updated: 02/28/23 0417     Significant Indicator NEG     Source BLD     Site PERIPHERAL     Culture Result No growth after 5 days of incubation.  Blood culture testing and Gram stain, if indicated, are  performed at [facility], [address] [place] [address].  Positive blood cultures are  sent to [facility], [address] [place] [address], for organism identification and  susceptibility testing.      Narrative:      Per Hospital Policy: Only change Specimen Src: to \"Line\" if  specified by physician order.  2nd draw was on anpther finer on the left hand...  Left Hand    Culture Wound w/Gram Stain [[id number]] Collected: 02/22/23 2000    Order Status: Completed Specimen: Wound from Right Hip Updated: 02/26/23 0950     Significant Indicator NEG     Source WND     Site RIGHT HIP     Culture Result Heavy growth mixed enteric geeta.     Gram Stain Result Moderate Gram positive cocci in chains.    Narrative:      Collected By: [id number] DAJUAN CELESTIN posterior Ischium  Collected By: [id number] DAJUAN CARLIN    BLOOD CULTURE [[id number]]  (Abnormal) Collected: 02/21/23 1212    Order Status: Completed Specimen: Blood from Peripheral Updated: 02/25/23 1410     Significant " "Indicator POS     Source BLD     Site PERIPHERAL     Culture Result Growth detected by Bactec instrument. 02/22/2023  12:52      Staphylococcus hominis  See previous culture for sensitivity report.        Coagulase-negative Staphylococcus species  Possible Contaminant  Isolated from one set only, please correlate with clinical  condition. Contact the Microbiology department within 48 hr  if identification and susceptibility are needed.      Narrative:      CALL  [facility]  ICU tel. ,  CALLED  ICU tel.  02/22/2023, 12:52, RB PERF. RESULTS CALLED TO: RN [phone]  Per Hospital Policy: Only change Specimen Src: to \"Line\" if  specified by physician order.  Right Hand    URINE CULTURE(NEW) [[id number]]  (Abnormal)  (Susceptibility) Collected: 02/21/23 1200    Order Status: Completed Specimen: Other from Urine, Suprapubic Updated: 02/25/23 0917     Significant Indicator POS     Source URSP     Site URINE, SUPRAPUBIC     Culture Result -     Gram Stain Result Many Gram positive cocci in clusters.     Culture Result Enterococcus faecalis  >100,000 cfu/mL        Klebsiella pneumoniae  >100,000 cfu/mL        Proteus vulgaris group  >100,000 cfu/mL        Aerococcus sanguinicola  >100,000 cfu/mL      Narrative:      Indication for culture:->Emergency Room Patient  Indication for culture:->Emergency Room Patient    BLOOD CULTURE [[id number]] Collected: 02/24/23 0930    Order Status: Completed Specimen: Blood from Peripheral Updated: 02/25/23 0635     Significant Indicator NEG     Source BLD     Site PERIPHERAL     Culture Result No Growth  Note: Blood cultures are incubated for 5 days and  are monitored continuously.Positive blood cultures  are called to the RN and reported as soon as  they are identified.  Blood culture testing and Gram stain, if indicated, are  performed at [facility], [address] [place] [address].[place], [place].  Positive blood cultures are  sent to [facility], [address] [place] [address] " "[place], [place], for organism identification and  susceptibility testing.      Narrative:      Per Hospital Policy: Only change Specimen Src: to \"Line\" if  specified by physician order.  Left Hand    BLOOD CULTURE [[id number]]  (Abnormal)  (Susceptibility) Collected: 02/21/23 1200    Order Status: Completed Specimen: Blood from Peripheral Updated: 02/24/23 1215     Significant Indicator POS     Source BLD     Site PERIPHERAL     Culture Result Growth detected by Bactec instrument. 02/22/2023  12:45  Negative for Staphylococcus aureus and MRSA by PCR. Correlate  ongoing need for antibiotics with clinical condition.        Staphylococcus hominis    Narrative:      CALL  [facility]  ICU tel. ,  CALLED  ICU tel.  02/22/2023, 17:27, RB PERF. RESULTS CALLED TO: Pharmacy x  [phone]  Per Hospital Policy: Only change Specimen Src: to \"Line\" if  specified by physician order.  Left AC    GRAM STAIN [[id number]] Collected: 02/21/23 1200    Order Status: Completed Specimen: Other Updated: 02/23/23 1719     Significant Indicator .     Source URSP     Site URINE, SUPRAPUBIC     Gram Stain Result Many Gram positive cocci in clusters.    Narrative:      Indication for culture:->Emergency Room Patient  Indication for culture:->Emergency Room Patient    GRAM STAIN [[id number]] Collected: 02/22/23 2000    Order Status: Completed Specimen: Wound Updated: 02/23/23 1716     Significant Indicator .     Source WND     Site RIGHT HIP     Gram Stain Result Moderate Gram positive cocci in chains.    Narrative:      Collected By: [id number] DAJUAN CARLIN  R posterior Ischium  Collected By: [id number] DAJUAN CARLIN    MRSA By PCR (Amp) [[id number]] Collected: 02/22/23 1515    Order Status: Completed Specimen: Respirate from Nares Updated: 02/22/23 2159     MRSA by PCR Negative    Narrative:      Special Contact Isolation  Collected By: [id number] ROE MICHEL I    C Diff by PCR rflx Toxin [[id number]] Collected: 02/21/23 1630    Order Status: " "Completed Specimen: Stool Updated: 23 2155     C Diff by PCR Negative     Comment: C. difficile NOT detected by PCR.  Treatment not indicated per guidelines.  Repeat testing not indicated within 7 days.          027-NAP1-BI Presumptive Negative     Comment: Presumptive 027/NAP1/BI target DNA sequences are NOT DETECTED.       Narrative:      Does this patient have risk factors for C-diff?->Yes  C-Diff Risk Factors->antibiotic exposure  Has patient taken stool softeners or laxatives in the last 5  days?->Yes    URINALYSIS [[id number]]  (Abnormal) Collected: 23 1200    Order Status: Completed Specimen: Urine Updated: 23 1616     Color Yellow     Character Cloudy     Specific Gravity <=1.005     Ph >=9.0     Glucose Negative mg/dL      Ketones Negative mg/dL      Protein Negative mg/dL      Bilirubin Negative     Nitrite Negative     Leukocyte Esterase Negative     Occult Blood Negative     Micro Urine Req Microscopic    Narrative:      Indication for culture:->Emergency Room Patient          Labs:   Estimated Creatinine Clearance: 284.5 mL/min (A) (by C-G formula based on SCr of 0.33 mg/dL (L)).  Recent Labs     23  0057 23  1350 23  0112 23  1151 23  0243   WBC 5.1 5.7 4.9 5.3 4.3*     Recent Labs     23  0057 23  0112   BUN 16 11   CREATININE 0.43* 0.33*     Intake/Output Summary (Last 24 hours) at 3/1/2023 1121  Last data filed at 2023 2145  Gross per 24 hour   Intake 150 ml   Output 1400 ml   Net -1250 ml      BP (!) 72/44   Pulse 83   Temp 36.6 °C (97.8 °F) (Oral)   Resp 17   Ht 1.905 m (6' 3\")   Wt 88.7 kg (195 lb 8.8 oz)   SpO2 97%  Temp (24hrs), Av.7 °C (98 °F), Min:36.5 °C (97.7 °F), Max:37 °C (98.6 °F)      List concerns for Vancomycin clearance:     Hypermetabolic State (SIRS);Pressors/Hypotension;BUN/Scr ratio greater than 20:1;Nephrotoxic drugs    Pharmacokinetics:   trough kinetics:   Recent Labs     23  0536   GERI " 22.5*     A/P:     -  Vancomycin dose: dose changed to 750 mg q12h starting 2100 tonight to account for elevated trough.     -  Next vancomycin level(s):    -3/3 Vt @ 0830     -  Comments: Trough elevated, dose adjusted to 750 mg q12h with start time delayed to 2100 for elevated trough level. Stop date 3/4 per provider. Trough scheduled for 3/3 @ 0930 for monitoring.     Isreal Adan, LaylaD

## 2023-03-01 NOTE — CARE PLAN
The patient is Stable - Low risk of patient condition declining or worsening    Shift Goals  Clinical Goals: Monitor H&H, s/s of bleeding, Abdominal XRAY  Patient Goals: BM, more stool output in colostomy  Family Goals: LUCAS    Progress made toward(s) clinical / shift goals:  Progressing    Patient is not progressing towards the following goals:

## 2023-03-02 PROCEDURE — 94760 N-INVAS EAR/PLS OXIMETRY 1: CPT

## 2023-03-02 PROCEDURE — 700102 HCHG RX REV CODE 250 W/ 637 OVERRIDE(OP): Performed by: INTERNAL MEDICINE

## 2023-03-02 PROCEDURE — 700105 HCHG RX REV CODE 258: Performed by: INTERNAL MEDICINE

## 2023-03-02 PROCEDURE — 700101 HCHG RX REV CODE 250: Performed by: INTERNAL MEDICINE

## 2023-03-02 PROCEDURE — A9270 NON-COVERED ITEM OR SERVICE: HCPCS | Performed by: INTERNAL MEDICINE

## 2023-03-02 PROCEDURE — 770020 HCHG ROOM/CARE - TELE (206)

## 2023-03-02 PROCEDURE — 99233 SBSQ HOSP IP/OBS HIGH 50: CPT | Performed by: INTERNAL MEDICINE

## 2023-03-02 PROCEDURE — 700111 HCHG RX REV CODE 636 W/ 250 OVERRIDE (IP): Performed by: INTERNAL MEDICINE

## 2023-03-02 RX ADMIN — OMEPRAZOLE 20 MG: 20 CAPSULE, DELAYED RELEASE ORAL at 17:57

## 2023-03-02 RX ADMIN — SENNOSIDES AND DOCUSATE SODIUM 2 TABLET: 50; 8.6 TABLET ORAL at 17:57

## 2023-03-02 RX ADMIN — SENNOSIDES AND DOCUSATE SODIUM 2 TABLET: 50; 8.6 TABLET ORAL at 05:36

## 2023-03-02 RX ADMIN — MIDODRINE HYDROCHLORIDE 15 MG: 5 TABLET ORAL at 09:08

## 2023-03-02 RX ADMIN — VANCOMYCIN HYDROCHLORIDE 750 MG: 500 INJECTION, POWDER, LYOPHILIZED, FOR SOLUTION INTRAVENOUS at 21:33

## 2023-03-02 RX ADMIN — VANCOMYCIN HYDROCHLORIDE 750 MG: 500 INJECTION, POWDER, LYOPHILIZED, FOR SOLUTION INTRAVENOUS at 10:37

## 2023-03-02 RX ADMIN — MIDODRINE HYDROCHLORIDE 15 MG: 5 TABLET ORAL at 11:57

## 2023-03-02 RX ADMIN — MIDODRINE HYDROCHLORIDE 15 MG: 5 TABLET ORAL at 17:56

## 2023-03-02 RX ADMIN — OMEPRAZOLE 20 MG: 20 CAPSULE, DELAYED RELEASE ORAL at 05:36

## 2023-03-02 ASSESSMENT — ENCOUNTER SYMPTOMS
BACK PAIN: 0
ABDOMINAL PAIN: 0
HEADACHES: 0
CONSTIPATION: 0
PALPITATIONS: 0
COUGH: 0
DIARRHEA: 0
FEVER: 0
SHORTNESS OF BREATH: 0
DIZZINESS: 0
CHILLS: 0
NAUSEA: 0
VOMITING: 0

## 2023-03-02 ASSESSMENT — PAIN DESCRIPTION - PAIN TYPE: TYPE: ACUTE PAIN

## 2023-03-02 NOTE — PROGRESS NOTES
Bedside report received from Shayy MIGUEL and assumed Pt's cares. Call light within reach. Safety measures in place.

## 2023-03-02 NOTE — DISCHARGE PLANNING
Case Management Discharge Planning    Admission Date: 2/21/2023  GMLOS: 5  ALOS: 9    6-Clicks ADL Score: 10  6-Clicks Mobility Score: 6  PT and/or OT Eval ordered: Yes  Post-acute Referrals Ordered: Yes  Post-acute Choice Obtained: NA  Has referral(s) been sent to post-acute provider:  TORREY      Anticipated Discharge Dispo: Discharge Disposition: Discharged to home/self care (01)  Discharge Address: [address] [place] [address]    DME Needed: No    Action(s) Taken: RNCM attended IDT rounds and reviewed chart. Per OT, pt has history of quadriplegia, currently at his functional baseline. Plan for pt to remain inpatient to finish IV vanco course(stop date 03/04/2023) and then return home to prior living arrangement with brother and sister-in-law.     Escalations Completed: None    Medically Clear: No    Next Steps:  f/u with medical team to discuss DC needs and barriers    Barriers to Discharge: Medical clearance, Medicaid FFS
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
## 2023-03-02 NOTE — CARE PLAN
The patient is Watcher - Medium risk of patient condition declining or worsening    Shift Goals  Clinical Goals: attempt to have pt allow us to turn pt  Patient Goals: Sleep  Family Goals: No family present    Progress made toward(s) clinical / shift goals:  watch wounds, encourage pt to move rotating off wounds, educate reasons to mobilize    Patient is not progressing towards the following goals:

## 2023-03-02 NOTE — PROGRESS NOTES
Telemetry shift summary:  Rhythm: A-Fib     HR Range: 70s to 90s      Measurements from strip printed at 00:42:11   NH: -   QRS 0.08   QT -     Ectopies (As per Telemetry Tech report) Rare PVC and trigeminy.

## 2023-03-02 NOTE — CARE PLAN
The patient is Stable - Low risk of patient condition declining or worsening    Shift Goals  Clinical Goals: Administer ABX as schedulled. Turn q 2 hrs  Patient Goals: Sleep  Family Goals: No family present    Progress made toward(s) clinical / shift goals:  Pt has been refusing turn every 2 hrs. Pt on low airloss mattress    Patient is not progressing towards the following goals:    Problem: Pain - Standard  Goal: Alleviation of pain or a reduction in pain to the patient’s comfort goal  Outcome: Progressing     Problem: Skin Care - Ostomy  Goal: Skin remains free from irritation  Outcome: Progressing     Problem: Fall Risk  Goal: Patient will remain free from falls  Outcome: Progressing

## 2023-03-03 LAB
ALBUMIN SERPL BCP-MCNC: 2.9 G/DL (ref 3.2–4.9)
BUN SERPL-MCNC: 8 MG/DL (ref 8–22)
CALCIUM ALBUM COR SERPL-MCNC: 8.7 MG/DL (ref 8.5–10.5)
CALCIUM SERPL-MCNC: 7.8 MG/DL (ref 8.4–10.2)
CHLORIDE SERPL-SCNC: 108 MMOL/L (ref 96–112)
CO2 SERPL-SCNC: 24 MMOL/L (ref 20–33)
CREAT SERPL-MCNC: 0.34 MG/DL (ref 0.5–1.4)
ERYTHROCYTE [DISTWIDTH] IN BLOOD BY AUTOMATED COUNT: 54.5 FL (ref 35.9–50)
GFR SERPLBLD CREATININE-BSD FMLA CKD-EPI: 131 ML/MIN/1.73 M 2
GLUCOSE SERPL-MCNC: 93 MG/DL (ref 65–99)
HCT VFR BLD AUTO: 24.9 % (ref 42–52)
HGB BLD-MCNC: 7.4 G/DL (ref 14–18)
MAGNESIUM SERPL-MCNC: 2 MG/DL (ref 1.5–2.5)
MCH RBC QN AUTO: 28.1 PG (ref 27–33)
MCHC RBC AUTO-ENTMCNC: 29.7 G/DL (ref 33.7–35.3)
MCV RBC AUTO: 94.7 FL (ref 81.4–97.8)
PHOSPHATE SERPL-MCNC: 3.2 MG/DL (ref 2.5–4.5)
PLATELET # BLD AUTO: 392 K/UL (ref 164–446)
PMV BLD AUTO: 9.1 FL (ref 9–12.9)
POTASSIUM SERPL-SCNC: 3.9 MMOL/L (ref 3.6–5.5)
RBC # BLD AUTO: 2.63 M/UL (ref 4.7–6.1)
SODIUM SERPL-SCNC: 141 MMOL/L (ref 135–145)
VANCOMYCIN TROUGH SERPL-MCNC: 10.8 UG/ML (ref 10–20)
WBC # BLD AUTO: 5.4 K/UL (ref 4.8–10.8)

## 2023-03-03 PROCEDURE — 85027 COMPLETE CBC AUTOMATED: CPT

## 2023-03-03 PROCEDURE — 94760 N-INVAS EAR/PLS OXIMETRY 1: CPT

## 2023-03-03 PROCEDURE — 700102 HCHG RX REV CODE 250 W/ 637 OVERRIDE(OP): Performed by: INTERNAL MEDICINE

## 2023-03-03 PROCEDURE — 83735 ASSAY OF MAGNESIUM: CPT

## 2023-03-03 PROCEDURE — 770020 HCHG ROOM/CARE - TELE (206)

## 2023-03-03 PROCEDURE — 36415 COLL VENOUS BLD VENIPUNCTURE: CPT

## 2023-03-03 PROCEDURE — 80069 RENAL FUNCTION PANEL: CPT

## 2023-03-03 PROCEDURE — 99233 SBSQ HOSP IP/OBS HIGH 50: CPT | Performed by: INTERNAL MEDICINE

## 2023-03-03 PROCEDURE — 700111 HCHG RX REV CODE 636 W/ 250 OVERRIDE (IP): Performed by: INTERNAL MEDICINE

## 2023-03-03 PROCEDURE — A9270 NON-COVERED ITEM OR SERVICE: HCPCS | Performed by: INTERNAL MEDICINE

## 2023-03-03 PROCEDURE — 80202 ASSAY OF VANCOMYCIN: CPT

## 2023-03-03 RX ORDER — LINEZOLID 600 MG/1
600 TABLET, FILM COATED ORAL EVERY 12 HOURS
Qty: 20 TABLET | Refills: 0 | Status: SHIPPED | OUTPATIENT
Start: 2023-03-03 | End: 2023-03-04

## 2023-03-03 RX ORDER — MIDODRINE HYDROCHLORIDE 5 MG/1
15 TABLET ORAL
Qty: 270 TABLET | Refills: 3 | Status: SHIPPED | OUTPATIENT
Start: 2023-03-03 | End: 2023-04-09

## 2023-03-03 RX ORDER — LINEZOLID 600 MG/1
600 TABLET, FILM COATED ORAL EVERY 12 HOURS
Status: DISCONTINUED | OUTPATIENT
Start: 2023-03-03 | End: 2023-03-04 | Stop reason: HOSPADM

## 2023-03-03 RX ORDER — OMEPRAZOLE 40 MG/1
40 CAPSULE, DELAYED RELEASE ORAL DAILY
Qty: 30 CAPSULE | Refills: 0 | Status: SHIPPED | OUTPATIENT
Start: 2023-03-03

## 2023-03-03 RX ADMIN — SENNOSIDES AND DOCUSATE SODIUM 2 TABLET: 50; 8.6 TABLET ORAL at 17:41

## 2023-03-03 RX ADMIN — OMEPRAZOLE 20 MG: 20 CAPSULE, DELAYED RELEASE ORAL at 05:27

## 2023-03-03 RX ADMIN — MIDODRINE HYDROCHLORIDE 15 MG: 5 TABLET ORAL at 11:18

## 2023-03-03 RX ADMIN — LINEZOLID 600 MG: 600 TABLET, FILM COATED ORAL at 17:41

## 2023-03-03 RX ADMIN — MIDODRINE HYDROCHLORIDE 15 MG: 5 TABLET ORAL at 17:40

## 2023-03-03 RX ADMIN — VANCOMYCIN HYDROCHLORIDE 750 MG: 500 INJECTION, POWDER, LYOPHILIZED, FOR SOLUTION INTRAVENOUS at 09:08

## 2023-03-03 RX ADMIN — MIDODRINE HYDROCHLORIDE 15 MG: 5 TABLET ORAL at 09:05

## 2023-03-03 RX ADMIN — OMEPRAZOLE 20 MG: 20 CAPSULE, DELAYED RELEASE ORAL at 17:40

## 2023-03-03 RX ADMIN — SENNOSIDES AND DOCUSATE SODIUM 2 TABLET: 50; 8.6 TABLET ORAL at 05:27

## 2023-03-03 ASSESSMENT — ENCOUNTER SYMPTOMS
DIARRHEA: 0
CONSTIPATION: 0
NAUSEA: 0
SHORTNESS OF BREATH: 0
BACK PAIN: 0
CHILLS: 0
PALPITATIONS: 0
FEVER: 0
ABDOMINAL PAIN: 0
DIZZINESS: 0
HEADACHES: 0
VOMITING: 0
COUGH: 0

## 2023-03-03 ASSESSMENT — PAIN DESCRIPTION - PAIN TYPE: TYPE: ACUTE PAIN

## 2023-03-03 NOTE — PROGRESS NOTES
RN contacted wound care RN to discuss ordering full system change for urostomy and colostomy to have at bedside.    RN verified with pt that all dressings have been changed yesterday and are due to be changed next on 3/5/23.

## 2023-03-03 NOTE — CARE PLAN
The patient is Stable - Low risk of patient condition declining or worsening    Shift Goals  Clinical Goals: Turn at lest q2hrs. Pt safety  Patient Goals: Sleep  Family Goals: No family present    Progress made toward(s) clinical / shift goals:  Pt has been refusing repositioning, education provided. Pt on low airloss mattress.     Patient is not progressing towards the following goals:      Problem: Pain - Standard  Goal: Alleviation of pain or a reduction in pain to the patient’s comfort goal  Outcome: Progressing     Problem: Fall Risk  Goal: Patient will remain free from falls  Outcome: Progressing     Problem: Respiratory  Goal: Patient will achieve/maintain optimum respiratory ventilation and gas exchange  Outcome: Progressing

## 2023-03-03 NOTE — PROGRESS NOTES
Charge Nurse Rounding Note    Bedside rounding completed to address quality of care and overall patient experience.    Patient Satisfaction addressed including staff responsiveness. Patient/family are aware of the POC and any questions answered. Thorough safety education completed including use of call light prior to all mobility throughout the entirety of the hospital stay.     Patient/family aware of time of next Hourly Round.    No further questions/concerns currently.     Additional Notes: Pt denied any needs at this time.
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
## 2023-03-03 NOTE — PROGRESS NOTES
Received report from previous shift nurse. Assumed patient's cares Pt is sitting in bed . Pt denies any pain, discomfort, or any needs at this time. Encouraged Pt to call for assistance if needed. Call light within reach. Bed alarm is on .

## 2023-03-03 NOTE — DISCHARGE PLANNING
Case Management Discharge Planning    Admission Date: 2/21/2023  GMLOS: 5  ALOS: 10    6-Clicks ADL Score: 10  6-Clicks Mobility Score: 6  PT and/or OT Eval ordered: Yes  Post-acute Referrals Ordered: NA  Post-acute Choice Obtained: NA  Has referral(s) been sent to post-acute provider:  NA      Anticipated Discharge Dispo: Discharge Disposition: Discharged to home/self care (01)  Discharge Address: [address] [place] [address]    DME Needed: No    Action(s) Taken: Mirza Nelson MD, pt discharging home tomorrow. RNCM faxed transport request to [facility], pending confirmation.    Escalations Completed: None    Medically Clear: No    Next Steps:  f/u with medical team to discuss DC needs and barriers    Barriers to Discharge: Medical clearance

## 2023-03-03 NOTE — DISCHARGE PLANNING
DC Transport Scheduled    Received request at: 3/3/2023 at 0513    Transport Company Scheduled:  [employer]  Spoke with Pat at [place] [facility] to schedule transport.  [place] [facility] Trip #: [id number]     Scheduled Date: 3/4/2023  Scheduled Time: 1000    Destination: [address] [place]     Notified care team of scheduled transport via Voalte.     If there are any changes needed to the DC transportation scheduled, please contact [facility] at ext. [phone] between the hours of 8060-1998 Mon-Fri. If outside those hours, contact the ED Case Manager at ext. [phone].

## 2023-03-03 NOTE — PROGRESS NOTES
"Pharmacy Vancomycin Kinetics Note for 3/3/2023     62 y.o. male on Vancomycin day # 8     Vancomycin Indication (AUC Dosing):    Vancomycin Indication (Two level/Trough based Dosing):      Provider specified end date: 03/04/23    Active Antibiotics (From admission, onward)      Ordered     Ordering Provider       Fri Mar 3, 2023  3:26 PM    03/03/23 1526  vancomycin 1000 mg in 250 mL NS IVPB Premix  (vancomycin (VANCOCIN) IV (LD + Maintenance))  EVERY 12 HOURS         Khai Nelson M.D.       Fri Feb 24, 2023  9:37 AM    02/24/23 0937  MD Alert...Vancomycin per Pharmacy  PHARMACY TO DOSE        Question:  Indication(s) for vancomycin?  Answer:  Unknown source of infection    Khai Nelson M.D.            Dosing Weight: 88.7 kg (195 lb 8.8 oz)      Admission History: Admitted on 2/21/2023 for GI bleed [K92.2]  Pertinent history: Admitted on 2/21/2023 for GI bleed  Pertinent history: Admitted on 2/21/2023 for GI bleed Pertinent history: GIB, quadrapalegic, recurrent UTI w/ hx of ESBL, septic shock, C diff hx, chronic supra-pubic cath, decubital ulcer (chronic).    Allergies:     Piperacillin sod-tazobactam so     Pertinent cultures to date:     Results       Procedure Component Value Units Date/Time    BLOOD CULTURE [[id number]] Collected: 02/24/23 0930    Order Status: Completed Specimen: Blood from Peripheral Updated: 03/01/23 1217     Significant Indicator NEG     Source BLD     Site PERIPHERAL     Culture Result No growth after 5 days of incubation.  Blood culture testing and Gram stain, if indicated, are  performed at [facility], [address] [place] [address].  Positive blood cultures are  sent to [facility], [address] [place] [address], for organism identification and  susceptibility testing.      Narrative:      Per Hospital Policy: Only change Specimen Src: to \"Line\" if  specified by physician order.  Left Hand    BLOOD CULTURE [[id number]] " "Collected: 02/24/23 1012    Order Status: Completed Specimen: Blood from Peripheral Updated: 03/01/23 1017     Significant Indicator NEG     Source BLD     Site PERIPHERAL     Culture Result No growth after 5 days of incubation.  Blood culture testing and Gram stain, if indicated, are  performed at [facility], [facility]  [place], [place].  Positive blood cultures are  sent to [facility], [address] [place] [address], for organism identification and  susceptibility testing.      Narrative:      Per Hospital Policy: Only change Specimen Src: to \"Line\" if  specified by physician order.  Right Hand    BLOOD CULTURE [[id number]] Collected: 02/23/23 0315    Order Status: Completed Specimen: Blood from Peripheral Updated: 02/28/23 0417     Significant Indicator NEG     Source BLD     Site PERIPHERAL     Culture Result No growth after 5 days of incubation.  Blood culture testing and Gram stain, if indicated, are  performed at [facility], [facility]  [place], [place].  Positive blood cultures are  sent to [facility], [address] [place] [address], for organism identification and  susceptibility testing.      Narrative:      Per Hospital Policy: Only change Specimen Src: to \"Line\" if  specified by physician order.  Left Hand    BLOOD CULTURE [[id number]] Collected: 02/23/23 0315    Order Status: Completed Specimen: Blood from Peripheral Updated: 02/28/23 0417     Significant Indicator NEG     Source BLD     Site PERIPHERAL     Culture Result No growth after 5 days of incubation.  Blood culture testing and Gram stain, if indicated, are  performed at [facility], [facility]  [place], [place].  Positive blood cultures are  sent to [facility], [address] [place] [address], for organism identification and  susceptibility testing.      Narrative:      Per Hospital " "Policy: Only change Specimen Src: to \"Line\" if  specified by physician order.  2nd draw was on anpther finer on the left hand...  Left Hand    Culture Wound w/Gram Stain [[id number]] Collected: 02/22/23 2000    Order Status: Completed Specimen: Wound from Right Hip Updated: 02/26/23 0950     Significant Indicator NEG     Source WND     Site RIGHT HIP     Culture Result Heavy growth mixed enteric geeta.     Gram Stain Result Moderate Gram positive cocci in chains.    Narrative:      Collected By: [id number] DAJUAN CARLIN  R posterior Ischium  Collected By: [id number] DAJUAN CARLIN    BLOOD CULTURE [[id number]]  (Abnormal) Collected: 02/21/23 1212    Order Status: Completed Specimen: Blood from Peripheral Updated: 02/25/23 1410     Significant Indicator POS     Source BLD     Site PERIPHERAL     Culture Result Growth detected by Bactec instrument. 02/22/2023  12:52      Staphylococcus hominis  See previous culture for sensitivity report.        Coagulase-negative Staphylococcus species  Possible Contaminant  Isolated from one set only, please correlate with clinical  condition. Contact the Microbiology department within 48 hr  if identification and susceptibility are needed.      Narrative:      CALL  [facility]  ICU tel. ,  CALLED  ICU tel.  02/22/2023, 12:52, RB PERF. RESULTS CALLED TO: RN [phone]  Per Hospital Policy: Only change Specimen Src: to \"Line\" if  specified by physician order.  Right Hand    URINE CULTURE(NEW) [[id number]]  (Abnormal)  (Susceptibility) Collected: 02/21/23 1200    Order Status: Completed Specimen: Other from Urine, Suprapubic Updated: 02/25/23 0917     Significant Indicator POS     Source URSP     Site URINE, SUPRAPUBIC     Culture Result -     Gram Stain Result Many Gram positive cocci in clusters.     Culture Result Enterococcus faecalis  >100,000 cfu/mL        Klebsiella pneumoniae  >100,000 cfu/mL        Proteus vulgaris group  >100,000 cfu/mL        Aerococcus sanguinicola  >100,000 cfu/mL   " "   Narrative:      Indication for culture:->Emergency Room Patient  Indication for culture:->Emergency Room Patient    BLOOD CULTURE [[id number]]  (Abnormal)  (Susceptibility) Collected: 02/21/23 1200    Order Status: Completed Specimen: Blood from Peripheral Updated: 02/24/23 1215     Significant Indicator POS     Source BLD     Site PERIPHERAL     Culture Result Growth detected by Bactec instrument. 02/22/2023  12:45  Negative for Staphylococcus aureus and MRSA by PCR. Correlate  ongoing need for antibiotics with clinical condition.        Staphylococcus hominis    Narrative:      CALL  [facility]  ICU tel. ,  CALLED  ICU tel.  02/22/2023, 17:27, RB PERF. RESULTS CALLED TO: Pharmacy x  [phone]  Per Hospital Policy: Only change Specimen Src: to \"Line\" if  specified by physician order.  Left AC    GRAM STAIN [[id number]] Collected: 02/21/23 1200    Order Status: Completed Specimen: Other Updated: 02/23/23 1719     Significant Indicator .     Source URSP     Site URINE, SUPRAPUBIC     Gram Stain Result Many Gram positive cocci in clusters.    Narrative:      Indication for culture:->Emergency Room Patient  Indication for culture:->Emergency Room Patient    GRAM STAIN [[id number]] Collected: 02/22/23 2000    Order Status: Completed Specimen: Wound Updated: 02/23/23 1716     Significant Indicator .     Source WND     Site RIGHT HIP     Gram Stain Result Moderate Gram positive cocci in chains.    Narrative:      Collected By: [id number] DAJUAN CARLIN  R posterior Ischium  Collected By: [id number] DAJUAN CARLIN    MRSA By PCR (Amp) [[id number]] Collected: 02/22/23 1515    Order Status: Completed Specimen: Respirate from Nares Updated: 02/22/23 2159     MRSA by PCR Negative    Narrative:      Special Contact Isolation  Collected By: [id number] ROE MICHEL I    C Diff by PCR rflx Toxin [[id number]] Collected: 02/21/23 1630    Order Status: Completed Specimen: Stool Updated: 02/21/23 2155     C Diff by PCR Negative     Comment: C. " "difficile NOT detected by PCR.  Treatment not indicated per guidelines.  Repeat testing not indicated within 7 days.          027-NAP1-BI Presumptive Negative     Comment: Presumptive 027/NAP1/BI target DNA sequences are NOT DETECTED.       Narrative:      Does this patient have risk factors for C-diff?->Yes  C-Diff Risk Factors->antibiotic exposure  Has patient taken stool softeners or laxatives in the last 5  days?->Yes    URINALYSIS [[id number]]  (Abnormal) Collected: 23 1200    Order Status: Completed Specimen: Urine Updated: 23 1616     Color Yellow     Character Cloudy     Specific Gravity <=1.005     Ph >=9.0     Glucose Negative mg/dL      Ketones Negative mg/dL      Protein Negative mg/dL      Bilirubin Negative     Nitrite Negative     Leukocyte Esterase Negative     Occult Blood Negative     Micro Urine Req Microscopic    Narrative:      Indication for culture:->Emergency Room Patient            Labs:     Estimated Creatinine Clearance: 276.1 mL/min (A) (by C-G formula based on SCr of 0.34 mg/dL (L)).  Recent Labs     23  0243 23  0825   WBC 4.3* 5.4     Recent Labs     23  0825   BUN 8   CREATININE 0.34*   ALBUMIN 2.9*       Intake/Output Summary (Last 24 hours) at 3/3/2023 1526  Last data filed at 3/3/2023 1000  Gross per 24 hour   Intake 850.01 ml   Output 1650 ml   Net -799.99 ml      BP 90/57   Pulse 91   Temp 36.4 °C (97.5 °F) (Oral)   Resp 18   Ht 1.905 m (6' 3\")   Wt 88.7 kg (195 lb 8.8 oz)   SpO2 93%  Temp (24hrs), Av.7 °C (98.1 °F), Min:36.4 °C (97.5 °F), Max:36.9 °C (98.5 °F)      List concerns for Vancomycin clearance:     BUN/Scr ratio greater than 20:1;Nephrotoxic drugs    Pharmacokinetics:       Trough kinetics:   Recent Labs     23  0825   VANCOTROUGH 10.8       A/P:     -  Vancomycin dose: change maintenance dose of Vancomycin from 750 mg IV every 12hours to 1000 mg IV every 12 hours      Next vancomycin level(s):    TBD if vancomycin is " continued.             Predicted vancomycin trough of 15    -  Comments: If vancomycin continued, Will order pk and vtr levels with next set.    Raad Putnam, [facility]

## 2023-03-03 NOTE — PROGRESS NOTES
Telemetry shift summary:  Rhythm: A-Fib     HR Range: 70's to 90's      Measurements from strip printed at 00:29:52   MO: -   QRS 0.08   QT -     Ectopies (As per Telemetry Tech report) Rare PVC.

## 2023-03-04 VITALS
BODY MASS INDEX: 28.1 KG/M2 | RESPIRATION RATE: 18 BRPM | HEART RATE: 81 BPM | WEIGHT: 225.97 LBS | TEMPERATURE: 97.9 F | HEIGHT: 75 IN | SYSTOLIC BLOOD PRESSURE: 100 MMHG | OXYGEN SATURATION: 95 % | DIASTOLIC BLOOD PRESSURE: 74 MMHG

## 2023-03-04 PROBLEM — K92.2 UPPER GI BLEED: Status: RESOLVED | Noted: 2023-02-21 | Resolved: 2023-03-04

## 2023-03-04 PROBLEM — N32.89 BLOOD CLOT IN BLADDER: Status: RESOLVED | Noted: 2023-02-22 | Resolved: 2023-03-04

## 2023-03-04 PROBLEM — E87.20 METABOLIC ACIDOSIS: Status: RESOLVED | Noted: 2021-10-02 | Resolved: 2023-03-04

## 2023-03-04 PROCEDURE — 700102 HCHG RX REV CODE 250 W/ 637 OVERRIDE(OP): Performed by: INTERNAL MEDICINE

## 2023-03-04 PROCEDURE — RXMED WILLOW AMBULATORY MEDICATION CHARGE: Performed by: INTERNAL MEDICINE

## 2023-03-04 PROCEDURE — A9270 NON-COVERED ITEM OR SERVICE: HCPCS | Performed by: INTERNAL MEDICINE

## 2023-03-04 PROCEDURE — 99239 HOSP IP/OBS DSCHRG MGMT >30: CPT | Performed by: INTERNAL MEDICINE

## 2023-03-04 RX ORDER — NYSTATIN 100000 [USP'U]/G
1 POWDER TOPICAL 3 TIMES DAILY
Qty: 63 APPLICATION | Refills: 0 | Status: SHIPPED | OUTPATIENT
Start: 2023-03-04 | End: 2023-03-30

## 2023-03-04 RX ADMIN — LINEZOLID 600 MG: 600 TABLET, FILM COATED ORAL at 04:51

## 2023-03-04 RX ADMIN — OMEPRAZOLE 20 MG: 20 CAPSULE, DELAYED RELEASE ORAL at 04:51

## 2023-03-04 RX ADMIN — MIDODRINE HYDROCHLORIDE 15 MG: 5 TABLET ORAL at 07:49

## 2023-03-04 RX ADMIN — SENNOSIDES AND DOCUSATE SODIUM 2 TABLET: 50; 8.6 TABLET ORAL at 04:51

## 2023-03-04 ASSESSMENT — FIBROSIS 4 INDEX: FIB4 SCORE: 0.62
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
## 2023-03-04 NOTE — PROGRESS NOTES
Pt discharged to home. Discharge instructions provided to pt. Pt verbalizes understanding. Pt states all questions have been answered. Signed copy in chart. Prescriptions sent to [facility]. Pt states that all personal belongings are in possession. Pt off unit via [facility], escorted by nursing staff.    Pt concerned with wound care at home. MD at bedside and discussed the insurance barriers to HH for wound care. RN included wound care instructions and supplies with dc paperwork.

## 2023-03-04 NOTE — PROGRESS NOTES
notified RN that transport has been established for tomorrow, 3/4 at 1000. RN notified  that barriers to discharge are meds-to-beds delivery typically occur at around 1500. No reply.     RN left message on voicemail to ER  at ex: [phone]

## 2023-03-04 NOTE — PROGRESS NOTES
Received report from MYRIAM Zhao. No acute events overnight, pt on an air lost mattress. Safety measures in place, care assumed.

## 2023-03-04 NOTE — CARE PLAN
The patient is Stable - Low risk of patient condition declining or worsening    Shift Goals  Clinical Goals: Turn at lest q2hrs. Pt safety, discharge plan for tomorrow  Patient Goals: Sleep  Family Goals: No family present    Progress made toward(s) clinical / shift goals:  Progressing    Patient is not progressing towards the following goals:

## 2023-03-04 NOTE — PROGRESS NOTES
ER  followed up with pt's discharge plan tomorrow. Although meds to beds deliver at 1500, [facility] can also deliver to pt's house if no family members are able to  medication at [facility] and another option is that pt's family members can  medication for pt. Meds to beds is not a true barrier.

## 2023-03-04 NOTE — PROGRESS NOTES
Patient assessment completed. Discussed q2h turns with patient. Patient refused q2h turns. Education was provided and charge nurse was notified. Patient was encouraged to reach out to staff if repositioning was needed. Patient requested for the colostomy to be checked. All patient's needs and questions were addressed at this time.

## 2023-03-04 NOTE — CARE PLAN
The patient is Stable - Low risk of patient condition declining or worsening    Shift Goals  Clinical Goals: change colostomy  Patient Goals: sleep  Family Goals: No family present    Progress made toward(s) clinical / shift goals:    Problem: Knowledge Deficit - Standard  Goal: Patient and family/care givers will demonstrate understanding of plan of care, disease process/condition, diagnostic tests and medications  Outcome: Progressing  Note: Patient verbalizes understanding of the plan of care and is actively engaged with treatments.      Problem: Skin Integrity  Goal: Skin integrity is maintained or improved  Outcome: Progressing  Note: Wound consult orders in place.      Problem: Skin Care - Ostomy  Goal: Skin remains free from irritation  Outcome: Progressing  Note: Ostomy care provided as needed with wound orders present.        Patient is not progressing towards the following goals:

## 2023-03-04 NOTE — DISCHARGE INSTRUCTIONS
Discharge Instructions    Gastrointestinal Bleeding  Gastrointestinal (GI) bleeding is bleeding somewhere along the path that food travels through the body (digestive tract). This path is anywhere between the mouth and the opening of the butt (anus). You may have blood in your poop (stool) or have black poop. If you throw up (vomit), there may be blood in it.  This condition can be mild, serious, or even life-threatening. If you have a lot of bleeding, you may need to stay in the hospital.  What are the causes?  This condition may be caused by:  Irritation and swelling of the esophagus (esophagitis). The esophagus is part of the body that moves food from your mouth to your stomach.  Swollen veins in the butt (hemorrhoids).  Areas of painful tearing in the opening of the butt (anal fissures). These are often caused by passing hard poop.  Pouches that form on the colon over time (diverticulosis).  Irritation and swelling (diverticulitis) in areas where pouches have formed on the colon.  Growths (polyps) or cancer. Colon cancer often starts out as growths that are not cancer.  Irritation of the stomach lining (gastritis).  Sores (ulcers) in the stomach.  What increases the risk?  You are more likely to develop this condition if you:  Have a certain type of infection in your stomach (Helicobacter pylori infection).  Take certain medicines.  Smoke.  Drink alcohol.  What are the signs or symptoms?  Common symptoms of this condition include:  Throwing up (vomiting) material that has bright red blood in it. It may look like coffee grounds.  Changes in your poop. The poop may:  Have red blood in it.  Be black, look like tar, and smell stronger than normal.  Be red.  Pain or cramping in the belly (abdomen).  How is this treated?  Treatment for this condition depends on the cause of the bleeding. For example:  Sometimes, the bleeding can be stopped during a procedure that is done to find the problem (endoscopy or  colonoscopy).  Medicines can be used to:  Help control irritation, swelling, or infection.  Reduce acid in your stomach.  Certain problems can be treated with:  Creams.  Medicines that are put in the butt (suppositories).  Warm baths.  Surgery is sometimes needed.  If you lose a lot of blood, you may need a blood transfusion.  If bleeding is mild, you may be allowed to go home. If there is a lot of bleeding, you will need to stay in the hospital.  Follow these instructions at home:    Take over-the-counter and prescription medicines only as told by your doctor.  Eat foods that have a lot of fiber in them. These foods include beans, whole grains, and fresh fruits and vegetables. You can also try eating 1-3 prunes each day.  Drink enough fluid to keep your pee (urine) pale yellow.  Keep all follow-up visits as told by your doctor. This is important.  Contact a doctor if:  Your symptoms do not get better.  Get help right away if:  Your bleeding does not stop.  You feel dizzy or you pass out (faint).  You feel weak.  You have very bad cramps in your back or belly.  You pass large clumps of blood (clots) in your poop.  Your symptoms are getting worse.  You have chest pain or fast heartbeats.  Summary  GI bleeding is bleeding somewhere along the path that food travels through the body (digestive tract).  This bleeding can be caused by many things. Treatment depends on the cause of the bleeding.  Take medicines only as told by your doctor.  Keep all follow-up visits as told by your doctor. This is important.  This information is not intended to replace advice given to you by your health care provider. Make sure you discuss any questions you have with your health care provider.  Document Released: 09/26/2009 Document Revised: 07/31/2019 Document Reviewed: 07/31/2019  [employer] Patient Education © 2020 [employer]      Discharged to home by medical transportation with escort. Discharged via ambulance, hospital escort:  Yes.  Special equipment needed: Not Applicable    Be sure to schedule a follow-up appointment with your primary care doctor or any specialists as instructed.     WOUNDS    R  and L heels: Nursing to cleanse wound/periwound with SALINE, dry.  Apply a small piece of silver hydrofiber (AKA aquacel ag) slightly bigger than the wound bed.  Cover with mepilex.  Nursing to change 72 H and as needed saturation or dislodgment.    R coccyx/buttocks: Nursing to cleanse wound/periwound with SALINE, dry. Leave Mepitel on wound bed with cleaning and on wound bed for one week. Blot when drying wound.  Apply Mepitel one to the wound bed and periwound. Cover Aquacel Ag (silver hydrofiber) with mepilex.  Nursing to change 72 H and PRN saturation or dislodgment. Mepitel one weekly changes and PRN dislodgment    Discharge Plan:   Diet Plan: Discussed  Activity Level: Discussed  Confirmed Follow up Appointment: Patient to Call and Schedule Appointment  Confirmed Symptoms Management: Discussed  Medication Reconciliation Updated: Yes  Influenza Vaccine Indication: Not indicated: Previously immunized this influenza season and > 8 years of age    I understand that a diet low in cholesterol, fat, and sodium is recommended for good health. Unless I have been given specific instructions below for another diet, I accept this instruction as my diet prescription.   Other diet: regular, bite sized    Special Instructions: None    -Is this patient being discharged with medication to prevent blood clots?  No    Is patient discharged on Warfarin / Coumadin?   No

## 2023-03-10 ENCOUNTER — PHARMACY VISIT (OUTPATIENT)
Dept: PHARMACY | Facility: MEDICAL CENTER | Age: 63
End: 2023-03-10
Payer: COMMERCIAL

## 2023-05-02 ENCOUNTER — HOME HEALTH ADMISSION (OUTPATIENT)
Dept: HOME HEALTH SERVICES | Facility: HOME HEALTHCARE | Age: 63
End: 2023-05-02
Payer: MEDICAID

## 2024-05-02 ENCOUNTER — HOME HEALTH ADMISSION (OUTPATIENT)
Dept: HOME HEALTH SERVICES | Facility: HOME HEALTHCARE | Age: 64
End: 2024-05-02
Payer: MEDICAID

## 2024-05-15 ENCOUNTER — HOME HEALTH ADMISSION (OUTPATIENT)
Dept: HOME HEALTH SERVICES | Facility: HOME HEALTHCARE | Age: 64
End: 2024-05-15
Payer: MEDICAID

## 2024-08-02 ENCOUNTER — HOME HEALTH ADMISSION (OUTPATIENT)
Dept: HOME HEALTH SERVICES | Facility: HOME HEALTHCARE | Age: 64
End: 2024-08-02
Payer: MEDICAID

## (undated) DEVICE — STAPLER SKIN DISP - (6/BX 10BX/CA) VISISTAT

## (undated) DEVICE — HUMID-VENT HEAT AND MOISTURE EXCHANGE- (50/BX)

## (undated) DEVICE — RESERVOIR SUCTION 100 CC - SILICONE (20EA/CA)

## (undated) DEVICE — BLADE SURGICAL #15 - (50/BX 3BX/CA)

## (undated) DEVICE — TUBE E-T HI-LO CUFF 7.5MM (10EA/PK)

## (undated) DEVICE — SYRINGE 10 ML CONTROL LL (25EA/BX 4BX/CA)

## (undated) DEVICE — SENSOR OXIMETER ADULT SPO2 RD SET (20EA/BX)

## (undated) DEVICE — SUTURE 3-0 MONOCRYL PLUS PS-2 - (12/BX)

## (undated) DEVICE — BLOCK BITE ENDOSCOPIC 2809 - (100/BX) INTERMEDIATE

## (undated) DEVICE — GLOVE BIOGEL PI ULTRATOUCH SZ 7.0 SURGICAL PF LF- POWDER FREE (50/BX 4BX/CA)

## (undated) DEVICE — SUTURE 3-0 ETHILON FS-1 - (36/BX) 30 INCH

## (undated) DEVICE — GOWN SURGEONS LARGE - (32/CA)

## (undated) DEVICE — MASK AIRWAY SIZE 4 UNIQUE SILICON (10EA/BX)

## (undated) DEVICE — ELECTRODE DUAL RETURN W/ CORD - (50/PK)

## (undated) DEVICE — TUBE SUCTION YANKAUER  1/4 X 6FT (20EA/CA)"

## (undated) DEVICE — GLOVE BIOGEL SZ 6.5 SURGICAL PF LTX (50PR/BX 4BX/CA)

## (undated) DEVICE — LACTATED RINGERS INJ 1000 ML - (14EA/CA 60CA/PF)

## (undated) DEVICE — MASK AIRWAY SIZE 2 LMA WITH LUBE & SYRINGE (10/BX)

## (undated) DEVICE — DRAPE LAPAROTOMY T SHEET - (12EA/CA)

## (undated) DEVICE — CHLORAPREP 26 ML APPLICATOR - ORANGE TINT(25/CA)

## (undated) DEVICE — BAG, SPONGE COUNT 50600

## (undated) DEVICE — GOWN WARMING STANDARD FLEX - (30/CA)

## (undated) DEVICE — TAPE CLOTH MEDIPORE 6 INCH - (12RL/CA)

## (undated) DEVICE — GLOVE BIOGEL SZ 7.5 SURGICAL PF LTX - (50PR/BX 4BX/CA)

## (undated) DEVICE — DRESSING, WOUND VAC MED.

## (undated) DEVICE — SYRINGE SAFETY 5 ML 18 GA X 1-1/2 BLUNT LL (100/BX 4BX/CA)

## (undated) DEVICE — SOD. CHL. INJ. 0.9% 1000 ML - (14EA/CA 60CA/PF)

## (undated) DEVICE — SUCTION INSTRUMENT YANKAUER BULBOUS TIP W/O VENT (50EA/CA)

## (undated) DEVICE — SODIUM CHL IRRIGATION 0.9% 1000ML (12EA/CA)

## (undated) DEVICE — CUFF BP ADULT LARGE DISPOSABLE (20EA/CA)

## (undated) DEVICE — HEAD HOLDER JUNIOR/ADULT

## (undated) DEVICE — DRESSING PETROLEUM GAUZE 5 X 9" (50EA/BX 4BX/CA)"

## (undated) DEVICE — GLOVE, LITE (PAIR)

## (undated) DEVICE — MASK WITH FACE SHIELD (25/BX 4BX/CA)

## (undated) DEVICE — SET LEADWIRE 5 LEAD BEDSIDE DISPOSABLE ECG (1SET OF 5/EA)

## (undated) DEVICE — NEEDLE NON SAFETY HYPO 22 GA X 1 1/2 IN (100/BX)

## (undated) DEVICE — PACK MINOR BASIN - (2EA/CA)

## (undated) DEVICE — GLOVE BIOGEL PI INDICATOR SZ 6.5 SURGICAL PF LF - (50/BX 4BX/CA)

## (undated) DEVICE — SPONGE GAUZESTER 4 X 4 4PLY - (128PK/CA)

## (undated) DEVICE — TRAY SKIN SCRUB PVP WET (20EA/CA) PART #DYND70356 DISCONTINUED

## (undated) DEVICE — GLOVE BIOGEL SZ 8 SURGICAL PF LTX - (50PR/BX 4BX/CA)

## (undated) DEVICE — TUBE CONNECTING SUCTION - CLEAR PLASTIC STERILE 72 IN (50EA/CA)

## (undated) DEVICE — MASK O2 VNYL ADLT RBRTH HI - (50/CS)

## (undated) DEVICE — KIT ANESTHESIA W/CIRCUIT & 3/LT BAG W/FILTER (20EA/CA)

## (undated) DEVICE — SPONGE GAUZE NON-STERILE 4X4 - (2000/CA 10PK/CA)

## (undated) DEVICE — SUTURE 3-0 VICRYL PLUSPS-2 - 18 INCH (36/BX)

## (undated) DEVICE — SYRINGE DISP. 60 CC LL - (30/BX, 12BX/CA)**WHEN THESE ARE GONE ORDER #500206**

## (undated) DEVICE — KIT ROOM DECONTAMINATION

## (undated) DEVICE — MASK AIRWAY SIZE 3 UNIQUE SILICON (10/BX)

## (undated) DEVICE — KIT CUSTOM PROCEDURE SINGLE FOR ENDO  (15/CA)

## (undated) DEVICE — DRAIN J-VAC 10MM FLAT - (10/CA)

## (undated) DEVICE — WATER IRRIGATION STERILE 1000ML (12EA/CA)

## (undated) DEVICE — MASK ANESTHESIA ADULT  - (100/CA)

## (undated) DEVICE — GOWN SURGEONS X-LARGE - DISP. (30/CA)

## (undated) DEVICE — FORCEP RADIAL JAW 4 STANDARD CAPACITY W/NEEDLE 240CM (40EA/BX)

## (undated) DEVICE — GLOVE BIOGEL PI INDICATOR SZ 7.5 SURGICAL PF LF -(50/BX 4BX/CA)

## (undated) DEVICE — SUTURE 2-0 VICRYL PLUS CT-2 - 27 INCH (36/BX)

## (undated) DEVICE — TUBING CLEARLINK DUO-VENT - C-FLO (48EA/CA)

## (undated) DEVICE — DRESSING ABDOMINAL PAD STERILE 8 X 10" (360EA/CA)"

## (undated) DEVICE — DRAPE SURGICAL U 77X120 - (10/CA)

## (undated) DEVICE — SENSOR SPO2 NEO LNCS ADHESIVE (20/BX) SEE USER NOTES

## (undated) DEVICE — MASK  AIRWAY SIZE 5 UNIQUE SILICON (10EA/BX)

## (undated) DEVICE — ELECTRODE 850 FOAM ADHESIVE - HYDROGEL RADIOTRNSPRNT (50/PK)

## (undated) DEVICE — PROTECTOR ULNA NERVE - (36PR/CA)

## (undated) DEVICE — CANISTER SUCTION RIGID RED 1500CC (40EA/CA)

## (undated) DEVICE — NEPTUNE 4 PORT MANIFOLD - (20/PK)

## (undated) DEVICE — SUTURE GENERAL